# Patient Record
Sex: FEMALE | Race: BLACK OR AFRICAN AMERICAN | NOT HISPANIC OR LATINO | Employment: OTHER | URBAN - METROPOLITAN AREA
[De-identification: names, ages, dates, MRNs, and addresses within clinical notes are randomized per-mention and may not be internally consistent; named-entity substitution may affect disease eponyms.]

---

## 2019-05-01 ENCOUNTER — HOSPITAL ENCOUNTER (OUTPATIENT)
Dept: URGENT CARE | Facility: CLINIC | Age: 39
Discharge: HOME | End: 2019-05-01

## 2020-05-30 ENCOUNTER — HOSPITAL ENCOUNTER (OUTPATIENT)
Dept: URGENT CARE | Facility: CLINIC | Age: 40
Discharge: HOME | End: 2020-05-30
Attending: FAMILY MEDICINE

## 2020-08-26 ENCOUNTER — HOSPITAL ENCOUNTER (OUTPATIENT)
Dept: URGENT CARE | Facility: CLINIC | Age: 40
Discharge: HOME | End: 2020-08-26

## 2020-08-29 LAB — SARS-COV-2 RNA SPEC QL NAA+PROBE: NOT DETECTED

## 2020-09-24 ENCOUNTER — HOSPITAL ENCOUNTER (OUTPATIENT)
Dept: URGENT CARE | Facility: CLINIC | Age: 40
Discharge: HOME | End: 2020-09-24

## 2021-01-08 ENCOUNTER — HOSPITAL ENCOUNTER (OUTPATIENT)
Dept: OTHER | Facility: HOSPITAL | Age: 41
Discharge: HOME | End: 2021-01-08
Attending: INTERNAL MEDICINE

## 2021-02-02 ENCOUNTER — HOSPITAL ENCOUNTER (OUTPATIENT)
Dept: VACCINE CLINIC | Facility: HOSPITAL | Age: 41
Discharge: HOME | End: 2021-02-02
Attending: INTERNAL MEDICINE

## 2021-09-10 ENCOUNTER — OFFICE VISIT (OUTPATIENT)
Dept: INTERNAL MEDICINE | Facility: CLINIC | Age: 41
End: 2021-09-10

## 2021-09-10 ENCOUNTER — TELEPHONE (OUTPATIENT)
Dept: INTERNAL MEDICINE | Facility: CLINIC | Age: 41
End: 2021-09-10

## 2021-09-10 VITALS
HEART RATE: 101 BPM | TEMPERATURE: 97.6 F | DIASTOLIC BLOOD PRESSURE: 84 MMHG | SYSTOLIC BLOOD PRESSURE: 124 MMHG | OXYGEN SATURATION: 98 % | WEIGHT: 253.6 LBS | BODY MASS INDEX: 43.29 KG/M2 | HEIGHT: 64 IN

## 2021-09-10 DIAGNOSIS — L73.2 HIDRADENITIS SUPPURATIVA: ICD-10-CM

## 2021-09-10 DIAGNOSIS — Z13.220 SCREENING FOR LIPID DISORDERS: ICD-10-CM

## 2021-09-10 DIAGNOSIS — M47.817 LUMBOSACRAL SPONDYLOSIS WITHOUT MYELOPATHY: ICD-10-CM

## 2021-09-10 DIAGNOSIS — D25.2 INTRAMURAL AND SUBSEROUS LEIOMYOMA OF UTERUS: ICD-10-CM

## 2021-09-10 DIAGNOSIS — R05.9 COUGH: ICD-10-CM

## 2021-09-10 DIAGNOSIS — F41.1 GAD (GENERALIZED ANXIETY DISORDER): ICD-10-CM

## 2021-09-10 DIAGNOSIS — D25.1 INTRAMURAL AND SUBSEROUS LEIOMYOMA OF UTERUS: ICD-10-CM

## 2021-09-10 DIAGNOSIS — F33.42 RECURRENT MAJOR DEPRESSIVE DISORDER, IN FULL REMISSION (HCC): ICD-10-CM

## 2021-09-10 DIAGNOSIS — G43.829 MENSTRUAL MIGRAINE WITHOUT STATUS MIGRAINOSUS, NOT INTRACTABLE: Primary | ICD-10-CM

## 2021-09-10 DIAGNOSIS — L40.9 PSORIASIS: ICD-10-CM

## 2021-09-10 DIAGNOSIS — J30.2 SEASONAL ALLERGIES: ICD-10-CM

## 2021-09-10 PROBLEM — F43.10 PTSD (POST-TRAUMATIC STRESS DISORDER): Status: ACTIVE | Noted: 2021-09-10

## 2021-09-10 LAB
EXPIRATION DATE: NORMAL
FLUAV AG UPPER RESP QL IA.RAPID: NOT DETECTED
FLUBV AG UPPER RESP QL IA.RAPID: NOT DETECTED
INTERNAL CONTROL: NORMAL
Lab: NORMAL
SARS-COV-2 AG UPPER RESP QL IA.RAPID: NOT DETECTED

## 2021-09-10 PROCEDURE — U0004 COV-19 TEST NON-CDC HGH THRU: HCPCS | Performed by: INTERNAL MEDICINE

## 2021-09-10 PROCEDURE — 87428 SARSCOV & INF VIR A&B AG IA: CPT | Performed by: INTERNAL MEDICINE

## 2021-09-10 PROCEDURE — 99204 OFFICE O/P NEW MOD 45 MIN: CPT | Performed by: INTERNAL MEDICINE

## 2021-09-10 RX ORDER — ADALIMUMAB 80MG/0.8ML
KIT SUBCUTANEOUS
COMMUNITY
End: 2021-10-13

## 2021-09-10 RX ORDER — ATOMOXETINE 60 MG/1
CAPSULE ORAL
COMMUNITY
Start: 2021-06-28 | End: 2021-09-29 | Stop reason: SDUPTHER

## 2021-09-10 RX ORDER — IBUPROFEN 800 MG/1
TABLET ORAL
COMMUNITY
Start: 2021-06-29 | End: 2022-07-22 | Stop reason: SDUPTHER

## 2021-09-10 RX ORDER — BUSPIRONE HYDROCHLORIDE 10 MG/1
TABLET ORAL
COMMUNITY
Start: 2021-06-28 | End: 2021-12-14 | Stop reason: SDUPTHER

## 2021-09-10 RX ORDER — ATOMOXETINE 40 MG/1
CAPSULE ORAL
COMMUNITY
Start: 2021-06-28 | End: 2021-09-29

## 2021-09-10 RX ORDER — TOPIRAMATE 100 MG/1
TABLET, FILM COATED ORAL
COMMUNITY
Start: 2021-08-26 | End: 2021-09-11 | Stop reason: SDUPTHER

## 2021-09-10 RX ORDER — CETIRIZINE HYDROCHLORIDE 10 MG/1
TABLET ORAL
COMMUNITY
Start: 2021-07-23 | End: 2021-09-11 | Stop reason: SDUPTHER

## 2021-09-10 RX ORDER — DULOXETIN HYDROCHLORIDE 60 MG/1
CAPSULE, DELAYED RELEASE ORAL
COMMUNITY
Start: 2021-06-28 | End: 2022-01-14 | Stop reason: SDUPTHER

## 2021-09-10 RX ORDER — GABAPENTIN 600 MG/1
TABLET ORAL
COMMUNITY
Start: 2021-08-15

## 2021-09-10 RX ORDER — DULOXETIN HYDROCHLORIDE 60 MG/1
CAPSULE, DELAYED RELEASE ORAL
COMMUNITY
End: 2021-09-29 | Stop reason: SDUPTHER

## 2021-09-10 RX ORDER — ESZOPICLONE 3 MG/1
TABLET, FILM COATED ORAL
COMMUNITY
Start: 2021-07-23 | End: 2021-12-28

## 2021-09-10 RX ORDER — BENZONATATE 100 MG/1
CAPSULE ORAL
COMMUNITY
Start: 2021-08-13 | End: 2021-09-13 | Stop reason: ALTCHOICE

## 2021-09-10 RX ORDER — CYCLOBENZAPRINE HCL 10 MG
TABLET ORAL
COMMUNITY
Start: 2021-07-28 | End: 2022-02-15

## 2021-09-10 NOTE — TELEPHONE ENCOUNTER
PT VERIFIED      CONTACTED PT PER PROVIDER'S INSTRUCTIONS       PATIENT NOTIFIED OF NEGATIVE POCT COVID RESULTS.

## 2021-09-10 NOTE — PROGRESS NOTES
"Chief Complaint  Establish Care (was being seen on [facility] office)    Subjective          Malinda Saucedo presents to [facility]  History of Present Illness  Recent skin Bx that was inconclusive. Thought to be panniculitis  Pt also with labs consistent with sjogren's  Psoriasis and arthritis- doing well at thi time.   hidranitis suppuritiva- on Humira with good results.   Pt also f/u with OB for leiomyoma.   DDD - pt receives lumbar spin injections at [facility]. Pt also gets gabapentin, flexeril, motrin to use for pain.   Pt tried using topamax for weight loss. Pt reports having topamax to help with migraines. Pt does reports some sleepiness on topamax when taking it at night.   Insomnia- pt is on lunesta to help with sleep.   Pt also developed a cough over the last 24 hours. Very mild symptoms without fevers or SOB.     Objective   Vital Signs:   /84 (BP Location: Left arm, Patient Position: Sitting, Cuff Size: Large Adult)   Pulse 101   Temp 97.6 °F (36.4 °C) (Temporal)   Ht 162.6 cm (64\")   Wt 115 kg (253 lb 9.6 oz)   SpO2 98%   BMI 43.53 kg/m²     Physical Exam   Appearance: No acute distress, well-nourished  Head: normocephalic, atraumatic  Eyes: extraocular movements intact, no scleral icterus, no conjunctival injection  Ears, Nose, and Throat: external ears normal, nares patent, moist mucous membranes  Cardiovascular: regular rate and rhythm. no murmurs, rales, or rhonchi. no edema  Respiratory: breathing comfortably, symmetric chest rise, clear to auscultation bilaterally. No wheezes, rales, or rhonchi.  Neuro: alert and oriented to time, place, and person. Normal gait  Psych: normal mood and affect     Result Review :   The following data was reviewed by: Ant Carlos Jr, MD on 09/10/2021:  Common labs    Common Labsle 9/22/20 9/22/20    0845 0845   Glucose  95   BUN  10   Creatinine  1.68 (A)   Sodium  136 "   Potassium  3.8   Chloride  100   Calcium  9.0   Albumin  4.2   Total Bilirubin  0.37   Alkaline Phosphatase  116 (A)   AST (SGOT)  29   ALT (SGPT)  32   WBC 6.62    Hemoglobin 14.3    Hematocrit 43.7    Platelets 221    (A) Abnormal value            Assessment and Plan    Diagnoses and all orders for this visit:    1. Menstrual migraine without status migrainosus, not intractable (Primary)  Comments:  also used to aid in weigh tloss efforts.   Orders:  -     topiramate (TOPAMAX) 100 MG tablet; Take 1 tablet by mouth Daily.  Dispense: 90 tablet; Refill: 3    2. Screening for lipid disorders  -     Lipid panel; Future    3. Cough  -     POCT SARS-CoV-2 Antigen BIANCA  -     COVID-19 PCR, [facility], NP SWAB IN [facility]EX[facility] VIRAL TRANSPORT MEDIA/ORAL SWISH 24-30 HR TAT - Swab, Nasopharynx    4. YENNY (generalized anxiety disorder)  -     Ambulatory Referral to Psychiatry    5. Recurrent major depressive disorder, in full remission (CMS/HCC)  -     Ambulatory Referral to Psychiatry    6. Intramural and subserous leiomyoma of uterus  Comments:  cont f/u with OB for management.     7. Hidradenitis suppurativa  Comments:  cont f/u with derm. seems to be oing well on humira.     8. Psoriasis  Comments:  likely receieving benefit from humira. cont monitoring.     9. Lumbosacral spondylosis without myelopathy  Comments:  f/u with pain management for pain control regimen and spinal injections.    10. Seasonal allergies  -     cetirizine (zyrTEC) 10 MG tablet; Take 1 tablet by mouth Daily.  Dispense: 90 tablet; Refill: 3        Follow Up   No follow-ups on file.  Patient was given instructions and counseling regarding her condition or for health maintenance advice. Please see specific information pulled into the AVS if appropriate.

## 2021-09-11 PROBLEM — O00.90 ECTOPIC PREGNANCY WITHOUT INTRAUTERINE PREGNANCY: Status: ACTIVE | Noted: 2019-01-25

## 2021-09-11 PROBLEM — D25.2 INTRAMURAL AND SUBSEROUS LEIOMYOMA OF UTERUS: Status: ACTIVE | Noted: 2019-02-12

## 2021-09-11 PROBLEM — D68.59 THROMBOPHILIA: Status: ACTIVE | Noted: 2019-02-12

## 2021-09-11 PROBLEM — F41.9 ANXIETY: Status: RESOLVED | Noted: 2021-02-03 | Resolved: 2021-09-11

## 2021-09-11 PROBLEM — J30.2 SEASONAL ALLERGIES: Status: ACTIVE | Noted: 2021-09-11

## 2021-09-11 PROBLEM — N80.9 ENDOMETRIOSIS DETERMINED BY LAPAROSCOPY: Status: ACTIVE | Noted: 2020-06-11

## 2021-09-11 PROBLEM — D25.1 INTRAMURAL AND SUBSEROUS LEIOMYOMA OF UTERUS: Status: ACTIVE | Noted: 2019-02-12

## 2021-09-11 PROBLEM — L40.9 PSORIASIS: Status: ACTIVE | Noted: 2021-02-03

## 2021-09-11 PROBLEM — F41.9 ANXIETY: Status: ACTIVE | Noted: 2021-02-03

## 2021-09-11 PROBLEM — L73.2 HIDRADENITIS SUPPURATIVA: Status: ACTIVE | Noted: 2021-02-03

## 2021-09-11 PROBLEM — F32.81 PMDD (PREMENSTRUAL DYSPHORIC DISORDER): Status: ACTIVE | Noted: 2018-11-20

## 2021-09-11 PROBLEM — F90.9 ATTENTION DEFICIT HYPERACTIVITY DISORDER: Status: ACTIVE | Noted: 2021-02-03

## 2021-09-11 PROBLEM — M47.817 LUMBOSACRAL SPONDYLOSIS WITHOUT MYELOPATHY: Status: ACTIVE | Noted: 2021-09-11

## 2021-09-11 LAB — SARS-COV-2 RNA NOSE QL NAA+PROBE: NOT DETECTED

## 2021-09-11 RX ORDER — TOPIRAMATE 100 MG/1
100 TABLET, FILM COATED ORAL DAILY
Qty: 90 TABLET | Refills: 3 | Status: SHIPPED | OUTPATIENT
Start: 2021-09-11 | End: 2021-10-13

## 2021-09-11 RX ORDER — CETIRIZINE HYDROCHLORIDE 10 MG/1
10 TABLET ORAL DAILY
Qty: 90 TABLET | Refills: 3 | Status: SHIPPED | OUTPATIENT
Start: 2021-09-11 | End: 2022-02-18 | Stop reason: SDUPTHER

## 2021-09-13 ENCOUNTER — TELEMEDICINE (OUTPATIENT)
Dept: FAMILY MEDICINE CLINIC | Facility: TELEHEALTH | Age: 41
End: 2021-09-13

## 2021-09-13 VITALS — TEMPERATURE: 99.7 F

## 2021-09-13 DIAGNOSIS — J32.9 SINUSITIS, UNSPECIFIED CHRONICITY, UNSPECIFIED LOCATION: Primary | ICD-10-CM

## 2021-09-13 PROBLEM — F33.42 RECURRENT MAJOR DEPRESSIVE DISORDER, IN FULL REMISSION: Status: RESOLVED | Noted: 2021-09-10 | Resolved: 2021-09-13

## 2021-09-13 PROBLEM — F32.4 MAJOR DEPRESSIVE DISORDER IN PARTIAL REMISSION: Status: ACTIVE | Noted: 2021-09-13

## 2021-09-13 PROCEDURE — BHEMPVIDEOVISIT: Performed by: NURSE PRACTITIONER

## 2021-09-13 RX ORDER — BENZONATATE 200 MG/1
200 CAPSULE ORAL 3 TIMES DAILY PRN
Qty: 30 CAPSULE | Refills: 0 | Status: SHIPPED | OUTPATIENT
Start: 2021-09-13 | End: 2021-09-23

## 2021-09-13 RX ORDER — METHYLPREDNISOLONE 4 MG/1
TABLET ORAL
Qty: 1 EACH | Refills: 0 | Status: SHIPPED | OUTPATIENT
Start: 2021-09-13 | End: 2021-10-13

## 2021-09-13 RX ORDER — AMOXICILLIN AND CLAVULANATE POTASSIUM 875; 125 MG/1; MG/1
1 TABLET, FILM COATED ORAL 2 TIMES DAILY
Qty: 14 TABLET | Refills: 0 | Status: SHIPPED | OUTPATIENT
Start: 2021-09-13 | End: 2021-09-20

## 2021-09-13 NOTE — PROGRESS NOTES
Chief Complaint  URI (Covid tested negative a few days ago)    Subjective          Malinda Saucedo presents to [facility] for   History of seasonal allergies with runny nose, nasal congestion, sinus congestions and pressure that has worsened over the last 7-10 days. She was tested twice in the last week or so for Covid and negative. She has been fully vaccinated. She is on Humira.    URI   This is a new problem. Episode onset: over a week ago. The problem has been gradually worsening. There has been no fever. Associated symptoms include congestion (nasal congestion, sinus congestion), coughing, headaches, rhinorrhea and sinus pain. Pertinent negatives include no diarrhea, nausea, rash, sore throat, swollen glands, vomiting or wheezing. She has tried antihistamine and decongestant for the symptoms. The treatment provided no relief.       Review of Systems   Constitutional: Negative for chills and fever.   HENT: Positive for congestion (nasal congestion, sinus congestion), postnasal drip, rhinorrhea, sinus pressure and sinus pain. Negative for facial swelling and sore throat.    Eyes: Negative.    Respiratory: Positive for cough. Negative for shortness of breath and wheezing.    Gastrointestinal: Negative for diarrhea, nausea and vomiting.   Skin: Negative for rash.   Allergic/Immunologic: Positive for environmental allergies.   Neurological: Positive for dizziness and headaches.   Hematological: Negative for adenopathy.       Objective   Vital Signs:   Temp 99.7 °F (37.6 °C) (Oral)     Physical Exam  HENT:      Nose: Congestion present.      Right Sinus: Maxillary sinus tenderness present.   Eyes:      Conjunctiva/sclera: Conjunctivae normal.   Pulmonary:      Effort: Pulmonary effort is normal.   Neurological:      Mental Status: She is alert.        Result Review :                 Assessment and Plan    Problem List Items Addressed This Visit     None      Visit Diagnoses      "Sinusitis, unspecified chronicity, unspecified location    -  Primary    Relevant Medications    amoxicillin-clavulanate (AUGMENTIN) 875-125 MG per tablet    methylPREDNISolone (MEDROL) 4 MG dose pack    benzonatate (TESSALON) 200 MG capsule          This visit was performed via Telehealth.    cool mist humidifier to sooth sinus congestion and inflammation, warm compresses to face for sinus pain and pressure relief, Nasal rinses to clear mucous. Over the Counter medications can help with symptoms as follows:    NSAIDs (Non-Steroidal Anti-Inflammatory Drugs) such as Ibuprofen are more beneficial for pain and inflammation. (May not be appropriate if you have High blood Pressure, Ulcers or Hx of GI bleeding).  Mucinex can thin the mucous and secretions to help clear the \"snot\" so that you can breath easier. Blow your nose often, use nasal rinse to clear as needed.  Decongestants- oral such as Sudafed (if no heart disease or Hypertension) or nasal for 2-3 days only to help breath through your nose easier and relieve nasal congestion.  Antihistamine-use if seasonal allergies are a problem and you feel like you have drainage that could be traveling down the back of the throat and causing cough and/or sore throat.  Steroid nasal spray such as Flonase-help to decrease the inflammation in your nasal and sinus passages and decrease the mucous and nasal congestion.       Follow up with PCP, Urgent Care or Video Visit if symptoms have not resolved in 7-10 days. If symptoms worsen, go to Urgent Care or follow up with PCP. If you develop high fever, chest pain, shortness of breath or any life-threatening symptoms, go to nearest Emergency Department.      I spent 20 minutes caring for Malinda on this date of service. This time includes time spent by me in the following activities:preparing for the visit, obtaining and/or reviewing a separately obtained history, performing a medically appropriate examination and/or evaluation , " counseling and educating the patient/family/caregiver, ordering medications, tests, or procedures and documenting information in the medical record  Follow Up   No follow-ups on file.  Patient was given instructions and counseling regarding her condition or for health maintenance advice. Please see specific information pulled into the AVS if appropriate.

## 2021-09-13 NOTE — PATIENT INSTRUCTIONS
"  cool mist humidifier to sooth sinus congestion and inflammation, warm compresses to face for sinus pain and pressure relief, Nasal rinses to clear mucous. Over the Counter medications can help with symptoms as follows:    NSAIDs (Non-Steroidal Anti-Inflammatory Drugs) such as Ibuprofen are more beneficial for pain and inflammation. (May not be appropriate if you have High blood Pressure, Ulcers or Hx of GI bleeding).  Mucinex can thin the mucous and secretions to help clear the \"snot\" so that you can breath easier. Blow your nose often, use nasal rinse to clear as needed.  Decongestants- oral such as Sudafed (if no heart disease or Hypertension) or nasal for 2-3 days only to help breath through your nose easier and relieve nasal congestion.  Antihistamine-use if seasonal allergies are a problem and you feel like you have drainage that could be traveling down the back of the throat and causing cough and/or sore throat.  Steroid nasal spray such as Flonase-help to decrease the inflammation in your nasal and sinus passages and decrease the mucous and nasal congestion.       Sinusitis, Adult  Sinusitis is soreness and swelling (inflammation) of your sinuses. Sinuses are hollow spaces in the bones around your face. They are located:  · Around your eyes.  · In the middle of your forehead.  · Behind your nose.  · In your cheekbones.  Your sinuses and nasal passages are lined with a fluid called mucus. Mucus drains out of your sinuses. Swelling can trap mucus in your sinuses. This lets germs (bacteria, virus, or fungus) grow, which leads to infection. Most of the time, this condition is caused by a virus.  What are the causes?  This condition is caused by:  · Allergies.  · Asthma.  · Germs.  · Things that block your nose or sinuses.  · Growths in the nose (nasal polyps).  · Chemicals or irritants in the air.  · Fungus (rare).  What increases the risk?  You are more likely to develop this condition if:  · You have a weak " body defense system (immune system).  · You do a lot of swimming or diving.  · You use nasal sprays too much.  · You smoke.  What are the signs or symptoms?  The main symptoms of this condition are pain and a feeling of pressure around the sinuses. Other symptoms include:  · Stuffy nose (congestion).  · Runny nose (drainage).  · Swelling and warmth in the sinuses.  · Headache.  · Toothache.  · A cough that may get worse at night.  · Mucus that collects in the throat or the back of the nose (postnasal drip).  · Being unable to smell and taste.  · Being very tired (fatigue).  · A fever.  · Sore throat.  · Bad breath.  How is this diagnosed?  This condition is diagnosed based on:  · Your symptoms.  · Your medical history.  · A physical exam.  · Tests to find out if your condition is short-term (acute) or long-term (chronic). Your doctor may:  ? Check your nose for growths (polyps).  ? Check your sinuses using a tool that has a light (endoscope).  ? Check for allergies or germs.  ? Do imaging tests, such as an MRI or CT scan.  How is this treated?  Treatment for this condition depends on the cause and whether it is short-term or long-term.  · If caused by a virus, your symptoms should go away on their own within 10 days. You may be given medicines to relieve symptoms. They include:  ? Medicines that shrink swollen tissue in the nose.  ? Medicines that treat allergies (antihistamines).  ? A spray that treats swelling of the nostrils.   ? Rinses that help get rid of thick mucus in your nose (nasal saline washes).  · If caused by bacteria, your doctor may wait to see if you will get better without treatment. You may be given antibiotic medicine if you have:  ? A very bad infection.  ? A weak body defense system.  · If caused by growths in the nose, you may need to have surgery.  Follow these instructions at home:  Medicines  · Take, use, or apply over-the-counter and prescription medicines only as told by your doctor.  These may include nasal sprays.  · If you were prescribed an antibiotic medicine, take it as told by your doctor. Do not stop taking the antibiotic even if you start to feel better.  Hydrate and humidify    · Drink enough water to keep your pee (urine) pale yellow.  · Use a cool mist humidifier to keep the humidity level in your home above 50%.  · Breathe in steam for 10-15 minutes, 3-4 times a day, or as told by your doctor. You can do this in the bathroom while a hot shower is running.  · Try not to spend time in cool or dry air.    Rest  · Rest as much as you can.  · Sleep with your head raised (elevated).  · Make sure you get enough sleep each night.  General instructions    · Put a warm, moist washcloth on your face 3-4 times a day, or as often as told by your doctor. This will help with discomfort.  · Wash your hands often with soap and water. If there is no soap and water, use hand .  · Do not smoke. Avoid being around people who are smoking (secondhand smoke).  · Keep all follow-up visits as told by your doctor. This is important.    Contact a doctor if:  · You have a fever.  · Your symptoms get worse.  · Your symptoms do not get better within 10 days.  Get help right away if:  · You have a very bad headache.  · You cannot stop throwing up (vomiting).  · You have very bad pain or swelling around your face or eyes.  · You have trouble seeing.  · You feel confused.  · Your neck is stiff.  · You have trouble breathing.  Summary  · Sinusitis is swelling of your sinuses. Sinuses are hollow spaces in the bones around your face.  · This condition is caused by tissues in your nose that become inflamed or swollen. This traps germs. These can lead to infection.  · If you were prescribed an antibiotic medicine, take it as told by your doctor. Do not stop taking it even if you start to feel better.  · Keep all follow-up visits as told by your doctor. This is important.  This information is not intended to  replace advice given to you by your health care provider. Make sure you discuss any questions you have with your health care provider.  Document Revised: 05/20/2019 Document Reviewed: 05/20/2019  Elsevier Patient Education © 2021 [employer]

## 2021-09-29 ENCOUNTER — TELEMEDICINE (OUTPATIENT)
Dept: PSYCHIATRY | Facility: CLINIC | Age: 41
End: 2021-09-29

## 2021-09-29 DIAGNOSIS — F33.1 MAJOR DEPRESSIVE DISORDER, RECURRENT EPISODE, MODERATE (HCC): Primary | ICD-10-CM

## 2021-09-29 DIAGNOSIS — F41.1 GENERALIZED ANXIETY DISORDER: ICD-10-CM

## 2021-09-29 DIAGNOSIS — F51.05 INSOMNIA DUE TO MENTAL CONDITION: ICD-10-CM

## 2021-09-29 PROCEDURE — 90792 PSYCH DIAG EVAL W/MED SRVCS: CPT | Performed by: STUDENT IN AN ORGANIZED HEALTH CARE EDUCATION/TRAINING PROGRAM

## 2021-09-29 RX ORDER — BUPROPION HYDROCHLORIDE 300 MG/1
TABLET ORAL
COMMUNITY
Start: 2021-09-23 | End: 2022-04-18 | Stop reason: SDUPTHER

## 2021-09-29 RX ORDER — TRIAMCINOLONE ACETONIDE 1 MG/G
CREAM TOPICAL
COMMUNITY
Start: 2021-09-10 | End: 2021-10-13

## 2021-09-29 RX ORDER — ATOMOXETINE 60 MG/1
60 CAPSULE ORAL DAILY
Qty: 30 CAPSULE | Refills: 2 | Status: SHIPPED | OUTPATIENT
Start: 2021-09-29 | End: 2021-10-27 | Stop reason: SDUPTHER

## 2021-09-29 RX ORDER — DULOXETIN HYDROCHLORIDE 30 MG/1
30 CAPSULE, DELAYED RELEASE ORAL DAILY
Qty: 30 CAPSULE | Refills: 2 | Status: SHIPPED | OUTPATIENT
Start: 2021-09-29 | End: 2021-11-10 | Stop reason: SDUPTHER

## 2021-09-29 NOTE — PATIENT INSTRUCTIONS
1.  Please return to clinic at your next scheduled visit.  Contact the clinic ([phone]) at least 24 hours prior in the event you need to cancel.  2.  Do no harm to yourself or others.    3.  Avoid alcohol and drugs.    4.  Take all medications as prescribed.  Please contact the clinic with any concerns. If you are in need of medication refills, please call the clinic at [phone].    5. Should you want to get in touch with your provider, Dr. Brooks Lynn, please utilize [employer] or contact the office ([phone]), and staff will be able to page Dr. Lynn directly.  6.  In the event you have personal crisis, contact the following crisis numbers: Suicide Prevention Hotline 1-658.578.8313; [facility] Helpline 3-317-347-NKCL; [facility] Emergency Room [phone]; text HELLO to 015110; or 712.     SPECIFIC RECOMMENDATIONS:     1.      Medications discussed at this encounter:                   -      2.      Psychotherapy recommendations:      3.     Return to clinic: 6 weeks

## 2021-09-29 NOTE — PROGRESS NOTES
"Subjective   Malinda Sue Saucedo is a 41 y.o. female who presents today for initial evaluation     Referring Provider:  Ant Carlos Jr., MD  [address] [facility]  [address]  [place] [address]    Chief Complaint:  mdd yenny    History of Present Illness:     Chart review 9/29: Seen September 10 to establish care.  Previously was at [facility] office.  Labs are consistent with Sjogren's.  Psoriasis and arthritis are under control.  On Topamax for migraines.  On Lunesta for insomnia.  History of anxiety and depression.  On gabapentin 600 mg 3 times daily, Strattera 100 mg daily, BuSpar 10 mg, duloxetine 60 mg, bupropion 300 mg.  Denied anxiety in January 2019.  Has been on Prozac in the past.  BMP demonstrates creatinine of 1.68, alk phos of 116, otherwise unremarkable.  hCG was followed in 2019.  CBC unremarkable, no head imaging or EKG.    \"Malinda\"    9/29: Virtual visit via Zoom audio and video due to the COVID-19 pandemic.  Patient is accepting of and agreeable to visit.  The visit consisted of the patient and I.  Interview:  1. Her story: \"Well, it started in [employer].\"  a. Originally PMDD (2009) for years  b. Got out 2011  c. Then dx'd with depression and YENNY  d. Has been on medications since  i. Was on prozac from 2009 until 2015, stopped due to pregnancy  ii. 2018, started wellbutrin only. Which was horrible by itself. They added buspar 10 mg bid, with it. Then duloxetine 60 mg daily added.   iii. Now on the above, and also on strattera 100 mg daily for ADHD. Also on gabapentin 600 tid.  e. Stimulants put her to sleep: adderall, vyvanse.  Did not try extended release formulations.  2. Mood: much better than it was in the past, but still could use some help.  3. Stressors:  a. Niece started having seizures at 21 and lost her memory, doesn't remember anyone or anybody.  b. In the middle of buying a house  c. Finances  d. Starting my own practice.  e. Son having school problems; hanging with \"bad " "kids.\"  4. Anxiety: manageable.  a. Worrying a lot  5. Coping: goes to [employer], trusts in God  6. Sleeping: yes  7. Eating: stable  8. Medication compliant: yes  9. Psych ROS: D, A.  Negative for psychosis.  Unclear gorge.  10. No SI HI AVH    Depression:  11. Anhedonia: denies  12. Guilt or hopelessness:   a. Feelings of guilt about how she could have done more for her nieces and nephews  13. Energy: horrible  14. Concentration: \"I have to force myself to focus.\"  a. If stops to eat, it ends her day  15. Weight loss or weight gain: stable over last few months, on weight loss pills, however  16. Psychomotor retardation or agitation: frequently  17. Insomnia: yes, on the lunesta  18. Duration: for years      Access to Firearms: yes, locked away    PHQ-9 Depression Screening  PHQ-9 Total Score:      Little interest or pleasure in doing things?     Feeling down, depressed, or hopeless?     Trouble falling or staying asleep, or sleeping too much?     Feeling tired or having little energy?     Poor appetite or overeating?     Feeling bad about yourself - or that you are a failure or have let yourself or your family down?     Trouble concentrating on things, such as reading the newspaper or watching television?     Moving or speaking so slowly that other people could have noticed? Or the opposite - being so fidgety or restless that you have been moving around a lot more than usual?     Thoughts that you would be better off dead, or of hurting yourself in some way?     PHQ-9 Total Score       YENNY-7       Past Surgical History:  Past Surgical History:   Procedure Laterality Date   •  SECTION     • CYSTECTOMY     • MYOMECTOMY         Problem List:  Patient Active Problem List   Diagnosis   • YENNY (generalized anxiety disorder)   • PTSD (post-traumatic stress disorder)   • Attention deficit hyperactivity disorder   • Ectopic pregnancy without intrauterine pregnancy   • Hidradenitis suppurativa   • Intramural and " subserous leiomyoma of uterus   • Lumbosacral spondylosis without myelopathy   • Endometriosis determined by laparoscopy   • PMDD (premenstrual dysphoric disorder)   • Psoriasis   • Thrombophilia (CMS/HCC)   • Seasonal allergies   • Major depressive disorder in partial remission (CMS/HCC)       Allergy:   Allergies   Allergen Reactions   • Methotrexate Rash        Discontinued Medications:  Medications Discontinued During This Encounter   Medication Reason   • buPROPion HCl (WELLBUTRIN PO)    • DULoxetine (CYMBALTA) 60 MG capsule Duplicate order   • atomoxetine (STRATTERA) 40 MG capsule Not Efficacious   • atomoxetine (STRATTERA) 60 MG capsule Reorder       Current Medications:   Current Outpatient Medications   Medication Sig Dispense Refill   • Adalimumab (Humira Pen) 80 MG/0.8ML injection      • atomoxetine (STRATTERA) 60 MG capsule Take 1 capsule by mouth Daily. 30 capsule 2   • buPROPion XL (WELLBUTRIN XL) 300 MG 24 hr tablet      • busPIRone (BUSPAR) 10 MG tablet      • cetirizine (zyrTEC) 10 MG tablet Take 1 tablet by mouth Daily. 90 tablet 3   • cyclobenzaprine (FLEXERIL) 10 MG tablet      • eszopiclone (LUNESTA) 3 MG tablet      • gabapentin (NEURONTIN) 600 MG tablet TAKE 1 TABLET BY MOUTH EVERY 8 HOURS AS DIRECTED     • ibuprofen (ADVIL,MOTRIN) 800 MG tablet      • topiramate (TOPAMAX) 100 MG tablet Take 1 tablet by mouth Daily. 90 tablet 3   • DULoxetine (CYMBALTA) 30 MG capsule Take 1 capsule by mouth Daily. 30 capsule 2   • DULoxetine (CYMBALTA) 60 MG capsule      • methylPREDNISolone (MEDROL) 4 MG dose pack Take as directed on package instructions. 1 each 0   • triamcinolone (KENALOG) 0.1 % cream        No current facility-administered medications for this visit.       Past Medical History:  Past Medical History:   Diagnosis Date   • Allergic    • Anxiety    • Arthritis    • Chronic pain disorder    • Deep vein thrombosis (CMS/HCC)    • Depression    • Endometriosis    • Fibroids    • Hidradenitis         Past Psychiatric History:  Began Treatment:   Diagnoses: D, A, insomnia  Psychiatrist: Last was months ago for a couple times; previously NP for 2 years  Therapist: yes, therapy has not helped, two bad experiences  Admission History: denies  Medication Trials: see hpi  Self Harm: Denies  Suicide Attempts:Denies   Psychosis, Anxiety, Depression: denies    Substance Abuse History:   Types:Denies all, including illicit  Withdrawal Symptoms:Denies  Longest Period Sober:Not Applicable   AA: Not applicable     Social History:  Martial Status:  Employed: therapist, 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  Kids: one  House: apartment   History: army, honorable discharge, see hpi    Social History     Socioeconomic History   • Marital status:      Spouse name: Not on file   • Number of children: Not on file   • Years of education: Not on file   • Highest education level: Not on file   Tobacco Use   • Smoking status: Never Smoker   • Smokeless tobacco: Never Used   Vaping Use   • Vaping Use: Never used   Substance and Sexual Activity   • Alcohol use: Not Currently   • Drug use: Never   • Sexual activity: Defer       Family History:   Suicide Attempts:  1st cousin, maternal; another 1st cousin maternal  Suicide Completions: 1st cousin, maternal  Dx'd her sister herself that she has bipolar.    Family History   Problem Relation Age of Onset   • ADD / ADHD Mother    • OCD Mother    • ADD / ADHD Sister    • Anxiety disorder Sister    • Bipolar disorder Sister    • Drug abuse Maternal Aunt    • Alcohol abuse Maternal Uncle    • Dementia Maternal Uncle    • Drug abuse Maternal Uncle    • Schizophrenia Maternal Uncle    • Depression Maternal Grandmother    • ADD / ADHD Cousin    • OCD Cousin    • Suicide Attempts Cousin    • Seizures Niece        Developmental History:   Born: [place]  Siblings: 1 sister, older cousin who lived with them  Childhood: no abuse  High School:Completed  College: 4 yrs at [employer]  "[employer] 3 years degree in counseling    · Mental Status Exam  · Appearance  · : groomed, good eye contact, normal street clothes  · Behavior  · : pleasant and cooperative  · Motor  · : No abnormal  · Speech  · :normal rhythm, rate, volume, tone, not hyperverbal, not pressured, normal prosidy  · Mood  · : \"still depressed\"  · Affect  · : euthymic, mood incongruent, good variability  · Thought Content  · : negative suicidal ideations, negative homicidal ideations, negative obsessions  · Perceptions  · : negative auditory hallucinations, negative visual hallucinations  · Thought Process  · : sometimes circumstantial  · Insight/Judgement  · : Fair/fair  · Cognition  · : grossly intact  · Attention   : intact    Review of Systems:  Review of Systems   Constitutional: Positive for diaphoresis and fatigue.   HENT: Negative for drooling.    Eyes: Negative for visual disturbance.   Respiratory: Negative for cough and shortness of breath.    Cardiovascular: Positive for palpitations and leg swelling. Negative for chest pain.   Gastrointestinal: Positive for nausea. Negative for vomiting.   Endocrine: Positive for cold intolerance and heat intolerance.   Genitourinary: Positive for difficulty urinating.   Musculoskeletal: Positive for joint swelling.   Allergic/Immunologic: Positive for immunocompromised state.   Neurological: Positive for dizziness. Negative for seizures, speech difficulty and numbness.         Physical Exam:  Physical Exam    Vital Signs:   There were no vitals taken for this visit.     Lab Results:   Office Visit on 09/10/2021   Component Date Value Ref Range Status   • SARS Antigen 09/10/2021 Not Detected  Not Detected Final   • Influenza A Antigen BIANCA 09/10/2021 Not Detected   Final   • Influenza B Antigen BIANCA 09/10/2021 Not Detected   Final   • Internal Control 09/10/2021 Passed  Passed Final   • Lot Number 09/10/2021 [id number]   Final   • Expiration Date 09/10/2021 12/22/22   Final   • " SARS-CoV-2 AMRIK 09/10/2021 Not Detected  Not Detected Final       EKG Results:  No orders to display       Imaging Results:  No Images in the past 120 days found..      Assessment/Plan   Diagnoses and all orders for this visit:    1. Major depressive disorder, recurrent episode, moderate (HCC) (Primary)  -     DULoxetine (CYMBALTA) 30 MG capsule; Take 1 capsule by mouth Daily.  Dispense: 30 capsule; Refill: 2  -     atomoxetine (STRATTERA) 60 MG capsule; Take 1 capsule by mouth Daily.  Dispense: 30 capsule; Refill: 2  -     Ambulatory Referral to Behavioral Health    2. Generalized anxiety disorder  -     DULoxetine (CYMBALTA) 30 MG capsule; Take 1 capsule by mouth Daily.  Dispense: 30 capsule; Refill: 2  -     atomoxetine (STRATTERA) 60 MG capsule; Take 1 capsule by mouth Daily.  Dispense: 30 capsule; Refill: 2  -     Ambulatory Referral to Behavioral Health    3. Insomnia due to mental condition        Visit Diagnoses:    ICD-10-CM ICD-9-CM   1. Major depressive disorder, recurrent episode, moderate (HCC)  F33.1 296.32   2. Generalized anxiety disorder  F41.1 300.02   3. Insomnia due to mental condition  F51.05 300.9     327.02     9/29: Records release will be signed by patient.  Patient is unsure if Strattera helped.  Reduce the dose.  Residual depressive symptoms.  Increase duloxetine.  Continue gabapentin and BuSpar.  Therapy referral made.  See back in 2 weeks to try to titrate off of Strattera entirely.  It does not sound like she is ever tried extended release formulations of stimulants for ADHD.  Does not sound like she got neuropsychological testing for ADHD.  Rule out gorge.    PLAN:  19. Safety: No acute safety concerns  20. Therapy:  Referral made.  21. Risk Assessment: Risk of self-harm acutely is moderate.  Risk factors include anxiety disorder, mood disorder, and recent psychosocial stressors (pandemic). Protective factors include no family history, denies access to guns/weapons, no present SI, no  history of suicide attempts or self-harm in the past, minimal AODA, healthcare seeking, future orientation, willingness to engage in care.  Risk of self-harm chronically is also moderate, but could be further elevated in the event of treatment noncompliance and/or AODA.  22. Meds:  a. REDUCE Strattera from 100 mg to 60 mg daily. Risks, benefits, alternatives discussed with patient including nausea, GI upset, sedation, exacerbation of irritability, dry mouth, constipation, urinary hesitancy. After discussion of these risks and benefits, the patient voiced understanding and agreed to proceed.  b. INCREASE duloxetine from 60 to 90 mg a day. Risks, benefits, alternatives discussed with patient including GI upset, nausea vomiting diarrhea, theoretical decrease of seizure threshold predisposing the patient to a slightly higher seizure risk, headaches, sexual dysfunction, serotonin syndrome, bleeding risk, increased suicidality in patients 24 years and younger.  Also constipation and urinary retention.  After discussion of these risks and benefits, the patient voiced understanding and agreed to proceed.  c. CONTINUE BuSpar 10 mg p.o. twice daily. Risks, benefits, alternatives discussed with patient including nausea, GI upset, mild sedation, falls risk.  After discussion of these risks and benefits, the patient voiced understanding and agreed to proceed.  d. CONTINUE bupropion  mg p.o. daily. Risks, benefits, alternatives discussed with patient including nausea, GI upset, increased energy, exacerbation of irritability, insomnia, lowering of seizure threshold.  After discussion of these risks and benefits, the patient voiced understanding and agreed to proceed.  23. Labs: no recs  24. Follow up: 2 wks    Patient screened positive for depression based on a PHQ-9 score of 0 on 9/10/2021. Follow-up recommendations include: Prescribed antidepressant medication treatment.           TREATMENT PLAN/GOALS: Continue supportive  psychotherapy efforts and medications as indicated. Treatment and medication options discussed during today's visit. Patient acknowledged and verbally consented to continue with current treatment plan and was educated on the importance of compliance with treatment and follow-up appointments.    MEDICATION ISSUES:  LILIAN reviewed as expected.  Discussed medication options and treatment plan of prescribed medication as well as the risks, benefits, and side effects including potential falls, possible impaired driving and metabolic adversities among others. Patient is agreeable to call the office with any worsening of symptoms or onset of side effects. Patient is agreeable to call 911 or go to the nearest ER should he/she begin having SI/HI. No medication side effects or related complaints today.     MEDS ORDERED DURING VISIT:  New Medications Ordered This Visit   Medications   • DULoxetine (CYMBALTA) 30 MG capsule     Sig: Take 1 capsule by mouth Daily.     Dispense:  30 capsule     Refill:  2     To be added to the 60 mg cap for a total of 90 mg daily   • atomoxetine (STRATTERA) 60 MG capsule     Sig: Take 1 capsule by mouth Daily.     Dispense:  30 capsule     Refill:  2       Return in about 6 weeks (around 11/10/2021).         This document has been electronically signed by Brooks Lynn MD  September 29, 2021 09:54 EDT      Part of this note may be an electronic transcription/translation of spoken language to printed text using the Dragon Dictation System.

## 2021-10-13 ENCOUNTER — TELEMEDICINE (OUTPATIENT)
Dept: PSYCHIATRY | Facility: CLINIC | Age: 41
End: 2021-10-13

## 2021-10-13 DIAGNOSIS — F41.1 GENERALIZED ANXIETY DISORDER: ICD-10-CM

## 2021-10-13 DIAGNOSIS — F33.1 MAJOR DEPRESSIVE DISORDER, RECURRENT EPISODE, MODERATE (HCC): Primary | ICD-10-CM

## 2021-10-13 DIAGNOSIS — F51.05 INSOMNIA DUE TO MENTAL CONDITION: ICD-10-CM

## 2021-10-13 PROCEDURE — 99214 OFFICE O/P EST MOD 30 MIN: CPT | Performed by: STUDENT IN AN ORGANIZED HEALTH CARE EDUCATION/TRAINING PROGRAM

## 2021-10-13 RX ORDER — PHENDIMETRAZINE TARTRATE 105 MG/1
105 CAPSULE, EXTENDED RELEASE ORAL DAILY
COMMUNITY
End: 2021-12-28

## 2021-10-13 RX ORDER — MEFENAMIC ACID 250 MG/1
CAPSULE ORAL
COMMUNITY
Start: 2021-10-05 | End: 2021-12-28

## 2021-10-13 RX ORDER — CLOBETASOL PROPIONATE 0.46 MG/ML
SOLUTION TOPICAL
COMMUNITY
End: 2023-01-14

## 2021-10-13 RX ORDER — MINOCYCLINE HYDROCHLORIDE 100 MG/1
CAPSULE ORAL
COMMUNITY
Start: 2021-10-12 | End: 2021-11-10

## 2021-10-13 RX ORDER — EPINEPHRINE 0.3 MG/.3ML
INJECTION SUBCUTANEOUS
COMMUNITY
End: 2022-06-21 | Stop reason: SDUPTHER

## 2021-10-13 RX ORDER — BENZONATATE 200 MG/1
CAPSULE ORAL
COMMUNITY
End: 2021-10-13

## 2021-10-13 RX ORDER — AMOXICILLIN AND CLAVULANATE POTASSIUM 875; 125 MG/1; MG/1
TABLET, FILM COATED ORAL
COMMUNITY
End: 2021-11-10

## 2021-10-13 NOTE — PATIENT INSTRUCTIONS
1.  Please return to clinic at your next scheduled visit.  Contact the clinic ([phone]) at least 24 hours prior in the event you need to cancel.  2.  Do no harm to yourself or others.    3.  Avoid alcohol and drugs.    4.  Take all medications as prescribed.  Please contact the clinic with any concerns. If you are in need of medication refills, please call the clinic at [phone].    5. Should you want to get in touch with your provider, Dr. Brooks Lynn, please utilize [employer] or contact the office ([phone]), and staff will be able to page Dr. Lynn directly.  6.  In the event you have personal crisis, contact the following crisis numbers: Suicide Prevention Hotline 1-953.623.4659; KELLY Helpline 3-413-974-AXWD; [facility] Emergency Room [phone]; text HELLO to 555921; or 810.     SPECIFIC RECOMMENDATIONS:     1.      Medications discussed at this encounter:                   - reduce strattera to 40 mg daily     2.      Psychotherapy recommendations:      3.     Return to clinic: 4 weeks

## 2021-10-13 NOTE — PROGRESS NOTES
"Subjective   Malinda Sue Saucedo is a 41 y.o. female who presents today for initial evaluation     Referring Provider:  No referring provider defined for this encounter.    Chief Complaint:  mdd vivi    History of Present Illness:     Chart review 9/29: Seen September 10 to establish care.  Previously was at [facility] office.  Labs are consistent with Sjogren's.  Psoriasis and arthritis are under control.  On Topamax for migraines.  On Lunesta for insomnia.  History of anxiety and depression.  On gabapentin 600 mg 3 times daily, Strattera 100 mg daily, BuSpar 10 mg, duloxetine 60 mg, bupropion 300 mg.  Denied anxiety in January 2019.  Has been on Prozac in the past.  BMP demonstrates creatinine of 1.68, alk phos of 116, otherwise unremarkable.  hCG was followed in 2019.  CBC unremarkable, no head imaging or EKG.    \"Malinda\"    10/13: Virtual visit via Zoom audio and video due to the COVID-19 pandemic.  Patient is accepting of and agreeable to visit.  The visit consisted of the patient and I.  Interview:  1. Chart review: No new developments.  2. \"I've seen some small changes, but not, like, horrible.\"  a. Attention drifts a little more, in the mornings.  3. Depression/Mood: \"increasing the duloxetine has helped.\"  a. Usually feels really sad before her cycle, but doesn't this time  4. Anxiety:  a. Not as irritable  5. Sleeping: yes  6. Eating: stable  7. Substances: denies  8. Therapy: denies  9. Medication compliant: y  10. No SI HI AV      9/29: Virtual visit via Zoom audio and video due to the COVID-19 pandemic.  Patient is accepting of and agreeable to visit.  The visit consisted of the patient and I.  Interview:  11. Her story: \"Well, it started in [employer].\"  a. Originally PMDD (2009) for years  b. Got out 2011  c. Then dx'd with depression and VIVI  d. Has been on medications since  i. Was on prozac from 2009 until 2015, stopped due to pregnancy  ii. 2018, started wellbutrin only. Which was horrible by " "itself. They added buspar 10 mg bid, with it. Then duloxetine 60 mg daily added. Better combination.  iii. Now on the above, and also on strattera 100 mg daily for ADHD. Also on gabapentin 600 tid.  e. Stimulants put her to sleep: adderall, vyvanse.  Did not try extended release formulations.  12. Mood: much better than it was in the past, but still could use some help.  13. Stressors:  a. Niece started having seizures at 21 and lost her memory, doesn't remember anyone or anybody.  b. In the middle of buying a house  c. Finances  d. Starting my own practice.  e. Son having school problems; hanging with \"bad kids.\"  14. Anxiety: manageable.  a. Worrying a lot  15. Coping: goes to [employer], trusts in God  16. Sleeping: yes  17. Eating: stable  18. Medication compliant: yes  19. Psych ROS: D, A.  Negative for psychosis.  Unclear gorge.  20. No SI HI AVH    Depression:  21. Anhedonia: denies  22. Guilt or hopelessness:   a. Feelings of guilt about how she could have done more for her nieces and nephews  23. Energy: horrible  24. Concentration: \"I have to force myself to focus.\"  a. If stops to eat, it ends her day  25. Weight loss or weight gain: stable over last few months, on weight loss pills, however  26. Psychomotor retardation or agitation: frequently  27. Insomnia: yes, on the lunesta  28. Duration: for years      Access to Firearms: yes, locked away    PHQ-9 Depression Screening  PHQ-9 Total Score:      Little interest or pleasure in doing things?     Feeling down, depressed, or hopeless?     Trouble falling or staying asleep, or sleeping too much?     Feeling tired or having little energy?     Poor appetite or overeating?     Feeling bad about yourself - or that you are a failure or have let yourself or your family down?     Trouble concentrating on things, such as reading the newspaper or watching television?     Moving or speaking so slowly that other people could have noticed? Or the opposite - being so " fidgety or restless that you have been moving around a lot more than usual?     Thoughts that you would be better off dead, or of hurting yourself in some way?     PHQ-9 Total Score       YENNY-7       Past Surgical History:  Past Surgical History:   Procedure Laterality Date   •  SECTION     • CYSTECTOMY     • EYE SURGERY     • MYOMECTOMY         Problem List:  Patient Active Problem List   Diagnosis   • YENNY (generalized anxiety disorder)   • PTSD (post-traumatic stress disorder)   • Attention deficit hyperactivity disorder   • Ectopic pregnancy without intrauterine pregnancy   • Hidradenitis suppurativa   • Intramural and subserous leiomyoma of uterus   • Lumbosacral spondylosis without myelopathy   • Endometriosis determined by laparoscopy   • PMDD (premenstrual dysphoric disorder)   • Psoriasis   • Thrombophilia ([facility])   • Seasonal allergies   • Major depressive disorder in partial remission ([facility])   • Body mass index (BMI) 40.0-44.9, adult ([facility])       Allergy:   Allergies   Allergen Reactions   • Methotrexate Rash        Discontinued Medications:  Medications Discontinued During This Encounter   Medication Reason   • triamcinolone (KENALOG) 0.1 % cream    • methylPREDNISolone (MEDROL) 4 MG dose pack    • Adalimumab (Humira Pen) 80 MG/0.8ML injection    • topiramate (TOPAMAX) 100 MG tablet    • benzonatate (TESSALON) 200 MG capsule        Current Medications:   Current Outpatient Medications   Medication Sig Dispense Refill   • atomoxetine (STRATTERA) 60 MG capsule Take 1 capsule by mouth Daily. 30 capsule 2   • buPROPion XL (WELLBUTRIN XL) 300 MG 24 hr tablet      • busPIRone (BUSPAR) 10 MG tablet      • cetirizine (zyrTEC) 10 MG tablet Take 1 tablet by mouth Daily. 90 tablet 3   • clobetasol (TEMOVATE) 0.05 % external solution clobetasol 0.05 % scalp solution     • cyclobenzaprine (FLEXERIL) 10 MG tablet      • DULoxetine (CYMBALTA) 30 MG capsule Take 1 capsule by mouth Daily. 30 capsule 2   • DULoxetine  (CYMBALTA) 60 MG capsule      • EPINEPHrine (EPIPEN) 0.3 MG/0.3ML solution auto-injector injection epinephrine 0.3 mg/0.3 mL injection, auto-injector     • eszopiclone (LUNESTA) 3 MG tablet      • gabapentin (NEURONTIN) 600 MG tablet TAKE 1 TABLET BY MOUTH EVERY 8 HOURS AS DIRECTED     • ibuprofen (ADVIL,MOTRIN) 800 MG tablet      • Mefenamic Acid 250 MG capsule      • minocycline (MINOCIN,DYNACIN) 100 MG capsule      • norethindrone (AYGESTIN) 5 MG tablet      • topiramate (TOPAMAX) 200 MG tablet topiramate 200 mg tablet     • amoxicillin-clavulanate (AUGMENTIN) 875-125 MG per tablet amoxicillin 875 mg-potassium clavulanate 125 mg tablet   TAKE 1 TABLET BY MOUTH TWICE DAILY FOR 7 DAYS     • Elagolix Sodium 150 MG tablet Take 150 mg by mouth Daily.     • mupirocin (BACTROBAN) 2 % ointment      • Phendimetrazine Tartrate  MG capsule sustained-release 24 hr Take 105 mg by mouth Daily.       No current facility-administered medications for this visit.       Past Medical History:  Past Medical History:   Diagnosis Date   • Allergic    • Anxiety    • Arthritis    • Chronic pain disorder    • Deep vein thrombosis (HCC)    • Depression    • Endometriosis    • Fibroids    • Hidradenitis    • Panic disorder    • PTSD (post-traumatic stress disorder)        Past Psychiatric History:  Began Treatment:   Diagnoses: D, A, insomnia  Psychiatrist: Last was months ago for a couple times; previously NP for 2 years  Therapist: yes, therapy has not helped, two bad experiences  Admission History: denies  Medication Trials: see hpi  Self Harm: Denies  Suicide Attempts:Denies   Psychosis, Anxiety, Depression: denies    Substance Abuse History:   Types:Denies all, including illicit  Withdrawal Symptoms:Denies  Longest Period Sober:Not Applicable   AA: Not applicable     Social History:  Martial Status:  Employed: therapist, 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    Family History   Problem Relation Age of Onset   • ADD / ADHD Mother    • OCD Mother    • ADD / ADHD Sister    • Anxiety disorder Sister    • Bipolar disorder Sister    • Drug abuse Maternal Aunt    • Alcohol abuse Maternal Uncle    • Dementia Maternal Uncle    • Drug abuse Maternal Uncle    • Schizophrenia Maternal Uncle    • Depression Maternal Grandmother    • ADD / ADHD Cousin    • OCD Cousin    • Suicide Attempts Cousin    • Seizures Niece        Developmental History:   Born: [place]  Siblings: 1 sister, older cousin who lived with them  Childhood: no abuse  High School:Completed  College: 4 yrs at [employer], [employer] 3 years degree in counseling    · Mental Status Exam  · Appearance  · : groomed, good eye contact, normal street clothes  · Behavior  · : pleasant and cooperative  · Motor  · : No abnormal  · Speech  · :normal rhythm, rate, volume, tone, not hyperverbal, not pressured, normal prosidy  · Mood  · : \"better\"  · Affect  · : euthymic, mood congruent, good variability  · Thought Content  · : negative suicidal ideations, negative homicidal ideations, negative obsessions  · Perceptions  · : negative auditory hallucinations, negative visual hallucinations  · Thought Process  · : linear  · Insight/Judgement  · : Fair/fair  · Cognition  · : grossly intact  · Attention   : intact    Review of Systems:  Review of Systems   Constitutional: Positive for diaphoresis and fatigue.   HENT: Negative for drooling.  "   Eyes: Negative for visual disturbance.   Respiratory: Positive for cough and shortness of breath.    Cardiovascular: Positive for leg swelling. Negative for chest pain and palpitations.   Gastrointestinal: Positive for nausea. Negative for vomiting.   Endocrine: Positive for heat intolerance. Negative for cold intolerance.   Genitourinary: Positive for difficulty urinating.   Musculoskeletal: Positive for joint swelling.   Allergic/Immunologic: Positive for immunocompromised state.   Neurological: Positive for dizziness. Negative for seizures, speech difficulty and numbness.         Physical Exam:  Physical Exam    Vital Signs:   There were no vitals taken for this visit.     Lab Results:   Office Visit on 09/10/2021   Component Date Value Ref Range Status   • SARS Antigen 09/10/2021 Not Detected  Not Detected Final   • Influenza A Antigen BIANCA 09/10/2021 Not Detected   Final   • Influenza B Antigen BIANCA 09/10/2021 Not Detected   Final   • Internal Control 09/10/2021 Passed  Passed Final   • Lot Number 09/10/2021 [id number]   Final   • Expiration Date 09/10/2021 12/22/22   Final   • SARS-CoV-2 AMRIK 09/10/2021 Not Detected  Not Detected Final       EKG Results:  No orders to display       Imaging Results:  No Images in the past 120 days found..      Assessment/Plan   Diagnoses and all orders for this visit:    1. Major depressive disorder, recurrent episode, moderate (HCC) (Primary)    2. Generalized anxiety disorder    3. Insomnia due to mental condition        Visit Diagnoses:    ICD-10-CM ICD-9-CM   1. Major depressive disorder, recurrent episode, moderate (HCC)  F33.1 296.32   2. Generalized anxiety disorder  F41.1 300.02   3. Insomnia due to mental condition  F51.05 300.9     327.02     10/13: Better mood. Reduce strattera to 40 mg nightly (not daily, it is sedating). 4 wks.    9/29: Records release will be signed by patient.  Patient is unsure if Strattera helped.  Reduce the dose.  Residual depressive symptoms.   Increase duloxetine.  Continue gabapentin and BuSpar.  Therapy referral made.  See back in 2 weeks to try to titrate off of Strattera entirely.  It does not sound like she has ever tried extended release formulations of stimulants for ADHD.  Does not sound like she got neuropsychological testing for ADHD.  Rule out gorge.    PLAN:  29. Safety: No acute safety concerns  30. Therapy:  Referral made.  31. Risk Assessment: Risk of self-harm acutely is moderate.  Risk factors include anxiety disorder, mood disorder, and recent psychosocial stressors (pandemic). Protective factors include no family history, denies access to guns/weapons, no present SI, no history of suicide attempts or self-harm in the past, minimal AODA, healthcare seeking, future orientation, willingness to engage in care.  Risk of self-harm chronically is also moderate, but could be further elevated in the event of treatment noncompliance and/or AODA.  32. Meds:  a. REDUCE Strattera from 60 to 40 mg daily. Risks, benefits, alternatives discussed with patient including nausea, GI upset, sedation, exacerbation of irritability, dry mouth, constipation, urinary hesitancy. After discussion of these risks and benefits, the patient voiced understanding and agreed to proceed.  b. CONTINUE duloxetine 90 mg a day. Risks, benefits, alternatives discussed with patient including GI upset, nausea vomiting diarrhea, theoretical decrease of seizure threshold predisposing the patient to a slightly higher seizure risk, headaches, sexual dysfunction, serotonin syndrome, bleeding risk, increased suicidality in patients 24 years and younger.  Also constipation and urinary retention.  After discussion of these risks and benefits, the patient voiced understanding and agreed to proceed.  c. CONTINUE BuSpar 10 mg p.o. twice daily. Risks, benefits, alternatives discussed with patient including nausea, GI upset, mild sedation, falls risk.  After discussion of these risks and  benefits, the patient voiced understanding and agreed to proceed.  d. CONTINUE bupropion  mg p.o. daily. Risks, benefits, alternatives discussed with patient including nausea, GI upset, increased energy, exacerbation of irritability, insomnia, lowering of seizure threshold.  After discussion of these risks and benefits, the patient voiced understanding and agreed to proceed.  33. Labs: no recs  34. Follow up: 4 wks    Patient screened positive for depression based on a PHQ-9 score of 0 on 9/10/2021. Follow-up recommendations include: Prescribed antidepressant medication treatment.           TREATMENT PLAN/GOALS: Continue supportive psychotherapy efforts and medications as indicated. Treatment and medication options discussed during today's visit. Patient acknowledged and verbally consented to continue with current treatment plan and was educated on the importance of compliance with treatment and follow-up appointments.    MEDICATION ISSUES:  LILIAN reviewed as expected.  Discussed medication options and treatment plan of prescribed medication as well as the risks, benefits, and side effects including potential falls, possible impaired driving and metabolic adversities among others. Patient is agreeable to call the office with any worsening of symptoms or onset of side effects. Patient is agreeable to call 911 or go to the nearest ER should he/she begin having SI/HI. No medication side effects or related complaints today.     MEDS ORDERED DURING VISIT:  No orders of the defined types were placed in this encounter.      Return in about 4 weeks (around 11/10/2021).         This document has been electronically signed by Brooks Lynn MD  October 13, 2021 10:24 EDT      Part of this note may be an electronic transcription/translation of spoken language to printed text using the Dragon Dictation System.

## 2021-10-27 ENCOUNTER — TELEPHONE (OUTPATIENT)
Dept: PSYCHIATRY | Facility: CLINIC | Age: 41
End: 2021-10-27

## 2021-10-27 DIAGNOSIS — F41.1 GENERALIZED ANXIETY DISORDER: ICD-10-CM

## 2021-10-27 DIAGNOSIS — F33.1 MAJOR DEPRESSIVE DISORDER, RECURRENT EPISODE, MODERATE (HCC): ICD-10-CM

## 2021-10-27 RX ORDER — ATOMOXETINE 40 MG/1
40 CAPSULE ORAL DAILY
Qty: 30 CAPSULE | Refills: 2 | Status: SHIPPED | OUTPATIENT
Start: 2021-10-27 | End: 2021-12-14

## 2021-10-27 NOTE — TELEPHONE ENCOUNTER
Med refill, pt said that she is supposed to be on the 40mg of Strattera as well as the 60mg, she needs a refill of 40mg

## 2021-11-10 ENCOUNTER — TELEMEDICINE (OUTPATIENT)
Dept: PSYCHIATRY | Facility: CLINIC | Age: 41
End: 2021-11-10

## 2021-11-10 DIAGNOSIS — F41.1 GENERALIZED ANXIETY DISORDER: ICD-10-CM

## 2021-11-10 DIAGNOSIS — F51.05 INSOMNIA DUE TO MENTAL CONDITION: ICD-10-CM

## 2021-11-10 DIAGNOSIS — F33.1 MAJOR DEPRESSIVE DISORDER, RECURRENT EPISODE, MODERATE (HCC): Primary | ICD-10-CM

## 2021-11-10 PROCEDURE — 99214 OFFICE O/P EST MOD 30 MIN: CPT | Performed by: STUDENT IN AN ORGANIZED HEALTH CARE EDUCATION/TRAINING PROGRAM

## 2021-11-10 PROCEDURE — 90833 PSYTX W PT W E/M 30 MIN: CPT | Performed by: STUDENT IN AN ORGANIZED HEALTH CARE EDUCATION/TRAINING PROGRAM

## 2021-11-10 RX ORDER — DULOXETIN HYDROCHLORIDE 30 MG/1
30 CAPSULE, DELAYED RELEASE ORAL DAILY
Qty: 30 CAPSULE | Refills: 2 | Status: SHIPPED | OUTPATIENT
Start: 2021-11-10 | End: 2022-02-09

## 2021-11-10 RX ORDER — ADALIMUMAB 40MG/0.4ML
KIT SUBCUTANEOUS
COMMUNITY
Start: 2021-10-31 | End: 2022-09-16

## 2021-11-10 NOTE — PATIENT INSTRUCTIONS
1.  Please return to clinic at your next scheduled visit.  Contact the clinic ([phone]) at least 24 hours prior in the event you need to cancel.  2.  Do no harm to yourself or others.    3.  Avoid alcohol and drugs.    4.  Take all medications as prescribed.  Please contact the clinic with any concerns. If you are in need of medication refills, please call the clinic at [phone].    5. Should you want to get in touch with your provider, Dr. Brooks Lynn, please utilize [employer] or contact the office ([phone]), and staff will be able to page Dr. Lynn directly.  6.  In the event you have personal crisis, contact the following crisis numbers: Suicide Prevention Hotline 1-783.719.7531; [facility] Helpline 9-364-944-KNOD; [facility] Emergency Room [phone]; text HELLO to 494260; or 573.     SPECIFIC RECOMMENDATIONS:     1.      Medications discussed at this encounter:                   - restart duloxetine; take melatonin 10-20 mg nightly and lunesta     2.      Psychotherapy recommendations:      3.     Return to clinic: 4 weeks

## 2021-11-10 NOTE — PROGRESS NOTES
"Subjective   Malinda Sue Saucedo is a 41 y.o. female who presents today for initial evaluation     Referring Provider:  No referring provider defined for this encounter.    Chief Complaint:  mdd vivi    History of Present Illness:     Chart review 9/29: Seen September 10 to establish care.  Previously was at [facility] office.  Labs are consistent with Sjogren's.  Psoriasis and arthritis are under control.  On Topamax for migraines.  On Lunesta for insomnia.  History of anxiety and depression.  On gabapentin 600 mg 3 times daily, Strattera 100 mg daily, BuSpar 10 mg, duloxetine 60 mg, bupropion 300 mg.  Denied anxiety in January 2019.  Has been on Prozac in the past.  BMP demonstrates creatinine of 1.68, alk phos of 116, otherwise unremarkable.  hCG was followed in 2019.  CBC unremarkable, no head imaging or EKG.    \"Malinda\"    11/10: Virtual visit via Zoom audio and video due to the COVID-19 pandemic.  Patient is accepting of and agreeable to visit.  The visit consisted of the patient and I.  Interview:  1. Chart review: No new developments.  2. \"I'm ok.\"  a. More emotional; horribly.  b. Mostly down.  c. Pharmacy has not filled her 30 mg cap of duloxetine in weeks; unsure why.  d. Also feels sick too; congested today  3. Depression/Mood: depressed mood  1. Guilt or hopelessness: denies  2. Energy: poor  3. Concentration: poor  4. Anxiety: not bad  5. Sleeping:  a. Initial insomnia  b. Maintenance insomnia  6. Eating: stable  7. Substances:  8. Therapy: denies  9. Medication compliant: no, inadvertently  10. No SI HI AVH.      10/13: Virtual visit via Zoom audio and video due to the COVID-19 pandemic.  Patient is accepting of and agreeable to visit.  The visit consisted of the patient and I.  Interview:  11. Chart review: No new developments.  12. \"I've seen some small changes, but not, like, horrible.\"  a. Attention drifts a little more, in the mornings.  13. Depression/Mood: \"increasing the duloxetine " "has helped.\"  a. Usually feels really sad before her cycle, but doesn't this time  14. Anxiety:  a. Not as irritable  15. Sleeping: yes  16. Eating: stable  17. Substances: denies  18. Therapy: denies  19. Medication compliant: y  20. No SI HI AVH      9/29: Virtual visit via Zoom audio and video due to the COVID-19 pandemic.  Patient is accepting of and agreeable to visit.  The visit consisted of the patient and I.  Interview:  21. Her story: \"Well, it started in [employer].\"  a. Originally PMDD (2009) for years  b. Got out 2011  c. Then dx'd with depression and YENNY  d. Has been on medications since  i. Was on prozac from 2009 until 2015, stopped due to pregnancy  ii. 2018, started wellbutrin only. Which was horrible by itself. They added buspar 10 mg bid, with it. Then duloxetine 60 mg daily added. Better combination.  iii. Now on the above, and also on strattera 100 mg daily for ADHD. Also on gabapentin 600 tid.  e. Stimulants put her to sleep: adderall, vyvanse.  Did not try extended release formulations.  22. Mood: much better than it was in the past, but still could use some help.  23. Stressors:  a. Niece started having seizures at 21 and lost her memory, doesn't remember anyone or anybody.  b. In the middle of buying a house  c. Finances  d. Starting my own practice.  e. Son having school problems; hanging with \"bad kids.\"  24. Anxiety: manageable.  a. Worrying a lot  25. Coping: goes to Christianity, trusts in God  26. Sleeping: yes  27. Eating: stable  28. Medication compliant: yes  29. Psych ROS: D, A.  Negative for psychosis.  Unclear gorge.  30. No SI HI AVH    Depression:  31. Anhedonia: denies  32. Guilt or hopelessness:   a. Feelings of guilt about how she could have done more for her nieces and nephews  33. Energy: horrible  34. Concentration: \"I have to force myself to focus.\"  a. If stops to eat, it ends her day  35. Weight loss or weight gain: stable over last few months, on weight loss pills, " however  36. Psychomotor retardation or agitation: frequently  37. Insomnia: yes, on the lunesta  38. Duration: for years      Access to Firearms: yes, locked away    PHQ-9 Depression Screening  PHQ-9 Total Score:      Little interest or pleasure in doing things?     Feeling down, depressed, or hopeless?     Trouble falling or staying asleep, or sleeping too much?     Feeling tired or having little energy?     Poor appetite or overeating?     Feeling bad about yourself - or that you are a failure or have let yourself or your family down?     Trouble concentrating on things, such as reading the newspaper or watching television?     Moving or speaking so slowly that other people could have noticed? Or the opposite - being so fidgety or restless that you have been moving around a lot more than usual?     Thoughts that you would be better off dead, or of hurting yourself in some way?     PHQ-9 Total Score       YENNY-7       Past Surgical History:  Past Surgical History:   Procedure Laterality Date   •  SECTION     • CYSTECTOMY     • EYE SURGERY     • MYOMECTOMY         Problem List:  Patient Active Problem List   Diagnosis   • YENNY (generalized anxiety disorder)   • PTSD (post-traumatic stress disorder)   • Attention deficit hyperactivity disorder   • Ectopic pregnancy without intrauterine pregnancy   • Hidradenitis suppurativa   • Intramural and subserous leiomyoma of uterus   • Lumbosacral spondylosis without myelopathy   • Endometriosis determined by laparoscopy   • PMDD (premenstrual dysphoric disorder)   • Psoriasis   • Thrombophilia ([facility])   • Seasonal allergies   • Major depressive disorder in partial remission (HCC)   • Body mass index (BMI) 40.0-44.9, adult ([facility])       Allergy:   Allergies   Allergen Reactions   • Methotrexate Rash        Discontinued Medications:  Medications Discontinued During This Encounter   Medication Reason   • amoxicillin-clavulanate (AUGMENTIN) 875-125 MG per tablet *Therapy  completed   • Elagolix Sodium 150 MG tablet *Therapy completed   • minocycline (MINOCIN,DYNACIN) 100 MG capsule *Therapy completed   • mupirocin (BACTROBAN) 2 % ointment *Therapy completed   • DULoxetine (CYMBALTA) 30 MG capsule Reorder       Current Medications:   Current Outpatient Medications   Medication Sig Dispense Refill   • atomoxetine (STRATTERA) 40 MG capsule Take 1 capsule by mouth Daily. 30 capsule 2   • buPROPion XL (WELLBUTRIN XL) 300 MG 24 hr tablet      • busPIRone (BUSPAR) 10 MG tablet      • cetirizine (zyrTEC) 10 MG tablet Take 1 tablet by mouth Daily. 90 tablet 3   • clobetasol (TEMOVATE) 0.05 % external solution clobetasol 0.05 % scalp solution     • cyclobenzaprine (FLEXERIL) 10 MG tablet      • EPINEPHrine (EPIPEN) 0.3 MG/0.3ML solution auto-injector injection epinephrine 0.3 mg/0.3 mL injection, auto-injector     • eszopiclone (LUNESTA) 3 MG tablet      • gabapentin (NEURONTIN) 600 MG tablet TAKE 1 TABLET BY MOUTH EVERY 8 HOURS AS DIRECTED     • Humira Pen 40 MG/0.4ML Pen-injector Kit      • ibuprofen (ADVIL,MOTRIN) 800 MG tablet      • norethindrone (AYGESTIN) 5 MG tablet      • Phendimetrazine Tartrate  MG capsule sustained-release 24 hr Take 105 mg by mouth Daily.     • topiramate (TOPAMAX) 200 MG tablet topiramate 200 mg tablet     • DULoxetine (CYMBALTA) 30 MG capsule Take 1 capsule by mouth Daily. 30 capsule 2   • DULoxetine (CYMBALTA) 60 MG capsule      • Mefenamic Acid 250 MG capsule        No current facility-administered medications for this visit.       Past Medical History:  Past Medical History:   Diagnosis Date   • Allergic    • Anxiety    • Arthritis    • Chronic pain disorder    • Deep vein thrombosis (HCC)    • Depression    • Endometriosis    • Fibroids    • Hidradenitis    • Panic disorder    • PTSD (post-traumatic stress disorder)        Past Psychiatric History:  Began Treatment: 2009  Diagnoses: D, A, insomnia  Psychiatrist: Last was months ago for a couple times;  previously NP for 2 years  Therapist: yes, therapy has not helped, two bad experiences  Admission History: denies  Medication Trials: see hpi  Self Harm: Denies  Suicide Attempts:Denies   Psychosis, Anxiety, Depression: denies    Substance Abuse History:   Types:Denies all, including illicit  Withdrawal Symptoms:Denies  Longest Period Sober:Not Applicable   AA: Not applicable     Social History:  Martial Status:  Employed: therapist, 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    Family History   Problem Relation Age of Onset   • ADD / ADHD Mother    • OCD Mother    • ADD / ADHD Sister    • Anxiety disorder Sister    • Bipolar disorder Sister    • Drug abuse Maternal Aunt    • Alcohol abuse Maternal Uncle    • Dementia Maternal Uncle    • Drug abuse Maternal Uncle    • Schizophrenia Maternal Uncle    • Depression Maternal Grandmother    • ADD / ADHD Cousin    • OCD Cousin    • Suicide Attempts Cousin    • Seizures Niece        Developmental History:   Born[place]  Siblings: 1 sister, older cousin who lived with them  Childhood: no abuse  High School:Completed  College: 4 yrs at [employer] 3 years degree in counseling    · Mental Status Exam  · Appearance  · : groomed, good eye contact, normal street clothes, at home  · Behavior  · : pleasant and cooperative  · Motor  · : No abnormal  · Speech  · :normal rhythm, rate, volume, tone, not hyperverbal,  "not pressured, normal prosidy  · Mood  · : \"more downs than ups\"  · Affect  · : slightly depressed, mood congruent, good variability  · Thought Content  · : negative suicidal ideations, negative homicidal ideations, negative obsessions  · Perceptions  · : negative auditory hallucinations, negative visual hallucinations  · Thought Process  · : linear  · Insight/Judgement  · : Fair/fair  · Cognition  · : grossly intact  · Attention   : intact    Review of Systems:  Review of Systems   Constitutional: Positive for fatigue. Negative for diaphoresis.   HENT: Negative for drooling.    Eyes: Negative for visual disturbance.   Respiratory: Positive for cough and shortness of breath.    Cardiovascular: Negative for chest pain, palpitations and leg swelling.   Gastrointestinal: Negative for nausea and vomiting.   Endocrine: Positive for cold intolerance and heat intolerance.   Genitourinary: Positive for difficulty urinating.   Musculoskeletal: Positive for joint swelling.   Allergic/Immunologic: Positive for immunocompromised state.   Neurological: Positive for dizziness, light-headedness and numbness. Negative for seizures and speech difficulty.         Physical Exam:  Physical Exam    Vital Signs:   There were no vitals taken for this visit.     Lab Results:   Office Visit on 09/10/2021   Component Date Value Ref Range Status   • SARS Antigen 09/10/2021 Not Detected  Not Detected Final   • Influenza A Antigen BIANCA 09/10/2021 Not Detected   Final   • Influenza B Antigen BIANCA 09/10/2021 Not Detected   Final   • Internal Control 09/10/2021 Passed  Passed Final   • Lot Number 09/10/2021 [id number]   Final   • Expiration Date 09/10/2021 12/22/22   Final   • SARS-CoV-2 AMRIK 09/10/2021 Not Detected  Not Detected Final       EKG Results:  No orders to display       Imaging Results:  No Images in the past 120 days found..      Assessment/Plan   Diagnoses and all orders for this visit:    1. Major depressive disorder, recurrent episode, " moderate (HCC) (Primary)  -     DULoxetine (CYMBALTA) 30 MG capsule; Take 1 capsule by mouth Daily.  Dispense: 30 capsule; Refill: 2    2. Generalized anxiety disorder  -     DULoxetine (CYMBALTA) 30 MG capsule; Take 1 capsule by mouth Daily.  Dispense: 30 capsule; Refill: 2    3. Insomnia due to mental condition        Visit Diagnoses:    ICD-10-CM ICD-9-CM   1. Major depressive disorder, recurrent episode, moderate (HCC)  F33.1 296.32   2. Generalized anxiety disorder  F41.1 300.02   3. Insomnia due to mental condition  F51.05 300.9     327.02     11/10: Start melatonin, restart duloxetine 17 minutes of supportive psychotherapy with goal to strengthen defenses, promote problems solving, restore adaptive functioning and provide symptom relief. The therapeutic alliance was strengthened to encourage the patient to express their thoughts and feelings. Esteem building was enhanced through praise, reassurance, normalizing and encouragement. Coping skills were enhanced to build distress tolerance skills and emotional regulation. Patient given education on medication side effects, diagnosis/illness and relapse symptoms. Plan to continue supportive psychotherapy in next appointment to provide symptom relief.  4 wks.    10/13: Better mood. Reduce strattera to 40 mg nightly (not daily, it is sedating). 4 wks.    9/29: Records release will be signed by patient.  Patient is unsure if Strattera helped.  Reduce the dose.  Residual depressive symptoms.  Increase duloxetine.  Continue gabapentin and BuSpar.  Therapy referral made.  See back in 2 weeks to try to titrate off of Strattera entirely.  It does not sound like she has ever tried extended release formulations of stimulants for ADHD.  Does not sound like she got neuropsychological testing for ADHD.  Rule out gorge.    PLAN:  39. Safety: No acute safety concerns  40. Therapy:  Referral made.  41. Risk Assessment: Risk of self-harm acutely is moderate.  Risk factors include  anxiety disorder, mood disorder, and recent psychosocial stressors (pandemic). Protective factors include no family history, denies access to guns/weapons, no present SI, no history of suicide attempts or self-harm in the past, minimal AODA, healthcare seeking, future orientation, willingness to engage in care.  Risk of self-harm chronically is also moderate, but could be further elevated in the event of treatment noncompliance and/or AODA.  42. Meds:  a. CONTINUE Strattera 40 mg daily. Risks, benefits, alternatives discussed with patient including nausea, GI upset, sedation, exacerbation of irritability, dry mouth, constipation, urinary hesitancy. After discussion of these risks and benefits, the patient voiced understanding and agreed to proceed.  b. CONTINUE duloxetine 90 mg a day. Risks, benefits, alternatives discussed with patient including GI upset, nausea vomiting diarrhea, theoretical decrease of seizure threshold predisposing the patient to a slightly higher seizure risk, headaches, sexual dysfunction, serotonin syndrome, bleeding risk, increased suicidality in patients 24 years and younger.  Also constipation and urinary retention.  After discussion of these risks and benefits, the patient voiced understanding and agreed to proceed.  c. CONTINUE BuSpar 10 mg p.o. twice daily. Risks, benefits, alternatives discussed with patient including nausea, GI upset, mild sedation, falls risk.  After discussion of these risks and benefits, the patient voiced understanding and agreed to proceed.  d. CONTINUE bupropion  mg p.o. daily. Risks, benefits, alternatives discussed with patient including nausea, GI upset, increased energy, exacerbation of irritability, insomnia, lowering of seizure threshold.  After discussion of these risks and benefits, the patient voiced understanding and agreed to proceed.  e. START melatonin 10-20 mg PO QHS. Risks, benefits, side effects discussed with patient including sedation,  dizziness/falls risk, GI upset.  After discussion of these risks and benefits, the patient voiced understanding and agreed to proceed.   43. Labs: no recs  44. Follow up: 4 wks    Patient screened positive for depression based on a PHQ-9 score of 0 on 9/10/2021. Follow-up recommendations include: Prescribed antidepressant medication treatment.           TREATMENT PLAN/GOALS: Continue supportive psychotherapy efforts and medications as indicated. Treatment and medication options discussed during today's visit. Patient acknowledged and verbally consented to continue with current treatment plan and was educated on the importance of compliance with treatment and follow-up appointments.    MEDICATION ISSUES:  LILIAN reviewed as expected.  Discussed medication options and treatment plan of prescribed medication as well as the risks, benefits, and side effects including potential falls, possible impaired driving and metabolic adversities among others. Patient is agreeable to call the office with any worsening of symptoms or onset of side effects. Patient is agreeable to call 911 or go to the nearest ER should he/she begin having SI/HI. No medication side effects or related complaints today.     MEDS ORDERED DURING VISIT:  New Medications Ordered This Visit   Medications   • DULoxetine (CYMBALTA) 30 MG capsule     Sig: Take 1 capsule by mouth Daily.     Dispense:  30 capsule     Refill:  2     To be added to the 60 mg cap for a total of 90 mg daily       Return in about 4 weeks (around 12/8/2021).         This document has been electronically signed by Brooks Lynn MD  November 10, 2021 10:57 EST      Part of this note may be an electronic transcription/translation of spoken language to printed text using the Dragon Dictation System.

## 2021-11-11 ENCOUNTER — OFFICE VISIT (OUTPATIENT)
Dept: INTERNAL MEDICINE | Facility: CLINIC | Age: 41
End: 2021-11-11

## 2021-11-11 VITALS
HEIGHT: 64 IN | SYSTOLIC BLOOD PRESSURE: 123 MMHG | OXYGEN SATURATION: 98 % | TEMPERATURE: 97.9 F | DIASTOLIC BLOOD PRESSURE: 86 MMHG | BODY MASS INDEX: 41.76 KG/M2 | WEIGHT: 244.6 LBS | HEART RATE: 102 BPM

## 2021-11-11 DIAGNOSIS — B34.9 ACUTE VIRAL SYNDROME: Primary | ICD-10-CM

## 2021-11-11 DIAGNOSIS — R05.9 COUGH: ICD-10-CM

## 2021-11-11 PROCEDURE — U0004 COV-19 TEST NON-CDC HGH THRU: HCPCS | Performed by: INTERNAL MEDICINE

## 2021-11-11 PROCEDURE — 87428 SARSCOV & INF VIR A&B AG IA: CPT | Performed by: INTERNAL MEDICINE

## 2021-11-11 PROCEDURE — 99213 OFFICE O/P EST LOW 20 MIN: CPT | Performed by: INTERNAL MEDICINE

## 2021-11-11 NOTE — PROGRESS NOTES
"Chief Complaint  Cough, Nasal Congestion, Fatigue, Headache, and Fever (LOW GRADE)    Subjective          Malinda Saucedo presents to [facility]  History of Present Illness  Pt with cough, congestion, fatigue, HEADACHE, subjective fevers, diffuse mylagias and arthralgias. Sick contacts include child with similar symptoms. Pt denies CHEST PAIN, SOB.     Objective   Vital Signs:   /86 (BP Location: Left arm, Patient Position: Sitting, Cuff Size: Adult)   Pulse 102   Temp 97.9 °F (36.6 °C) (Temporal)   Ht 162.6 cm (64\")   Wt 111 kg (244 lb 9.6 oz)   SpO2 98%   BMI 41.99 kg/m²     Wt Readings from Last 3 Encounters:   11/11/21 111 kg (244 lb 9.6 oz)   09/10/21 115 kg (253 lb 9.6 oz)     BP Readings from Last 3 Encounters:   11/11/21 123/86   09/10/21 124/84     Physical Exam   Appearance: No acute distress, well-nourished  Head: normocephalic, atraumatic  Eyes: extraocular movements intact, no scleral icterus, no conjunctival injection  Ears, Nose, and Throat: external ears normal, nares patent, moist mucous membranes  Cardiovascular: regular rate and rhythm. no murmurs, rales, or rhonchi. no edema  Respiratory: breathing comfortably, symmetric chest rise, clear to auscultation bilaterally. No wheezes, rales, or rhonchi.  Neuro: alert and oriented to time, place, and person. Normal gait  Psych: normal mood and affect     Result Review :     Lab Results   Component Value Date    SARSANTIGEN Not Detected 11/12/2021    COVID19 Not Detected 11/12/2021    FLUAAG Not Detected 11/12/2021    INR 1.1 02/05/2019        Assessment and Plan    Diagnoses and all orders for this visit:    1. Acute viral syndrome (Primary)  Comments:  concern for flu/covid based on symtpoms. rapid tests neg today, but may be too soon in disease course. pt ot return in 1-2 day for repeat. If symptoms persists and test neg, will Prescribe augmentin for sinus infection.     2. Cough  -     " POCT SARS-CoV-2 Antigen BIANCA + Flu  -     COVID-19 PCR, [facility], NP SWAB IN LEXAR VIRAL TRANSPORT MEDIA/ORAL SWISH 24-30 HR TAT - Swab, Nasopharynx        Follow Up   Return if symptoms worsen or fail to improve.  Patient was given instructions and counseling regarding her condition or for health maintenance advice. Please see specific information pulled into the AVS if appropriate.

## 2021-11-12 ENCOUNTER — CLINICAL SUPPORT (OUTPATIENT)
Dept: INTERNAL MEDICINE | Facility: CLINIC | Age: 41
End: 2021-11-12

## 2021-11-12 DIAGNOSIS — R05.9 COUGH: ICD-10-CM

## 2021-11-12 DIAGNOSIS — Z20.822 EXPOSURE TO COVID-19 VIRUS: Primary | ICD-10-CM

## 2021-11-12 DIAGNOSIS — J06.9 UPPER RESPIRATORY TRACT INFECTION, UNSPECIFIED TYPE: Primary | ICD-10-CM

## 2021-11-12 PROBLEM — O00.90 ECTOPIC PREGNANCY WITHOUT INTRAUTERINE PREGNANCY: Status: RESOLVED | Noted: 2019-01-25 | Resolved: 2021-11-12

## 2021-11-12 LAB
EXPIRATION DATE: NORMAL
FLUAV AG UPPER RESP QL IA.RAPID: NOT DETECTED
FLUBV AG UPPER RESP QL IA.RAPID: NOT DETECTED
INTERNAL CONTROL: NORMAL
Lab: NORMAL
SARS-COV-2 AG UPPER RESP QL IA.RAPID: NOT DETECTED
SARS-COV-2 N GENE RESP QL NAA+PROBE: NOT DETECTED
SARS-COV-2 RNA NOSE QL NAA+PROBE: NOT DETECTED

## 2021-11-12 PROCEDURE — 87428 SARSCOV & INF VIR A&B AG IA: CPT | Performed by: INTERNAL MEDICINE

## 2021-11-12 PROCEDURE — 87635 SARS-COV-2 COVID-19 AMP PRB: CPT | Performed by: INTERNAL MEDICINE

## 2021-11-12 RX ORDER — BROMPHENIRAMINE MALEATE, PSEUDOEPHEDRINE HYDROCHLORIDE, AND DEXTROMETHORPHAN HYDROBROMIDE 2; 30; 10 MG/5ML; MG/5ML; MG/5ML
10 SYRUP ORAL 4 TIMES DAILY PRN
Qty: 400 ML | Refills: 0 | Status: SHIPPED | OUTPATIENT
Start: 2021-11-12 | End: 2021-11-22

## 2021-11-12 RX ORDER — FLUTICASONE PROPIONATE 50 MCG
2 SPRAY, SUSPENSION (ML) NASAL DAILY
Qty: 16 G | Refills: 0 | Status: SHIPPED | OUTPATIENT
Start: 2021-11-12 | End: 2021-11-15

## 2021-11-13 DIAGNOSIS — J06.9 UPPER RESPIRATORY TRACT INFECTION, UNSPECIFIED TYPE: ICD-10-CM

## 2021-11-15 ENCOUNTER — TELEPHONE (OUTPATIENT)
Dept: INTERNAL MEDICINE | Facility: CLINIC | Age: 41
End: 2021-11-15

## 2021-11-15 RX ORDER — AMOXICILLIN AND CLAVULANATE POTASSIUM 875; 125 MG/1; MG/1
1 TABLET, FILM COATED ORAL 2 TIMES DAILY
Qty: 14 TABLET | Refills: 0 | Status: SHIPPED | OUTPATIENT
Start: 2021-11-15 | End: 2021-11-22

## 2021-11-15 RX ORDER — FLUTICASONE PROPIONATE 50 MCG
SPRAY, SUSPENSION (ML) NASAL
Qty: 48 G | Refills: 3 | Status: SHIPPED | OUTPATIENT
Start: 2021-11-15 | End: 2021-12-13

## 2021-11-15 NOTE — TELEPHONE ENCOUNTER
Caller: Malinda Saucedo    Relationship: Self    Best call back number: [phone]     What medication are you requesting: ANTIBIOTIC    What are your current symptoms: COUGH, CONGESTION, SORE THROAT, COUGHING UP MUCUS, HEADACHE    How long have you been experiencing symptoms: ONE WEEK     Have you had these symptoms before:    [x] Yes  [] No    Have you been treated for these symptoms before:   [x] Yes  [] No    If a prescription is needed, what is your preferred pharmacy and phone number:      [id number] - [place], [place] - [address] AT [facility] [phone] [facility] [phone]     Additional notes: PATIENT IS STILL NOT FEELING WELL AND WOULD LIKE TO KNOW IF AN ANTIBIOTIC CAN BE CALLED IN.

## 2021-12-03 ENCOUNTER — TELEPHONE (OUTPATIENT)
Dept: INTERNAL MEDICINE | Facility: CLINIC | Age: 41
End: 2021-12-03

## 2021-12-03 DIAGNOSIS — G47.00 INSOMNIA, UNSPECIFIED TYPE: Primary | ICD-10-CM

## 2021-12-03 NOTE — TELEPHONE ENCOUNTER
Caller: Malinda Saucedo    Relationship: Self    Best call back number: [phone]    What is the medical concern/diagnosis: LUPUS    What specialty or service is being requested: SLEEP STUDY    What is the provider, practice or medical service name: PROVIDER PREFERENCE

## 2021-12-08 ENCOUNTER — OFFICE VISIT (OUTPATIENT)
Dept: INTERNAL MEDICINE | Facility: CLINIC | Age: 41
End: 2021-12-08

## 2021-12-08 VITALS
HEART RATE: 102 BPM | BODY MASS INDEX: 43.28 KG/M2 | HEIGHT: 64 IN | OXYGEN SATURATION: 98 % | WEIGHT: 253.5 LBS | TEMPERATURE: 97.4 F | SYSTOLIC BLOOD PRESSURE: 118 MMHG | DIASTOLIC BLOOD PRESSURE: 82 MMHG

## 2021-12-08 DIAGNOSIS — B37.0 CANDIDA INFECTION OF MOUTH: ICD-10-CM

## 2021-12-08 DIAGNOSIS — B37.0 ORAL THRUSH: Primary | ICD-10-CM

## 2021-12-08 PROCEDURE — 99213 OFFICE O/P EST LOW 20 MIN: CPT | Performed by: NURSE PRACTITIONER

## 2021-12-08 RX ORDER — FLUCONAZOLE 150 MG/1
150 TABLET ORAL DAILY
Qty: 5 TABLET | Refills: 0 | Status: SHIPPED | OUTPATIENT
Start: 2021-12-08 | End: 2021-12-13

## 2021-12-08 NOTE — PROGRESS NOTES
Chief Complaint  Other (thrush in mouth, sx have lasted greater than 2 weeks, currently on Humira, was recently on Augmentin)    Subjective          Malinda Saucedo presents to [facility]  Oral thrush on tongue and in back of her throat after taking Augmentin.   Discussed the need to change toothbrush and clean any product that she uses for her mouth extensively after she has been on treatment for a few days.     Sore Throat   This is a new problem. The current episode started 1 to 4 weeks ago (2 weeks ). The problem has been gradually worsening. There has been no fever. Pertinent negatives include no abdominal pain, congestion, coughing, diarrhea, drooling, ear discharge, ear pain, headaches, hoarse voice, plugged ear sensation, neck pain, shortness of breath, stridor, swollen glands, trouble swallowing or vomiting.       Answers for HPI/ROS submitted by the patient on 12/7/2021  Please describe your symptoms.: Thrush. White tongue, starting to make swallowing difficult and causing coughing when it goes into the back of my throat. I take Humira and it weakens my immune system, so I get them often, but it's never been this bad.  Have you had these symptoms before?: Yes  How long have you been having these symptoms?: Greater than 2 weeks  Please list any medications you are currently taking for this condition.: N/A  Please describe any probable cause for these symptoms. : Weakened immune response due to Humira. Just took antibiotics recently and it's worsened since then.  What is the primary reason for your visit?: Other        Current Outpatient Medications   Medication Instructions   • atomoxetine (STRATTERA) 40 mg, Oral, Daily   • buPROPion XL (WELLBUTRIN XL) 300 MG 24 hr tablet No dose, route, or frequency recorded.   • busPIRone (BUSPAR) 10 MG tablet No dose, route, or frequency recorded.   • cetirizine (ZYRTEC) 10 mg, Oral, Daily   • clobetasol (TEMOVATE)  "0.05 % external solution clobetasol 0.05 % scalp solution   • cyclobenzaprine (FLEXERIL) 10 MG tablet No dose, route, or frequency recorded.   • DULoxetine (CYMBALTA) 60 MG capsule No dose, route, or frequency recorded.   • DULoxetine (CYMBALTA) 30 mg, Oral, Daily   • EPINEPHrine (EPIPEN) 0.3 MG/0.3ML solution auto-injector injection epinephrine 0.3 mg/0.3 mL injection, auto-injector   • eszopiclone (LUNESTA) 3 MG tablet No dose, route, or frequency recorded.   • fluconazole (DIFLUCAN) 150 mg, Oral, Daily   • fluticasone (FLONASE) 50 MCG/ACT nasal spray USE 2 SPRAYS INTO THE NOSTRIL(S) EVERY DAY AS DIRECTED   • gabapentin (NEURONTIN) 600 MG tablet TAKE 1 TABLET BY MOUTH EVERY 8 HOURS AS DIRECTED   • Humira Pen 40 MG/0.4ML Pen-injector Kit No dose, route, or frequency recorded.   • ibuprofen (ADVIL,MOTRIN) 800 MG tablet No dose, route, or frequency recorded.   • Mefenamic Acid 250 MG capsule No dose, route, or frequency recorded.   • norethindrone (AYGESTIN) 5 MG tablet No dose, route, or frequency recorded.   • nystatin (MYCOSTATIN) 500,000 Units, Swish & Swallow, 4 Times Daily   • Phendimetrazine Tartrate  mg, Oral, Daily   • topiramate (TOPAMAX) 200 MG tablet topiramate 200 mg tablet         Objective   Vital Signs:   /82   Pulse 102   Temp 97.4 °F (36.3 °C) (Temporal)   Ht 162.6 cm (64\")   Wt 115 kg (253 lb 8 oz)   SpO2 98%   BMI 43.51 kg/m²     Wt Readings from Last 3 Encounters:   12/08/21 115 kg (253 lb 8 oz)   11/11/21 111 kg (244 lb 9.6 oz)   09/10/21 115 kg (253 lb 9.6 oz)     BP Readings from Last 3 Encounters:   12/08/21 118/82   11/11/21 123/86   09/10/21 124/84     Physical Exam  Vitals and nursing note reviewed.   Constitutional:       General: She is not in acute distress.     Appearance: Normal appearance. She is not ill-appearing.   HENT:      Mouth/Throat:      Lips: Pink.      Comments: White patches to tongue and posterior pharynx   Eyes:      Extraocular Movements: Extraocular " movements intact.      Conjunctiva/sclera: Conjunctivae normal.   Pulmonary:      Effort: Pulmonary effort is normal.      Breath sounds: Normal breath sounds.   Skin:     General: Skin is warm and dry.      Capillary Refill: Capillary refill takes less than 2 seconds.   Neurological:      General: No focal deficit present.      Mental Status: She is alert and oriented to person, place, and time. Mental status is at baseline.   Psychiatric:         Mood and Affect: Mood normal.         Behavior: Behavior normal.         Thought Content: Thought content normal.         Judgment: Judgment normal.            Result Review :   The following data was reviewed by: CARLA Hart on 12/08/2021:         Lab Results   Component Value Date    SARSANTIGEN Not Detected 11/12/2021    COVID19 Not Detected 11/12/2021    FLUAAG Not Detected 11/12/2021    INR 1.1 02/05/2019       Procedures        Assessment and Plan    Diagnoses and all orders for this visit:    1. Oral thrush (Primary)  -     nystatin (MYCOSTATIN) 100,000 unit/mL suspension; Swish and swallow 5 mL 4 (Four) Times a Day.  Dispense: 473 mL; Refill: 0    2. Candida infection of mouth  -     fluconazole (Diflucan) 150 MG tablet; Take 1 tablet by mouth Daily for 5 doses.  Dispense: 5 tablet; Refill: 0        There are no discontinued medications.       Follow Up   Return if symptoms worsen or fail to improve.  Patient was given instructions and counseling regarding her condition or for health maintenance advice. Please see specific information pulled into the AVS if appropriate.

## 2021-12-13 DIAGNOSIS — J06.9 UPPER RESPIRATORY TRACT INFECTION, UNSPECIFIED TYPE: ICD-10-CM

## 2021-12-13 RX ORDER — FLUTICASONE PROPIONATE 50 MCG
SPRAY, SUSPENSION (ML) NASAL
Qty: 48 G | Refills: 3 | Status: SHIPPED | OUTPATIENT
Start: 2021-12-13 | End: 2021-12-28

## 2021-12-14 ENCOUNTER — TELEMEDICINE (OUTPATIENT)
Dept: PSYCHIATRY | Facility: CLINIC | Age: 41
End: 2021-12-14

## 2021-12-14 DIAGNOSIS — F51.05 INSOMNIA DUE TO MENTAL CONDITION: ICD-10-CM

## 2021-12-14 DIAGNOSIS — F33.1 MAJOR DEPRESSIVE DISORDER, RECURRENT EPISODE, MODERATE (HCC): Primary | ICD-10-CM

## 2021-12-14 DIAGNOSIS — F41.1 GENERALIZED ANXIETY DISORDER: ICD-10-CM

## 2021-12-14 PROCEDURE — 99214 OFFICE O/P EST MOD 30 MIN: CPT | Performed by: STUDENT IN AN ORGANIZED HEALTH CARE EDUCATION/TRAINING PROGRAM

## 2021-12-14 PROCEDURE — 90833 PSYTX W PT W E/M 30 MIN: CPT | Performed by: STUDENT IN AN ORGANIZED HEALTH CARE EDUCATION/TRAINING PROGRAM

## 2021-12-14 RX ORDER — BUSPIRONE HYDROCHLORIDE 10 MG/1
10 TABLET ORAL 2 TIMES DAILY
Qty: 60 TABLET | Refills: 2 | Status: SHIPPED | OUTPATIENT
Start: 2021-12-14 | End: 2022-03-16

## 2021-12-14 RX ORDER — ARIPIPRAZOLE 2 MG/1
2 TABLET ORAL DAILY
Qty: 30 TABLET | Refills: 2 | Status: SHIPPED | OUTPATIENT
Start: 2021-12-14 | End: 2022-01-11 | Stop reason: SDUPTHER

## 2021-12-14 NOTE — PATIENT INSTRUCTIONS
1.  Please return to clinic at your next scheduled visit.  Contact the clinic ([phone]) at least 24 hours prior in the event you need to cancel.  2.  Do no harm to yourself or others.    3.  Avoid alcohol and drugs.    4.  Take all medications as prescribed.  Please contact the clinic with any concerns. If you are in need of medication refills, please call the clinic at [phone].    5. Should you want to get in touch with your provider, Dr. Brooks Lynn, please utilize [employer] or contact the office ([phone]), and staff will be able to page Dr. Lynn directly.  6.  In the event you have personal crisis, contact the following crisis numbers: Suicide Prevention Hotline 1-140.213.2913; [facility] Helpline 2-175-016-DXZJ; [facility] Emergency Room [phone]; text HELLO to 876618; or 106.     SPECIFIC RECOMMENDATIONS:     1.      Medications discussed at this encounter:                   - Take strattera every other day for 3 doses then stop. Start abilify 5 mg daily.     2.      Psychotherapy recommendations:      3.     Return to clinic: 4 weeks

## 2021-12-14 NOTE — PROGRESS NOTES
"Subjective   Malinda Sue Saucedo is a 41 y.o. female who presents today for initial evaluation     Referring Provider:  No referring provider defined for this encounter.    Chief Complaint:  mdd vivi    History of Present Illness:     Chart review 9/29: Seen September 10 to establish care.  Previously was at [facility] office.  Labs are consistent with Sjogren's.  Psoriasis and arthritis are under control.  On Topamax for migraines.  On Lunesta for insomnia.  History of anxiety and depression.  On gabapentin 600 mg 3 times daily, Strattera 100 mg daily, BuSpar 10 mg, duloxetine 60 mg, bupropion 300 mg.  Denied anxiety in January 2019.  Has been on Prozac in the past.  BMP demonstrates creatinine of 1.68, alk phos of 116, otherwise unremarkable.  hCG was followed in 2019.  CBC unremarkable, no head imaging or EKG.    \"Malinda\"    12/14: Virtual visit via Zoom audio and video due to the COVID-19 pandemic.  Patient is accepting of and agreeable to visit.  The visit consisted of the patient and I.  Interview:  1. Chart review: Seen for thrush by PCP 12/8, seen 11/11 for cough, sore throat (COVID neg). Referred to sleep medicine.  2. \"I'm ok.\"  a. Things have \"been weird.\"  b. I was having auditory and visual hallucinations when \"I first started seeing you.\"  i. Her son or  saying her name.  ii. Sees \"scary shadows\" out of the corner of her eyes, especially at night.  iii. \"Afraid I have bipolar disorder.\"  1. \"Started a practice out of nowhere.\"  2. \"I have spending sprees.\" $2,000 at a time. In the middle of one right now. Bought a bedset, tables, talked  into getting a new TV. Now in a new house.  c. Uncle has dx of schizophrenia, sister dx'd with bipolar d/o.  d. Also found out recently on the Sjogren's/Lupus spectrum.  e. Also has sleep study scheduled.  f. Will be seeing a neurologist because \"I'm losing time, like 5 minutes.\"  g. Willing to reduce the number of medications she is on and start " "a mood stabilizer.  h. \"Scared of these. Scared it will get worse.\"  i. Stopped buspar 2 weeks ago and is more irritable.  3. Depression/Mood:  1. Depressed mood: y  2. Seasonal pattern: denies  3. Severity: moderate  4. Anhedonia: mild, but enjoys spending time with son, shopping  5. Guilt or hopelessness:  6. Energy: \"horrible\"  7. Concentration: poor  8. Weight loss or weight gain: gain, 2 lbs since 2 weeks  9. Psychomotor retardation or agitation: def  10. Insomnia:  a. Topamax makes her sleepy, bed at 11, no initial insomnia, but wakes at 2 am, eats, sleeps in an hour, wakes at 6:30 am.  11. Duration: months  4. Anxiety:  1. Uncontrolled worrying: y  2. Severity: moderate  3. Muscle tension: y  4. Fatigue: y  5. Concentration: poor  6. Restlessness/feeling on edge: y  7. Irritability: y  8. Insomnia: maintenance insomnia  9. Duration: months  10. Panic attacks: def  5. Refills: none  6. Sleeping: above, sleep study set up  7. Eating: above  8. Substances: CBD gummy to sleep  9. Therapy: declines  10. Medication compliant: y  11. No SI HI AVH.      11/10: Virtual visit via Zoom audio and video due to the COVID-19 pandemic.  Patient is accepting of and agreeable to visit.  The visit consisted of the patient and I.  Interview:  12. Chart review: No new developments.  13. \"I'm ok.\"  a. More emotional; horribly.  b. Mostly down.  c. Pharmacy has not filled her 30 mg cap of duloxetine in weeks; unsure why.  d. Also feels sick too; congested today  14. Depression/Mood: depressed mood  12. Guilt or hopelessness: denies  13. Energy: poor  14. Concentration: poor  15. Anxiety: not bad  16. Sleeping:  a. Initial insomnia  b. Maintenance insomnia  17. Eating: stable  18. Substances:  19. Therapy: denies  20. Medication compliant: no, inadvertently  21. No SI HI AVH.      10/13: Virtual visit via Zoom audio and video due to the COVID-19 pandemic.  Patient is accepting of and agreeable to visit.  The visit consisted of the " "patient and I.  Interview:  22. Chart review: No new developments.  23. \"I've seen some small changes, but not, like, horrible.\"  a. Attention drifts a little more, in the mornings.  24. Depression/Mood: \"increasing the duloxetine has helped.\"  a. Usually feels really sad before her cycle, but doesn't this time  25. Anxiety:  a. Not as irritable  26. Sleeping: yes  27. Eating: stable  28. Substances: denies  29. Therapy: denies  30. Medication compliant: y  31. No SI HI AVH      9/29: Virtual visit via Zoom audio and video due to the COVID-19 pandemic.  Patient is accepting of and agreeable to visit.  The visit consisted of the patient and I.  Interview:  32. Her story: \"Well, it started in [employer].\"  a. Originally PMDD (2009) for years  b. Got out 2011  c. Then dx'd with depression and YENNY  d. Has been on medications since  i. Was on prozac from 2009 until 2015, stopped due to pregnancy  ii. 2018, started wellbutrin only. Which was horrible by itself. They added buspar 10 mg bid, with it. Then duloxetine 60 mg daily added. Better combination.  iii. Now on the above, and also on strattera 100 mg daily for ADHD. Also on gabapentin 600 tid.  e. Stimulants put her to sleep: adderall, vyvanse.  Did not try extended release formulations.  33. Mood: much better than it was in the past, but still could use some help.  34. Stressors:  a. Niece started having seizures at 21 and lost her memory, doesn't remember anyone or anybody.  b. In the middle of buying a house  c. Finances  d. Starting my own practice.  e. Son having school problems; hanging with \"bad kids.\"  35. Anxiety: manageable.  a. Worrying a lot  36. Coping: goes to [employer], trusts in God  37. Sleeping: yes  38. Eating: stable  39. Medication compliant: yes  40. Psych ROS: D, A.  Negative for psychosis.  Unclear gorge.  41. No SI HI AVH    Depression:  42. Anhedonia: denies  43. Guilt or hopelessness:   a. Feelings of guilt about how she could have done more for her " "nieces and nephews  44. Energy: horrible  45. Concentration: \"I have to force myself to focus.\"  a. If stops to eat, it ends her day  46. Weight loss or weight gain: stable over last few months, on weight loss pills, however  47. Psychomotor retardation or agitation: frequently  48. Insomnia: yes, on the lunesta  49. Duration: for years      Access to Firearms: yes, locked away    PHQ-9 Depression Screening  PHQ-9 Total Score:      Little interest or pleasure in doing things?     Feeling down, depressed, or hopeless?     Trouble falling or staying asleep, or sleeping too much?     Feeling tired or having little energy?     Poor appetite or overeating?     Feeling bad about yourself - or that you are a failure or have let yourself or your family down?     Trouble concentrating on things, such as reading the newspaper or watching television?     Moving or speaking so slowly that other people could have noticed? Or the opposite - being so fidgety or restless that you have been moving around a lot more than usual?     Thoughts that you would be better off dead, or of hurting yourself in some way?     PHQ-9 Total Score       YENNY-7       Past Surgical History:  Past Surgical History:   Procedure Laterality Date   •  SECTION     • CYSTECTOMY     • ENDOMETRIAL ABLATION  , ,    • EYE SURGERY     • MYOMECTOMY         Problem List:  Patient Active Problem List   Diagnosis   • YENNY (generalized anxiety disorder)   • PTSD (post-traumatic stress disorder)   • Attention deficit hyperactivity disorder   • Hidradenitis suppurativa   • Intramural and subserous leiomyoma of uterus   • Lumbosacral spondylosis without myelopathy   • Endometriosis determined by laparoscopy   • PMDD (premenstrual dysphoric disorder)   • Psoriasis   • Thrombophilia (formerly [facility])   • Seasonal allergies   • Major depressive disorder in partial remission (formerly [facility])   • Body mass index (BMI) 40.0-44.9, adult (formerly [facility])       Allergy:   Allergies   Allergen " Reactions   • Methotrexate Rash        Discontinued Medications:  Medications Discontinued During This Encounter   Medication Reason   • atomoxetine (STRATTERA) 40 MG capsule Not Efficacious   • busPIRone (BUSPAR) 10 MG tablet Reorder       Current Medications:   Current Outpatient Medications   Medication Sig Dispense Refill   • buPROPion XL (WELLBUTRIN XL) 300 MG 24 hr tablet      • cetirizine (zyrTEC) 10 MG tablet Take 1 tablet by mouth Daily. 90 tablet 3   • clobetasol (TEMOVATE) 0.05 % external solution clobetasol 0.05 % scalp solution     • cyclobenzaprine (FLEXERIL) 10 MG tablet      • DULoxetine (CYMBALTA) 30 MG capsule Take 1 capsule by mouth Daily. 30 capsule 2   • DULoxetine (CYMBALTA) 60 MG capsule      • EPINEPHrine (EPIPEN) 0.3 MG/0.3ML solution auto-injector injection epinephrine 0.3 mg/0.3 mL injection, auto-injector     • eszopiclone (LUNESTA) 3 MG tablet      • gabapentin (NEURONTIN) 600 MG tablet TAKE 1 TABLET BY MOUTH EVERY 8 HOURS AS DIRECTED     • Humira Pen 40 MG/0.4ML Pen-injector Kit      • ibuprofen (ADVIL,MOTRIN) 800 MG tablet      • norethindrone (AYGESTIN) 5 MG tablet      • topiramate (TOPAMAX) 200 MG tablet topiramate 200 mg tablet     • ARIPiprazole (Abilify) 2 MG tablet Take 1 tablet by mouth Daily. 30 tablet 2   • busPIRone (BUSPAR) 10 MG tablet Take 1 tablet by mouth 2 (Two) Times a Day. 60 tablet 2   • fluticasone (FLONASE) 50 MCG/ACT nasal spray USE 2 SPRAYS INTO THE NOSTRIL(S) EVERY DAY AS DIRECTED 48 g 3   • Mefenamic Acid 250 MG capsule      • nystatin (MYCOSTATIN) 100,000 unit/mL suspension Swish and swallow 5 mL 4 (Four) Times a Day. 473 mL 0   • Phendimetrazine Tartrate  MG capsule sustained-release 24 hr Take 105 mg by mouth Daily.       No current facility-administered medications for this visit.       Past Medical History:  Past Medical History:   Diagnosis Date   • Allergic    • Anxiety    • Arthritis    • Chronic pain disorder    • Deep vein thrombosis (HCC)    •  Depression    • Ectopic pregnancy without intrauterine pregnancy 2019    Formatting of this note might be different from the original. - Day 1 = 19 -> beta 4,927 MTX #1 given (110mg) - Day 4 = 19 -> beta 11,077 - Day 7 = 19 -> beta 11,923 MTX #2 given (110mg) - Day 11 = 19 -> beta 11,406 - Day 14 = 19 -> scheduled visit and beta - Day 19 =  19 -> beta 6,584 - Day 26 = 19 -> beta 3,111 - Day 34 = 3/1/19 -> beta 553 [facility]   • Endometriosis    • Fibroids    • Headache    • Hidradenitis    • Low back pain ?    DDD   • Obesity    • Panic disorder    • PTSD (post-traumatic stress disorder)        Past Psychiatric History:  Began Treatment:   Diagnoses: D, A, insomnia  Psychiatrist: Last was months ago for a couple times; previously NP for 2 years  Therapist: yes, therapy has not helped, two bad experiences  Admission History: denies  Medication Trials: see hpi  Self Harm: Denies  Suicide Attempts:Denies   Psychosis, Anxiety, Depression: denies    Substance Abuse History:   Types:Denies all, including illicit  Withdrawal Symptoms:Denies  Longest Period Sober:Not Applicable   AA: Not applicable     Social History:  Martial Status:  Employed: therapist, 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  Kids: one  House: apartment   History: army, honorable discharge, see hpi    Social History     Socioeconomic History   • Marital status:    Tobacco Use   • Smoking status: Never Smoker   • Smokeless tobacco: Never Used   Vaping Use   • Vaping Use: Never used   Substance and Sexual Activity   • Alcohol use: Not Currently   • Drug use: Never   • Sexual activity: Not Currently     Partners: Male     Birth control/protection: None       Family History:   Suicide Attempts:  1st cousin, maternal; another 1st cousin maternal  Suicide Completions: 1st cousin, maternal  Dx'd her sister herself that she has bipolar.    Family History   Problem Relation Age of Onset  "  • ADD / ADHD Mother    • OCD Mother    • Arthritis Mother    • Hyperlipidemia Mother    • Hypertension Mother    • Thyroid disease Mother    • Anxiety disorder Mother    • ADD / ADHD Sister    • Anxiety disorder Sister    • Bipolar disorder Sister    • Drug abuse Maternal Aunt    • Depression Maternal Aunt    • Alcohol abuse Maternal Uncle    • Drug abuse Maternal Uncle    • Schizophrenia Maternal Uncle    • Depression Maternal Grandmother    • Other Maternal Grandmother         Colon cancer   • Anxiety disorder Maternal Grandmother    • ADD / ADHD Cousin    • OCD Cousin    • Suicide Attempts Cousin    • Alcohol abuse Cousin    • Depression Cousin    • Seizures Niece    • Arthritis Sister    • Depression Sister    • Hyperlipidemia Sister    • Asthma Sister    • Hypertension Sister    • Miscarriages / Stillbirths Sister    • Mental illness Maternal Uncle    • Alcohol abuse Maternal Uncle        Developmental History:   Born: [place]  Siblings: 1 sister, older cousin who lived with them  Childhood: no abuse  High School:Completed  College: 4 yrs at [employer], [employer] 3 years degree in counseling    · Mental Status Exam  · Appearance  · : groomed, good eye contact, normal street clothes, at home  · Behavior  · : pleasant and cooperative  · Motor  · : No abnormal  · Speech  · :normal rhythm, rate, volume, tone, not hyperverbal, not pressured, normal prosidy  · Mood  · : \"it's been weird\"  · Affect  · : slightly depressed, mood congruent, good variability  · Thought Content  · : negative suicidal ideations, negative homicidal ideations, negative obsessions  · Perceptions  · : negative auditory hallucinations, negative visual hallucinations  · Thought Process  · : a little tangential  · Insight/Judgement  · : Fair/fair  · Cognition  · : grossly intact  · Attention   : intact    Review of Systems:  Review of Systems   Constitutional: Positive for fatigue. Negative for diaphoresis.   HENT: Negative for drooling.  "   Eyes: Negative for visual disturbance.   Respiratory: Positive for shortness of breath. Negative for cough.    Cardiovascular: Negative for chest pain, palpitations and leg swelling.   Gastrointestinal: Negative for nausea and vomiting.   Endocrine: Positive for cold intolerance and heat intolerance.   Genitourinary: Positive for difficulty urinating.   Musculoskeletal: Positive for joint swelling.   Allergic/Immunologic: Positive for immunocompromised state.   Neurological: Positive for dizziness, speech difficulty, light-headedness and numbness. Negative for seizures.         Physical Exam:  Physical Exam    Vital Signs:   There were no vitals taken for this visit.     Lab Results:   Clinical Support on 11/12/2021   Component Date Value Ref Range Status   • SARS Antigen 11/12/2021 Not Detected  Not Detected Final   • Influenza A Antigen BIANCA 11/12/2021 Not Detected   Final   • Influenza B Antigen BIANCA 11/12/2021 Not Detected   Final   • Internal Control 11/12/2021 Passed  Passed Final   • Lot Number 11/12/2021 [id number]   Final   • Expiration Date 11/12/2021 12/22/2021   Final   • COVID19 11/12/2021 Not Detected  Not Detected - Ref. Range Final   Office Visit on 11/11/2021   Component Date Value Ref Range Status   • SARS Antigen 11/11/2021 Not Detected  Not Detected Final   • Influenza A Antigen BIANCA 11/11/2021 Not Detected   Final   • Influenza B Antigen BIANCA 11/11/2021 Not Detected   Final   • Internal Control 11/11/2021 Passed  Passed Final   • Lot Number 11/11/2021 [id number]   Final   • Expiration Date 11/11/2021 12/11/2022   Final   • SARS-CoV-2 AMRIK 11/11/2021 Not Detected  Not Detected Final   Office Visit on 09/10/2021   Component Date Value Ref Range Status   • SARS Antigen 09/10/2021 Not Detected  Not Detected Final   • Influenza A Antigen BIANCA 09/10/2021 Not Detected   Final   • Influenza B Antigen BIANCA 09/10/2021 Not Detected   Final   • Internal Control 09/10/2021 Passed  Passed Final   • Lot Number 09/10/2021  [id number]   Final   • Expiration Date 09/10/2021 12/22/22   Final   • SARS-CoV-2 AMRIK 09/10/2021 Not Detected  Not Detected Final       EKG Results:  No orders to display       Imaging Results:  No Images in the past 120 days found..      Assessment/Plan   Diagnoses and all orders for this visit:    1. Major depressive disorder, recurrent episode, moderate (HCC) (Primary)  -     busPIRone (BUSPAR) 10 MG tablet; Take 1 tablet by mouth 2 (Two) Times a Day.  Dispense: 60 tablet; Refill: 2  -     ARIPiprazole (Abilify) 2 MG tablet; Take 1 tablet by mouth Daily.  Dispense: 30 tablet; Refill: 2    2. Generalized anxiety disorder  -     busPIRone (BUSPAR) 10 MG tablet; Take 1 tablet by mouth 2 (Two) Times a Day.  Dispense: 60 tablet; Refill: 2    3. Insomnia due to mental condition        Visit Diagnoses:    ICD-10-CM ICD-9-CM   1. Major depressive disorder, recurrent episode, moderate (HCC)  F33.1 296.32   2. Generalized anxiety disorder  F41.1 300.02   3. Insomnia due to mental condition  F51.05 300.9     327.02     12/14: Mood reactivity versus actual bipolar II disorder. Take strattera every other day for 3 doses then stop. Start abilify 5 mg day. 20 minutes of supportive psychotherapy with goal to strengthen defenses, promote problems solving, restore adaptive functioning and provide symptom relief. The therapeutic alliance was strengthened to encourage the patient to express their thoughts and feelings. Esteem building was enhanced through praise, reassurance, normalizing and encouragement. Coping skills were enhanced to build distress tolerance skills and emotional regulation. Patient given education on medication side effects, diagnosis/illness and relapse symptoms. Plan to continue supportive psychotherapy in next appointment to provide symptom relief.  4 wks.    11/10: Start melatonin, restart duloxetine 17 minutes of supportive psychotherapy with goal to strengthen defenses, promote problems solving, restore  adaptive functioning and provide symptom relief. The therapeutic alliance was strengthened to encourage the patient to express their thoughts and feelings. Esteem building was enhanced through praise, reassurance, normalizing and encouragement. Coping skills were enhanced to build distress tolerance skills and emotional regulation. Patient given education on medication side effects, diagnosis/illness and relapse symptoms. Plan to continue supportive psychotherapy in next appointment to provide symptom relief.  4 wks.    10/13: Better mood. Reduce strattera to 40 mg nightly (not daily, it is sedating). 4 wks.    9/29: Records release will be signed by patient.  Patient is unsure if Strattera helped.  Reduce the dose.  Residual depressive symptoms.  Increase duloxetine.  Continue gabapentin and BuSpar.  Therapy referral made.  See back in 2 weeks to try to titrate off of Strattera entirely.  It does not sound like she has ever tried extended release formulations of stimulants for ADHD.  Does not sound like she got neuropsychological testing for ADHD.  Rule out gorge.    PLAN:  50. Safety: No acute safety concerns  51. Therapy:  Referral made.  52. Risk Assessment: Risk of self-harm acutely is moderate.  Risk factors include anxiety disorder, mood disorder, and recent psychosocial stressors (pandemic). Protective factors include no family history, denies access to guns/weapons, no present SI, no history of suicide attempts or self-harm in the past, minimal AODA, healthcare seeking, future orientation, willingness to engage in care.  Risk of self-harm chronically is also moderate, but could be further elevated in the event of treatment noncompliance and/or AODA.  53. Meds:  a. START abilify 2 mg PO QDAY. Risks, benefits, alternatives discussed with patient including increased energy, exacerbation of irritability, akathisia, GI upset, orthostatic hypotension, increased appetite.  After discussion of these risks and  benefits, the patient voiced understanding and agreed to proceed.  b. DISCONTINUE Strattera 40 mg daily (every other day for 3 doses, then stop). Risks, benefits, alternatives discussed with patient including nausea, GI upset, sedation, exacerbation of irritability, dry mouth, constipation, urinary hesitancy. After discussion of these risks and benefits, the patient voiced understanding and agreed to proceed.  c. CONTINUE duloxetine 90 mg a day. Risks, benefits, alternatives discussed with patient including GI upset, nausea vomiting diarrhea, theoretical decrease of seizure threshold predisposing the patient to a slightly higher seizure risk, headaches, sexual dysfunction, serotonin syndrome, bleeding risk, increased suicidality in patients 24 years and younger.  Also constipation and urinary retention.  After discussion of these risks and benefits, the patient voiced understanding and agreed to proceed.  d. CONTINUE BuSpar 10 mg p.o. twice daily (refilled today). Risks, benefits, alternatives discussed with patient including nausea, GI upset, mild sedation, falls risk.  After discussion of these risks and benefits, the patient voiced understanding and agreed to proceed.  e. CONTINUE bupropion  mg p.o. daily. Risks, benefits, alternatives discussed with patient including nausea, GI upset, increased energy, exacerbation of irritability, insomnia, lowering of seizure threshold.  After discussion of these risks and benefits, the patient voiced understanding and agreed to proceed.  f. CONTINUE gabapentin 600 mg PO TID. Risks, benefits, alternatives discussed with patient including sedation, dizziness/falls risk, GI upset, weight gain.  After discussion of these risks and benefits, the patient voiced understanding and agreed to proceed. LULY, Ramin ordered. Verbally signed controlled substances agreement.  g. START melatonin 10-20 mg PO QHS. Risks, benefits, side effects discussed with patient including sedation,  dizziness/falls risk, GI upset.  After discussion of these risks and benefits, the patient voiced understanding and agreed to proceed.   54. Labs: no recs  55. Follow up: 4 wks    Patient screened positive for depression based on a PHQ-9 score of 0 on 9/10/2021. Follow-up recommendations include: Prescribed antidepressant medication treatment.           TREATMENT PLAN/GOALS: Continue supportive psychotherapy efforts and medications as indicated. Treatment and medication options discussed during today's visit. Patient acknowledged and verbally consented to continue with current treatment plan and was educated on the importance of compliance with treatment and follow-up appointments.    MEDICATION ISSUES:  LILIAN reviewed as expected.  Discussed medication options and treatment plan of prescribed medication as well as the risks, benefits, and side effects including potential falls, possible impaired driving and metabolic adversities among others. Patient is agreeable to call the office with any worsening of symptoms or onset of side effects. Patient is agreeable to call 911 or go to the nearest ER should he/she begin having SI/HI. No medication side effects or related complaints today.     MEDS ORDERED DURING VISIT:  New Medications Ordered This Visit   Medications   • busPIRone (BUSPAR) 10 MG tablet     Sig: Take 1 tablet by mouth 2 (Two) Times a Day.     Dispense:  60 tablet     Refill:  2   • ARIPiprazole (Abilify) 2 MG tablet     Sig: Take 1 tablet by mouth Daily.     Dispense:  30 tablet     Refill:  2       Return in about 4 weeks (around 1/11/2022).         This document has been electronically signed by Brooks Lynn MD  December 14, 2021 09:14 EST      Part of this note may be an electronic transcription/translation of spoken language to printed text using the Dragon Dictation System.

## 2021-12-17 ENCOUNTER — OFFICE VISIT (OUTPATIENT)
Dept: SLEEP MEDICINE | Facility: HOSPITAL | Age: 41
End: 2021-12-17

## 2021-12-17 VITALS
WEIGHT: 280 LBS | HEIGHT: 64 IN | DIASTOLIC BLOOD PRESSURE: 93 MMHG | HEART RATE: 99 BPM | TEMPERATURE: 96.6 F | SYSTOLIC BLOOD PRESSURE: 140 MMHG | BODY MASS INDEX: 47.8 KG/M2 | OXYGEN SATURATION: 100 %

## 2021-12-17 DIAGNOSIS — G47.10 HYPERSOMNIA: Primary | ICD-10-CM

## 2021-12-17 PROCEDURE — G0463 HOSPITAL OUTPT CLINIC VISIT: HCPCS

## 2021-12-17 RX ORDER — ZOLPIDEM TARTRATE 5 MG/1
5 TABLET ORAL
Qty: 2 TABLET | Refills: 0 | Status: SHIPPED | OUTPATIENT
Start: 2021-12-17 | End: 2021-12-17
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
## 2021-12-21 ENCOUNTER — HOSPITAL ENCOUNTER (OUTPATIENT)
Dept: SLEEP MEDICINE | Facility: HOSPITAL | Age: 41
Discharge: HOME | End: 2021-12-21
Admitting: INTERNAL MEDICINE

## 2021-12-21 DIAGNOSIS — G47.10 HYPERSOMNIA: ICD-10-CM

## 2021-12-21 PROCEDURE — 95810 POLYSOM 6/> YRS 4/> PARAM: CPT

## 2021-12-22 ENCOUNTER — CLINICAL SUPPORT (OUTPATIENT)
Dept: INTERNAL MEDICINE | Facility: CLINIC | Age: 41
End: 2021-12-22

## 2021-12-22 DIAGNOSIS — R05.9 COUGH: Primary | ICD-10-CM

## 2021-12-22 PROCEDURE — 87428 SARSCOV & INF VIR A&B AG IA: CPT | Performed by: NURSE PRACTITIONER

## 2021-12-22 PROCEDURE — U0004 COV-19 TEST NON-CDC HGH THRU: HCPCS | Performed by: INTERNAL MEDICINE

## 2021-12-22 NOTE — PROGRESS NOTES
Lab Results   Component Value Date    SARSANTIGEN Not Detected 12/22/2021    COVID19 Not Detected 11/12/2021    FLUAAG Not Detected 12/22/2021    INR 1.1 02/05/2019

## 2021-12-23 LAB — SARS-COV-2 RNA PNL SPEC NAA+PROBE: NOT DETECTED

## 2021-12-28 ENCOUNTER — OFFICE VISIT (OUTPATIENT)
Dept: INTERNAL MEDICINE | Facility: CLINIC | Age: 41
End: 2021-12-28

## 2021-12-28 VITALS
OXYGEN SATURATION: 97 % | HEIGHT: 64 IN | SYSTOLIC BLOOD PRESSURE: 127 MMHG | HEART RATE: 110 BPM | BODY MASS INDEX: 43.5 KG/M2 | WEIGHT: 254.8 LBS | DIASTOLIC BLOOD PRESSURE: 85 MMHG | TEMPERATURE: 96.5 F

## 2021-12-28 DIAGNOSIS — R73.02 IMPAIRED GLUCOSE TOLERANCE: ICD-10-CM

## 2021-12-28 DIAGNOSIS — L73.2 HIDRADENITIS SUPPURATIVA: ICD-10-CM

## 2021-12-28 DIAGNOSIS — R56.9 SEIZURE-LIKE ACTIVITY (HCC): Primary | ICD-10-CM

## 2021-12-28 DIAGNOSIS — L83 ACANTHOSIS: ICD-10-CM

## 2021-12-28 DIAGNOSIS — L40.9 PSORIASIS: ICD-10-CM

## 2021-12-28 DIAGNOSIS — Z13.220 SCREENING FOR LIPID DISORDERS: ICD-10-CM

## 2021-12-28 PROBLEM — G47.00 INSOMNIA, UNSPECIFIED: Status: ACTIVE | Noted: 2021-12-20

## 2021-12-28 PROBLEM — T63.441A ANAPHYLACTIC REACTION TO BEE STING: Status: ACTIVE | Noted: 2021-12-20

## 2021-12-28 PROBLEM — F33.1 MAJOR DEPRESSIVE DISORDER, RECURRENT, MODERATE (HCC): Status: ACTIVE | Noted: 2019-08-05

## 2021-12-28 PROBLEM — M54.50 LOW BACK PAIN: Status: ACTIVE | Noted: 2017-01-27

## 2021-12-28 PROBLEM — B37.31 CANDIDIASIS OF VAGINA: Status: ACTIVE | Noted: 2021-12-20

## 2021-12-28 PROBLEM — T78.2XXA ANAPHYLACTIC REACTION TO BEE STING: Status: ACTIVE | Noted: 2021-12-20

## 2021-12-28 LAB
ALBUMIN SERPL-MCNC: 4.5 G/DL (ref 3.5–5.2)
ALBUMIN/GLOB SERPL: 1.9 G/DL
ALP SERPL-CCNC: 98 U/L (ref 39–117)
ALT SERPL W P-5'-P-CCNC: 15 U/L (ref 1–33)
ANION GAP SERPL CALCULATED.3IONS-SCNC: 8.6 MMOL/L (ref 5–15)
AST SERPL-CCNC: 15 U/L (ref 1–32)
BASOPHILS # BLD AUTO: 0.06 10*3/MM3 (ref 0–0.2)
BASOPHILS NFR BLD AUTO: 0.8 % (ref 0–1.5)
BILIRUB SERPL-MCNC: 0.3 MG/DL (ref 0–1.2)
BUN SERPL-MCNC: 10 MG/DL (ref 6–20)
BUN/CREAT SERPL: 8.9 (ref 7–25)
CALCIUM SPEC-SCNC: 9.5 MG/DL (ref 8.6–10.5)
CHLORIDE SERPL-SCNC: 111 MMOL/L (ref 98–107)
CHOLEST SERPL-MCNC: 150 MG/DL (ref 0–200)
CO2 SERPL-SCNC: 21.4 MMOL/L (ref 22–29)
CREAT SERPL-MCNC: 1.12 MG/DL (ref 0.57–1)
DEPRECATED RDW RBC AUTO: 41.7 FL (ref 37–54)
EOSINOPHIL # BLD AUTO: 0.15 10*3/MM3 (ref 0–0.4)
EOSINOPHIL NFR BLD AUTO: 2 % (ref 0.3–6.2)
ERYTHROCYTE [DISTWIDTH] IN BLOOD BY AUTOMATED COUNT: 12.4 % (ref 12.3–15.4)
ERYTHROCYTE [SEDIMENTATION RATE] IN BLOOD: 20 MM/HR (ref 0–20)
GFR SERPL CREATININE-BSD FRML MDRD: 65 ML/MIN/1.73
GLOBULIN UR ELPH-MCNC: 2.4 GM/DL
GLUCOSE SERPL-MCNC: 95 MG/DL (ref 65–99)
HBA1C MFR BLD: 4.94 % (ref 4.8–5.6)
HCT VFR BLD AUTO: 45.2 % (ref 34–46.6)
HDLC SERPL-MCNC: 64 MG/DL (ref 40–60)
HGB BLD-MCNC: 15 G/DL (ref 12–15.9)
IMM GRANULOCYTES # BLD AUTO: 0.03 10*3/MM3 (ref 0–0.05)
IMM GRANULOCYTES NFR BLD AUTO: 0.4 % (ref 0–0.5)
LDLC SERPL CALC-MCNC: 72 MG/DL (ref 0–100)
LDLC/HDLC SERPL: 1.13 {RATIO}
LYMPHOCYTES # BLD AUTO: 1.98 10*3/MM3 (ref 0.7–3.1)
LYMPHOCYTES NFR BLD AUTO: 26.9 % (ref 19.6–45.3)
MCH RBC QN AUTO: 30.5 PG (ref 26.6–33)
MCHC RBC AUTO-ENTMCNC: 33.2 G/DL (ref 31.5–35.7)
MCV RBC AUTO: 92.1 FL (ref 79–97)
MONOCYTES # BLD AUTO: 0.55 10*3/MM3 (ref 0.1–0.9)
MONOCYTES NFR BLD AUTO: 7.5 % (ref 5–12)
NEUTROPHILS NFR BLD AUTO: 4.59 10*3/MM3 (ref 1.7–7)
NEUTROPHILS NFR BLD AUTO: 62.4 % (ref 42.7–76)
NRBC BLD AUTO-RTO: 0 /100 WBC (ref 0–0.2)
PLATELET # BLD AUTO: 265 10*3/MM3 (ref 140–450)
PMV BLD AUTO: 10.5 FL (ref 6–12)
POTASSIUM SERPL-SCNC: 4.4 MMOL/L (ref 3.5–5.2)
PROT SERPL-MCNC: 6.9 G/DL (ref 6–8.5)
RBC # BLD AUTO: 4.91 10*6/MM3 (ref 3.77–5.28)
SODIUM SERPL-SCNC: 141 MMOL/L (ref 136–145)
TRIGL SERPL-MCNC: 69 MG/DL (ref 0–150)
TSH SERPL DL<=0.05 MIU/L-ACNC: 1.1 UIU/ML (ref 0.27–4.2)
VLDLC SERPL-MCNC: 14 MG/DL (ref 5–40)
WBC NRBC COR # BLD: 7.36 10*3/MM3 (ref 3.4–10.8)

## 2021-12-28 PROCEDURE — 99214 OFFICE O/P EST MOD 30 MIN: CPT | Performed by: INTERNAL MEDICINE

## 2021-12-28 PROCEDURE — 80061 LIPID PANEL: CPT | Performed by: INTERNAL MEDICINE

## 2021-12-28 PROCEDURE — 85025 COMPLETE CBC W/AUTO DIFF WBC: CPT | Performed by: INTERNAL MEDICINE

## 2021-12-28 PROCEDURE — 84443 ASSAY THYROID STIM HORMONE: CPT | Performed by: INTERNAL MEDICINE

## 2021-12-28 PROCEDURE — 85652 RBC SED RATE AUTOMATED: CPT | Performed by: INTERNAL MEDICINE

## 2021-12-28 PROCEDURE — 80053 COMPREHEN METABOLIC PANEL: CPT | Performed by: INTERNAL MEDICINE

## 2021-12-28 PROCEDURE — 83036 HEMOGLOBIN GLYCOSYLATED A1C: CPT | Performed by: INTERNAL MEDICINE

## 2021-12-28 NOTE — PROGRESS NOTES
Chief Complaint  referral needed (neruo ) and Diabetes (dark spot- referred by Dr Fofana )    Subjective          Malinda Saucedo presents to [facility]  History of Present Illness  Due for annual labs  Patient reports easy bruising from humira injections recently. Bruising seems to take a while to resolve.    Patient would like screened for Diabetes Mellitus. Dermatology reported having some skin darkening in different areas. .   Patient would like to see neurology with concerns for epilepsy. Patient reports having abnormal brain waves on recent sleep study. Patient still with intermittent somnolence during the day.     Current Outpatient Medications   Medication Instructions   • ARIPiprazole (ABILIFY) 2 mg, Oral, Daily   • buPROPion XL (WELLBUTRIN XL) 300 MG 24 hr tablet No dose, route, or frequency recorded.   • busPIRone (BUSPAR) 10 mg, Oral, 2 Times Daily   • cetirizine (ZYRTEC) 10 mg, Oral, Daily   • clobetasol (TEMOVATE) 0.05 % external solution clobetasol 0.05 % scalp solution   • cyclobenzaprine (FLEXERIL) 10 MG tablet No dose, route, or frequency recorded.   • DULoxetine (CYMBALTA) 60 MG capsule No dose, route, or frequency recorded.   • DULoxetine (CYMBALTA) 30 mg, Oral, Daily   • EPINEPHrine (EPIPEN) 0.3 MG/0.3ML solution auto-injector injection epinephrine 0.3 mg/0.3 mL injection, auto-injector   • gabapentin (NEURONTIN) 600 MG tablet TAKE 1 TABLET BY MOUTH EVERY 8 HOURS AS DIRECTED   • Humira Pen 40 MG/0.4ML Pen-injector Kit No dose, route, or frequency recorded.   • ibuprofen (ADVIL,MOTRIN) 800 MG tablet No dose, route, or frequency recorded.   • norethindrone (AYGESTIN) 5 MG tablet No dose, route, or frequency recorded.   • topiramate (TOPAMAX) 200 MG tablet topiramate 200 mg tablet       The following portions of the patient's history were reviewed and updated as appropriate: allergies, current medications, past family history, past medical  "history, past social history, past surgical history, and problem list.    Objective   Vital Signs:   /85 (BP Location: Left arm, Patient Position: Sitting, Cuff Size: Large Adult)   Pulse 110   Temp 96.5 °F (35.8 °C) (Temporal)   Ht 162.6 cm (64\")   Wt 116 kg (254 lb 12.8 oz)   SpO2 97%   BMI 43.74 kg/m²     Wt Readings from Last 3 Encounters:   12/28/21 116 kg (254 lb 12.8 oz)   12/17/21 127 kg (280 lb)   12/08/21 115 kg (253 lb 8 oz)     BP Readings from Last 3 Encounters:   12/28/21 127/85   12/17/21 140/93   12/08/21 118/82     Physical Exam   Appearance: No acute distress, well-nourished  Head: normocephalic, atraumatic  Eyes: extraocular movements intact, no scleral icterus, no conjunctival injection  Ears, Nose, and Throat: external ears normal, nares patent, moist mucous membranes  Cardiovascular: regular rate and rhythm. no murmurs, rales, or rhonchi. no edema  Respiratory: breathing comfortably, symmetric chest rise, clear to auscultation bilaterally. No wheezes, rales, or rhonchi.  Neuro: alert and oriented to time, place, and person. Normal gait  Psych: normal mood and affect     Lab Results   Component Value Date    SARSANTIGEN Not Detected 12/22/2021    COVID19 Not Detected 12/22/2021    FLUAAG Not Detected 12/22/2021    INR 1.1 02/05/2019          Assessment and Plan    Diagnoses and all orders for this visit:    1. Seizure-like activity (HCC) (Primary)  Comments:  based on sleep study results. refer to neuro for further eval.   Orders:  -     Ambulatory Referral to Neurology  -     Hemoglobin A1c  -     TSH    2. Psoriasis  Comments:  check ESR with blood draw. cont f/u with derm.   Orders:  -     Sedimentation Rate    3. Hidradenitis suppurativa  -     Sedimentation Rate    4. Impaired glucose tolerance  Comments:  noted previously. check HgbA1c.   Orders:  -     Comprehensive Metabolic Panel  -     CBC & Differential  -     TSH    5. Screening for lipid disorders  -     Lipid " Panel    6. Acanthosis  Comments:  from dermatology exam. will check HgbA1c.         Medications Discontinued During This Encounter   Medication Reason   • fluticasone (FLONASE) 50 MCG/ACT nasal spray *Therapy completed   • nystatin (MYCOSTATIN) 100,000 unit/mL suspension *Therapy completed   • eszopiclone (LUNESTA) 3 MG tablet *Therapy completed   • Mefenamic Acid 250 MG capsule *Therapy completed   • Phendimetrazine Tartrate  MG capsule sustained-release 24 hr *Therapy completed        Follow Up   Return in about 4 months (around 4/28/2022) for Recheck.  Patient was given instructions and counseling regarding her condition or for health maintenance advice. Please see specific information pulled into the AVS if appropriate.

## 2021-12-31 ENCOUNTER — HOSPITAL ENCOUNTER (EMERGENCY)
Facility: HOSPITAL | Age: 41
Discharge: HOME | End: 2022-01-01
Attending: EMERGENCY MEDICINE | Admitting: EMERGENCY MEDICINE

## 2021-12-31 ENCOUNTER — APPOINTMENT (OUTPATIENT)
Dept: GENERAL RADIOLOGY | Facility: HOSPITAL | Age: 41
End: 2021-12-31

## 2021-12-31 ENCOUNTER — APPOINTMENT (OUTPATIENT)
Dept: CARDIOLOGY | Facility: HOSPITAL | Age: 41
End: 2021-12-31

## 2021-12-31 VITALS
SYSTOLIC BLOOD PRESSURE: 130 MMHG | DIASTOLIC BLOOD PRESSURE: 86 MMHG | HEIGHT: 64 IN | RESPIRATION RATE: 18 BRPM | TEMPERATURE: 98.2 F | HEART RATE: 103 BPM | WEIGHT: 257.94 LBS | OXYGEN SATURATION: 99 % | BODY MASS INDEX: 44.04 KG/M2

## 2021-12-31 DIAGNOSIS — S93.601A SPRAIN OF RIGHT FOOT, INITIAL ENCOUNTER: Primary | ICD-10-CM

## 2021-12-31 PROCEDURE — 93971 EXTREMITY STUDY: CPT | Performed by: SURGERY

## 2021-12-31 PROCEDURE — 93971 EXTREMITY STUDY: CPT

## 2021-12-31 PROCEDURE — 73630 X-RAY EXAM OF FOOT: CPT

## 2021-12-31 PROCEDURE — 99283 EMERGENCY DEPT VISIT LOW MDM: CPT

## 2021-12-31 RX ORDER — IBUPROFEN 400 MG/1
800 TABLET ORAL ONCE
Status: COMPLETED | OUTPATIENT
Start: 2021-12-31 | End: 2021-12-31

## 2021-12-31 RX ADMIN — IBUPROFEN 800 MG: 400 TABLET, FILM COATED ORAL at 22:14

## 2022-01-01 NOTE — ED TRIAGE NOTES
Patient presents to ED with complaints of left arm pain that she believes may be a blood clot caused by a lab draw on Wednesday. States that she has a history of blood clots but has not been on lovenox since 2019. Patient also complaining of right foot and toe pain caused by a fall this morning while walking up stairs.

## 2022-01-01 NOTE — ED PROVIDER NOTES
Time: 9:11 PM EST  Arrived by: private car  Chief Complaint: Right foot pain, concern for DVT of left arm  History provided by: Patient  History is limited by: N/A     History of Present Illness:  Patient is a 41 y.o. year old female that presents to the emergency department with left arm numbness pain/swelling since yesterday.  Patient states she had an IV placed to her left AC 2 days ago and is concerned about DVT.  Sent by urgent care for ultrasound.  Patient also states that 06 15 this morning she fell while walking and has pain to her right distal/medial foot.  Pain is throbbing and worse with walking.    HPI    Similar Symptoms Previously: Has a history of DVT  Recently seen: Yes      Patient Care Team  Primary Care Provider: Ant Carlos Jr., MD    Past Medical History:     Allergies   Allergen Reactions   • Methotrexate Rash     Past Medical History:   Diagnosis Date   • Allergic    • Anxiety    • Arthritis    • Chronic pain disorder    • Deep vein thrombosis (HCC)    • Depression    • Ectopic pregnancy without intrauterine pregnancy 2019    Formatting of this note might be different from the original. - Day 1 = 19 -> beta 4,927 MTX #1 given (110mg) - Day 4 = 19 -> beta 11,077 - Day 7 = 19 -> beta 11,923 MTX #2 given (110mg) - Day 11 = 19 -> beta 11,406 - Day 14 = 19 -> scheduled visit and beta - Day 19 =  19 -> beta 6,584 - Day 26 = 19 -> beta 3,111 - Day 34 = 3/1/19 -> beta 553 (seen at [facility]   • Endometriosis    • Fibroids    • Headache    • Hidradenitis    • Low back pain ?    DDD   • Obesity    • Panic disorder    • PTSD (post-traumatic stress disorder)      Past Surgical History:   Procedure Laterality Date   •  SECTION     • CYSTECTOMY     • ENDOMETRIAL ABLATION  , ,    • EYE SURGERY     • MYOMECTOMY       Family History   Problem Relation Age of Onset   • ADD / ADHD Mother    • OCD Mother    • Arthritis Mother    •  Hyperlipidemia Mother    • Hypertension Mother    • Thyroid disease Mother    • Anxiety disorder Mother    • ADD / ADHD Sister    • Anxiety disorder Sister    • Bipolar disorder Sister    • Drug abuse Maternal Aunt    • Depression Maternal Aunt    • Alcohol abuse Maternal Uncle    • Drug abuse Maternal Uncle    • Schizophrenia Maternal Uncle    • Depression Maternal Grandmother    • Other Maternal Grandmother         Colon cancer   • Anxiety disorder Maternal Grandmother    • ADD / ADHD Cousin    • OCD Cousin    • Suicide Attempts Cousin    • Alcohol abuse Cousin    • Depression Cousin    • Seizures Niece    • Arthritis Sister    • Depression Sister    • Hyperlipidemia Sister    • Asthma Sister    • Hypertension Sister    • Miscarriages / Stillbirths Sister    • Mental illness Maternal Uncle    • Alcohol abuse Maternal Uncle        Home Medications:  Prior to Admission medications    Medication Sig Start Date End Date Taking? Authorizing Provider   ARIPiprazole (Abilify) 2 MG tablet Take 1 tablet by mouth Daily. 12/14/21   Brooks Lynn MD   buPROPion XL (WELLBUTRIN XL) 300 MG 24 hr tablet  9/23/21   Merari Fischer MD   busPIRone (BUSPAR) 10 MG tablet Take 1 tablet by mouth 2 (Two) Times a Day. 12/14/21   Brooks Lynn MD   cetirizine (zyrTEC) 10 MG tablet Take 1 tablet by mouth Daily. 9/11/21   Ant Carlos Jr., MD   clobetasol (TEMOVATE) 0.05 % external solution clobetasol 0.05 % scalp solution    Merari Fischer MD   cyclobenzaprine (FLEXERIL) 10 MG tablet  7/28/21   Merari Fischer MD   DULoxetine (CYMBALTA) 30 MG capsule Take 1 capsule by mouth Daily. 11/10/21   Brooks Lynn MD   DULoxetine (CYMBALTA) 60 MG capsule  6/28/21   Merari Fischer MD   EPINEPHrine (EPIPEN) 0.3 MG/0.3ML solution auto-injector injection epinephrine 0.3 mg/0.3 mL injection, auto-injector    Merari Fischer MD   gabapentin (NEURONTIN) 600 MG tablet TAKE 1 TABLET BY MOUTH EVERY 8 HOURS AS  "DIRECTED 8/15/21   Merari Fischer MD   Humira Pen 40 MG/0.4ML Pen-injector Kit  10/31/21   Merari Fischer MD   ibuprofen (ADVIL,MOTRIN) 800 MG tablet  6/29/21   Merari Fischer MD   norethindrone (AYGESTIN) 5 MG tablet  10/4/21   Merari Fischer MD   topiramate (TOPAMAX) 200 MG tablet topiramate 200 mg tablet    Merari Fischer MD        Social History:   Social History     Tobacco Use   • Smoking status: Never Smoker   • Smokeless tobacco: Never Used   Vaping Use   • Vaping Use: Never used   Substance Use Topics   • Alcohol use: Not Currently   • Drug use: Never     Recent travel: no     Review of Systems:  Review of Systems   Constitutional: Negative for chills and fever.   HENT: Negative for congestion, rhinorrhea and sore throat.    Eyes: Negative for pain and visual disturbance.   Respiratory: Negative for apnea, cough, chest tightness and shortness of breath.    Cardiovascular: Negative for chest pain and palpitations.   Gastrointestinal: Negative for abdominal pain, diarrhea, nausea and vomiting.   Genitourinary: Negative for difficulty urinating and dysuria.   Musculoskeletal: Negative for joint swelling and myalgias.   Skin: Negative for color change.   Neurological: Negative for seizures and headaches.   Psychiatric/Behavioral: Negative.    All other systems reviewed and are negative.       Physical Exam:  /86   Pulse 103   Temp 98.2 °F (36.8 °C) (Oral)   Resp 18   Ht 162.6 cm (64\")   Wt 117 kg (257 lb 15 oz)   LMP  (LMP Unknown)   SpO2 99%   BMI 44.27 kg/m²     Physical Exam  Vitals and nursing note reviewed.   Constitutional:       Appearance: Normal appearance.   HENT:      Head: Normocephalic and atraumatic.      Nose: Nose normal.      Mouth/Throat:      Mouth: Mucous membranes are dry.   Eyes:      Extraocular Movements: Extraocular movements intact.      Pupils: Pupils are equal, round, and reactive to light.   Cardiovascular:      Rate and Rhythm: " Normal rate and regular rhythm.      Heart sounds: Normal heart sounds.   Pulmonary:      Effort: Pulmonary effort is normal.      Breath sounds: Normal breath sounds.   Abdominal:      General: Bowel sounds are normal.      Palpations: Abdomen is soft.      Tenderness: There is no abdominal tenderness.   Musculoskeletal:         General: Tenderness (Right first metacarpal) present. No swelling.      Cervical back: Normal range of motion and neck supple.   Skin:     General: Skin is warm and dry.      Coloration: Skin is not jaundiced.   Neurological:      General: No focal deficit present.      Mental Status: She is alert and oriented to person, place, and time. Mental status is at baseline.   Psychiatric:         Mood and Affect: Mood normal.         Behavior: Behavior normal.         Judgment: Judgment normal.                Medications in the Emergency Department:  Medications   ibuprofen (ADVIL,MOTRIN) tablet 800 mg (800 mg Oral Given 12/31/21 2214)        Labs  Lab Results (last 24 hours)     ** No results found for the last 24 hours. **           Imaging:  XR Foot 3+ View Right    Result Date: 12/31/2021  PROCEDURE: XR FOOT 3+ VW RIGHT  COMPARISON: None  INDICATIONS: fall, right foot pain  FINDINGS:  BONES: Normal.  No significant arthropathy or acute abnormality.  SOFT TISSUES: Negative.  No visible soft tissue swelling.  EFFUSION: None visible.  OTHER: Negative.   CONCLUSION: No acute fracture or traumatic malalignment identified.      AMRY CONNELL MD       Electronically Signed and Approved By: MARY CONNELL MD on 12/31/2021 at 21:08               Procedures:  Procedures    Progress                            Medical Decision Making:  MDM  Number of Diagnoses or Management Options     Amount and/or Complexity of Data Reviewed  Tests in the radiology section of CPT®: reviewed  Independent visualization of images, tracings, or specimens: yes    Risk of Complications, Morbidity, and/or  Mortality  Presenting problems: moderate  Management options: low    Patient Progress  Patient progress: stable       Final diagnoses:   Sprain of right foot, initial encounter        Disposition:  ED Disposition     ED Disposition Condition Comment    Discharge Stable Pt d/c'ed stable and ambulatory. IV cath removed and intact.           This medical record created using voice recognition software and may contain unintended errors.         Juan R Barcenas MD  12/31/21 [id number]

## 2022-01-02 LAB
BH CV UPPER VENOUS LEFT AXILLARY AUGMENT: NORMAL
BH CV UPPER VENOUS LEFT AXILLARY COMPRESS: NORMAL
BH CV UPPER VENOUS LEFT AXILLARY PHASIC: NORMAL
BH CV UPPER VENOUS LEFT AXILLARY SPONT: NORMAL
BH CV UPPER VENOUS LEFT BASILIC FOREARM COMPRESS: NORMAL
BH CV UPPER VENOUS LEFT BASILIC UPPER COMPRESS: NORMAL
BH CV UPPER VENOUS LEFT BRACHIAL AUGMENT: NORMAL
BH CV UPPER VENOUS LEFT BRACHIAL COMPRESS: NORMAL
BH CV UPPER VENOUS LEFT CEPHALIC FOREARM COMPRESS: NORMAL
BH CV UPPER VENOUS LEFT CEPHALIC UPPER COMPRESS: NORMAL
BH CV UPPER VENOUS LEFT INTERNAL JUGULAR COMPRESS: NORMAL
BH CV UPPER VENOUS LEFT INTERNAL JUGULAR PHASIC: NORMAL
BH CV UPPER VENOUS LEFT INTERNAL JUGULAR SPONT: NORMAL
BH CV UPPER VENOUS LEFT RADIAL AUGMENT: NORMAL
BH CV UPPER VENOUS LEFT RADIAL COMPRESS: NORMAL
BH CV UPPER VENOUS LEFT SUBCLAVIAN AUGMENT: NORMAL
BH CV UPPER VENOUS LEFT SUBCLAVIAN COMPRESS: NORMAL
BH CV UPPER VENOUS LEFT SUBCLAVIAN PHASIC: NORMAL
BH CV UPPER VENOUS LEFT SUBCLAVIAN SPONT: NORMAL
BH CV UPPER VENOUS LEFT ULNAR AUGMENT: NORMAL
BH CV UPPER VENOUS LEFT ULNAR COMPRESS: NORMAL
BH CV UPPER VENOUS RIGHT INTERNAL JUGULAR COMPRESS: NORMAL
BH CV UPPER VENOUS RIGHT INTERNAL JUGULAR PHASIC: NORMAL
BH CV UPPER VENOUS RIGHT INTERNAL JUGULAR SPONT: NORMAL
BH CV UPPER VENOUS RIGHT SUBCLAVIAN COMPRESS: NORMAL
BH CV UPPER VENOUS RIGHT SUBCLAVIAN PHASIC: NORMAL
BH CV UPPER VENOUS RIGHT SUBCLAVIAN SPONT: NORMAL
MAXIMAL PREDICTED HEART RATE: 180 BPM
STRESS TARGET HR: 153 BPM

## 2022-01-09 DIAGNOSIS — G47.33 OSA (OBSTRUCTIVE SLEEP APNEA): Primary | ICD-10-CM

## 2022-01-10 ENCOUNTER — TELEPHONE (OUTPATIENT)
Dept: SLEEP MEDICINE | Facility: HOSPITAL | Age: 42
End: 2022-01-10

## 2022-01-11 ENCOUNTER — TELEMEDICINE (OUTPATIENT)
Dept: PSYCHIATRY | Facility: CLINIC | Age: 42
End: 2022-01-11

## 2022-01-11 DIAGNOSIS — F51.05 INSOMNIA DUE TO MENTAL CONDITION: ICD-10-CM

## 2022-01-11 DIAGNOSIS — F41.1 GENERALIZED ANXIETY DISORDER: ICD-10-CM

## 2022-01-11 DIAGNOSIS — F33.1 MAJOR DEPRESSIVE DISORDER, RECURRENT EPISODE, MODERATE: Primary | ICD-10-CM

## 2022-01-11 PROCEDURE — 99214 OFFICE O/P EST MOD 30 MIN: CPT | Performed by: STUDENT IN AN ORGANIZED HEALTH CARE EDUCATION/TRAINING PROGRAM

## 2022-01-11 RX ORDER — FLUTICASONE PROPIONATE 50 MCG
SPRAY, SUSPENSION (ML) NASAL
COMMUNITY
End: 2022-01-11

## 2022-01-11 RX ORDER — FLUCONAZOLE 150 MG/1
TABLET ORAL
COMMUNITY
End: 2022-01-11

## 2022-01-11 RX ORDER — ARIPIPRAZOLE 2 MG/1
4 TABLET ORAL DAILY
Qty: 60 TABLET | Refills: 2 | Status: SHIPPED | OUTPATIENT
Start: 2022-01-11 | End: 2022-03-11 | Stop reason: ALTCHOICE

## 2022-01-11 RX ORDER — ZOLPIDEM TARTRATE 5 MG/1
TABLET ORAL
COMMUNITY
End: 2022-02-15

## 2022-01-11 NOTE — PATIENT INSTRUCTIONS
1.  Please return to clinic at your next scheduled visit.  Contact the clinic ([phone]) at least 24 hours prior in the event you need to cancel.  2.  Do no harm to yourself or others.    3.  Avoid alcohol and drugs.    4.  Take all medications as prescribed.  Please contact the clinic with any concerns. If you are in need of medication refills, please call the clinic at [phone].    5. Should you want to get in touch with your provider, Dr. Brooks Lynn, please utilize [employer] or contact the office ([phone]), and staff will be able to page Dr. Lynn directly.  6.  In the event you have personal crisis, contact the following crisis numbers: Suicide Prevention Hotline 1-645.528.5375; [facility] Helpline 1-379-168-DOFV; [facility] Emergency Room [phone]; text HELLO to 327841; or 206.     SPECIFIC RECOMMENDATIONS:     1.      Medications discussed at this encounter:                   - increase abilify     2.      Psychotherapy recommendations:      3.     Return to clinic: 4 weeks

## 2022-01-11 NOTE — PROGRESS NOTES
"Subjective   Malinda Saucedo is a 41 y.o. female who presents today for initial evaluation     Referring Provider:  No referring provider defined for this encounter.    Chief Complaint:  mdd vivi    History of Present Illness:     Chart review 9/29: Seen September 10 to establish care.  Previously was at hospitals office.  Labs are consistent with Sjogren's.  Psoriasis and arthritis are under control.  On Topamax for migraines.  On Lunesta for insomnia.  History of anxiety and depression.  On gabapentin 600 mg 3 times daily, Strattera 100 mg daily, BuSpar 10 mg, duloxetine 60 mg, bupropion 300 mg.  Denied anxiety in January 2019.  Has been on Prozac in the past.  BMP demonstrates creatinine of 1.68, alk phos of 116, otherwise unremarkable.  hCG was followed in 2019.  CBC unremarkable, no head imaging or EKG.    \"Malinda\"    1/11: Virtual visit via Zoom audio and video due to the COVID-19 pandemic.  Patient is accepting of and agreeable to visit.  The visit consisted of the patient and I.  Interview:  1. Chart review: Seen in the emergency room December 31 for left arm numbness pain and swelling after having an IV placed to her ACE 2 days ago.  Patient saw primary care December 28 and wanted to be referred to neurology after her sleep study showed abnormal brain waves.  Labs from December 28 show low CO2 21.4, elevated chloride 111, elevated creatinine 1.12, A1c.  Thyroid studies, lipid profile, CBC are unremarkable.  No DVT found.  2. \"Good actually. The abilify helped a lot.\"  a. Sees things out of the corner of her eye, never directly.  b. Still hears voices stating her name.  3. Depression/Mood: stable  4. Anxiety: stable  5. Refills: n  6. Sleeping: initial insomnia. GISELA confirmed by sleep study.  7. Eating: stable  8. Substances: n  9. Therapy: n  10. Medication compliant: y  11. No SI HI AVH.      12/14: Virtual visit via Zoom audio and video due to the COVID-19 pandemic.  Patient is accepting of " "and agreeable to visit.  The visit consisted of the patient and I.  Interview:  12. Chart review: Seen for thrush by PCP 12/8, seen 11/11 for cough, sore throat (COVID neg). Referred to sleep medicine.  13. \"I'm ok.\"  a. Things have \"been weird.\"  b. I was having auditory and visual hallucinations when \"I first started seeing you.\"  i. Her son or  saying her name.  ii. Sees \"scary shadows\" out of the corner of her eyes, especially at night.  iii. \"Afraid I have bipolar disorder.\"  1. \"Started a practice out of nowhere.\"  2. \"I have spending sprees.\" $2,000 at a time. In the middle of one right now. Bought a bedset, tables, talked  into getting a new TV. Now in a new house.  c. Uncle has dx of schizophrenia, sister dx'd with bipolar d/o.  d. Also found out recently on the Sjogren's/Lupus spectrum.  e. Also has sleep study scheduled.  f. Will be seeing a neurologist because \"I'm losing time, like 5 minutes.\"  g. Willing to reduce the number of medications she is on and start a mood stabilizer.  h. \"Scared of these. Scared it will get worse.\"  i. Stopped buspar 2 weeks ago and is more irritable.  14. Depression/Mood:  1. Depressed mood: y  2. Seasonal pattern: denies  3. Severity: moderate  4. Anhedonia: mild, but enjoys spending time with son, shopping  5. Guilt or hopelessness:  6. Energy: \"horrible\"  7. Concentration: poor  8. Weight loss or weight gain: gain, 2 lbs since 2 weeks  9. Psychomotor retardation or agitation: def  10. Insomnia:  a. Topamax makes her sleepy, bed at 11, no initial insomnia, but wakes at 2 am, eats, sleeps in an hour, wakes at 6:30 am.  11. Duration: months  15. Anxiety:  1. Uncontrolled worrying: y  2. Severity: moderate  3. Muscle tension: y  4. Fatigue: y  5. Concentration: poor  6. Restlessness/feeling on edge: y  7. Irritability: y  8. Insomnia: maintenance insomnia  9. Duration: months  10. Panic attacks: def  16. Refills: none  17. Sleeping: above, sleep study set " "up  18. Eating: above  19. Substances: CBD gummy to sleep  20. Therapy: declines  21. Medication compliant: y  22. No SI HI AVH.      11/10: Virtual visit via Zoom audio and video due to the COVID-19 pandemic.  Patient is accepting of and agreeable to visit.  The visit consisted of the patient and I.  Interview:  23. Chart review: No new developments.  24. \"I'm ok.\"  a. More emotional; horribly.  b. Mostly down.  c. Pharmacy has not filled her 30 mg cap of duloxetine in weeks; unsure why.  d. Also feels sick too; congested today  25. Depression/Mood: depressed mood  12. Guilt or hopelessness: denies  13. Energy: poor  14. Concentration: poor  26. Anxiety: not bad  27. Sleeping:  a. Initial insomnia  b. Maintenance insomnia  28. Eating: stable  29. Substances:  30. Therapy: denies  31. Medication compliant: no, inadvertently  32. No SI HI AVH.      10/13: Virtual visit via Zoom audio and video due to the COVID-19 pandemic.  Patient is accepting of and agreeable to visit.  The visit consisted of the patient and I.  Interview:  33. Chart review: No new developments.  34. \"I've seen some small changes, but not, like, horrible.\"  a. Attention drifts a little more, in the mornings.  35. Depression/Mood: \"increasing the duloxetine has helped.\"  a. Usually feels really sad before her cycle, but doesn't this time  36. Anxiety:  a. Not as irritable  37. Sleeping: yes  38. Eating: stable  39. Substances: denies  40. Therapy: denies  41. Medication compliant: y  42. No SI HI AVH      9/29: Virtual visit via Zoom audio and video due to the COVID-19 pandemic.  Patient is accepting of and agreeable to visit.  The visit consisted of the patient and I.  Interview:  43. Her story: \"Well, it started in [employer].\"  a. Originally PMDD (2009) for years  b. Got out 2011  c. Then dx'd with depression and YENNY  d. Has been on medications since  i. Was on prozac from 2009 until 2015, stopped due to pregnancy  ii. 2018, started wellbutrin only. " "Which was horrible by itself. They added buspar 10 mg bid, with it. Then duloxetine 60 mg daily added. Better combination.  iii. Now on the above, and also on strattera 100 mg daily for ADHD. Also on gabapentin 600 tid.  e. Stimulants put her to sleep: adderalllachelleyvanse.  Did not try extended release formulations.  44. Mood: much better than it was in the past, but still could use some help.  45. Stressors:  a. Niece started having seizures at 21 and lost her memory, doesn't remember anyone or anybody.  b. In the middle of buying a house  c. Finances  d. Starting my own practice.  e. Son having school problems; hanging with \"bad kids.\"  46. Anxiety: manageable.  a. Worrying a lot  47. Coping: goes to [employer], trusts in God  48. Sleeping: yes  49. Eating: stable  50. Medication compliant: yes  51. Psych ROS: D, A.  Negative for psychosis.  Unclear gorge.  52. No SI HI AVH    Depression:  53. Anhedonia: denies  54. Guilt or hopelessness:   a. Feelings of guilt about how she could have done more for her nieces and nephews  55. Energy: horrible  56. Concentration: \"I have to force myself to focus.\"  a. If stops to eat, it ends her day  57. Weight loss or weight gain: stable over last few months, on weight loss pills, however  58. Psychomotor retardation or agitation: frequently  59. Insomnia: yes, on the lunesta  60. Duration: for years      Access to Firearms: yes, locked away    PHQ-9 Depression Screening  PHQ-9 Total Score:      Little interest or pleasure in doing things?     Feeling down, depressed, or hopeless?     Trouble falling or staying asleep, or sleeping too much?     Feeling tired or having little energy?     Poor appetite or overeating?     Feeling bad about yourself - or that you are a failure or have let yourself or your family down?     Trouble concentrating on things, such as reading the newspaper or watching television?     Moving or speaking so slowly that other people could have noticed? Or the " opposite - being so fidgety or restless that you have been moving around a lot more than usual?     Thoughts that you would be better off dead, or of hurting yourself in some way?     PHQ-9 Total Score       YENNY-7       Past Surgical History:  Past Surgical History:   Procedure Laterality Date   •  SECTION     • CYSTECTOMY     • ENDOMETRIAL ABLATION  , ,    • EYE SURGERY     • MYOMECTOMY         Problem List:  Patient Active Problem List   Diagnosis   • YENNY (generalized anxiety disorder)   • PTSD (post-traumatic stress disorder)   • Attention deficit hyperactivity disorder   • Hidradenitis suppurativa   • Intramural and subserous leiomyoma of uterus   • Lumbosacral spondylosis without myelopathy   • Endometriosis determined by laparoscopy   • PMDD (premenstrual dysphoric disorder)   • Psoriasis   • Thrombophilia ([facility])   • Seasonal allergies   • Major depressive disorder in partial remission ([facility])   • Body mass index (BMI) 40.0-44.9, adult ([facility])   • Candidiasis of vagina   • Anaphylactic reaction to bee sting   • Insomnia, unspecified   • Low back pain   • Major depressive disorder, recurrent, moderate ([facility])       Allergy:   Allergies   Allergen Reactions   • Methotrexate Rash        Discontinued Medications:  Medications Discontinued During This Encounter   Medication Reason   • fluconazole (DIFLUCAN) 150 MG tablet *Therapy completed   • fluticasone (FLONASE) 50 MCG/ACT nasal spray *Therapy completed   • ARIPiprazole (Abilify) 2 MG tablet Reorder       Current Medications:   Current Outpatient Medications   Medication Sig Dispense Refill   • ARIPiprazole (Abilify) 2 MG tablet Take 2 tablets by mouth Daily. 60 tablet 2   • buPROPion XL (WELLBUTRIN XL) 300 MG 24 hr tablet      • busPIRone (BUSPAR) 10 MG tablet Take 1 tablet by mouth 2 (Two) Times a Day. 60 tablet 2   • cetirizine (zyrTEC) 10 MG tablet Take 1 tablet by mouth Daily. 90 tablet 3   • clobetasol (TEMOVATE) 0.05 % external solution  clobetasol 0.05 % scalp solution     • cyclobenzaprine (FLEXERIL) 10 MG tablet      • DULoxetine (CYMBALTA) 30 MG capsule Take 1 capsule by mouth Daily. 30 capsule 2   • DULoxetine (CYMBALTA) 60 MG capsule      • EPINEPHrine (EPIPEN) 0.3 MG/0.3ML solution auto-injector injection epinephrine 0.3 mg/0.3 mL injection, auto-injector     • gabapentin (NEURONTIN) 600 MG tablet TAKE 1 TABLET BY MOUTH EVERY 8 HOURS AS DIRECTED     • Humira Pen 40 MG/0.4ML Pen-injector Kit      • ibuprofen (ADVIL,MOTRIN) 800 MG tablet      • norethindrone (AYGESTIN) 5 MG tablet      • nystatin (MYCOSTATIN) 100,000 unit/mL suspension nystatin 100,000 unit/mL oral suspension   SWISH AND SWALLOW 5 ML BY MOUTH FOUR TIMES DAILY     • topiramate (TOPAMAX) 200 MG tablet topiramate 200 mg tablet     • zolpidem (AMBIEN) 5 MG tablet zolpidem 5 mg tablet   TAKE 1 TABLET BY MOUTH. TO BE GIVEN IN CLINIC OR HOSPITAL DEPARTMENT FOR 1 DOSE. DO NOT TAKE AT HOME. BRING WITH YOU TO SLEEP LAB.       No current facility-administered medications for this visit.       Past Medical History:  Past Medical History:   Diagnosis Date   • Allergic    • Anxiety    • Arthritis    • Chronic pain disorder    • Deep vein thrombosis (HCC)    • Depression    • Ectopic pregnancy without intrauterine pregnancy 1/25/2019    Formatting of this note might be different from the original. - Day 1 = 1/25/19 -> beta 4,927 MTX #1 given (110mg) - Day 4 = 1/28/19 -> beta 11,077 - Day 7 = 1/31/19 -> beta 11,923 MTX #2 given (110mg) - Day 11 = 2/5/19 -> beta 11,406 - Day 14 = 2/8/19 -> scheduled visit and beta - Day 19 =  2/13/19 -> beta 6,584 - Day 26 = 2/20/19 -> beta 3,111 - Day 34 = 3/1/19 -> beta 553 (seen at [facility]   • Endometriosis    • Fibroids    • Headache    • Hidradenitis    • Low back pain 2013?    DDD   • Obesity    • Panic disorder    • PTSD (post-traumatic stress disorder)        Past Psychiatric History:  Began Treatment: 2009  Diagnoses: D, A,  insomnia  Psychiatrist: Last was months ago for a couple times; previously NP for 2 years  Therapist: yes, therapy has not helped, two bad experiences  Admission History: denies  Medication Trials: see hpi  Self Harm: Denies  Suicide Attempts:Denies   Psychosis, Anxiety, Depression: denies    Substance Abuse History:   Types:Denies all, including illicit  Withdrawal Symptoms:Denies  Longest Period Sober:Not Applicable   AA: Not applicable     Social History:  Martial Status:  Employed: therapist, 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  Kids: one  House: apartment   History: army, honorable discharge, see hpi    Social History     Socioeconomic History   • Marital status:    Tobacco Use   • Smoking status: Never Smoker   • Smokeless tobacco: Never Used   Vaping Use   • Vaping Use: Never used   Substance and Sexual Activity   • Alcohol use: Not Currently   • Drug use: Never   • Sexual activity: Not Currently     Partners: Male     Birth control/protection: None       Family History:   Suicide Attempts:  1st cousin, maternal; another 1st cousin maternal  Suicide Completions: 1st cousin, maternal  Dx'd her sister herself that she has bipolar.    Family History   Problem Relation Age of Onset   • ADD / ADHD Mother    • OCD Mother    • Arthritis Mother    • Hyperlipidemia Mother    • Hypertension Mother    • Thyroid disease Mother    • Anxiety disorder Mother    • ADD / ADHD Sister    • Anxiety disorder Sister    • Bipolar disorder Sister    • Drug abuse Maternal Aunt    • Depression Maternal Aunt    • Alcohol abuse Maternal Uncle    • Drug abuse Maternal Uncle    • Schizophrenia Maternal Uncle    • Depression Maternal Grandmother    • Other Maternal Grandmother         Colon cancer   • Anxiety disorder Maternal Grandmother    • ADD / ADHD Cousin    • OCD Cousin    • Suicide Attempts Cousin    • Alcohol abuse Cousin    • Depression Cousin    • Seizures Niece    • Arthritis Sister    • Depression Sister   "  • Hyperlipidemia Sister    • Asthma Sister    • Hypertension Sister    • Miscarriages / Stillbirths Sister    • Mental illness Maternal Uncle    • Alcohol abuse Maternal Uncle        Developmental History:   Born: [place]  Siblings: 1 sister, older cousin who lived with them  Childhood: no abuse  High School:Completed  College: 4 yrs at [employer] 3 years degree in counseling    · Mental Status Exam  · Appearance  · : groomed, good eye contact, normal street clothes, in her car  · Behavior  · : pleasant and cooperative  · Motor  · : No abnormal  · Speech  · :normal rhythm, rate, volume, tone, not hyperverbal, not pressured, normal prosidy  · Mood  · : \"much better\"  · Affect  · : euthymic, mood congruent, good variability  · Thought Content  · : negative suicidal ideations, negative homicidal ideations, negative obsessions  · Perceptions  · : negative auditory hallucinations, negative visual hallucinations  · Thought Process  · : a little tangential  · Insight/Judgement  · : Fair/fair  · Cognition  · : grossly intact  · Attention   : intact    Review of Systems:  Review of Systems   Constitutional: Positive for fatigue. Negative for diaphoresis.   HENT: Negative for drooling.    Eyes: Negative for visual disturbance.   Respiratory: Negative for cough and shortness of breath.    Cardiovascular: Negative for chest pain, palpitations and leg swelling.   Gastrointestinal: Negative for nausea and vomiting.   Endocrine: Positive for cold intolerance and heat intolerance.   Genitourinary: Negative for difficulty urinating.   Musculoskeletal: Positive for joint swelling.   Allergic/Immunologic: Positive for immunocompromised state.   Neurological: Positive for dizziness, speech difficulty, light-headedness and numbness. Negative for seizures.         Physical Exam:  Physical Exam    Vital Signs:   There were no vitals taken for this visit.     Lab Results:   Admission on 12/31/2021, Discharged on 01/01/2022 "   Component Date Value Ref Range Status   • Target HR (85%) 12/31/2021 153  bpm Final   • Max. Pred. HR (100%) 12/31/2021 180  bpm Final   • Right Internal Jugular Compress 12/31/2021 C   Final   • Right Internal Jugular Phasic 12/31/2021 N   Final   • Right Internal Jugular Spont 12/31/2021 Y   Final   • Left Internal Jugular Compress 12/31/2021 C   Final   • Left Internal Jugular Phasic 12/31/2021 Y   Final   • Left Internal Jugular Spont 12/31/2021 Y   Final   • Right Subclavian Compress 12/31/2021 C   Final   • Right Subclavian Phasic 12/31/2021 N   Final   • Right Subclavian Spont 12/31/2021 Y   Final   • Left Subclavian Augment 12/31/2021 Y   Final   • Left Subclavian Compress 12/31/2021 C   Final   • Left Subclavian Phasic 12/31/2021 Y   Final   • Left Subclavian Spont 12/31/2021 Y   Final   • Left Axillary Augment 12/31/2021 Y   Final   • Left Axillary Compress 12/31/2021 C   Final   • Left Axillary Phasic 12/31/2021 Y   Final   • Left Axillary Spont 12/31/2021 Y   Final   • Left Brachial Augment 12/31/2021 Y   Final   • Left Brachial Compress 12/31/2021 C   Final   • Left Radial Augment 12/31/2021 Y   Final   • Left Radial Compress 12/31/2021 C   Final   • Left Ulnar Augment 12/31/2021 Y   Final   • Left Ulnar Compress 12/31/2021 C   Final   • Left Basilic Upper Compress 12/31/2021 C   Final   • Left Basilic Forearm Compress 12/31/2021 C   Final   • Left Cephalic Upper Compress 12/31/2021 C   Final   • Left Cephalic Forearm Compress 12/31/2021 C   Final   Office Visit on 12/28/2021   Component Date Value Ref Range Status   • Total Cholesterol 12/28/2021 150  0 - 200 mg/dL Final   • Triglycerides 12/28/2021 69  0 - 150 mg/dL Final   • HDL Cholesterol 12/28/2021 64* 40 - 60 mg/dL Final   • LDL Cholesterol  12/28/2021 72  0 - 100 mg/dL Final   • VLDL Cholesterol 12/28/2021 14  5 - 40 mg/dL Final   • LDL/HDL Ratio 12/28/2021 1.13   Final   • Glucose 12/28/2021 95  65 - 99 mg/dL Final   • BUN 12/28/2021 10  6 -  20 mg/dL Final   • Creatinine 12/28/2021 1.12* 0.57 - 1.00 mg/dL Final   • Sodium 12/28/2021 141  136 - 145 mmol/L Final   • Potassium 12/28/2021 4.4  3.5 - 5.2 mmol/L Final   • Chloride 12/28/2021 111* 98 - 107 mmol/L Final   • CO2 12/28/2021 21.4* 22.0 - 29.0 mmol/L Final   • Calcium 12/28/2021 9.5  8.6 - 10.5 mg/dL Final   • Total Protein 12/28/2021 6.9  6.0 - 8.5 g/dL Final   • Albumin 12/28/2021 4.50  3.50 - 5.20 g/dL Final   • ALT (SGPT) 12/28/2021 15  1 - 33 U/L Final   • AST (SGOT) 12/28/2021 15  1 - 32 U/L Final   • Alkaline Phosphatase 12/28/2021 98  39 - 117 U/L Final   • Total Bilirubin 12/28/2021 0.3  0.0 - 1.2 mg/dL Final   • eGFR   Amer 12/28/2021 65  >60 mL/min/1.73 Final   • Globulin 12/28/2021 2.4  gm/dL Final   • A/G Ratio 12/28/2021 1.9  g/dL Final   • BUN/Creatinine Ratio 12/28/2021 8.9  7.0 - 25.0 Final   • Anion Gap 12/28/2021 8.6  5.0 - 15.0 mmol/L Final   • Hemoglobin A1C 12/28/2021 4.94  4.80 - 5.60 % Final   • Sed Rate 12/28/2021 20  0 - 20 mm/hr Final   • TSH 12/28/2021 1.100  0.270 - 4.200 uIU/mL Final   • WBC 12/28/2021 7.36  3.40 - 10.80 10*3/mm3 Final   • RBC 12/28/2021 4.91  3.77 - 5.28 10*6/mm3 Final   • Hemoglobin 12/28/2021 15.0  12.0 - 15.9 g/dL Final   • Hematocrit 12/28/2021 45.2  34.0 - 46.6 % Final   • MCV 12/28/2021 92.1  79.0 - 97.0 fL Final   • MCH 12/28/2021 30.5  26.6 - 33.0 pg Final   • MCHC 12/28/2021 33.2  31.5 - 35.7 g/dL Final   • RDW 12/28/2021 12.4  12.3 - 15.4 % Final   • RDW-SD 12/28/2021 41.7  37.0 - 54.0 fl Final   • MPV 12/28/2021 10.5  6.0 - 12.0 fL Final   • Platelets 12/28/2021 265  140 - 450 10*3/mm3 Final   • Neutrophil % 12/28/2021 62.4  42.7 - 76.0 % Final   • Lymphocyte % 12/28/2021 26.9  19.6 - 45.3 % Final   • Monocyte % 12/28/2021 7.5  5.0 - 12.0 % Final   • Eosinophil % 12/28/2021 2.0  0.3 - 6.2 % Final   • Basophil % 12/28/2021 0.8  0.0 - 1.5 % Final   • Immature Grans % 12/28/2021 0.4  0.0 - 0.5 % Final   • Neutrophils, Absolute  12/28/2021 4.59  1.70 - 7.00 10*3/mm3 Final   • Lymphocytes, Absolute 12/28/2021 1.98  0.70 - 3.10 10*3/mm3 Final   • Monocytes, Absolute 12/28/2021 0.55  0.10 - 0.90 10*3/mm3 Final   • Eosinophils, Absolute 12/28/2021 0.15  0.00 - 0.40 10*3/mm3 Final   • Basophils, Absolute 12/28/2021 0.06  0.00 - 0.20 10*3/mm3 Final   • Immature Grans, Absolute 12/28/2021 0.03  0.00 - 0.05 10*3/mm3 Final   • nRBC 12/28/2021 0.0  0.0 - 0.2 /100 WBC Final   Clinical Support on 12/22/2021   Component Date Value Ref Range Status   • SARS Antigen 12/22/2021 Not Detected  Not Detected Final   • Influenza A Antigen BIANCA 12/22/2021 Not Detected   Final   • Influenza B Antigen BIANCA 12/22/2021 Not Detected   Final   • Internal Control 12/22/2021 Passed  Passed Final   • Lot Number 12/22/2021 [id number]   Final   • Expiration Date 12/22/2021 12,112,022   Final   • COVID19 12/22/2021 Not Detected  Not Detected - Ref. Range Final   Clinical Support on 11/12/2021   Component Date Value Ref Range Status   • SARS Antigen 11/12/2021 Not Detected  Not Detected Final   • Influenza A Antigen BIANCA 11/12/2021 Not Detected   Final   • Influenza B Antigen BIANCA 11/12/2021 Not Detected   Final   • Internal Control 11/12/2021 Passed  Passed Final   • Lot Number 11/12/2021 [id number]   Final   • Expiration Date 11/12/2021 12/22/2021   Final   • COVID19 11/12/2021 Not Detected  Not Detected - Ref. Range Final   Office Visit on 11/11/2021   Component Date Value Ref Range Status   • SARS Antigen 11/11/2021 Not Detected  Not Detected Final   • Influenza A Antigen BIANCA 11/11/2021 Not Detected   Final   • Influenza B Antigen BIANCA 11/11/2021 Not Detected   Final   • Internal Control 11/11/2021 Passed  Passed Final   • Lot Number 11/11/2021 [id number]   Final   • Expiration Date 11/11/2021 12/11/2022   Final   • SARS-CoV-2 AMRIK 11/11/2021 Not Detected  Not Detected Final   Office Visit on 09/10/2021   Component Date Value Ref Range Status   • SARS Antigen 09/10/2021 Not Detected   Not Detected Final   • Influenza A Antigen BIANCA 09/10/2021 Not Detected   Final   • Influenza B Antigen BIANCA 09/10/2021 Not Detected   Final   • Internal Control 09/10/2021 Passed  Passed Final   • Lot Number 09/10/2021 [id number]   Final   • Expiration Date 09/10/2021 12/22/22   Final   • SARS-CoV-2 AMRIK 09/10/2021 Not Detected  Not Detected Final       EKG Results:  No orders to display       Imaging Results:  No Images in the past 120 days found..      Assessment/Plan   Diagnoses and all orders for this visit:    1. Major depressive disorder, recurrent episode, moderate (HCC) (Primary)  -     ARIPiprazole (Abilify) 2 MG tablet; Take 2 tablets by mouth Daily.  Dispense: 60 tablet; Refill: 2    2. Generalized anxiety disorder    3. Insomnia due to mental condition        Visit Diagnoses:    ICD-10-CM ICD-9-CM   1. Major depressive disorder, recurrent episode, moderate (HCC)  F33.1 296.32   2. Generalized anxiety disorder  F41.1 300.02   3. Insomnia due to mental condition  F51.05 300.9     327.02     1/11: Significant improvement in symptoms, however residual auditory hallucinations.  Increase Abilify. 5 minutes of supportive psychotherapy with goal to strengthen defenses, promote problems solving, restore adaptive functioning and provide symptom relief. The therapeutic alliance was strengthened to encourage the patient to express their thoughts and feelings. Esteem building was enhanced through praise, reassurance, normalizing and encouragement. Coping skills were enhanced to build distress tolerance skills and emotional regulation. Patient given education on medication side effects, diagnosis/illness and relapse symptoms. Plan to continue supportive psychotherapy in next appointment to provide symptom relief.  4 weeks    12/14: Mood reactivity versus actual bipolar II disorder. Take strattera every other day for 3 doses then stop. Start abilify 5 mg day. 20 minutes of supportive psychotherapy with goal to strengthen  defenses, promote problems solving, restore adaptive functioning and provide symptom relief. The therapeutic alliance was strengthened to encourage the patient to express their thoughts and feelings. Esteem building was enhanced through praise, reassurance, normalizing and encouragement. Coping skills were enhanced to build distress tolerance skills and emotional regulation. Patient given education on medication side effects, diagnosis/illness and relapse symptoms. Plan to continue supportive psychotherapy in next appointment to provide symptom relief.  4 wks.    11/10: Start melatonin, restart duloxetine 17 minutes of supportive psychotherapy with goal to strengthen defenses, promote problems solving, restore adaptive functioning and provide symptom relief. The therapeutic alliance was strengthened to encourage the patient to express their thoughts and feelings. Esteem building was enhanced through praise, reassurance, normalizing and encouragement. Coping skills were enhanced to build distress tolerance skills and emotional regulation. Patient given education on medication side effects, diagnosis/illness and relapse symptoms. Plan to continue supportive psychotherapy in next appointment to provide symptom relief.  4 wks.    10/13: Better mood. Reduce strattera to 40 mg nightly (not daily, it is sedating). 4 wks.    9/29: Records release will be signed by patient.  Patient is unsure if Strattera helped.  Reduce the dose.  Residual depressive symptoms.  Increase duloxetine.  Continue gabapentin and BuSpar.  Therapy referral made.  See back in 2 weeks to try to titrate off of Strattera entirely.  It does not sound like she has ever tried extended release formulations of stimulants for ADHD.  Does not sound like she got neuropsychological testing for ADHD.  Rule out gorge.    PLAN:  61. Safety: No acute safety concerns  62. Therapy:  Referral made.  63. Risk Assessment: Risk of self-harm acutely is moderate.  Risk  factors include anxiety disorder, mood disorder, and recent psychosocial stressors (pandemic). Protective factors include no family history, denies access to guns/weapons, no present SI, no history of suicide attempts or self-harm in the past, minimal AODA, healthcare seeking, future orientation, willingness to engage in care.  Risk of self-harm chronically is also moderate, but could be further elevated in the event of treatment noncompliance and/or AODA.  64. Meds:  a. INCREASE abilify 2 to 4 mg PO QDAY.  Effective dose is 8 mg.  Risks, benefits, alternatives discussed with patient including increased energy, exacerbation of irritability, akathisia, GI upset, orthostatic hypotension, increased appetite.  After discussion of these risks and benefits, the patient voiced understanding and agreed to proceed.  b. CONTINUE duloxetine 90 mg a day. Risks, benefits, alternatives discussed with patient including GI upset, nausea vomiting diarrhea, theoretical decrease of seizure threshold predisposing the patient to a slightly higher seizure risk, headaches, sexual dysfunction, serotonin syndrome, bleeding risk, increased suicidality in patients 24 years and younger.  Also constipation and urinary retention.  After discussion of these risks and benefits, the patient voiced understanding and agreed to proceed.  c. CONTINUE BuSpar 10 mg p.o. twice daily (refilled today). Risks, benefits, alternatives discussed with patient including nausea, GI upset, mild sedation, falls risk.  After discussion of these risks and benefits, the patient voiced understanding and agreed to proceed.  d. CONTINUE bupropion  mg p.o. daily. Risks, benefits, alternatives discussed with patient including nausea, GI upset, increased energy, exacerbation of irritability, insomnia, lowering of seizure threshold.  After discussion of these risks and benefits, the patient voiced understanding and agreed to proceed.  e. CONTINUE gabapentin 600 mg PO  TID. Risks, benefits, alternatives discussed with patient including sedation, dizziness/falls risk, GI upset, weight gain.  After discussion of these risks and benefits, the patient voiced understanding and agreed to proceed. Lilian MAURICIO ordered. Verbally signed controlled substances agreement.  f. S/P:  i. Strattera 40 mg: stopped 1/11/21 to reduce medication regimen.  ii. Never started melatonin  65. Labs: no recs  66. Follow up: 4 wks    Patient screened positive for depression based on a PHQ-9 score of 0 on 9/10/2021. Follow-up recommendations include: Prescribed antidepressant medication treatment.           TREATMENT PLAN/GOALS: Continue supportive psychotherapy efforts and medications as indicated. Treatment and medication options discussed during today's visit. Patient acknowledged and verbally consented to continue with current treatment plan and was educated on the importance of compliance with treatment and follow-up appointments.    MEDICATION ISSUES:  LILIAN reviewed as expected.  Discussed medication options and treatment plan of prescribed medication as well as the risks, benefits, and side effects including potential falls, possible impaired driving and metabolic adversities among others. Patient is agreeable to call the office with any worsening of symptoms or onset of side effects. Patient is agreeable to call 911 or go to the nearest ER should he/she begin having SI/HI. No medication side effects or related complaints today.     MEDS ORDERED DURING VISIT:  New Medications Ordered This Visit   Medications   • ARIPiprazole (Abilify) 2 MG tablet     Sig: Take 2 tablets by mouth Daily.     Dispense:  60 tablet     Refill:  2       Return in about 4 weeks (around 2/8/2022).         This document has been electronically signed by Brooks Lynn MD  January 11, 2022 15:03 EST      Part of this note may be an electronic transcription/translation of spoken language to printed text using the Dragon  Dictation System.

## 2022-01-12 ENCOUNTER — TELEPHONE (OUTPATIENT)
Dept: PSYCHIATRY | Facility: CLINIC | Age: 42
End: 2022-01-12

## 2022-01-14 DIAGNOSIS — F33.1 MAJOR DEPRESSIVE DISORDER, RECURRENT EPISODE, MODERATE: Primary | ICD-10-CM

## 2022-01-14 DIAGNOSIS — F51.05 INSOMNIA DUE TO MENTAL CONDITION: ICD-10-CM

## 2022-01-14 DIAGNOSIS — F41.1 GENERALIZED ANXIETY DISORDER: ICD-10-CM

## 2022-01-14 RX ORDER — DULOXETIN HYDROCHLORIDE 60 MG/1
60 CAPSULE, DELAYED RELEASE ORAL DAILY
Qty: 30 CAPSULE | Refills: 2 | Status: SHIPPED | OUTPATIENT
Start: 2022-01-14 | End: 2022-06-07 | Stop reason: SDUPTHER

## 2022-01-14 NOTE — TELEPHONE ENCOUNTER
Patient called and is asking you to send in a new RX for her Duloxetine 60mg.  You gave her the Duloxetine 30mg to add to her 60mg dose to total 90mg daily, however you have never Rx'd her Duloxetine 60mg and patient says pharmacy won't let her have it.  I have reordered and pended medication.

## 2022-01-20 ENCOUNTER — TELEMEDICINE (OUTPATIENT)
Dept: FAMILY MEDICINE CLINIC | Facility: TELEHEALTH | Age: 42
End: 2022-01-20

## 2022-01-20 ENCOUNTER — E-VISIT (OUTPATIENT)
Dept: FAMILY MEDICINE CLINIC | Facility: TELEHEALTH | Age: 42
End: 2022-01-20

## 2022-01-20 DIAGNOSIS — J01.40 ACUTE NON-RECURRENT PANSINUSITIS: Primary | ICD-10-CM

## 2022-01-20 PROCEDURE — 99213 OFFICE O/P EST LOW 20 MIN: CPT | Performed by: NURSE PRACTITIONER

## 2022-01-20 RX ORDER — BROMPHENIRAMINE MALEATE, PSEUDOEPHEDRINE HYDROCHLORIDE, AND DEXTROMETHORPHAN HYDROBROMIDE 2; 30; 10 MG/5ML; MG/5ML; MG/5ML
5-10 SYRUP ORAL 4 TIMES DAILY PRN
Qty: 200 ML | Refills: 0 | Status: SHIPPED | OUTPATIENT
Start: 2022-01-20 | End: 2022-02-08

## 2022-01-20 RX ORDER — FLUCONAZOLE 150 MG/1
150 TABLET ORAL
Qty: 3 TABLET | Refills: 0 | Status: SHIPPED | OUTPATIENT
Start: 2022-01-20 | End: 2022-02-15

## 2022-01-20 RX ORDER — AMOXICILLIN AND CLAVULANATE POTASSIUM 875; 125 MG/1; MG/1
1 TABLET, FILM COATED ORAL 2 TIMES DAILY
Qty: 20 TABLET | Refills: 0 | Status: SHIPPED | OUTPATIENT
Start: 2022-01-20 | End: 2022-01-30

## 2022-01-20 NOTE — E-VISIT ESCALATED
Patient escalated   Provider Theresa Saldivar chose to escalate patient to another level of care because: Insufficient information to diagnose   Patient was sent the following message:   Based on the information you've provided us, you need to take another step to get care. Your severe dizziness needs to be evaluated further.   What to do now:    Please set up a video visit  .   You won't be charged for your eVisit. If you paid with a credit card, the charge will be reversed.   Chief Complaint: Coronavirus (COVID-19), cold, sinus pain, allergy, or flu   Patient introduction   Patient is 41-year-old female who reports cough, fever (which may have resolved; see below), congestion, rhinitis, itchy or watery eyes, voice hoarseness, headache, myalgia, and fatigue that started 3-5 days ago.   Patient has not requested COVID testing.   Coronavirus Disease 2019 (COVID-19) exposure, testing history, vaccination status, and vaccine injection site symptoms:    No known exposure to a confirmed or suspected case of COVID-19.    No recent travel outside of their local community.    Patient had a self-test 2-4 days ago. Test result was negative.    Reports receiving 3 doses of the COVID-19 vaccine.    Received the Moderna vaccine for the first dose.    Received the Moderna vaccine for the second dose.    Received the Pfizer vaccine for the third dose.    Received their most recent dose of the vaccine > 14 days ago.   Warning. The following may warrant further investigation:    BMI >= 40    Moderate to severe plaque psoriasis    An unspecified autoimmune disorder    Dizziness that makes it hard to stand, walk, or do daily activities   When asked why they're seeking care online today, patient reports they want a specific treatment or medication. Patient writes: Upper respiratory infecti   Patient-submitted comments:   Patient writes: I am taking Humira as well. Also, my son had the same symptoms that I do (he's 5yo)..    Patient did not request an excuse note.   General presentation   Symptoms came on gradually.   Fever:    Reports 1-3 days of measured fever.    Reports current measured fever, but none at initial symptom onset.    Highest temperature of 100.4F-101.5F.   Sinus and nasal symptoms:    Reports rhinitis.    Reports yellow nasal drainage.    Nasal drainage is thick.    Reports postnasal drip.    Reports 1-3 episodes of antibiotic treatment for sinus infection in the last year.    Current diagnosis of deviated septum.    Reports congestion with sinus pain or pressure on or around their eyes and nose.    Patient first noticed sinus pain < 5 days ago.    Sinus pain is not worse with Valsalva.    Denies itchy nose or sneezing.    Denies history of unhealed nasal septal ulcer/nasal wound.   Sore throat:    Reports mild hoarseness. Patient doesn't believe hoarseness is due to voice strain.    Denies sore throat.   Head and body aches:    Reports headache, described as mild (1-3 on a scale of 1-10).    Reports myalgia.    Reports fatigue.    Denies sweats.    Denies chills.   Dizziness:    Reports dizziness that interferes with daily activities.   Cough:    Reports cough.    Cough is worse in the morning.    Cough is productive of sputum.    Describes color of mucus as yellow.   Wheezing and SOB:    Reports previous albuterol inhaler use during URIs, bronchitis, or pneumonia.    Reports previous steroid inhaler use during URIs, bronchitis, or pneumonia.    Not using an albuterol inhaler for current symptoms because they don't have any available.    Denies asthma diagnosis.    Denies wheezing.    Denies shortness of breath.   Chest pain:    Denies chest pain.   Allergies:    Reports history of allergies.    Patient does not think symptoms are allergy-related.    Patient has known seasonal allergies.   Flu exposure:    Denies recent exposure to confirmed flu diagnosis.    Reports a flu vaccine in the last 3-6 months.   Patient  denies the following red flags:    Changes in alertness or awareness    Symptoms suggesting intracranial hemorrhage    Decreased urination   Risk factors for antibiotic resistance:    Close contact with a child in    Pregnancy/menstrual status/breastfeeding:    Denies being pregnant    Denies breastfeeding    Regarding last menstrual period, patient writes: Sept 2021 due to hormones to manage endometriosis   Self-exam:    No difficulty moving their chin toward their chest    Neck lymph nodes feel normal    Mild periorbital edema    Denies antibiotic treatment for similar symptoms within the past month   Current medications   Reports taking over-the-counter medication for current symptoms. Patient has taken acetaminophen, cetirizine, dextromethorphan, ibuprofen, and oxymetazoline.   Reports taking Buspirone, elagolix / estradiol / norethindrone, Duloxetine, phentermine / topiramate, Abilify, cetirizine Oral Capsule [Zyrtec], and Gabapentin.   Medication allergies   None reported.   Medication contraindication review   Reports history positive for depression. Therefore, the following medication(s) will not be prescribed:    Metoclopramide   Denies history of metoclopramide-associated dystonic reaction and tardive dyskinesia.   No known history of amoxicillin-clavulanate-associated cholestatic jaundice or hepatic impairment.   No known history of azithromycin-associated cholestatic jaundice or hepatic impairment.   Past medical history   Immune conditions: Reports moderate to severe plaque psoriasis and an unspecified autoimmune disorder. Patient takes medications regularly for their condition(s). Denies history of cancer.   Social history   Reports being a healthcare worker.   Non-smoker.   Assessment:   Patient determined to need a level of care not appropriate to be delivered through eVisit.   Plan:   Patient informed of need to seek in-person care      ----------   Electronically signed by Theresa  Saldivar, APRPATTI on 2022-01-20 at 10:37AM   ----------   Patient Interview Transcript:   Why are you getting care through eVisit today? We can't guarantee a specific treatment or test. Your provider will decide what's best for you. Select all that apply.    I want a specific treatment or medication   Not selected:    I want to know if I have a cold or something more serious    I want to know if I need to be seen by a provider    I need a doctor's note    I want to be tested for COVID-19    I want to get the COVID-19 vaccine    I think I'm having side effects from the COVID-19 vaccine    I just want to feel better!    None of the above   Tell us which specific treatment or medication you'd like. Your provider will make the final decision on which treatment is best for your condition.    Upper respiratory infecti   Which of these symptoms are bothering you? Select all that apply.    Cough    Fever    Stuffed-up nose or sinuses    Runny nose    Itchy or watery eyes    Hoarse voice or loss of voice    Headache    Muscle or body aches    Fatigue or tiredness   Not selected:    Shortness of breath    Itchy nose or sneezing    Loss of smell or taste    Sore throat    Sweats    Chills    Nausea or vomiting    Diarrhea    I don't have any of these symptoms   Before we learn more about why you're here, we'll get some information related to COVID-19. We'll ask about risk factors, testing, vaccination status, vaccine injection site symptoms, and exposure. Do you have any of these conditions? If so, you may be at increased risk for complications from COVID-19. Select all that apply.    None of the above   Not selected:    Chronic lung disease, such as cystic fibrosis or interstitial fibrosis    Heart disease, such as congenital heart disease, congestive heart failure, or coronary artery disease    Disorder of the brain, spinal cord, or nerves and muscles, such as dementia, cerebral palsy, epilepsy, muscular dystrophy, or  developmental delay    Metabolic disorder or mitochondrial disease    Cerebrovascular disease, such as stroke or another condition affecting the blood vessels or blood supply to the brain   Do you live in a group care setting? Examples include: - Nursing home - Residential care - Psychiatric treatment facility - Group home - Dormitory - Board and care home - Homeless shelter - Foster care setting Select one.    No   Not selected:    Yes   Have you ever been tested for COVID-19? Select one.    Yes   Not selected:    No   When was your most recent COVID-19 test? Select one.    2 to 4 days ago   Not selected:    Today    Yesterday    5 to 7 days ago    7 to 14 days ago    15 to 30 days ago    1 to 3 months ago    More than 3 months ago   What type of COVID-19 test did you most recently have? There are two types of COVID-19 tests: - Viral tests check if you're currently infected with COVID-19. For these tests, a nose swab or saliva sample is taken. Viral tests include self-tests and tests done at a doctor's office, lab, or testing site. - Antibody tests check if you've been infected in the past. For these tests, your blood is drawn. Antibody tests can only be done at a doctor's office, lab, or testing site. Select one.    Viral self-test   Not selected:    Viral test at a doctor's office, lab, or testing site    Antibody test   What was the result of your most recent COVID-19 test? Select one.    Negative   Not selected:    Positive    I'm not sure   Have you gotten the COVID-19 vaccine? Select one.    Yes   Not selected:    No   How many doses of the COVID-19 vaccine have you gotten? This includes boosters. Select one.    3 doses   Not selected:    1 dose    2 doses   Which COVID-19 vaccine did you get for your first dose? Check your Vaccination Record Card under Product Name/. Select one.    Moderna   Not selected:    Rufus & Rufus's Carissa Vaccine (J&J/Carissa)    Pfizer-BioNTech (Pfizer)   Which  "COVID-19 vaccine did you get for your second dose? Check your Vaccination Record Card under Product Name/. Select one.    Moderna   Not selected:    Rufus [employer] Rufus's Carissa Vaccine (J&J/Carissa)    Pfizer-BioNT[employer] (Pfizer)   Which COVID-19 vaccine did you get for your third dose? Check your Vaccination Record Card under Product Name/. Select one.    Pfizer-BioNTech (Pfizer)   Not selected:    Rufus & Rufus's Carissa Vaccine (J&J/Carissa)    Moderna   When did you get your most recent dose of the COVID-19 vaccine?    More than 14 days ago   Not selected:    Less than 48 hours (2 days) ago    48 to 72 hours (3 days) ago    3 to 5 days ago    5 to 7 days ago    7 to 14 days ago   In the last 14 days, have you traveled outside of your local community? This includes travel by car, RV, bus, train, or plane. Travel increases your chances of getting and spreading COVID-19. Select one.    No   Not selected:    Yes   In the last 14 days, have you had close contact with someone who has coronavirus (COVID-19)? \"Close contact\" means any of these: - Living in the same household as someone with COVID-19. - Caring for someone with COVID-19. - Being within 6 feet of someone with COVID-19 for a total of at least 15 minutes over a 24-hour period. For example, three 5-minute exposures for a total of 15 minutes. - Being in direct contact with respiratory droplets from someone with COVID-19 (being coughed on, kissing, sharing utensils). Select one.    No, not that I know of   Not selected:    Yes, a confirmed case    Yes, a suspected case   Thanks for completing our COVID-19 questions. Now we'll return to your symptoms. When did your symptoms start? If you know the exact date your symptoms started, choose Other and enter the month and day. Select one.    3 to 5 days ago   Not selected:    Less than 48 hours ago    6 to 9 days ago    10 to 14 days ago    2 to 4 weeks ago    More than a month ago    Other " (specify)   Did your symptoms come on suddenly or gradually? Select one.    Gradually   Not selected:    Suddenly    I'm not sure   You mentioned having a fever. Do you have a fever now? Select one.    Yes, but I didn't have one when my symptoms started   Not selected:    Yes, and I've had one since my symptoms started    No, it's gone now    I'm not sure   Did you take your temperature with a thermometer? Select one.    Yes   Not selected:    No, but it felt mild    No, but it felt high   What was the highest reading on the thermometer? Select one.    100.4 to 101.5F   Not selected:    Below 100.4F    101.6 to 101.9F    102.0 to 103.0F    Above 103.0F   How long have you had a fever? Select one.    1 to 3 days   Not selected:    Less than 24 hours    4 or more days   You mentioned having a headache. On a scale of 1 to 10, how severe is your headache pain? Select one.    Mild (1 to 3)   Not selected:    Moderate (4 to 6)    Severe (7 to 9)    Unbearable (10)    The worst headache of my life (10+)   Do you cough so hard that it's made you gag or vomit? By gag, we mean has your coughing made you choke or dry heave? Select all that apply.    No   Not selected:    Yes, my coughing has made me gag    Yes, my coughing has made me vomit   When is your cough the worst? Select all that apply.    In the morning, or when I wake up   Not selected:    During the day    At nighttime, or while I'm sleeping    I'm not sure   Are you coughing up mucus or phlegm? Select one.    Yes, a lot   Not selected:    No, my cough is dry    Yes, a little   What color is most of the mucus or phlegm that you're coughing up? Select one.    Yellow   Not selected:    Clear    White/frothy    Green    Red or pink    I'm not sure   You mentioned having a stuffy nose or sinus congestion. Do you feel pain or pressure in your sinuses?    Yes   Not selected:    No    I'm not sure   Where do you feel sinus pain or pressure?    Around my eyes    Behind my  "nose   Not selected:    In my forehead    In my cheeks    In my upper teeth or jaw    I'm not sure   When did you first notice your sinus pain or pressure? Select one.    Less than 5 days ago   Not selected:    5 to 9 days ago    10 to 14 days ago    2 to 4 weeks ago    1 month ago or longer   Does coughing, sneezing, or leaning forward make your sinuses feel worse? Select one.    No   Not selected:    Yes    I'm not sure   What color is your nasal drainage? Select one.    Yellow   Not selected:    Clear    White    Green    My nose is stuffed but not draining or running    I'm not sure   Is your nasal drainage thick or thin? Select one.    Thick   Not selected:    Thin    I'm not sure   Is there any drainage (mucus) going down the back of your throat? This kind of drainage is also called \"postnasal drip.\" Select one.    Yes   Not selected:    No    I'm not sure   Since your symptoms started, have you felt dizzy? Select one.    Yes, and it makes it hard to stand, walk, or do daily activities   Not selected:    Yes, but I can continue with my regular daily activities    No   Do you have chest pain? You might also feel it as discomfort, aching, tightness, or squeezing in the chest. Select one.    No   Not selected:    Yes   Have you urinated at least 3 times in the last 24 hours? Select one.    Yes   Not selected:    No    I'm not sure   Changes in alertness or awareness may mean you need emergency care. Since your symptoms started, have you had any of these? Select all that apply.    None of the above   Not selected:    Confusion    Slurred speech    Not knowing where you are or what day it is    Difficulty staying conscious    Fainting or passing out   Do your symptoms include a whistling sound, or wheezing, when you breathe? Select one.    No   Not selected:    Yes    I'm not sure   Early in this interview, you told us you were hoarse or you'd lost your voice. How would you describe the changes to your voice? " Select one.    It just sounds a little raspy   Not selected:    It's harder than usual to talk    I can barely talk at all   Is it possible that you strained your voice? Singing, yelling, or talking more or louder than usual can cause voice strain. Select one.    No   Not selected:    Yes    I'm not sure   Are your eyelids or the areas around your eyes puffy? Select one.    Yes, but I can easily open my eye(s)   Not selected:    Yes, and it's hard to open my eye(s)    Yes, and my eye(s) are completely swollen shut    No   Do you have any of these symptoms in your ear(s)? Select all that apply.    Pressure    Crackling or popping    Plugged or blocked sensation   Not selected:    Pain    Fullness    None of the above   Can you move your chin toward your chest?    Yes   Not selected:    No, my neck is too stiff   Are your glands/lymph nodes swollen, or does it hurt when you touch them?    No   Not selected:    Yes    I'm not sure   People with a very high body mass index (BMI) are at higher risk for developing complications from the flu and severe illness from COVID-19. To determine your BMI, we need to know your weight and height. Please enter your weight (in pounds).    Weight   Please enter your height.    Height   In the past week, has anyone around you (such as at school, work, or home) had a confirmed diagnosis of the flu? A confirmed diagnosis means that a nose swab was done to verify a flu infection. Select all that apply.    No   Not selected:    I live with someone who has the flu    I've been within touching distance of someone who has the flu    I've walked by, or sat about 3 feet away from, someone who has the flu    I've been in the same building as someone who has the flu    I'm not sure   Have you ever been diagnosed with asthma? Select one.    No   Not selected:    Yes   Have you ever been prescribed albuterol to use for wheezing, cough, or shortness of breath caused by a cold, bronchitis, or  pneumonia? Albuterol (ProAir, Proventil, Ventolin) is prescribed as an inhaler or a solution to be used with a nebulizer machine. Select one.    Yes   Not selected:    No    I'm not sure   Have you ever been prescribed a steroid inhaler to use for wheezing, cough, or shortness of breath caused by a cold, bronchitis, or pneumonia? Some examples of steroid inhalers include Pulmicort, Flovent, Qvar, and Alvesco. Select one.    Yes   Not selected:    No    I'm not sure   Have you used albuterol for your current symptoms? This includes both albuterol inhalers and albuterol solution in a nebulizer machine. Select one.    No, I don't have any, or it's    Not selected:    Yes    No, I don't feel like I need it   Would you like a prescription for albuterol? Select one.    No   Not selected:    Yes   Have you ever been diagnosed with chronic obstructive pulmonary disease (COPD)? Select one.    I'm not sure   Not selected:    Yes    No   In the last year, how many times were you treated with antibiotics for a sinus infection? Select one.    1 to 3 times   Not selected:    None    4 or more times   Have you been diagnosed with a deviated septum or nasal polyps? The nose is divided into two nostrils by the septum. A crooked septum is called a deviated septum. Nasal polyps are growths inside the nose or sinuses. Select one.    Yes, I have a deviated septum   Not selected:    Yes, but I had surgery to treat them    Yes, I have nasal polyps    Yes, I have a deviated septum and nasal polyps    No    I'm not sure   Do you have a sore inside your nose that won't heal? Select one.    No   Not selected:    Yes    I'm not sure   Do you have allergies (pollen, dust mites, mold, animal dander)? Select one.    Yes   Not selected:    No    I'm not sure   What kind of allergies do you have? Select all that apply.    Seasonal allergies (hay fever)   Not selected:    Pet allergies    Dust allergies    None of the above    I'm not sure    Do you think your symptoms could be allergy-related? Select one.    No   Not selected:    Yes    I'm not sure   Have you had a flu shot this season? Select one.    Yes, 3 to 6 months ago   Not selected:    Yes, less than 2 weeks ago    Yes, 2 to 4 weeks ago    Yes, 1 to 3 months ago    Yes, more than 6 months ago    I'm not sure    No   The flu and COVID-19 can be more serious for people with certain conditions or characteristics. These questions help us figure out if you or anyone you live with is at higher risk for complications from these infections. Do either of these statements apply to you? Select all that apply.    I'm a healthcare worker   Not selected:    I'm  or Native [place]    None of the above   Do you smoke tobacco? Select one.    No, never   Not selected:    Yes, every day    Yes, some days    No, I quit   Some conditions can put you at risk for more serious infections. Do any of these apply to you? Select all that apply.    I'm in close contact with a child in    Not selected:    I've been hospitalized within the last 5 days    I have diabetes    None of the above   Are you currently being treated for any of these conditions? Scroll to see all options. Select all that apply.    Depression   Not selected:    Aspirin triad (also known as Samter's triad or ASA triad)    Asthma or hives from taking aspirin or other NSAIDs, such as ibuprofen or naproxen    Blockage or narrowing of the blood vessels of the heart    Blood dyscrasia, such anemia, leukemia, lymphoma, or myeloma    Bone marrow depression    Catecholamine-releasing paraganglioma    Blood clotting disorder    Congenital long QT syndrome    Difficulty urinating or completely emptying your bladder    Uncorrected electrolyte abnormalities    Fungal infection    Gastrointestinal (GI) bleeding    Gastrointestinal (GI) obstruction    G6PD deficiency    Recent heart attack    High blood pressure    Irregular heartbeat or heart  rhythm    Kidney disease or hemodialysis    Mononucleosis (mono)    Myasthenia gravis    Parkinson's disease    Pheochromocytoma    Reye syndrome    Seizure disorder    Ulcerative colitis    None of the above   Do you have any of these conditions that can affect the immune system? Scroll to see all options. Select all that apply.    Moderate to severe plaque psoriasis    An autoimmune disorder not listed here   Not selected:    History of bone marrow transplant    Chronic kidney disease    Chronic liver disease (including cirrhosis)    HIV/AIDS    Inflammatory bowel disease (Crohn's disease or ulcerative colitis)    Lupus    Multiple sclerosis    Rheumatoid arthritis    Sickle cell anemia    Alpha or beta thalassemia    History of solid organ transplant (kidney, liver, or heart)    History of spleen removal    A condition requiring treatment with long-term use of oral steroids (such as prednisone, prednisolone, or dexamethasone)    None of these   Do you take medications for your condition? This includes oral and injectable medications that are taken daily, weekly, or monthly. Select one.    Yes, regularly   Not selected:    Yes, for flare-ups only    No   Have you ever been diagnosed with cancer? Select one.    No   Not selected:    Yes, I have cancer now    Yes, but I'm in remission   Have you ever had either of these conditions? Select all that apply.    No   Not selected:    Metoclopramide-associated dystonic reaction    Tardive dyskinesia   Do any of these apply to the people who live with you? Select all that apply.    None of the above   Not selected:    A child under the age of 5    An adult 65 or older    A person who is pregnant    A person who has given birth, had a miscarriage, had a pregnancy loss, or had an  in the last 2 weeks    An  or Native [place]   Does any member of your household have any of these medical conditions? Select all that apply.    None of the above   Not  selected:    Asthma    Disorders of the brain, spinal cord, or nerves and muscles, such as dementia, cerebral palsy, epilepsy, muscular dystrophy, or developmental delay    Chronic lung disease, such as COPD or cystic fibrosis    Heart disease, such as congenital heart disease, congestive heart failure, or coronary artery disease    Cerebrovascular disease, such as stroke or another condition affecting the blood vessels or blood supply to the brain    Blood disorders, such as sickle cell disease    Diabetes    Metabolic disorders such as inherited metabolic disorders or mitochondrial disease    Kidney disorders    Liver disorders    Weakened immune system due to illness or medications such as chemotherapy or steroids    Children under the age of 19 who are on long-term aspirin therapy    Extreme obesity (BMI > 40)   Are you pregnant? Select one.    No   Not selected:    Yes   When was your last menstrual period? If you don't currently have periods or no longer have periods, please briefly explain.    2021 due to hormones to manage endometriosis   Within the last 2 weeks, have you: - Given birth - Had a miscarriage - Had a pregnancy loss - Had an  Being postpartum (live birth or loss) within the last 2 weeks increases your risk of flu complications. Select one.    No   Not selected:    Yes   Are you breastfeeding? Select one.    No   Not selected:    Yes   Just a few more questions about medications, and then you're finished. Have you used any non-prescription medications or nasal sprays for your current symptoms? Examples include saline sprays, decongestants, NyQuil, and Tylenol. Select one.    Yes   Not selected:    No   Which of these non-prescription medications have you tried? Scroll to see all options. Select all that apply.    Acetaminophen (Tylenol)    Cetirizine (Zyrtec)    Dextromethorphan (Delsym, Robitussin, Vicks DayQuil Cough)    Ibuprofen (Advil, Motrin, Midol)    Oxymetazoline (Afrin)    Not selected:    Budesonide (Rhinocort)    Chlorpheniramine (Aller-chlor, Chlor-Trimeton)    Cromolyn (NasalCrom)    Diphenhydramine (Benadryl)    Fexofenadine (Allegra)    Fluticasone (Flonase)    Guaifenesin (Mucinex)    Guaifenesin/dextromethorphan (Delsym DM, Mucinex DM, Robitussin DM)    Ketotifen (Alaway, Zaditor)    Loratadine (Alavert, Claritin)    Naphazoline-pheniramine (Naphcon-A, Opcon-A, Visine-A)    Omeprazole (Prilosec)    Phenylephrine (Sudafed)    Triamcinolone (Nasacort)    None of the above   In the past month, have you taken antibiotics for similar symptoms? Examples of antibiotics include amoxicillin, amoxicillin-clavulanate (Augmentin), penicillin, cefdinir (Omnicef), doxycycline, and clindamycin (Cleocin). Select one.    No   Not selected:    Yes    I'm not sure   Have you taken any monoamine oxidase inhibitor (MAOI) medications in the last 14 days? Examples include rasagiline (Azilect), selegiline (Eldepryl, Zelapar), isocarboxazid (Marplan), phenelzine (Nardil), and tranylcypromine (Parnate). Select one.    No   Not selected:    Yes    I'm not sure   Do you take Kynmobi or Apokyn (apomorphine)? Select one.    No   Not selected:    Yes    I'm not sure   Are you taking any other medications or supplements? On the next screen, you need to list all vitamins, supplements, non-prescription medications (such as aspirin or Aleve), and prescription medications that you're taking. Select one.    Yes   Not selected:    Yes, but I'm not sure what they are    No   Have you ever had an allergic or bad reaction to any medication? Select one.    Yes   Not selected:    No   Have you had an allergic or bad reaction to any of these medications? Select all that apply.    None of the above   Not selected:    Baloxavir (Xofluza)    Benzonatate (Tessalon Perles)    Fluconazole, itraconazole, or terconazole (brands include Diflucan, Sporanox, Terazol)    Oseltamivir (Tamiflu) or zanamivir (Relenza)    I'm not  "sure   Have you had an allergic or bad reaction to any of these antibiotic medications? Select all that apply.    None of the above   Not selected:    Penicillin or any \"-cillin\" antibiotic, such as amoxicillin, ampicillin, dicloxacillin, nafcillin, or piperacillin (Brands include Augmentin, Unasyn, and Zosyn)    Tetracycline or any \"-cycline\" antibiotic, such as doxycycline, demeclocycline, minocycline (Brands include Declomycin, Doryx, Dynacin, Oracea, Monodox, Panmycin, and Vibramycin)    Ciprofloxacin or any \"-floxacin\" antibiotic, such as gemifloxacin, levofloxacin, moxifloxacin, or ofloxacin (Brands include Factive, Cipro, Floxin, and Levaquin)    Cephalexin or any \"cef-\" antibiotic, such as cefazolin, cefdinir, cefuroxime, ceftriaxone, ceftazidime, or cefepime (Brands include Ancef, Ceftin, Fortaz, Keflex, Maxipime, Rocephin, and Simplicef)    Azithromycin or any \"-thromycin\" antibiotic, such as erythromycin or clarithromycin (Brands include Biaxin, Erythrocin, Z-killian, and Zithromax)    Clindamycin or lincomycin (Brands include Cleocin and Lincocin)    I'm not sure   Have you had an allergic or bad reaction to any of these medications? Select all that apply.    None of the above   Not selected:    Albuterol or a similar medication    Corticosteroid (steroid) medication, including topical steroids, inhaled steroids, nasal steroids, or oral steroids (budesonide, ciclesonide, dexamethasone, flunisolide, fluticasone, methylprednisolone, triamcinolone, prednisone (or brand names Alvesco, Deltasone, Flovent, Medrol, Nasacort, Rhinocort, or Veramyst)    Metoclopramide (Reglan)    Ondansetron (Zuplenz, Zofran ODT, Zofran)    Prochlorperazine (Compazine)    I'm not sure   Have you had an allergic or bad reaction to any of these eye drops or nasal sprays? Scroll to see all options. Select all that apply.    None of the above   Not selected:    Azelastine (Astelin, Astepro, Optivar)    Cromolyn (Crolom, NasalCrom)   "  Ipratropium (Atrovent)    Ketotifen (Alaway, Zaditor)    Pheniramine/naphazoline (Naphcon-A, Opcon-A, Visine-A)    Olopatadine (Pataday, Patanol, Pazeo)    I'm not sure   Have you had an allergic or bad reaction to any of these non-prescription medications? Scroll to see all options. Select all that apply.    None of the above   Not selected:    Acetaminophen (Tylenol)    Aspirin    Cetirizine (Zyrtec)    Chlorpheniramine (Aller-chlor, Chlor-Trimeton)    Dextromethorphan (Delsym, Robitussin, Vicks DayQuil Cough)    Diphenhydramine (Benadryl)    Fexofenadine (Allegra)    Guaifenesin (Mucinex)    Guaifenesin/dextromethorphan (Delsym DM, Mucinex DM, Robitussin DM)    Ibuprofen (Advil, Motrin, Midol)    Loratadine (Alavert, Claritin)    Oxymetazoline (Afrin)    Phenylephrine (Sudafed)    I'm not sure   Are you allergic to milk or to the proteins found in milk (for example, whey or casein)? A milk allergy is different from lactose intolerance. Select one.    No   Not selected:    Yes    I'm not sure   Have you ever had jaundice or liver problems as a result of taking amoxicillin-clavulanate (Augmentin)? Jaundice is a condition in which the skin and the whites of the eyes turn yellow. Select all that apply.    No, not that I know of   Not selected:    Yes, jaundice    Yes, liver problems   Have you ever had jaundice or liver problems as a result of taking azithromycin (Zithromax, Zmax)? Jaundice is a condition in which the skin and the whites of the eyes turn yellow. Select all that apply.    No, not that I know of   Not selected:    Yes, jaundice    Yes, liver problems   Do you need a doctor's note? A doctor's note confirms that you received care today and states when you can return to school or work. It does not contain information about your diagnosis or treatment plan. Your provider will make the final decision on whether to give you a doctor's note and for how long. Doctor's notes CANNOT be backdated. We can't  provide medical leave paperwork through this type of visit. If more paperwork is needed to request time off, contact your primary care provider. Select one.    No   Not selected:    Today only (1 day)    Today and tomorrow (2 days)    3 days    7 days    10 days    14 days   Is there anything else you'd like to tell us about your symptoms?    I am taking Humira as well. Also, my son had the same symptoms that I do (he's 5yo).   ----------   Medical history   Medical history data does not currently exist for this patient.

## 2022-01-20 NOTE — PROGRESS NOTES
HPI  Malinda Saucedo is a 41 y.o. female  presents with complaint of 3-4 day history of congestion, runny nose, watery eyes, sinus pressure, cough, sore throat due to cough, bilateral ears feel full. Temp up to 101.    Denies SOA, CP, N/V/D.    History of frequent sinus infections.   Home covid test negative.        Review of Systems    Past Medical History:   Diagnosis Date   • Allergic    • Anxiety    • Arthritis    • Chronic pain disorder    • Deep vein thrombosis (HCC)    • Depression    • Ectopic pregnancy without intrauterine pregnancy 1/25/2019    Formatting of this note might be different from the original. - Day 1 = 1/25/19 -> beta 4,927 MTX #1 given (110mg) - Day 4 = 1/28/19 -> beta 11,077 - Day 7 = 1/31/19 -> beta 11,923 MTX #2 given (110mg) - Day 11 = 2/5/19 -> beta 11,406 - Day 14 = 2/8/19 -> scheduled visit and beta - Day 19 =  2/13/19 -> beta 6,584 - Day 26 = 2/20/19 -> beta 3,111 - Day 34 = 3/1/19 -> [facility]   • Endometriosis    • Fibroids    • Headache    • Hidradenitis    • Low back pain 2013?    DDD   • Obesity    • Panic disorder    • PTSD (post-traumatic stress disorder)        Family History   Problem Relation Age of Onset   • ADD / ADHD Mother    • OCD Mother    • Arthritis Mother    • Hyperlipidemia Mother    • Hypertension Mother    • Thyroid disease Mother    • Anxiety disorder Mother    • ADD / ADHD Sister    • Anxiety disorder Sister    • Bipolar disorder Sister    • Drug abuse Maternal Aunt    • Depression Maternal Aunt    • Alcohol abuse Maternal Uncle    • Drug abuse Maternal Uncle    • Schizophrenia Maternal Uncle    • Depression Maternal Grandmother    • Other Maternal Grandmother         Colon cancer   • Anxiety disorder Maternal Grandmother    • ADD / ADHD Cousin    • OCD Cousin    • Suicide Attempts Cousin    • Alcohol abuse Cousin    • Depression Cousin    • Seizures Niece    • Arthritis Sister    • Depression Sister    • Hyperlipidemia Sister    •  Asthma Sister    • Hypertension Sister    • Miscarriages / Stillbirths Sister    • Mental illness Maternal Uncle    • Alcohol abuse Maternal Uncle        Social History     Socioeconomic History   • Marital status:    Tobacco Use   • Smoking status: Never Smoker   • Smokeless tobacco: Never Used   Vaping Use   • Vaping Use: Never used   Substance and Sexual Activity   • Alcohol use: Not Currently   • Drug use: Never   • Sexual activity: Not Currently     Partners: Male     Birth control/protection: None         LMP  (LMP Unknown)     PHYSICAL EXAM  Physical Exam   Constitutional: She appears well-developed and well-nourished.   HENT:   Head: Normocephalic.   Nose: Rhinorrhea present. Right sinus exhibits maxillary sinus tenderness and frontal sinus tenderness. Left sinus exhibits maxillary sinus tenderness and frontal sinus tenderness.   Neck: Neck normal appearance.  Pulmonary/Chest: Effort normal.   Neurological: She is alert.   Psychiatric: She has a normal mood and affect. Her speech is normal.       Diagnoses and all orders for this visit:    1. Acute non-recurrent pansinusitis (Primary)  -     COVID-19,[facility] ROUTINE, NP/OP SWAB IN TRANSPORT MEDIA OR ESWAB 72 HR TAT - Swab, Nasopharynx; Future  -     amoxicillin-clavulanate (Augmentin) 875-125 MG per tablet; Take 1 tablet by mouth 2 (Two) Times a Day for 10 days.  Dispense: 20 tablet; Refill: 0  -     fluconazole (Diflucan) 150 MG tablet; Take 1 tablet by mouth Every 72 (Seventy-Two) Hours As Needed (yeast).  Dispense: 3 tablet; Refill: 0  -     brompheniramine-pseudoephedrine-DM 30-2-10 MG/5ML syrup; Take 5-10 mL by mouth 4 (Four) Times a Day As Needed for Cough or Allergies.  Dispense: 200 mL; Refill: 0      *Discussed possibility of covid. She agrees to try to test at  if wait is not too long. Will treat sinuses with abx due to frequent hx and decreased immune system. Pt verbalized understanding.     FOLLOW-UP  As discussed during visit with  Virtual Care, if symptoms worsen or fail to improve, follow-up with PCP/Urgent Care/Emergency Department.    Patient verbalizes understanding of medications, instructions for treatment and follow-up.    CARLA Bunch  01/20/2022  14:52 EST    This visit was performed via Telehealth.  This patient has been instructed to follow-up with their primary care provider if their symptoms worsen or the treatment provided does not resolve their illness.    aMlinda Saucedo verbally consented to a telehealth visit. Malinda Saucedo was seen via telehealth using real-time video conferencing technology by CARLA Bunch. Malinda Suejesus Saucedo was located at [place], [place], and CARLA Bunch was located in [place], [place].

## 2022-01-20 NOTE — PATIENT INSTRUCTIONS
Sinusitis, Adult  Sinusitis is inflammation of your sinuses. Sinuses are hollow spaces in the bones around your face. Your sinuses are located:  · Around your eyes.  · In the middle of your forehead.  · Behind your nose.  · In your cheekbones.  Mucus normally drains out of your sinuses. When your nasal tissues become inflamed or swollen, mucus can become trapped or blocked. This allows bacteria, viruses, and fungi to grow, which leads to infection. Most infections of the sinuses are caused by a virus.  Sinusitis can develop quickly. It can last for up to 4 weeks (acute) or for more than 12 weeks (chronic). Sinusitis often develops after a cold.  What are the causes?  This condition is caused by anything that creates swelling in the sinuses or stops mucus from draining. This includes:  · Allergies.  · Asthma.  · Infection from bacteria or viruses.  · Deformities or blockages in your nose or sinuses.  · Abnormal growths in the nose (nasal polyps).  · Pollutants, such as chemicals or irritants in the air.  · Infection from fungi (rare).  What increases the risk?  You are more likely to develop this condition if you:  · Have a weak body defense system (immune system).  · Do a lot of swimming or diving.  · Overuse nasal sprays.  · Smoke.  What are the signs or symptoms?  The main symptoms of this condition are pain and a feeling of pressure around the affected sinuses. Other symptoms include:  · Stuffy nose or congestion.  · Thick drainage from your nose.  · Swelling and warmth over the affected sinuses.  · Headache.  · Upper toothache.  · A cough that may get worse at night.  · Extra mucus that collects in the throat or the back of the nose (postnasal drip).  · Decreased sense of smell and taste.  · Fatigue.  · A fever.  · Sore throat.  · Bad breath.  How is this diagnosed?  This condition is diagnosed based on:  · Your symptoms.  · Your medical history.  · A physical exam.  · Tests to find out if your condition is  acute or chronic. This may include:  ? Checking your nose for nasal polyps.  ? Viewing your sinuses using a device that has a light (endoscope).  ? Testing for allergies or bacteria.  ? Imaging tests, such as an MRI or CT scan.  In rare cases, a bone biopsy may be done to rule out more serious types of fungal sinus disease.  How is this treated?  Treatment for sinusitis depends on the cause and whether your condition is chronic or acute.  · If caused by a virus, your symptoms should go away on their own within 10 days. You may be given medicines to relieve symptoms. They include:  ? Medicines that shrink swollen nasal passages (topical intranasal decongestants).  ? Medicines that treat allergies (antihistamines).  ? A spray that eases inflammation of the nostrils (topical intranasal corticosteroids).  ? Rinses that help get rid of thick mucus in your nose (nasal saline washes).  · If caused by bacteria, your health care provider may recommend waiting to see if your symptoms improve. Most bacterial infections will get better without antibiotic medicine. You may be given antibiotics if you have:  ? A severe infection.  ? A weak immune system.  · If caused by narrow nasal passages or nasal polyps, you may need to have surgery.  Follow these instructions at home:  Medicines  · Take, use, or apply over-the-counter and prescription medicines only as told by your health care provider. These may include nasal sprays.  · If you were prescribed an antibiotic medicine, take it as told by your health care provider. Do not stop taking the antibiotic even if you start to feel better.  Hydrate and humidify    · Drink enough fluid to keep your urine pale yellow. Staying hydrated will help to thin your mucus.  · Use a cool mist humidifier to keep the humidity level in your home above 50%.  · Inhale steam for 10-15 minutes, 3-4 times a day, or as told by your health care provider. You can do this in the bathroom while a hot shower is  running.  · Limit your exposure to cool or dry air.    Rest  · Rest as much as possible.  · Sleep with your head raised (elevated).  · Make sure you get enough sleep each night.  General instructions    · Apply a warm, moist washcloth to your face 3-4 times a day or as told by your health care provider. This will help with discomfort.  · Wash your hands often with soap and water to reduce your exposure to germs. If soap and water are not available, use hand .  · Do not smoke. Avoid being around people who are smoking (secondhand smoke).  · Keep all follow-up visits as told by your health care provider. This is important.    Contact a health care provider if:  · You have a fever.  · Your symptoms get worse.  · Your symptoms do not improve within 10 days.  Get help right away if:  · You have a severe headache.  · You have persistent vomiting.  · You have severe pain or swelling around your face or eyes.  · You have vision problems.  · You develop confusion.  · Your neck is stiff.  · You have trouble breathing.  Summary  · Sinusitis is soreness and inflammation of your sinuses. Sinuses are hollow spaces in the bones around your face.  · This condition is caused by nasal tissues that become inflamed or swollen. The swelling traps or blocks the flow of mucus. This allows bacteria, viruses, and fungi to grow, which leads to infection.  · If you were prescribed an antibiotic medicine, take it as told by your health care provider. Do not stop taking the antibiotic even if you start to feel better.  · Keep all follow-up visits as told by your health care provider. This is important.  This information is not intended to replace advice given to you by your health care provider. Make sure you discuss any questions you have with your health care provider.  Document Revised: 05/20/2019 Document Reviewed: 05/20/2019  [employer] Patient Education © 2021 [employer]

## 2022-01-21 PROCEDURE — U0004 COV-19 TEST NON-CDC HGH THRU: HCPCS | Performed by: NURSE PRACTITIONER

## 2022-01-25 ENCOUNTER — TELEPHONE (OUTPATIENT)
Dept: URGENT CARE | Facility: CLINIC | Age: 42
End: 2022-01-25

## 2022-01-25 NOTE — TELEPHONE ENCOUNTER
Patient is contacted with her positive Covid result.  She is informed to quarantine for 5 more days based on her date of symptom onset which is 5 days ago.  Seek care if symptoms worsen.

## 2022-02-08 ENCOUNTER — OFFICE VISIT (OUTPATIENT)
Dept: INTERNAL MEDICINE | Facility: CLINIC | Age: 42
End: 2022-02-08

## 2022-02-08 ENCOUNTER — HOSPITAL ENCOUNTER (OUTPATIENT)
Dept: GENERAL RADIOLOGY | Facility: HOSPITAL | Age: 42
Discharge: HOME | End: 2022-02-08

## 2022-02-08 ENCOUNTER — LAB (OUTPATIENT)
Dept: LAB | Facility: HOSPITAL | Age: 42
End: 2022-02-08

## 2022-02-08 DIAGNOSIS — U07.1 COVID-19: ICD-10-CM

## 2022-02-08 DIAGNOSIS — R06.02 SHORTNESS OF BREATH: ICD-10-CM

## 2022-02-08 DIAGNOSIS — R50.9 FEBRILE ILLNESS: ICD-10-CM

## 2022-02-08 DIAGNOSIS — U07.1 COVID-19: Primary | ICD-10-CM

## 2022-02-08 LAB
ALBUMIN SERPL-MCNC: 4 G/DL (ref 3.5–5.2)
ALBUMIN/GLOB SERPL: 1.5 G/DL
ALP SERPL-CCNC: 90 U/L (ref 39–117)
ALT SERPL W P-5'-P-CCNC: 14 U/L (ref 1–33)
ANION GAP SERPL CALCULATED.3IONS-SCNC: 10.7 MMOL/L (ref 5–15)
AST SERPL-CCNC: 13 U/L (ref 1–32)
BASOPHILS # BLD AUTO: 0.05 10*3/MM3 (ref 0–0.2)
BASOPHILS NFR BLD AUTO: 0.6 % (ref 0–1.5)
BILIRUB SERPL-MCNC: 0.2 MG/DL (ref 0–1.2)
BUN SERPL-MCNC: 8 MG/DL (ref 6–20)
BUN/CREAT SERPL: 7.7 (ref 7–25)
CALCIUM SPEC-SCNC: 8.9 MG/DL (ref 8.6–10.5)
CHLORIDE SERPL-SCNC: 108 MMOL/L (ref 98–107)
CO2 SERPL-SCNC: 20.3 MMOL/L (ref 22–29)
CREAT SERPL-MCNC: 1.04 MG/DL (ref 0.57–1)
DEPRECATED RDW RBC AUTO: 42.4 FL (ref 37–54)
EOSINOPHIL # BLD AUTO: 0.19 10*3/MM3 (ref 0–0.4)
EOSINOPHIL NFR BLD AUTO: 2.3 % (ref 0.3–6.2)
ERYTHROCYTE [DISTWIDTH] IN BLOOD BY AUTOMATED COUNT: 12.4 % (ref 12.3–15.4)
GFR SERPL CREATININE-BSD FRML MDRD: 71 ML/MIN/1.73
GLOBULIN UR ELPH-MCNC: 2.7 GM/DL
GLUCOSE SERPL-MCNC: 75 MG/DL (ref 65–99)
HCT VFR BLD AUTO: 44.1 % (ref 34–46.6)
HGB BLD-MCNC: 14.7 G/DL (ref 12–15.9)
IMM GRANULOCYTES # BLD AUTO: 0.02 10*3/MM3 (ref 0–0.05)
IMM GRANULOCYTES NFR BLD AUTO: 0.2 % (ref 0–0.5)
LYMPHOCYTES # BLD AUTO: 2.49 10*3/MM3 (ref 0.7–3.1)
LYMPHOCYTES NFR BLD AUTO: 30.6 % (ref 19.6–45.3)
MCH RBC QN AUTO: 30.8 PG (ref 26.6–33)
MCHC RBC AUTO-ENTMCNC: 33.3 G/DL (ref 31.5–35.7)
MCV RBC AUTO: 92.5 FL (ref 79–97)
MONOCYTES # BLD AUTO: 0.42 10*3/MM3 (ref 0.1–0.9)
MONOCYTES NFR BLD AUTO: 5.2 % (ref 5–12)
NEUTROPHILS NFR BLD AUTO: 4.96 10*3/MM3 (ref 1.7–7)
NEUTROPHILS NFR BLD AUTO: 61.1 % (ref 42.7–76)
NRBC BLD AUTO-RTO: 0 /100 WBC (ref 0–0.2)
PLATELET # BLD AUTO: 299 10*3/MM3 (ref 140–450)
PMV BLD AUTO: 10.1 FL (ref 6–12)
POTASSIUM SERPL-SCNC: 4.1 MMOL/L (ref 3.5–5.2)
PROT SERPL-MCNC: 6.7 G/DL (ref 6–8.5)
RBC # BLD AUTO: 4.77 10*6/MM3 (ref 3.77–5.28)
SODIUM SERPL-SCNC: 139 MMOL/L (ref 136–145)
WBC NRBC COR # BLD: 8.13 10*3/MM3 (ref 3.4–10.8)

## 2022-02-08 PROCEDURE — 99214 OFFICE O/P EST MOD 30 MIN: CPT | Performed by: NURSE PRACTITIONER

## 2022-02-08 PROCEDURE — 36415 COLL VENOUS BLD VENIPUNCTURE: CPT

## 2022-02-08 PROCEDURE — 80053 COMPREHEN METABOLIC PANEL: CPT

## 2022-02-08 PROCEDURE — 85025 COMPLETE CBC W/AUTO DIFF WBC: CPT

## 2022-02-08 PROCEDURE — 71046 X-RAY EXAM CHEST 2 VIEWS: CPT

## 2022-02-08 RX ORDER — BROMPHENIRAMINE MALEATE, PSEUDOEPHEDRINE HYDROCHLORIDE, AND DEXTROMETHORPHAN HYDROBROMIDE 2; 30; 10 MG/5ML; MG/5ML; MG/5ML
10 SYRUP ORAL 4 TIMES DAILY PRN
Qty: 400 ML | Refills: 0 | Status: SHIPPED | OUTPATIENT
Start: 2022-02-08 | End: 2022-02-15

## 2022-02-09 DIAGNOSIS — F33.1 MAJOR DEPRESSIVE DISORDER, RECURRENT EPISODE, MODERATE: ICD-10-CM

## 2022-02-09 DIAGNOSIS — F41.1 GENERALIZED ANXIETY DISORDER: ICD-10-CM

## 2022-02-09 RX ORDER — PHENDIMETRAZINE TARTRATE 105 MG/1
CAPSULE, EXTENDED RELEASE ORAL
COMMUNITY
End: 2022-03-07

## 2022-02-09 RX ORDER — BACLOFEN 5 MG/1
1 TABLET ORAL 3 TIMES DAILY
COMMUNITY
Start: 2022-01-11 | End: 2022-04-18

## 2022-02-09 RX ORDER — DULOXETIN HYDROCHLORIDE 30 MG/1
CAPSULE, DELAYED RELEASE ORAL
Qty: 30 CAPSULE | Refills: 2 | Status: SHIPPED | OUTPATIENT
Start: 2022-02-09 | End: 2022-04-18 | Stop reason: SDUPTHER

## 2022-02-09 RX ORDER — AMOXICILLIN AND CLAVULANATE POTASSIUM 875; 125 MG/1; MG/1
TABLET, FILM COATED ORAL
COMMUNITY
End: 2022-02-15

## 2022-02-10 ENCOUNTER — PATIENT ROUNDING (BHMG ONLY) (OUTPATIENT)
Dept: PSYCHIATRY | Facility: CLINIC | Age: 42
End: 2022-02-10

## 2022-02-10 ENCOUNTER — TELEPHONE (OUTPATIENT)
Dept: PSYCHIATRY | Facility: CLINIC | Age: 42
End: 2022-02-10

## 2022-02-10 NOTE — PROGRESS NOTES
Chief Complaint  Cough/SOA  Subjective          Malinda Sue Saucedo presents to [facility]  S/P COVID     URI   This is a recurrent problem. The current episode started 1 to 4 weeks ago. The problem has been gradually improving. There has been no fever. Associated symptoms include coughing. Pertinent negatives include no abdominal pain, chest pain, congestion, diarrhea, dysuria, ear pain, headaches, joint pain, joint swelling, nausea, neck pain, plugged ear sensation, rash, rhinorrhea, sinus pain, sneezing, sore throat, swollen glands, vomiting or wheezing. She has tried decongestant for the symptoms.       Current Outpatient Medications   Medication Instructions   • amoxicillin-clavulanate (AUGMENTIN) 875-125 MG per tablet amoxicillin 875 mg-potassium clavulanate 125 mg tablet   TAKE 1 TABLET BY MOUTH TWICE DAILY FOR 10 DAYS   • ARIPiprazole (ABILIFY) 4 mg, Oral, Daily   • Baclofen 5 MG tablet 1 tablet, Oral, 3 Times Daily   • brompheniramine-pseudoephedrine-DM (Bromfed DM) 30-2-10 MG/5ML syrup 10 mL, Oral, 4 Times Daily PRN   • buPROPion XL (WELLBUTRIN XL) 300 MG 24 hr tablet No dose, route, or frequency recorded.   • busPIRone (BUSPAR) 10 mg, Oral, 2 Times Daily   • cetirizine (ZYRTEC) 10 mg, Oral, Daily   • clobetasol (TEMOVATE) 0.05 % external solution clobetasol 0.05 % scalp solution   • cyclobenzaprine (FLEXERIL) 10 MG tablet No dose, route, or frequency recorded.   • DULoxetine (CYMBALTA) 30 MG capsule TAKE 1 CAPSULE BY MOUTH DAILY(WITH 60MG= 90MG/DAY)   • DULoxetine (CYMBALTA) 60 mg, Oral, Daily, In addition to the Duloxetine 30mg   • EPINEPHrine (EPIPEN) 0.3 MG/0.3ML solution auto-injector injection epinephrine 0.3 mg/0.3 mL injection, auto-injector   • fluconazole (DIFLUCAN) 150 mg, Oral, Every 72 Hours PRN   • gabapentin (NEURONTIN) 600 MG tablet TAKE 1 TABLET BY MOUTH EVERY 8 HOURS AS DIRECTED   • Humira Pen 40 MG/0.4ML Pen-injector Kit No dose, route, or  frequency recorded.   • ibuprofen (ADVIL,MOTRIN) 800 MG tablet No dose, route, or frequency recorded.   • norethindrone (AYGESTIN) 5 MG tablet No dose, route, or frequency recorded.   • nystatin (MYCOSTATIN) 100,000 unit/mL suspension nystatin 100,000 unit/mL oral suspension   SWISH AND SWALLOW 5 ML BY MOUTH FOUR TIMES DAILY   • Phendimetrazine Tartrate  MG capsule sustained-release 24 hr phendimetrazine tartrate  mg capsule,extended release   Take 1 capsule every day by oral route in the morning for 30 days.   • topiramate (TOPAMAX) 200 MG tablet topiramate 200 mg tablet   • zolpidem (AMBIEN) 5 MG tablet zolpidem 5 mg tablet   TAKE 1 TABLET BY MOUTH. TO BE GIVEN IN CLINIC OR HOSPITAL DEPARTMENT FOR 1 DOSE. DO NOT TAKE AT HOME. BRING WITH YOU TO SLEEP LAB.       The following portions of the patient's history were reviewed and updated as appropriate: allergies, current medications, past family history, past medical history, past social history, past surgical history, and problem list.    Objective   Vital Signs:   There were no vitals taken for this visit.    Wt Readings from Last 3 Encounters:   12/31/21 117 kg (257 lb 15 oz)   12/28/21 116 kg (254 lb 12.8 oz)   12/17/21 127 kg (280 lb)     BP Readings from Last 3 Encounters:   12/31/21 130/86   12/28/21 127/85   12/17/21 140/93     Physical Exam  Vitals and nursing note reviewed.   Constitutional:       General: She is not in acute distress.     Appearance: Normal appearance. She is not ill-appearing.   HENT:      Head: Normocephalic.      Nose: Nose normal.      Mouth/Throat:      Mouth: Mucous membranes are moist.   Eyes:      Extraocular Movements: Extraocular movements intact.      Conjunctiva/sclera: Conjunctivae normal.   Cardiovascular:      Rate and Rhythm: Normal rate.   Pulmonary:      Effort: Pulmonary effort is normal.      Comments: Diminished breath sounds   Skin:     General: Skin is warm and dry.      Capillary Refill: Capillary refill  takes less than 2 seconds.   Neurological:      General: No focal deficit present.      Mental Status: She is alert and oriented to person, place, and time. Mental status is at baseline.   Psychiatric:         Mood and Affect: Mood normal.         Behavior: Behavior normal.         Thought Content: Thought content normal.         Judgment: Judgment normal.            Result Review :   The following data was reviewed by: CARLA Hart on 02/08/2022:  Common labs    Common Labsle 12/28/21 12/28/21 12/28/21 12/28/21 2/8/22 2/8/22    1147 1147 1147 1147 1645 1645   Glucose    95  75   BUN    10  8   Creatinine    1.12 (A)  1.04 (A)   eGFR African Am    65  71   Sodium    141  139   Potassium    4.4  4.1   Chloride    111 (A)  108 (A)   Calcium    9.5  8.9   Albumin    4.50  4.00   Total Bilirubin    0.3  0.2   Alkaline Phosphatase    98  90   AST (SGOT)    15  13   ALT (SGPT)    15  14   WBC 7.36    8.13    Hemoglobin 15.0    14.7    Hematocrit 45.2    44.1    Platelets 265    299    Total Cholesterol   150      Triglycerides   69      HDL Cholesterol   64 (A)      LDL Cholesterol    72      Hemoglobin A1C  4.94       (A) Abnormal value                   Lab Results   Component Value Date    SARSANTIGEN Not Detected 12/22/2021    COVID19 Detected (C) 01/21/2022    FLUAAG Not Detected 12/22/2021    FLUBAG Not Detected 12/22/2021    INR 1.1 02/05/2019       Procedures        Assessment and Plan    Diagnoses and all orders for this visit:    1. COVID-19 (Primary)  -     XR Chest PA & Lateral; Future  -     CBC w AUTO Differential; Future  -     Comprehensive metabolic panel; Future  -     brompheniramine-pseudoephedrine-DM (Bromfed DM) 30-2-10 MG/5ML syrup; Take 10 mL by mouth 4 (Four) Times a Day As Needed for Congestion, Cough or Allergies for up to 10 days.  Dispense: 400 mL; Refill: 0    2. Shortness of breath  -     XR Chest PA & Lateral; Future  -     CBC w AUTO Differential; Future  -     Comprehensive  metabolic panel; Future    3. Febrile illness  -     XR Chest PA & Lateral; Future  -     CBC w AUTO Differential; Future  -     Comprehensive metabolic panel; Future          Medications Discontinued During This Encounter   Medication Reason   • brompheniramine-pseudoephedrine-DM 30-2-10 MG/5ML syrup           Follow Up   Return if symptoms worsen or fail to improve.  Patient was given instructions and counseling regarding her condition or for health maintenance advice. Please see specific information pulled into the AVS if appropriate.

## 2022-02-15 ENCOUNTER — TELEPHONE (OUTPATIENT)
Dept: INTERNAL MEDICINE | Facility: CLINIC | Age: 42
End: 2022-02-15

## 2022-02-15 ENCOUNTER — OFFICE VISIT (OUTPATIENT)
Dept: INTERNAL MEDICINE | Facility: CLINIC | Age: 42
End: 2022-02-15

## 2022-02-15 VITALS
SYSTOLIC BLOOD PRESSURE: 140 MMHG | DIASTOLIC BLOOD PRESSURE: 88 MMHG | BODY MASS INDEX: 44.46 KG/M2 | TEMPERATURE: 98.8 F | WEIGHT: 260.4 LBS | HEART RATE: 99 BPM | OXYGEN SATURATION: 97 % | HEIGHT: 64 IN

## 2022-02-15 DIAGNOSIS — R05.9 COUGH: ICD-10-CM

## 2022-02-15 DIAGNOSIS — U07.1 COVID-19: Primary | ICD-10-CM

## 2022-02-15 LAB
EXPIRATION DATE: NORMAL
EXPIRATION DATE: NORMAL
FLUAV AG NPH QL: NEGATIVE
FLUBV AG NPH QL: NEGATIVE
INTERNAL CONTROL: NORMAL
INTERNAL CONTROL: NORMAL
Lab: NORMAL
Lab: NORMAL
SARS-COV-2 AG UPPER RESP QL IA.RAPID: NOT DETECTED

## 2022-02-15 PROCEDURE — 87426 SARSCOV CORONAVIRUS AG IA: CPT | Performed by: INTERNAL MEDICINE

## 2022-02-15 PROCEDURE — 87804 INFLUENZA ASSAY W/OPTIC: CPT | Performed by: INTERNAL MEDICINE

## 2022-02-15 PROCEDURE — 99213 OFFICE O/P EST LOW 20 MIN: CPT | Performed by: INTERNAL MEDICINE

## 2022-02-15 RX ORDER — AZITHROMYCIN 250 MG/1
TABLET, FILM COATED ORAL
Qty: 6 TABLET | Refills: 0 | Status: SHIPPED | OUTPATIENT
Start: 2022-02-15 | End: 2022-03-07

## 2022-02-15 RX ORDER — FAMOTIDINE 40 MG/1
80 TABLET, FILM COATED ORAL 3 TIMES DAILY
Qty: 84 TABLET | Refills: 0 | Status: SHIPPED | OUTPATIENT
Start: 2022-02-15 | End: 2022-02-28

## 2022-02-15 RX ORDER — ASPIRIN 81 MG/1
81 TABLET ORAL DAILY
Qty: 90 TABLET | Refills: 3 | Status: SHIPPED | OUTPATIENT
Start: 2022-02-15 | End: 2022-08-26 | Stop reason: SDUPTHER

## 2022-02-15 NOTE — PROGRESS NOTES
Chief Complaint  Cough (COVID POSITIVE)    Subjective          Malinda Sue Saucedo presents to [facility]  History of Present Illness  Patient reports persistent cough, VERDIN, and shortness of breath. Patient denies wheezing. Patient reports difficulty sleeping despite taking nyquil. Patient unsure what is keep her awake. Patient reports chest pain, but mostly when she is coughing.     Current Outpatient Medications   Medication Instructions   • ARIPiprazole (ABILIFY) 4 mg, Oral, Daily   • aspirin 81 mg, Oral, Daily   • azithromycin (Zithromax Z-Antonio) 250 MG tablet Take 2 tablets by mouth on day 1, then 1 tablet daily on days 2-5   • Baclofen 5 MG tablet 1 tablet, Oral, 3 Times Daily   • budesonide (PULMICORT) 180 MCG/ACT inhaler 1 puff, Inhalation, 2 Times Daily - RT   • buPROPion XL (WELLBUTRIN XL) 300 MG 24 hr tablet No dose, route, or frequency recorded.   • busPIRone (BUSPAR) 10 mg, Oral, 2 Times Daily   • cetirizine (ZYRTEC) 10 mg, Oral, Daily   • clobetasol (TEMOVATE) 0.05 % external solution clobetasol 0.05 % scalp solution   • DULoxetine (CYMBALTA) 30 MG capsule TAKE 1 CAPSULE BY MOUTH DAILY(WITH 60MG= 90MG/DAY)   • DULoxetine (CYMBALTA) 60 mg, Oral, Daily, In addition to the Duloxetine 30mg   • EPINEPHrine (EPIPEN) 0.3 MG/0.3ML solution auto-injector injection epinephrine 0.3 mg/0.3 mL injection, auto-injector   • famotidine (PEPCID) 80 mg, Oral, 3 Times Daily   • gabapentin (NEURONTIN) 600 MG tablet TAKE 1 TABLET BY MOUTH EVERY 8 HOURS AS DIRECTED   • Humira Pen 40 MG/0.4ML Pen-injector Kit No dose, route, or frequency recorded.   • ibuprofen (ADVIL,MOTRIN) 800 MG tablet No dose, route, or frequency recorded.   • norethindrone (AYGESTIN) 5 MG tablet No dose, route, or frequency recorded.   • Phendimetrazine Tartrate  MG capsule sustained-release 24 hr phendimetrazine tartrate  mg capsule,extended release   Take 1 capsule every day by oral route in  "the morning for 30 days.   • topiramate (TOPAMAX) 200 MG tablet topiramate 200 mg tablet       The following portions of the patient's history were reviewed and updated as appropriate: allergies, current medications, past family history, past medical history, past social history, past surgical history, and problem list.    Objective   Vital Signs:   /88   Pulse 99   Temp 98.8 °F (37.1 °C) (Oral)   Ht 162.6 cm (64\")   Wt 118 kg (260 lb 6.4 oz)   SpO2 97%   BMI 44.70 kg/m²     Wt Readings from Last 3 Encounters:   02/15/22 118 kg (260 lb 6.4 oz)   12/31/21 117 kg (257 lb 15 oz)   12/28/21 116 kg (254 lb 12.8 oz)     BP Readings from Last 3 Encounters:   02/15/22 140/88   12/31/21 130/86   12/28/21 127/85     Physical Exam   Appearance: No acute distress, well-nourished  Head: normocephalic, atraumatic  Eyes: extraocular movements intact, no scleral icterus, no conjunctival injection  Ears, Nose, and Throat: external ears normal, nares patent, moist mucous membranes  Cardiovascular: regular rate and rhythm. no murmurs, rales, or rhonchi. no edema  Respiratory: breathing comfortably, symmetric chest rise, clear to auscultation bilaterally. No wheezes, rales, or rhonchi.  Neuro: alert and oriented to time, place, and person. Normal gait  Psych: normal mood and affect     Result Review :   The following data was reviewed by: Ant Carlos Jr, MD on 02/15/2022:  Common labs    Common Labsle 12/28/21 12/28/21 12/28/21 12/28/21 2/8/22 2/8/22    1147 1147 1147 1147 1645 1645   Glucose    95  75   BUN    10  8   Creatinine    1.12 (A)  1.04 (A)   eGFR African Am    65  71   Sodium    141  139   Potassium    4.4  4.1   Chloride    111 (A)  108 (A)   Calcium    9.5  8.9   Albumin    4.50  4.00   Total Bilirubin    0.3  0.2   Alkaline Phosphatase    98  90   AST (SGOT)    15  13   ALT (SGPT)    15  14   WBC 7.36    8.13    Hemoglobin 15.0    14.7    Hematocrit 45.2    44.1    Platelets 265    299    Total " Cholesterol   150      Triglycerides   69      HDL Cholesterol   64 (A)      LDL Cholesterol    72      Hemoglobin A1C  4.94       (A) Abnormal value              Lab Results   Component Value Date    SARSANTIGEN Not Detected 12/22/2021    COVID19 Detected (C) 01/21/2022    FLUAAG Not Detected 12/22/2021    FLUBAG Not Detected 12/22/2021    INR 1.1 02/05/2019          Assessment and Plan    Diagnoses and all orders for this visit:    1. COVID-19 (Primary)  Comments:  given duration of symptoms, add pulmicort inhaler, azithromycin, famotidine, and ASA daily. CXR reviewed. exam reassuring.   Orders:  -     aspirin (aspirin) 81 MG EC tablet; Take 1 tablet by mouth Daily.  Dispense: 90 tablet; Refill: 3  -     azithromycin (Zithromax Z-Antonio) 250 MG tablet; Take 2 tablets by mouth on day 1, then 1 tablet daily on days 2-5  Dispense: 6 tablet; Refill: 0  -     budesonide (PULMICORT) 180 MCG/ACT inhaler; Inhale 1 puff 2 (Two) Times a Day.  Dispense: 1 each; Refill: 1  -     famotidine (PEPCID) 40 MG tablet; Take 2 tablets by mouth 3 (Three) Times a Day for 14 days.  Dispense: 84 tablet; Refill: 0    2. Cough  -     POCT Influenza A/B  -     POCT SARS-CoV-2 Antigen BIANCA          Medications Discontinued During This Encounter   Medication Reason   • amoxicillin-clavulanate (AUGMENTIN) 875-125 MG per tablet *Therapy completed   • cyclobenzaprine (FLEXERIL) 10 MG tablet *Therapy completed   • fluconazole (Diflucan) 150 MG tablet *Therapy completed   • nystatin (MYCOSTATIN) 100,000 unit/mL suspension *Therapy completed   • zolpidem (AMBIEN) 5 MG tablet *Therapy completed   • brompheniramine-pseudoephedrine-DM (Bromfed DM) 30-2-10 MG/5ML syrup *Therapy completed        Follow Up   Return if symptoms worsen or fail to improve, for Recheck.  Patient was given instructions and counseling regarding her condition or for health maintenance advice. Please see specific information pulled into the AVS if appropriate.

## 2022-02-17 ENCOUNTER — TELEMEDICINE (OUTPATIENT)
Dept: PSYCHIATRY | Facility: CLINIC | Age: 42
End: 2022-02-17

## 2022-02-17 ENCOUNTER — TELEPHONE (OUTPATIENT)
Dept: PSYCHIATRY | Facility: CLINIC | Age: 42
End: 2022-02-17

## 2022-02-17 DIAGNOSIS — F41.1 GENERALIZED ANXIETY DISORDER: ICD-10-CM

## 2022-02-17 DIAGNOSIS — F33.1 MAJOR DEPRESSIVE DISORDER, RECURRENT EPISODE, MODERATE: Primary | ICD-10-CM

## 2022-02-17 DIAGNOSIS — F51.05 INSOMNIA DUE TO MENTAL CONDITION: ICD-10-CM

## 2022-02-17 DIAGNOSIS — B37.31 CANDIDIASIS OF VAGINA: Primary | ICD-10-CM

## 2022-02-17 PROCEDURE — 90833 PSYTX W PT W E/M 30 MIN: CPT | Performed by: STUDENT IN AN ORGANIZED HEALTH CARE EDUCATION/TRAINING PROGRAM

## 2022-02-17 PROCEDURE — 99214 OFFICE O/P EST MOD 30 MIN: CPT | Performed by: STUDENT IN AN ORGANIZED HEALTH CARE EDUCATION/TRAINING PROGRAM

## 2022-02-17 RX ORDER — FLUCONAZOLE 150 MG/1
150 TABLET ORAL ONCE
Qty: 2 TABLET | Refills: 0 | Status: SHIPPED | OUTPATIENT
Start: 2022-02-17 | End: 2022-02-17

## 2022-02-17 NOTE — PATIENT INSTRUCTIONS
1.  Please return to clinic at your next scheduled visit.  Contact the clinic ([phone]) at least 24 hours prior in the event you need to cancel.  2.  Do no harm to yourself or others.    3.  Avoid alcohol and drugs.    4.  Take all medications as prescribed.  Please contact the clinic with any concerns. If you are in need of medication refills, please call the clinic at [phone].    5. Should you want to get in touch with your provider, Dr. Brooks Lynn, please utilize [employer] or contact the office ([phone]), and staff will be able to page Dr. Lynn directly.  6.  In the event you have personal crisis, contact the following crisis numbers: Suicide Prevention Hotline 1-645.913.8433; [facility] Helpline 8-411-627-ZEUB; [facility] Emergency Room [phone]; text HELLO to 151313; or 432.     SPECIFIC RECOMMENDATIONS:     1.      Medications discussed at this encounter:                   - no changes (forgot to increase abilify)     2.      Psychotherapy recommendations:      3.     Return to clinic: 4 weeks

## 2022-02-17 NOTE — PROGRESS NOTES
"Subjective   Malinda Saucedo is a 41 y.o. female who presents today for initial evaluation     Referring Provider:  No referring provider defined for this encounter.    Chief Complaint:  mdd vivi    History of Present Illness:     Chart review 9/29: Seen September 10 to establish care.  Previously was at [facility].  Labs are consistent with Sjogren's.  Psoriasis and arthritis are under control.  On Topamax for migraines.  On Lunesta for insomnia.  History of anxiety and depression.  On gabapentin 600 mg 3 times daily, Strattera 100 mg daily, BuSpar 10 mg, duloxetine 60 mg, bupropion 300 mg.  Denied anxiety in January 2019.  Has been on Prozac in the past.  BMP demonstrates creatinine of 1.68, alk phos of 116, otherwise unremarkable.  hCG was followed in 2019.  CBC unremarkable, no head imaging or EKG.    \"Malinda\"    2/17:Virtual visit via Zoom audio and video due to the COVID-19 pandemic.  Patient is accepting of and agreeable to visit.  The visit consisted of the patient and I. The patient is at home, and I am at the office.  Interview:  1. Chart review: Seen by primary care February 15.  For cough.  COVID negative.  2. \"I never went up on the two of abilify.\"  a. \"Same symptoms.\" Still hears voices, but not as often. Also,  \"seeing things isn't as bad.\" Hears people calling her name. Random music.  b. Now has a sinus infection.  c. \"There are at least 3 family members who have schizophrenia.\"  d. Fearful that she may have schizophrenia; fearful that she may deteriorate like her uncle and cousin.  3. Sleeping: not well, waiting on equipment for GISELA.  4. Substances: stopped using CBD gummies first week of January 5. Therapy: n  6. Medication compliant: m  7. No SI HI AVH.      1/11: Virtual visit via Zoom audio and video due to the COVID-19 pandemic.  Patient is accepting of and agreeable to visit.  The visit consisted of the patient and I.  Interview:  8. Chart review: Seen in the emergency " "room December 31 for left arm numbness pain and swelling after having an IV placed to her ACE 2 days ago.  Patient saw primary care December 28 and wanted to be referred to neurology after her sleep study showed abnormal brain waves.  Labs from December 28 show low CO2 21.4, elevated chloride 111, elevated creatinine 1.12, A1c.  Thyroid studies, lipid profile, CBC are unremarkable.  No DVT found.  9. \"Good actually. The abilify helped a lot.\"  a. Sees things out of the corner of her eye, never directly.  b. Still hears voices stating her name.  10. Depression/Mood: stable  11. Anxiety: stable  12. Refills: n  13. Sleeping: initial insomnia. GISELA confirmed by sleep study.  14. Eating: stable  15. Substances: n  16. Therapy: n  17. Medication compliant: y  18. No SI HI AVH.      12/14: Virtual visit via Zoom audio and video due to the COVID-19 pandemic.  Patient is accepting of and agreeable to visit.  The visit consisted of the patient and I.  Interview:  19. Chart review: Seen for thrush by PCP 12/8, seen 11/11 for cough, sore throat (COVID neg). Referred to sleep medicine.  20. \"I'm ok.\"  a. Things have \"been weird.\"  b. I was having auditory and visual hallucinations when \"I first started seeing you.\"  i. Her son or  saying her name.  ii. Sees \"scary shadows\" out of the corner of her eyes, especially at night.  iii. \"Afraid I have bipolar disorder.\"  1. \"Started a practice out of nowhere.\"  2. \"I have spending sprees.\" $2,000 at a time. In the middle of one right now. Bought a bedset, tables, talked  into getting a new TV. Now in a new house.  c. Uncle has dx of schizophrenia, sister dx'd with bipolar d/o.  d. Also found out recently on the Sjogren's/Lupus spectrum.  e. Also has sleep study scheduled.  f. Will be seeing a neurologist because \"I'm losing time, like 5 minutes.\"  g. Willing to reduce the number of medications she is on and start a mood stabilizer.  h. \"Scared of these. Scared it will get " "worse.\"  i. Stopped buspar 2 weeks ago and is more irritable.  21. Depression/Mood:  1. Depressed mood: y  2. Seasonal pattern: denies  3. Severity: moderate  4. Anhedonia: mild, but enjoys spending time with son, shopping  5. Guilt or hopelessness:  6. Energy: \"horrible\"  7. Concentration: poor  8. Weight loss or weight gain: gain, 2 lbs since 2 weeks  9. Psychomotor retardation or agitation: def  10. Insomnia:  a. Topamax makes her sleepy, bed at 11, no initial insomnia, but wakes at 2 am, eats, sleeps in an hour, wakes at 6:30 am.  11. Duration: months  22. Anxiety:  1. Uncontrolled worrying: y  2. Severity: moderate  3. Muscle tension: y  4. Fatigue: y  5. Concentration: poor  6. Restlessness/feeling on edge: y  7. Irritability: y  8. Insomnia: maintenance insomnia  9. Duration: months  10. Panic attacks: def  23. Refills: none  24. Sleeping: above, sleep study set up  25. Eating: above  26. Substances: CBD gummy to sleep  27. Therapy: declines  28. Medication compliant: y  29. No SI HI AVH.      11/10: Virtual visit via Zoom audio and video due to the COVID-19 pandemic.  Patient is accepting of and agreeable to visit.  The visit consisted of the patient and I.  Interview:  30. Chart review: No new developments.  31. \"I'm ok.\"  a. More emotional; horribly.  b. Mostly down.  c. Pharmacy has not filled her 30 mg cap of duloxetine in weeks; unsure why.  d. Also feels sick too; congested today  32. Depression/Mood: depressed mood  12. Guilt or hopelessness: denies  13. Energy: poor  14. Concentration: poor  33. Anxiety: not bad  34. Sleeping:  a. Initial insomnia  b. Maintenance insomnia  35. Eating: stable  36. Substances:  37. Therapy: denies  38. Medication compliant: no, inadvertently  39. No SI HI AVH.      10/13: Virtual visit via Zoom audio and video due to the COVID-19 pandemic.  Patient is accepting of and agreeable to visit.  The visit consisted of the patient and I.  Interview:  40. Chart review: No new " "developments.  41. \"I've seen some small changes, but not, like, horrible.\"  a. Attention drifts a little more, in the mornings.  42. Depression/Mood: \"increasing the duloxetine has helped.\"  a. Usually feels really sad before her cycle, but doesn't this time  43. Anxiety:  a. Not as irritable  44. Sleeping: yes  45. Eating: stable  46. Substances: denies  47. Therapy: denies  48. Medication compliant: y  49. No SI HI AVH      9/29: Virtual visit via Zoom audio and video due to the COVID-19 pandemic.  Patient is accepting of and agreeable to visit.  The visit consisted of the patient and I.  Interview:  50. Her story: \"Well, it started in [employer].\"  a. Originally PMDD (2009) for years  b. Got out 2011  c. Then dx'd with depression and YENNY  d. Has been on medications since  i. Was on prozac from 2009 until 2015, stopped due to pregnancy  ii. 2018, started wellbutrin only. Which was horrible by itself. They added buspar 10 mg bid, with it. Then duloxetine 60 mg daily added. Better combination.  iii. Now on the above, and also on strattera 100 mg daily for ADHD. Also on gabapentin 600 tid.  e. Stimulants put her to sleep: adderall vyvanse.  Did not try extended release formulations.  51. Mood: much better than it was in the past, but still could use some help.  52. Stressors:  a. Niece started having seizures at 21 and lost her memory, doesn't remember anyone or anybody.  b. In the middle of buying a house  c. Finances  d. Starting my own practice.  e. Son having school problems; hanging with \"bad kids.\"  53. Anxiety: manageable.  a. Worrying a lot  54. Coping: goes to [employer], trusts in God  55. Sleeping: yes  56. Eating: stable  57. Medication compliant: yes  58. Psych ROS: D, A.  Negative for psychosis.  Unclear gorge.  59. No SI HI AVH    Depression:  60. Anhedonia: denies  61. Guilt or hopelessness:   a. Feelings of guilt about how she could have done more for her nieces and nephews  62. Energy: " "horrible  63. Concentration: \"I have to force myself to focus.\"  a. If stops to eat, it ends her day  64. Weight loss or weight gain: stable over last few months, on weight loss pills, however  65. Psychomotor retardation or agitation: frequently  66. Insomnia: yes, on the lunesta  67. Duration: for years      Access to Firearms: yes, locked away    PHQ-9 Depression Screening  PHQ-9 Total Score:      Little interest or pleasure in doing things?     Feeling down, depressed, or hopeless?     Trouble falling or staying asleep, or sleeping too much?     Feeling tired or having little energy?     Poor appetite or overeating?     Feeling bad about yourself - or that you are a failure or have let yourself or your family down?     Trouble concentrating on things, such as reading the newspaper or watching television?     Moving or speaking so slowly that other people could have noticed? Or the opposite - being so fidgety or restless that you have been moving around a lot more than usual?     Thoughts that you would be better off dead, or of hurting yourself in some way?     PHQ-9 Total Score       YENNY-7       Past Surgical History:  Past Surgical History:   Procedure Laterality Date   •  SECTION     • CYSTECTOMY     • ENDOMETRIAL ABLATION  , ,    • EYE SURGERY     • MYOMECTOMY         Problem List:  Patient Active Problem List   Diagnosis   • YENNY (generalized anxiety disorder)   • PTSD (post-traumatic stress disorder)   • Attention deficit hyperactivity disorder   • Hidradenitis suppurativa   • Intramural and subserous leiomyoma of uterus   • Lumbosacral spondylosis without myelopathy   • Endometriosis determined by laparoscopy   • PMDD (premenstrual dysphoric disorder)   • Psoriasis   • Thrombophilia ([facility])   • Seasonal allergies   • Major depressive disorder in partial remission ([facility])   • Body mass index (BMI) 40.0-44.9, adult ([facility])   • Candidiasis of vagina   • Anaphylactic reaction to bee sting   • " Insomnia, unspecified   • Low back pain   • Major depressive disorder, recurrent, moderate ([facility])       Allergy:   Allergies   Allergen Reactions   • Methotrexate Rash        Discontinued Medications:  There are no discontinued medications.    Current Medications:   Current Outpatient Medications   Medication Sig Dispense Refill   • ARIPiprazole (Abilify) 2 MG tablet Take 2 tablets by mouth Daily. 60 tablet 2   • aspirin (aspirin) 81 MG EC tablet Take 1 tablet by mouth Daily. 90 tablet 3   • azithromycin (Zithromax Z-Antonio) 250 MG tablet Take 2 tablets by mouth on day 1, then 1 tablet daily on days 2-5 6 tablet 0   • Baclofen 5 MG tablet Take 1 tablet by mouth 3 (Three) Times a Day.     • budesonide (PULMICORT) 180 MCG/ACT inhaler Inhale 1 puff 2 (Two) Times a Day. 1 each 1   • buPROPion XL (WELLBUTRIN XL) 300 MG 24 hr tablet      • busPIRone (BUSPAR) 10 MG tablet Take 1 tablet by mouth 2 (Two) Times a Day. 60 tablet 2   • cetirizine (zyrTEC) 10 MG tablet Take 1 tablet by mouth Daily. 90 tablet 3   • clobetasol (TEMOVATE) 0.05 % external solution clobetasol 0.05 % scalp solution     • COVID-19 Test kit COVID-19 test specimen collection   TEST AS DIRECTED TODAY     • DULoxetine (CYMBALTA) 30 MG capsule TAKE 1 CAPSULE BY MOUTH DAILY(WITH 60MG= 90MG/DAY) 30 capsule 2   • DULoxetine (CYMBALTA) 60 MG capsule Take 1 capsule by mouth Daily. In addition to the Duloxetine 30mg 30 capsule 2   • EPINEPHrine (EPIPEN) 0.3 MG/0.3ML solution auto-injector injection epinephrine 0.3 mg/0.3 mL injection, auto-injector     • famotidine (PEPCID) 40 MG tablet Take 2 tablets by mouth 3 (Three) Times a Day for 14 days. 84 tablet 0   • gabapentin (NEURONTIN) 600 MG tablet TAKE 1 TABLET BY MOUTH EVERY 8 HOURS AS DIRECTED     • ibuprofen (ADVIL,MOTRIN) 800 MG tablet      • norethindrone (AYGESTIN) 5 MG tablet      • Phendimetrazine Tartrate  MG capsule sustained-release 24 hr phendimetrazine tartrate  mg capsule,extended release    Take 1 capsule every day by oral route in the morning for 30 days.     • topiramate (TOPAMAX) 200 MG tablet topiramate 200 mg tablet     • Humira Pen 40 MG/0.4ML Pen-injector Kit        No current facility-administered medications for this visit.       Past Medical History:  Past Medical History:   Diagnosis Date   • Allergic    • Anxiety    • Arthritis    • Chronic pain disorder    • Deep vein thrombosis (HCC)    • Depression    • Ectopic pregnancy without intrauterine pregnancy 2019    Formatting of this note might be different from the original. - Day 1 = 19 -> beta 4,927 MTX #1 given (110mg) - Day 4 = 19 -> beta 11,077 - Day 7 = 19 -> beta 11,923 MTX #2 given (110mg) - Day 11 = 19 -> beta 11,406 - Day 14 = 19 -> scheduled visit and beta - Day 19 =  19 -> beta 6,584 - Day 26 = 19 -> beta 3,111 - Day 34 = 3/1/19 -> beta 553 (seen at [facility]   • Endometriosis    • Fibroids    • Headache    • Hidradenitis    • Low back pain ?    DDD   • Obesity    • Panic disorder    • PTSD (post-traumatic stress disorder)        Past Psychiatric History:  Began Treatment:   Diagnoses: D, A, insomnia  Psychiatrist: Last was months ago for a couple times; previously NP for 2 years  Therapist: yes, therapy has not helped, two bad experiences  Admission History: denies  Medication Trials: see hpi  Self Harm: Denies  Suicide Attempts:Denies   Psychosis, Anxiety, Depression: denies    Substance Abuse History:   Types:Denies all, including illicit  Withdrawal Symptoms:Denies  Longest Period Sober:Not Applicable   AA: Not applicable     Social History:  Martial Status:  Employed: therapist, 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  Kids: one  House: apartment   History: army, honorable discharge, see hpi    Social History     Socioeconomic History   • Marital status:    Tobacco Use   • Smoking status: Never Smoker   • Smokeless tobacco: Never Used   Vaping Use   • Vaping  "Use: Never used   Substance and Sexual Activity   • Alcohol use: Not Currently   • Drug use: Never   • Sexual activity: Not Currently     Partners: Male     Birth control/protection: None       Family History:   Suicide Attempts:  1st cousin, maternal; another 1st cousin maternal  Suicide Completions: 1st cousin, maternal  Dx'd her sister herself that she has bipolar.    Family History   Problem Relation Age of Onset   • ADD / ADHD Mother    • OCD Mother    • Arthritis Mother    • Hyperlipidemia Mother    • Hypertension Mother    • Thyroid disease Mother    • Anxiety disorder Mother    • ADD / ADHD Sister    • Anxiety disorder Sister    • Bipolar disorder Sister    • Drug abuse Maternal Aunt    • Depression Maternal Aunt    • Alcohol abuse Maternal Uncle    • Drug abuse Maternal Uncle    • Schizophrenia Maternal Uncle    • Depression Maternal Grandmother    • Other Maternal Grandmother         Colon cancer   • Anxiety disorder Maternal Grandmother    • ADD / ADHD Cousin    • OCD Cousin    • Suicide Attempts Cousin    • Alcohol abuse Cousin    • Depression Cousin    • Seizures Niece    • Arthritis Sister    • Depression Sister    • Hyperlipidemia Sister    • Asthma Sister    • Hypertension Sister    • Miscarriages / Stillbirths Sister    • Mental illness Maternal Uncle    • Alcohol abuse Maternal Uncle        Developmental History:   Born: [place]  Siblings: 1 sister, older cousin who lived with them  Childhood: no abuse  High School:Completed  College: 4 yrs at [employer] 3 years degree in counseling    · Mental Status Exam  · Appearance  · : groomed, good eye contact, normal street clothes, at home  · Behavior  · : pleasant and cooperative  · Motor  · : No abnormal  · Speech  · :normal rhythm, rate, volume, tone, not hyperverbal, not pressured, normal prosidy  · Mood  · : \"I forgot to increase the abilify\"  · Affect  · : euthymic, then tearful, mood congruent, good variability  · Thought Content  · : " negative suicidal ideations, negative homicidal ideations, negative obsessions  · Perceptions  · : negative auditory hallucinations, negative visual hallucinations  · Thought Process  · : linear  · Insight/Judgement  · : Fair/fair  · Cognition  · : grossly intact  · Attention   : intact    Review of Systems:  Review of Systems   Constitutional: Positive for diaphoresis and fatigue.   HENT: Positive for drooling.    Eyes: Positive for visual disturbance.   Respiratory: Positive for cough and shortness of breath.    Cardiovascular: Positive for chest pain. Negative for palpitations and leg swelling.   Gastrointestinal: Positive for nausea. Negative for vomiting.   Endocrine: Positive for cold intolerance and heat intolerance.   Genitourinary: Negative for difficulty urinating.   Musculoskeletal: Negative for joint swelling.   Allergic/Immunologic: Positive for immunocompromised state.   Neurological: Positive for dizziness, speech difficulty and light-headedness. Negative for seizures and numbness.         Physical Exam:  Physical Exam    Vital Signs:   There were no vitals taken for this visit.     Lab Results:   Office Visit on 02/15/2022   Component Date Value Ref Range Status   • Rapid Influenza A Ag 02/15/2022 Negative  Negative Final   • Rapid Influenza B Ag 02/15/2022 Negative  Negative Final   • Internal Control 02/15/2022 Passed  Passed Final   • Lot Number 02/15/2022 [id number]   Final   • Expiration Date 02/15/2022 05/23/2023   Final   • SARS Antigen 02/15/2022 Not Detected  Not Detected Final   • Internal Control 02/15/2022 Passed  Passed Final   • Lot Number 02/15/2022 [id number]   Final   • Expiration Date 02/15/2022 09/20/2023   Final   Lab on 02/08/2022   Component Date Value Ref Range Status   • Glucose 02/08/2022 75  65 - 99 mg/dL Final   • BUN 02/08/2022 8  6 - 20 mg/dL Final   • Creatinine 02/08/2022 1.04* 0.57 - 1.00 mg/dL Final   • Sodium 02/08/2022 139  136 - 145 mmol/L Final   • Potassium 02/08/2022  4.1  3.5 - 5.2 mmol/L Final   • Chloride 02/08/2022 108* 98 - 107 mmol/L Final   • CO2 02/08/2022 20.3* 22.0 - 29.0 mmol/L Final   • Calcium 02/08/2022 8.9  8.6 - 10.5 mg/dL Final   • Total Protein 02/08/2022 6.7  6.0 - 8.5 g/dL Final   • Albumin 02/08/2022 4.00  3.50 - 5.20 g/dL Final   • ALT (SGPT) 02/08/2022 14  1 - 33 U/L Final   • AST (SGOT) 02/08/2022 13  1 - 32 U/L Final   • Alkaline Phosphatase 02/08/2022 90  39 - 117 U/L Final   • Total Bilirubin 02/08/2022 0.2  0.0 - 1.2 mg/dL Final   • eGFR   Amer 02/08/2022 71  >60 mL/min/1.73 Final   • Globulin 02/08/2022 2.7  gm/dL Final   • A/G Ratio 02/08/2022 1.5  g/dL Final   • BUN/Creatinine Ratio 02/08/2022 7.7  7.0 - 25.0 Final   • Anion Gap 02/08/2022 10.7  5.0 - 15.0 mmol/L Final   • WBC 02/08/2022 8.13  3.40 - 10.80 10*3/mm3 Final   • RBC 02/08/2022 4.77  3.77 - 5.28 10*6/mm3 Final   • Hemoglobin 02/08/2022 14.7  12.0 - 15.9 g/dL Final   • Hematocrit 02/08/2022 44.1  34.0 - 46.6 % Final   • MCV 02/08/2022 92.5  79.0 - 97.0 fL Final   • MCH 02/08/2022 30.8  26.6 - 33.0 pg Final   • MCHC 02/08/2022 33.3  31.5 - 35.7 g/dL Final   • RDW 02/08/2022 12.4  12.3 - 15.4 % Final   • RDW-SD 02/08/2022 42.4  37.0 - 54.0 fl Final   • MPV 02/08/2022 10.1  6.0 - 12.0 fL Final   • Platelets 02/08/2022 299  140 - 450 10*3/mm3 Final   • Neutrophil % 02/08/2022 61.1  42.7 - 76.0 % Final   • Lymphocyte % 02/08/2022 30.6  19.6 - 45.3 % Final   • Monocyte % 02/08/2022 5.2  5.0 - 12.0 % Final   • Eosinophil % 02/08/2022 2.3  0.3 - 6.2 % Final   • Basophil % 02/08/2022 0.6  0.0 - 1.5 % Final   • Immature Grans % 02/08/2022 0.2  0.0 - 0.5 % Final   • Neutrophils, Absolute 02/08/2022 4.96  1.70 - 7.00 10*3/mm3 Final   • Lymphocytes, Absolute 02/08/2022 2.49  0.70 - 3.10 10*3/mm3 Final   • Monocytes, Absolute 02/08/2022 0.42  0.10 - 0.90 10*3/mm3 Final   • Eosinophils, Absolute 02/08/2022 0.19  0.00 - 0.40 10*3/mm3 Final   • Basophils, Absolute 02/08/2022 0.05  0.00 - 0.20  10*3/mm3 Final   • Immature Grans, Absolute 02/08/2022 0.02  0.00 - 0.05 10*3/mm3 Final   • nRBC 02/08/2022 0.0  0.0 - 0.2 /100 WBC Final   Admission on 01/21/2022, Discharged on 01/21/2022   Component Date Value Ref Range Status   • COVID19 01/21/2022 Detected* Not Detected - Ref. Range Final   Admission on 12/31/2021, Discharged on 01/01/2022   Component Date Value Ref Range Status   • Target HR (85%) 12/31/2021 153  bpm Final   • Max. Pred. HR (100%) 12/31/2021 180  bpm Final   • Right Internal Jugular Compress 12/31/2021 C   Final   • Right Internal Jugular Phasic 12/31/2021 N   Final   • Right Internal Jugular Spont 12/31/2021 Y   Final   • Left Internal Jugular Compress 12/31/2021 C   Final   • Left Internal Jugular Phasic 12/31/2021 Y   Final   • Left Internal Jugular Spont 12/31/2021 Y   Final   • Right Subclavian Compress 12/31/2021 C   Final   • Right Subclavian Phasic 12/31/2021 N   Final   • Right Subclavian Spont 12/31/2021 Y   Final   • Left Subclavian Augment 12/31/2021 Y   Final   • Left Subclavian Compress 12/31/2021 C   Final   • Left Subclavian Phasic 12/31/2021 Y   Final   • Left Subclavian Spont 12/31/2021 Y   Final   • Left Axillary Augment 12/31/2021 Y   Final   • Left Axillary Compress 12/31/2021 C   Final   • Left Axillary Phasic 12/31/2021 Y   Final   • Left Axillary Spont 12/31/2021 Y   Final   • Left Brachial Augment 12/31/2021 Y   Final   • Left Brachial Compress 12/31/2021 C   Final   • Left Radial Augment 12/31/2021 Y   Final   • Left Radial Compress 12/31/2021 C   Final   • Left Ulnar Augment 12/31/2021 Y   Final   • Left Ulnar Compress 12/31/2021 C   Final   • Left Basilic Upper Compress 12/31/2021 C   Final   • Left Basilic Forearm Compress 12/31/2021 C   Final   • Left Cephalic Upper Compress 12/31/2021 C   Final   • Left Cephalic Forearm Compress 12/31/2021 C   Final   Office Visit on 12/28/2021   Component Date Value Ref Range Status   • Total Cholesterol 12/28/2021 150  0 - 200  mg/dL Final   • Triglycerides 12/28/2021 69  0 - 150 mg/dL Final   • HDL Cholesterol 12/28/2021 64* 40 - 60 mg/dL Final   • LDL Cholesterol  12/28/2021 72  0 - 100 mg/dL Final   • VLDL Cholesterol 12/28/2021 14  5 - 40 mg/dL Final   • LDL/HDL Ratio 12/28/2021 1.13   Final   • Glucose 12/28/2021 95  65 - 99 mg/dL Final   • BUN 12/28/2021 10  6 - 20 mg/dL Final   • Creatinine 12/28/2021 1.12* 0.57 - 1.00 mg/dL Final   • Sodium 12/28/2021 141  136 - 145 mmol/L Final   • Potassium 12/28/2021 4.4  3.5 - 5.2 mmol/L Final   • Chloride 12/28/2021 111* 98 - 107 mmol/L Final   • CO2 12/28/2021 21.4* 22.0 - 29.0 mmol/L Final   • Calcium 12/28/2021 9.5  8.6 - 10.5 mg/dL Final   • Total Protein 12/28/2021 6.9  6.0 - 8.5 g/dL Final   • Albumin 12/28/2021 4.50  3.50 - 5.20 g/dL Final   • ALT (SGPT) 12/28/2021 15  1 - 33 U/L Final   • AST (SGOT) 12/28/2021 15  1 - 32 U/L Final   • Alkaline Phosphatase 12/28/2021 98  39 - 117 U/L Final   • Total Bilirubin 12/28/2021 0.3  0.0 - 1.2 mg/dL Final   • eGFR   Amer 12/28/2021 65  >60 mL/min/1.73 Final   • Globulin 12/28/2021 2.4  gm/dL Final   • A/G Ratio 12/28/2021 1.9  g/dL Final   • BUN/Creatinine Ratio 12/28/2021 8.9  7.0 - 25.0 Final   • Anion Gap 12/28/2021 8.6  5.0 - 15.0 mmol/L Final   • Hemoglobin A1C 12/28/2021 4.94  4.80 - 5.60 % Final   • Sed Rate 12/28/2021 20  0 - 20 mm/hr Final   • TSH 12/28/2021 1.100  0.270 - 4.200 uIU/mL Final   • WBC 12/28/2021 7.36  3.40 - 10.80 10*3/mm3 Final   • RBC 12/28/2021 4.91  3.77 - 5.28 10*6/mm3 Final   • Hemoglobin 12/28/2021 15.0  12.0 - 15.9 g/dL Final   • Hematocrit 12/28/2021 45.2  34.0 - 46.6 % Final   • MCV 12/28/2021 92.1  79.0 - 97.0 fL Final   • MCH 12/28/2021 30.5  26.6 - 33.0 pg Final   • MCHC 12/28/2021 33.2  31.5 - 35.7 g/dL Final   • RDW 12/28/2021 12.4  12.3 - 15.4 % Final   • RDW-SD 12/28/2021 41.7  37.0 - 54.0 fl Final   • MPV 12/28/2021 10.5  6.0 - 12.0 fL Final   • Platelets 12/28/2021 265  140 - 450 10*3/mm3 Final    • Neutrophil % 12/28/2021 62.4  42.7 - 76.0 % Final   • Lymphocyte % 12/28/2021 26.9  19.6 - 45.3 % Final   • Monocyte % 12/28/2021 7.5  5.0 - 12.0 % Final   • Eosinophil % 12/28/2021 2.0  0.3 - 6.2 % Final   • Basophil % 12/28/2021 0.8  0.0 - 1.5 % Final   • Immature Grans % 12/28/2021 0.4  0.0 - 0.5 % Final   • Neutrophils, Absolute 12/28/2021 4.59  1.70 - 7.00 10*3/mm3 Final   • Lymphocytes, Absolute 12/28/2021 1.98  0.70 - 3.10 10*3/mm3 Final   • Monocytes, Absolute 12/28/2021 0.55  0.10 - 0.90 10*3/mm3 Final   • Eosinophils, Absolute 12/28/2021 0.15  0.00 - 0.40 10*3/mm3 Final   • Basophils, Absolute 12/28/2021 0.06  0.00 - 0.20 10*3/mm3 Final   • Immature Grans, Absolute 12/28/2021 0.03  0.00 - 0.05 10*3/mm3 Final   • nRBC 12/28/2021 0.0  0.0 - 0.2 /100 WBC Final   Clinical Support on 12/22/2021   Component Date Value Ref Range Status   • SARS Antigen 12/22/2021 Not Detected  Not Detected Final   • Influenza A Antigen BIANCA 12/22/2021 Not Detected   Final   • Influenza B Antigen BIANCA 12/22/2021 Not Detected   Final   • Internal Control 12/22/2021 Passed  Passed Final   • Lot Number 12/22/2021 [id number]   Final   • Expiration Date 12/22/2021 12,112,022   Final   • COVID19 12/22/2021 Not Detected  Not Detected - Ref. Range Final   Clinical Support on 11/12/2021   Component Date Value Ref Range Status   • SARS Antigen 11/12/2021 Not Detected  Not Detected Final   • Influenza A Antigen BIANCA 11/12/2021 Not Detected   Final   • Influenza B Antigen BIANCA 11/12/2021 Not Detected   Final   • Internal Control 11/12/2021 Passed  Passed Final   • Lot Number 11/12/2021 [id number]   Final   • Expiration Date 11/12/2021 12/22/2021   Final   • COVID19 11/12/2021 Not Detected  Not Detected - Ref. Range Final   Office Visit on 11/11/2021   Component Date Value Ref Range Status   • SARS Antigen 11/11/2021 Not Detected  Not Detected Final   • Influenza A Antigen BIANCA 11/11/2021 Not Detected   Final   • Influenza B Antigen BIANCA 11/11/2021  Not Detected   Final   • Internal Control 11/11/2021 Passed  Passed Final   • Lot Number 11/11/2021 [id number]   Final   • Expiration Date 11/11/2021 12/11/2022   Final   • SARS-CoV-2 AMRIK 11/11/2021 Not Detected  Not Detected Final   Office Visit on 09/10/2021   Component Date Value Ref Range Status   • SARS Antigen 09/10/2021 Not Detected  Not Detected Final   • Influenza A Antigen BIANCA 09/10/2021 Not Detected   Final   • Influenza B Antigen BIANCA 09/10/2021 Not Detected   Final   • Internal Control 09/10/2021 Passed  Passed Final   • Lot Number 09/10/2021 [id number]   Final   • Expiration Date 09/10/2021 12/22/22   Final   • SARS-CoV-2 AMRKI 09/10/2021 Not Detected  Not Detected Final       EKG Results:  No orders to display       Imaging Results:  No Images in the past 120 days found..      Assessment/Plan   Diagnoses and all orders for this visit:    1. Major depressive disorder, recurrent episode, moderate (HCC) (Primary)    2. Generalized anxiety disorder    3. Insomnia due to mental condition        Visit Diagnoses:    ICD-10-CM ICD-9-CM   1. Major depressive disorder, recurrent episode, moderate (HCC)  F33.1 296.32   2. Generalized anxiety disorder  F41.1 300.02   3. Insomnia due to mental condition  F51.05 300.9     327.02     2/17: Increase Abilify.  Patient is fearful that she will develop schizophrenia and deteriorate like her family.  She will start therapy as well.  18 minutes of supportive psychotherapy with goal to strengthen defenses, promote problems solving, restore adaptive functioning and provide symptom relief. The therapeutic alliance was strengthened to encourage the patient to express their thoughts and feelings. Esteem building was enhanced through praise, reassurance, normalizing and encouragement. Coping skills were enhanced to build distress tolerance skills and emotional regulation. Patient given education on medication side effects, diagnosis/illness and relapse symptoms. Plan to continue  supportive psychotherapy in next appointment to provide symptom relief.  4 weeks    1/11: Significant improvement in symptoms, however residual auditory hallucinations.  Increase Abilify. 5 minutes of supportive psychotherapy with goal to strengthen defenses, promote problems solving, restore adaptive functioning and provide symptom relief. The therapeutic alliance was strengthened to encourage the patient to express their thoughts and feelings. Esteem building was enhanced through praise, reassurance, normalizing and encouragement. Coping skills were enhanced to build distress tolerance skills and emotional regulation. Patient given education on medication side effects, diagnosis/illness and relapse symptoms. Plan to continue supportive psychotherapy in next appointment to provide symptom relief.  4 weeks    12/14: Mood reactivity versus actual bipolar II disorder. Take strattera every other day for 3 doses then stop. Start abilify 5 mg day. 20 minutes of supportive psychotherapy with goal to strengthen defenses, promote problems solving, restore adaptive functioning and provide symptom relief. The therapeutic alliance was strengthened to encourage the patient to express their thoughts and feelings. Esteem building was enhanced through praise, reassurance, normalizing and encouragement. Coping skills were enhanced to build distress tolerance skills and emotional regulation. Patient given education on medication side effects, diagnosis/illness and relapse symptoms. Plan to continue supportive psychotherapy in next appointment to provide symptom relief.  4 wks.    11/10: Start melatonin, restart duloxetine 17 minutes of supportive psychotherapy with goal to strengthen defenses, promote problems solving, restore adaptive functioning and provide symptom relief. The therapeutic alliance was strengthened to encourage the patient to express their thoughts and feelings. Esteem building was enhanced through praise,  reassurance, normalizing and encouragement. Coping skills were enhanced to build distress tolerance skills and emotional regulation. Patient given education on medication side effects, diagnosis/illness and relapse symptoms. Plan to continue supportive psychotherapy in next appointment to provide symptom relief.  4 wks.    10/13: Better mood. Reduce strattera to 40 mg nightly (not daily, it is sedating). 4 wks.    9/29: Records release will be signed by patient.  Patient is unsure if Strattera helped.  Reduce the dose.  Residual depressive symptoms.  Increase duloxetine.  Continue gabapentin and BuSpar.  Therapy referral made.  See back in 2 weeks to try to titrate off of Strattera entirely.  It does not sound like she has ever tried extended release formulations of stimulants for ADHD.  Does not sound like she got neuropsychological testing for ADHD.  Rule out gorge.    PLAN:  68. Safety: No acute safety concerns  69. Therapy:  Referral made.  70. Risk Assessment: Risk of self-harm acutely is moderate.  Risk factors include anxiety disorder, mood disorder, and recent psychosocial stressors (pandemic). Protective factors include no family history, denies access to guns/weapons, no present SI, no history of suicide attempts or self-harm in the past, minimal AODA, healthcare seeking, future orientation, willingness to engage in care.  Risk of self-harm chronically is also moderate, but could be further elevated in the event of treatment noncompliance and/or AODA.  71. Meds:  a. INCREASE abilify 2 to 4 mg PO QDAY.  Effective dose is 8 mg.  Risks, benefits, alternatives discussed with patient including increased energy, exacerbation of irritability, akathisia, GI upset, orthostatic hypotension, increased appetite.  After discussion of these risks and benefits, the patient voiced understanding and agreed to proceed.  b. CONTINUE duloxetine 90 mg a day. Risks, benefits, alternatives discussed with patient including GI  upset, nausea vomiting diarrhea, theoretical decrease of seizure threshold predisposing the patient to a slightly higher seizure risk, headaches, sexual dysfunction, serotonin syndrome, bleeding risk, increased suicidality in patients 24 years and younger.  Also constipation and urinary retention.  After discussion of these risks and benefits, the patient voiced understanding and agreed to proceed.  c. CONTINUE BuSpar 10 mg p.o. twice daily (refilled today). Risks, benefits, alternatives discussed with patient including nausea, GI upset, mild sedation, falls risk.  After discussion of these risks and benefits, the patient voiced understanding and agreed to proceed.  d. CONTINUE bupropion  mg p.o. daily. Risks, benefits, alternatives discussed with patient including nausea, GI upset, increased energy, exacerbation of irritability, insomnia, lowering of seizure threshold.  After discussion of these risks and benefits, the patient voiced understanding and agreed to proceed.  e. CONTINUE gabapentin 600 mg PO TID. Risks, benefits, alternatives discussed with patient including sedation, dizziness/falls risk, GI upset, weight gain.  After discussion of these risks and benefits, the patient voiced understanding and agreed to proceed. Ramin MAURICIO ordered. Verbally signed controlled substances agreement.  f. S/P:  i. Strattera 40 mg: stopped 1/11/21 to reduce medication regimen.  ii. Never started melatonin  72. Labs: no recs  73. Follow up: 4 wks    Patient screened positive for depression based on a PHQ-9 score of 0 on 9/10/2021. Follow-up recommendations include: Prescribed antidepressant medication treatment.           TREATMENT PLAN/GOALS: Continue supportive psychotherapy efforts and medications as indicated. Treatment and medication options discussed during today's visit. Patient acknowledged and verbally consented to continue with current treatment plan and was educated on the importance of compliance with  treatment and follow-up appointments.    MEDICATION ISSUES:  LILIAN reviewed as expected.  Discussed medication options and treatment plan of prescribed medication as well as the risks, benefits, and side effects including potential falls, possible impaired driving and metabolic adversities among others. Patient is agreeable to call the office with any worsening of symptoms or onset of side effects. Patient is agreeable to call 911 or go to the nearest ER should he/she begin having SI/HI. No medication side effects or related complaints today.     MEDS ORDERED DURING VISIT:  No orders of the defined types were placed in this encounter.      Return in about 4 weeks (around 3/17/2022).         This document has been electronically signed by Brooks Lynn MD  February 17, 2022 10:39 EST      Part of this note may be an electronic transcription/translation of spoken language to printed text using the Dragon Dictation System.

## 2022-02-18 ENCOUNTER — TELEPHONE (OUTPATIENT)
Dept: INTERNAL MEDICINE | Facility: CLINIC | Age: 42
End: 2022-02-18

## 2022-02-18 DIAGNOSIS — J30.2 SEASONAL ALLERGIES: ICD-10-CM

## 2022-02-18 RX ORDER — DEXAMETHASONE 4 MG/1
1 TABLET ORAL
Qty: 12 G | Refills: 0 | Status: SHIPPED | OUTPATIENT
Start: 2022-02-18 | End: 2022-03-18

## 2022-02-18 RX ORDER — CETIRIZINE HYDROCHLORIDE 10 MG/1
10 TABLET ORAL DAILY
Qty: 90 TABLET | Refills: 3 | Status: SHIPPED | OUTPATIENT
Start: 2022-02-18 | End: 2022-08-26 | Stop reason: SDUPTHER

## 2022-02-18 NOTE — TELEPHONE ENCOUNTER
Caller: Malinda Saucedo    Relationship: Self    Best call back number: [phone] OKAY TO LEAVE A MESSAGE     Requested Prescriptions:   Requested Prescriptions     Pending Prescriptions Disp Refills   • cetirizine (zyrTEC) 10 MG tablet 90 tablet 3     Sig: Take 1 tablet by mouth Daily.    STEROID INHALER (PATIENT SAID THIS WOULD BE HER FIRST TIME RECEIVING THIS MEDICATION)    Pharmacy where request should be sent: [id number] - [place], [place] - [address] AT [facility] [address] [facility] & [place] - [phone] [facility] [phone] FX         Antionette Sun, Estebaned Rep   02/18/22 14:26 EST

## 2022-02-26 DIAGNOSIS — U07.1 COVID-19: ICD-10-CM

## 2022-02-28 ENCOUNTER — TELEPHONE (OUTPATIENT)
Dept: INTERNAL MEDICINE | Facility: CLINIC | Age: 42
End: 2022-02-28

## 2022-02-28 RX ORDER — FAMOTIDINE 40 MG/1
TABLET, FILM COATED ORAL
Qty: 84 TABLET | Refills: 0 | Status: SHIPPED | OUTPATIENT
Start: 2022-02-28 | End: 2022-03-07

## 2022-02-28 NOTE — TELEPHONE ENCOUNTER
Caller: Malinda Saucedo    Relationship to patient: Self    Best call back number: [phone] OKAY TO LEAVE A MESSAGE ON PHONE    Patient is needing: PATIENT WOULD LIKE A CALL BACK WITH THE STATUS OF HER REFERRAL TO A NEUROLOGIST. PATIENT SAID IT IS OKAY TO LEAVE MESSAGE ON PHONE.

## 2022-03-04 ENCOUNTER — TELEPHONE (OUTPATIENT)
Dept: BEHAVIORAL HEALTH | Facility: CLINIC | Age: 42
End: 2022-03-04

## 2022-03-04 ENCOUNTER — OFFICE VISIT (OUTPATIENT)
Dept: SLEEP MEDICINE | Facility: HOSPITAL | Age: 42
End: 2022-03-04

## 2022-03-04 VITALS
HEART RATE: 100 BPM | DIASTOLIC BLOOD PRESSURE: 83 MMHG | HEIGHT: 64 IN | SYSTOLIC BLOOD PRESSURE: 131 MMHG | OXYGEN SATURATION: 97 % | WEIGHT: 260 LBS | TEMPERATURE: 96 F | BODY MASS INDEX: 44.39 KG/M2

## 2022-03-04 DIAGNOSIS — G47.33 OSA (OBSTRUCTIVE SLEEP APNEA): Primary | ICD-10-CM

## 2022-03-04 PROCEDURE — G0463 HOSPITAL OUTPT CLINIC VISIT: HCPCS

## 2022-03-04 NOTE — PROGRESS NOTES
[facility]                          Chief Complaint:   Follow up for obstructive sleep apnea and hypersomnia    History of present illness:   Subjective     Patient is a 41 y.o. female  Patient with hx of anxiety and depression who complains of excessive daytime sleepiness, frequent nocturnal awakenings & snoring. BMI=44.     PSG 21:   AHI= 14.6/h; RDI=30/h; REM-AHI= 54.3/h.     She has not received her CPAP.     ESS: Total score: 21.  She denies sleep paralysis, hallucination or cataplexy    DME: [facility].     REVIEW OF SYSTEMS:   HEENT: Nasal congestion & postnasal drip   CARDIOVASCULAR: No chest pain, chest pressure or chest discomfort. No palpitations or edema.   RESPIRATORY: No shortness of breath, cough or sputum.   GASTROINTESTINAL: No abdominal bloating or reflux   NEUROLOGICAL/PSYCHOATRY: Anxiety and depression.  Morning headache.    Past Medical History:  Past Medical History:   Diagnosis Date   • Allergic    • Anxiety    • Arthritis    • Chronic pain disorder    • Deep vein thrombosis (HCC)    • Depression    • Ectopic pregnancy without intrauterine pregnancy 2019    Formatting of this note might be different from the original. - Day 1 = 19 -> beta 4,927 MTX #1 given (110mg) - Day 4 = 19 -> beta 11,077 - Day 7 = 19 -> beta 11,923 MTX #2 given (110mg) - Day 11 = 19 -> beta 11,406 - Day 14 = 19 -> scheduled visit and beta - Day 19 =  19 -> beta 6,584 - Day 26 = 19 -> beta 3,111 - Day 34 = 3/1/19 -> beta 553 (seen at [facility]   • Endometriosis    • Fibroids    • Headache    • Hidradenitis    • Low back pain ?    DDD   • Obesity    • Panic disorder    • PTSD (post-traumatic stress disorder)    ,   Past Surgical History:   Procedure Laterality Date   •  SECTION     • CYSTECTOMY     • ENDOMETRIAL ABLATION  , ,    • EYE SURGERY     • MYOMECTOMY     ,   Social History     Socioeconomic History   • Marital  status:    Tobacco Use   • Smoking status: Never Smoker   • Smokeless tobacco: Never Used   Vaping Use   • Vaping Use: Never used   Substance and Sexual Activity   • Alcohol use: Not Currently   • Drug use: Never   • Sexual activity: Not Currently     Partners: Male     Birth control/protection: None     E-cigarette/Vaping   • E-cigarette/Vaping Use Never User      E-cigarette/Vaping Substances     E-cigarette/Vaping Devices        and Allergies:  Methotrexate    Medication Review:     Current Outpatient Medications:   •  ARIPiprazole (Abilify) 2 MG tablet, Take 2 tablets by mouth Daily., Disp: 60 tablet, Rfl: 2  •  aspirin (aspirin) 81 MG EC tablet, Take 1 tablet by mouth Daily., Disp: 90 tablet, Rfl: 3  •  azithromycin (Zithromax Z-Antonio) 250 MG tablet, Take 2 tablets by mouth on day 1, then 1 tablet daily on days 2-5, Disp: 6 tablet, Rfl: 0  •  Baclofen 5 MG tablet, Take 1 tablet by mouth 3 (Three) Times a Day., Disp: , Rfl:   •  buPROPion XL (WELLBUTRIN XL) 300 MG 24 hr tablet, , Disp: , Rfl:   •  busPIRone (BUSPAR) 10 MG tablet, Take 1 tablet by mouth 2 (Two) Times a Day., Disp: 60 tablet, Rfl: 2  •  cetirizine (zyrTEC) 10 MG tablet, Take 1 tablet by mouth Daily., Disp: 90 tablet, Rfl: 3  •  clobetasol (TEMOVATE) 0.05 % external solution, clobetasol 0.05 % scalp solution, Disp: , Rfl:   •  COVID-19 Test kit, COVID-19 test specimen collection  TEST AS DIRECTED TODAY, Disp: , Rfl:   •  DULoxetine (CYMBALTA) 30 MG capsule, TAKE 1 CAPSULE BY MOUTH DAILY(WITH 60MG= 90MG/DAY), Disp: 30 capsule, Rfl: 2  •  DULoxetine (CYMBALTA) 60 MG capsule, Take 1 capsule by mouth Daily. In addition to the Duloxetine 30mg, Disp: 30 capsule, Rfl: 2  •  EPINEPHrine (EPIPEN) 0.3 MG/0.3ML solution auto-injector injection, epinephrine 0.3 mg/0.3 mL injection, auto-injector, Disp: , Rfl:   •  famotidine (PEPCID) 40 MG tablet, TAKE 2 TABLETS BY MOUTH THREE TIMES DAILY FOR 14 DAYS, Disp: 84 tablet, Rfl: 0  •  fluticasone (Flovent HFA)  "110 MCG/ACT inhaler, Inhale 1 puff 2 (Two) Times a Day., Disp: 12 g, Rfl: 0  •  gabapentin (NEURONTIN) 600 MG tablet, TAKE 1 TABLET BY MOUTH EVERY 8 HOURS AS DIRECTED, Disp: , Rfl:   •  Humira Pen 40 MG/0.4ML Pen-injector Kit, , Disp: , Rfl:   •  ibuprofen (ADVIL,MOTRIN) 800 MG tablet, , Disp: , Rfl:   •  norethindrone (AYGESTIN) 5 MG tablet, , Disp: , Rfl:   •  Phendimetrazine Tartrate  MG capsule sustained-release 24 hr, phendimetrazine tartrate  mg capsule,extended release  Take 1 capsule every day by oral route in the morning for 30 days., Disp: , Rfl:   •  topiramate (TOPAMAX) 200 MG tablet, topiramate 200 mg tablet, Disp: , Rfl:       Objective   Vital Signs:  Vitals:    03/04/22 1300   BP: 131/83   Pulse: 100   Temp: 96 °F (35.6 °C)   SpO2: 97%   Weight: 118 kg (260 lb)   Height: 162.6 cm (64\")     Body mass index is 44.63 kg/m².          Physical Exam:   General Appearance:    Alert, cooperative, in no acute distress   ENMT:  Freidman score 3, narrow distance in between the posterior pharyngeal pillars   Neck:  Large. Trachea midline. No thyromegaly.   Lungs:     Clear to auscultation,respirations regular, even and                  unlabored    Heart:    Regular rhythm and normal rate, normal S1 and S2, no            Murmur.   Abdomen:     Obese.  Soft.  No tenderness.  No HSM    Neuro:   Conscious, alert, oriented x3. Appropriate mood and affect.    Extremities:   Moves all extremities well, no edema, no cyanosis, no             Redness              Diagnostic data:      Lab Results   Component Value Date    HGBA1C 4.94 12/28/2021     Total Cholesterol   Date Value Ref Range Status   12/28/2021 150 0 - 200 mg/dL Final     Triglycerides   Date Value Ref Range Status   12/28/2021 69 0 - 150 mg/dL Final     HDL Cholesterol   Date Value Ref Range Status   12/28/2021 64 (H) 40 - 60 mg/dL Final     Hemoglobin   Date Value Ref Range Status   02/08/2022 14.7 12.0 - 15.9 g/dL Final   02/05/2019 13.5 12.0 " - 16.0 g/dL Final     CO2   Date Value Ref Range Status   02/08/2022 20.3 (L) 22.0 - 29.0 mmol/L Final     Total CO2   Date Value Ref Range Status   02/05/2019 22 22 - 30 mmol/L Final        Assessment   1. GISELA  2. Hypersomnia, secondary to #1  3. Morbid obesity, BMI 44  4. Depression/PTSD    PLAN:  · Discussed the results of the sleep study  · Initiate auto CPAP 6-16.  · Counseled for weight loss  · Counseled against driving or operating heavy machinery when sleepy        Time 22 minutes    This note was dictated utilizing Dragon dictation

## 2022-03-04 NOTE — TELEPHONE ENCOUNTER
Patient called wanting provider to call her re: her medications and wanting to possibly discontinuing her meds due to her weight.  Patient has appt scheduled for 03/22/2022 was possibly wanting to be seen sooner however provider is really booked up.  Patient was last seen 02/17/2022.  Please advise

## 2022-03-07 ENCOUNTER — TELEMEDICINE (OUTPATIENT)
Dept: PSYCHIATRY | Facility: CLINIC | Age: 42
End: 2022-03-07

## 2022-03-07 DIAGNOSIS — F41.1 GENERALIZED ANXIETY DISORDER: ICD-10-CM

## 2022-03-07 DIAGNOSIS — F33.1 MAJOR DEPRESSIVE DISORDER, RECURRENT EPISODE, MODERATE: Primary | ICD-10-CM

## 2022-03-07 DIAGNOSIS — F51.05 INSOMNIA DUE TO MENTAL CONDITION: ICD-10-CM

## 2022-03-07 PROCEDURE — 99214 OFFICE O/P EST MOD 30 MIN: CPT | Performed by: STUDENT IN AN ORGANIZED HEALTH CARE EDUCATION/TRAINING PROGRAM

## 2022-03-07 PROCEDURE — 90833 PSYTX W PT W E/M 30 MIN: CPT | Performed by: STUDENT IN AN ORGANIZED HEALTH CARE EDUCATION/TRAINING PROGRAM

## 2022-03-07 RX ORDER — FLUCONAZOLE 150 MG/1
TABLET ORAL
COMMUNITY
End: 2022-03-07

## 2022-03-07 RX ORDER — ELAGOLIX 150 MG/1
TABLET, FILM COATED ORAL
COMMUNITY
Start: 2022-03-01 | End: 2022-04-11

## 2022-03-07 NOTE — PATIENT INSTRUCTIONS
1.  Please return to clinic at your next scheduled visit.  Contact the clinic ([phone]) at least 24 hours prior in the event you need to cancel.  2.  Do no harm to yourself or others.    3.  Avoid alcohol and drugs.    4.  Take all medications as prescribed.  Please contact the clinic with any concerns. If you are in need of medication refills, please call the clinic at [phone].    5. Should you want to get in touch with your provider, Dr. Brooks Lynn, please utilize [facility] [employer] or contact the office ([phone]), and staff will be able to page Dr. Lynn directly.  6.  In the event you have personal crisis, contact the following crisis numbers: Suicide Prevention Hotline 1-200.130.5923; [facility] Helpline 6-462-112-LGLX; [facility] Emergency Room [phone]; text HELLO to 567788; or 818.     SPECIFIC RECOMMENDATIONS:     1.      Medications discussed at this encounter:                   -Discontinue Abilify     2.      Psychotherapy recommendations:      3.     Return to clinic: 5 weeks

## 2022-03-07 NOTE — PROGRESS NOTES
"Subjective   Malinda Saucedo is a 41 y.o. female who presents today for initial evaluation     Referring Provider:  No referring provider defined for this encounter.    Chief Complaint:  mdd vivi    History of Present Illness:     Chart review 9/29: Seen September 10 to establish care.  Previously was at [facility] office.  Labs are consistent with Sjogren's.  Psoriasis and arthritis are under control.  On Topamax for migraines.  On Lunesta for insomnia.  History of anxiety and depression.  On gabapentin 600 mg 3 times daily, Strattera 100 mg daily, BuSpar 10 mg, duloxetine 60 mg, bupropion 300 mg.  Denied anxiety in January 2019.  Has been on Prozac in the past.  BMP demonstrates creatinine of 1.68, alk phos of 116, otherwise unremarkable.  hCG was followed in 2019.  CBC unremarkable, no head imaging or EKG.    \"Malinda\"    3/7: Virtual visit via Zoom audio and video due to the COVID-19 pandemic.  Patient is accepting of and agreeable to visit.  The visit consisted of the patient and I. The patient is at home, and I am at the office.  Interview:  1. Chart review: Patient is concerned about her weight.  Labs from February show elevated creatinine 1.04, reassuring LFTs, low CO2 20.3, elevated chloride 108, reassuring CBC.  2. \" I am gaining weight.\"  a. Patient reports she is gained about 5 pounds in the last few weeks.  It is not clear per the system how much weight she has actually gained.  b. Denies hallucinations, anxiety and depression are very well controlled at this time.  c. Having some trouble with her  since around November, but they have had this happen before and \"he is not going anywhere.\"  3. Medication compliant: Yes  4. No SI HI AVH.      2/17: Virtual visit via Zoom audio and video due to the COVID-19 pandemic.  Patient is accepting of and agreeable to visit.  The visit consisted of the patient and I. The patient is at home, and I am at the office.  Interview:  5. Chart review: " "Seen by primary care February 15.  For cough.  COVID negative.  6. \"I never went up on the two of abilify.\"  a. \"Same symptoms.\" Still hears voices, but not as often. Also,  \"seeing things isn't as bad.\" Hears people calling her name. Random music.  b. Now has a sinus infection.  c. \"There are at least 3 family members who have schizophrenia.\"  d. Fearful that she may have schizophrenia; fearful that she may deteriorate like her uncle and cousin.  7. Sleeping: not well, waiting on equipment for GISELA.  8. Substances: stopped using CBD gummies first week of January 9. Therapy: n  10. Medication compliant: m  11. No SI HI AVH.      1/11: Virtual visit via Zoom audio and video due to the COVID-19 pandemic.  Patient is accepting of and agreeable to visit.  The visit consisted of the patient and I.  Interview:  12. Chart review: Seen in the emergency room December 31 for left arm numbness pain and swelling after having an IV placed to her ACE 2 days ago.  Patient saw primary care December 28 and wanted to be referred to neurology after her sleep study showed abnormal brain waves.  Labs from December 28 show low CO2 21.4, elevated chloride 111, elevated creatinine 1.12, A1c.  Thyroid studies, lipid profile, CBC are unremarkable.  No DVT found.  13. \"Good actually. The abilify helped a lot.\"  a. Sees things out of the corner of her eye, never directly.  b. Still hears voices stating her name.  14. Depression/Mood: stable  15. Anxiety: stable  16. Refills: n  17. Sleeping: initial insomnia. GISELA confirmed by sleep study.  18. Eating: stable  19. Substances: n  20. Therapy: n  21. Medication compliant: y  22. No SI HI AVH.      12/14: Virtual visit via Zoom audio and video due to the COVID-19 pandemic.  Patient is accepting of and agreeable to visit.  The visit consisted of the patient and I.  Interview:  23. Chart review: Seen for thrush by PCP 12/8, seen 11/11 for cough, sore throat (COVID neg). Referred to sleep " "medicine.  24. \"I'm ok.\"  a. Things have \"been weird.\"  b. I was having auditory and visual hallucinations when \"I first started seeing you.\"  i. Her son or  saying her name.  ii. Sees \"scary shadows\" out of the corner of her eyes, especially at night.  iii. \"Afraid I have bipolar disorder.\"  1. \"Started a practice out of nowhere.\"  2. \"I have spending sprees.\" $2,000 at a time. In the middle of one right now. Bought a bedset, tables, talked  into getting a new TV. Now in a new house.  c. Uncle has dx of schizophrenia, sister dx'd with bipolar d/o.  d. Also found out recently on the Sjogren's/Lupus spectrum.  e. Also has sleep study scheduled.  f. Will be seeing a neurologist because \"I'm losing time, like 5 minutes.\"  g. Willing to reduce the number of medications she is on and start a mood stabilizer.  h. \"Scared of these. Scared it will get worse.\"  i. Stopped buspar 2 weeks ago and is more irritable.  25. Depression/Mood:  1. Depressed mood: y  2. Seasonal pattern: denies  3. Severity: moderate  4. Anhedonia: mild, but enjoys spending time with son, shopping  5. Guilt or hopelessness:  6. Energy: \"horrible\"  7. Concentration: poor  8. Weight loss or weight gain: gain, 2 lbs since 2 weeks  9. Psychomotor retardation or agitation: def  10. Insomnia:  a. Topamax makes her sleepy, bed at 11, no initial insomnia, but wakes at 2 am, eats, sleeps in an hour, wakes at 6:30 am.  11. Duration: months  26. Anxiety:  1. Uncontrolled worrying: y  2. Severity: moderate  3. Muscle tension: y  4. Fatigue: y  5. Concentration: poor  6. Restlessness/feeling on edge: y  7. Irritability: y  8. Insomnia: maintenance insomnia  9. Duration: months  10. Panic attacks: def  27. Refills: none  28. Sleeping: above, sleep study set up  29. Eating: above  30. Substances: CBD gummy to sleep  31. Therapy: declines  32. Medication compliant: y  33. No SI HI AVH.      11/10: Virtual visit via Zoom audio and video due to the " "COVID-19 pandemic.  Patient is accepting of and agreeable to visit.  The visit consisted of the patient and I.  Interview:  34. Chart review: No new developments.  35. \"I'm ok.\"  a. More emotional; horribly.  b. Mostly down.  c. Pharmacy has not filled her 30 mg cap of duloxetine in weeks; unsure why.  d. Also feels sick too; congested today  36. Depression/Mood: depressed mood  12. Guilt or hopelessness: denies  13. Energy: poor  14. Concentration: poor  37. Anxiety: not bad  38. Sleeping:  a. Initial insomnia  b. Maintenance insomnia  39. Eating: stable  40. Substances:  41. Therapy: denies  42. Medication compliant: no, inadvertently  43. No SI HI AVH.      10/13: Virtual visit via Zoom audio and video due to the COVID-19 pandemic.  Patient is accepting of and agreeable to visit.  The visit consisted of the patient and I.  Interview:  44. Chart review: No new developments.  45. \"I've seen some small changes, but not, like, horrible.\"  a. Attention drifts a little more, in the mornings.  46. Depression/Mood: \"increasing the duloxetine has helped.\"  a. Usually feels really sad before her cycle, but doesn't this time  47. Anxiety:  a. Not as irritable  48. Sleeping: yes  49. Eating: stable  50. Substances: denies  51. Therapy: denies  52. Medication compliant: y  53. No SI HI AVH      9/29: Virtual visit via Zoom audio and video due to the COVID-19 pandemic.  Patient is accepting of and agreeable to visit.  The visit consisted of the patient and I.  Interview:  54. Her story: \"Well, it started in [employer].\"  a. Originally PMDD (2009) for years  b. Got out 2011  c. Then dx'd with depression and YENNY  d. Has been on medications since  i. Was on prozac from 2009 until 2015, stopped due to pregnancy  ii. 2018, started wellbutrin only. Which was horrible by itself. They added buspar 10 mg bid, with it. Then duloxetine 60 mg daily added. Better combination.  iii. Now on the above, and also on strattera 100 mg daily for ADHD. " "Also on gabapentin 600 tid.  e. Stimulants put her to sleep: adderall, vyvanse.  Did not try extended release formulations.  55. Mood: much better than it was in the past, but still could use some help.  56. Stressors:  a. Niece started having seizures at 21 and lost her memory, doesn't remember anyone or anybody.  b. In the middle of buying a house  c. Finances  d. Starting my own practice.  e. Son having school problems; hanging with \"bad kids.\"  57. Anxiety: manageable.  a. Worrying a lot  58. Coping: goes to [employer], trusts in God  59. Sleeping: yes  60. Eating: stable  61. Medication compliant: yes  62. Psych ROS: D, A.  Negative for psychosis.  Unclear gorge.  63. No SI HI AVH    Depression:  64. Anhedonia: denies  65. Guilt or hopelessness:   a. Feelings of guilt about how she could have done more for her nieces and nephews  66. Energy: horrible  67. Concentration: \"I have to force myself to focus.\"  a. If stops to eat, it ends her day  68. Weight loss or weight gain: stable over last few months, on weight loss pills, however  69. Psychomotor retardation or agitation: frequently  70. Insomnia: yes, on the lunesta  71. Duration: for years      Access to Firearms: yes, locked away    PHQ-9 Depression Screening  PHQ-9 Total Score:      Little interest or pleasure in doing things?     Feeling down, depressed, or hopeless?     Trouble falling or staying asleep, or sleeping too much?     Feeling tired or having little energy?     Poor appetite or overeating?     Feeling bad about yourself - or that you are a failure or have let yourself or your family down?     Trouble concentrating on things, such as reading the newspaper or watching television?     Moving or speaking so slowly that other people could have noticed? Or the opposite - being so fidgety or restless that you have been moving around a lot more than usual?     Thoughts that you would be better off dead, or of hurting yourself in some way?     PHQ-9 Total " Score       YENNY-7       Past Surgical History:  Past Surgical History:   Procedure Laterality Date   •  SECTION     • CYSTECTOMY     • ENDOMETRIAL ABLATION  , ,    • EYE SURGERY     • MYOMECTOMY         Problem List:  Patient Active Problem List   Diagnosis   • YENNY (generalized anxiety disorder)   • PTSD (post-traumatic stress disorder)   • Attention deficit hyperactivity disorder   • Hidradenitis suppurativa   • Intramural and subserous leiomyoma of uterus   • Lumbosacral spondylosis without myelopathy   • Endometriosis determined by laparoscopy   • PMDD (premenstrual dysphoric disorder)   • Psoriasis   • Thrombophilia ([facility])   • Seasonal allergies   • Major depressive disorder in partial remission ([facility])   • Body mass index (BMI) 40.0-44.9, adult ([facility])   • Candidiasis of vagina   • Anaphylactic reaction to bee sting   • Insomnia, unspecified   • Low back pain   • Major depressive disorder, recurrent, moderate ([facility])       Allergy:   Allergies   Allergen Reactions   • Methotrexate Rash        Discontinued Medications:  Medications Discontinued During This Encounter   Medication Reason   • azithromycin (Zithromax Z-Antonio) 250 MG tablet *Therapy completed   • famotidine (PEPCID) 40 MG tablet *Therapy completed   • fluconazole (DIFLUCAN) 150 MG tablet *Therapy completed   • Phendimetrazine Tartrate  MG capsule sustained-release 24 hr *Therapy completed       Current Medications:   Current Outpatient Medications   Medication Sig Dispense Refill   • ARIPiprazole (Abilify) 2 MG tablet Take 2 tablets by mouth Daily. 60 tablet 2   • aspirin (aspirin) 81 MG EC tablet Take 1 tablet by mouth Daily. 90 tablet 3   • Baclofen 5 MG tablet Take 1 tablet by mouth 3 (Three) Times a Day.     • buPROPion XL (WELLBUTRIN XL) 300 MG 24 hr tablet      • busPIRone (BUSPAR) 10 MG tablet Take 1 tablet by mouth 2 (Two) Times a Day. 60 tablet 2   • cetirizine (zyrTEC) 10 MG tablet Take 1 tablet by mouth Daily. 90 tablet 3    • clobetasol (TEMOVATE) 0.05 % external solution clobetasol 0.05 % scalp solution     • DULoxetine (CYMBALTA) 30 MG capsule TAKE 1 CAPSULE BY MOUTH DAILY(WITH 60MG= 90MG/DAY) 30 capsule 2   • DULoxetine (CYMBALTA) 60 MG capsule Take 1 capsule by mouth Daily. In addition to the Duloxetine 30mg 30 capsule 2   • EPINEPHrine (EPIPEN) 0.3 MG/0.3ML solution auto-injector injection epinephrine 0.3 mg/0.3 mL injection, auto-injector     • fluticasone (Flovent HFA) 110 MCG/ACT inhaler Inhale 1 puff 2 (Two) Times a Day. 12 g 0   • gabapentin (NEURONTIN) 600 MG tablet TAKE 1 TABLET BY MOUTH EVERY 8 HOURS AS DIRECTED     • Humira Pen 40 MG/0.4ML Pen-injector Kit      • ibuprofen (ADVIL,MOTRIN) 800 MG tablet      • norethindrone (AYGESTIN) 5 MG tablet      • Orilissa 150 MG tablet      • topiramate (TOPAMAX) 200 MG tablet topiramate 200 mg tablet     • COVID-19 Test kit COVID-19 test specimen collection   TEST AS DIRECTED TODAY       No current facility-administered medications for this visit.       Past Medical History:  Past Medical History:   Diagnosis Date   • Allergic    • Anxiety    • Arthritis    • Chronic pain disorder    • Deep vein thrombosis (HCC)    • Depression    • Ectopic pregnancy without intrauterine pregnancy 1/25/2019    Formatting of this note might be different from the original. - Day 1 = 1/25/19 -> beta 4,927 MTX #1 given (110mg) - Day 4 = 1/28/19 -> beta 11,077 - Day 7 = 1/31/19 -> beta 11,923 MTX #2 given (110mg) - Day 11 = 2/5/19 -> beta 11,406 - Day 14 = 2/8/19 -> scheduled visit and beta - Day 19 =  2/13/19 -> beta 6,584 - Day 26 = 2/20/19 -> beta 3,111 - Day 34 = 3/1/19 -> beta 553 (seen at [facility]   • Endometriosis    • Fibroids    • Headache    • Hidradenitis    • Low back pain 2013?    DDD   • Obesity    • Panic disorder    • PTSD (post-traumatic stress disorder)        Past Psychiatric History:  Began Treatment: 2009  Diagnoses: D, A, insomnia  Psychiatrist: Last was months ago for a  couple times; previously NP for 2 years  Therapist: yes, therapy has not helped, two bad experiences  Admission History: denies  Medication Trials: see hpi  Self Harm: Denies  Suicide Attempts:Denies   Psychosis, Anxiety, Depression: denies    Substance Abuse History:   Types:Denies all, including illicit  Withdrawal Symptoms:Denies  Longest Period Sober:Not Applicable   AA: Not applicable     Social History:  Martial Status:  Employed: therapist, 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  Kids: one  House: apartment   History: army, honorable discharge, see hpi    Social History     Socioeconomic History   • Marital status:    Tobacco Use   • Smoking status: Never Smoker   • Smokeless tobacco: Never Used   Vaping Use   • Vaping Use: Never used   Substance and Sexual Activity   • Alcohol use: Not Currently   • Drug use: Never   • Sexual activity: Not Currently     Partners: Male     Birth control/protection: None       Family History:   Suicide Attempts:  1st cousin, maternal; another 1st cousin maternal  Suicide Completions: 1st cousin, maternal  Dx'd her sister herself that she has bipolar.    Family History   Problem Relation Age of Onset   • ADD / ADHD Mother    • OCD Mother    • Arthritis Mother    • Hyperlipidemia Mother    • Hypertension Mother    • Thyroid disease Mother    • Anxiety disorder Mother    • ADD / ADHD Sister    • Anxiety disorder Sister    • Bipolar disorder Sister    • Drug abuse Maternal Aunt    • Depression Maternal Aunt    • Alcohol abuse Maternal Uncle    • Drug abuse Maternal Uncle    • Schizophrenia Maternal Uncle    • Depression Maternal Grandmother    • Other Maternal Grandmother         Colon cancer   • Anxiety disorder Maternal Grandmother    • ADD / ADHD Cousin    • OCD Cousin    • Suicide Attempts Cousin    • Alcohol abuse Cousin    • Depression Cousin    • Seizures Niece    • Arthritis Sister    • Depression Sister    • Hyperlipidemia Sister    • Asthma Sister    •  "Hypertension Sister    • Miscarriages / Stillbirths Sister    • Mental illness Maternal Uncle    • Alcohol abuse Maternal Uncle        Developmental History:   Born: [place]  Siblings: 1 sister, older cousin who lived with them  Childhood: no abuse  High School:Completed  College: 4 yrs at [employer] 3 years degree in counseling    · Mental Status Exam  · Appearance  · : groomed, good eye contact, normal street clothes, at home  · Behavior  · : pleasant and cooperative  · Motor  · : No abnormal  · Speech  · :normal rhythm, rate, volume, tone, not hyperverbal, not pressured, normal prosidy  · Mood  · : \"I am gaining weight\"  · Affect  · : euthymic, mood congruent, good variability  · Thought Content  · : negative suicidal ideations, negative homicidal ideations, negative obsessions  · Perceptions  · : negative auditory hallucinations, negative visual hallucinations  · Thought Process  · : linear  · Insight/Judgement  · : Fair/fair  · Cognition  · : grossly intact  · Attention   : intact    Review of Systems:  Review of Systems   Constitutional: Positive for diaphoresis and fatigue.   Eyes: Negative for visual disturbance.   Respiratory: Positive for shortness of breath. Negative for cough.    Cardiovascular: Positive for chest pain. Negative for palpitations and leg swelling.   Gastrointestinal: Positive for nausea. Negative for vomiting.   Endocrine: Positive for cold intolerance and heat intolerance.   Genitourinary: Negative for difficulty urinating.   Musculoskeletal: Negative for joint swelling.   Allergic/Immunologic: Positive for immunocompromised state.   Neurological: Positive for weakness and light-headedness. Negative for dizziness, seizures, speech difficulty and numbness.         Physical Exam:  Physical Exam    Vital Signs:   There were no vitals taken for this visit.     Lab Results:   Office Visit on 02/15/2022   Component Date Value Ref Range Status   • Rapid Influenza A Ag 02/15/2022 " Negative  Negative Final   • Rapid Influenza B Ag 02/15/2022 Negative  Negative Final   • Internal Control 02/15/2022 Passed  Passed Final   • Lot Number 02/15/2022 [id number]   Final   • Expiration Date 02/15/2022 05/23/2023   Final   • SARS Antigen 02/15/2022 Not Detected  Not Detected Final   • Internal Control 02/15/2022 Passed  Passed Final   • Lot Number 02/15/2022 [id number]   Final   • Expiration Date 02/15/2022 09/20/2023   Final   Lab on 02/08/2022   Component Date Value Ref Range Status   • Glucose 02/08/2022 75  65 - 99 mg/dL Final   • BUN 02/08/2022 8  6 - 20 mg/dL Final   • Creatinine 02/08/2022 1.04 (A) 0.57 - 1.00 mg/dL Final   • Sodium 02/08/2022 139  136 - 145 mmol/L Final   • Potassium 02/08/2022 4.1  3.5 - 5.2 mmol/L Final   • Chloride 02/08/2022 108 (A) 98 - 107 mmol/L Final   • CO2 02/08/2022 20.3 (A) 22.0 - 29.0 mmol/L Final   • Calcium 02/08/2022 8.9  8.6 - 10.5 mg/dL Final   • Total Protein 02/08/2022 6.7  6.0 - 8.5 g/dL Final   • Albumin 02/08/2022 4.00  3.50 - 5.20 g/dL Final   • ALT (SGPT) 02/08/2022 14  1 - 33 U/L Final   • AST (SGOT) 02/08/2022 13  1 - 32 U/L Final   • Alkaline Phosphatase 02/08/2022 90  39 - 117 U/L Final   • Total Bilirubin 02/08/2022 0.2  0.0 - 1.2 mg/dL Final   • eGFR   Amer 02/08/2022 71  >60 mL/min/1.73 Final   • Globulin 02/08/2022 2.7  gm/dL Final   • A/G Ratio 02/08/2022 1.5  g/dL Final   • BUN/Creatinine Ratio 02/08/2022 7.7  7.0 - 25.0 Final   • Anion Gap 02/08/2022 10.7  5.0 - 15.0 mmol/L Final   • WBC 02/08/2022 8.13  3.40 - 10.80 10*3/mm3 Final   • RBC 02/08/2022 4.77  3.77 - 5.28 10*6/mm3 Final   • Hemoglobin 02/08/2022 14.7  12.0 - 15.9 g/dL Final   • Hematocrit 02/08/2022 44.1  34.0 - 46.6 % Final   • MCV 02/08/2022 92.5  79.0 - 97.0 fL Final   • MCH 02/08/2022 30.8  26.6 - 33.0 pg Final   • MCHC 02/08/2022 33.3  31.5 - 35.7 g/dL Final   • RDW 02/08/2022 12.4  12.3 - 15.4 % Final   • RDW-SD 02/08/2022 42.4  37.0 - 54.0 fl Final   • MPV 02/08/2022 10.1   6.0 - 12.0 fL Final   • Platelets 02/08/2022 299  140 - 450 10*3/mm3 Final   • Neutrophil % 02/08/2022 61.1  42.7 - 76.0 % Final   • Lymphocyte % 02/08/2022 30.6  19.6 - 45.3 % Final   • Monocyte % 02/08/2022 5.2  5.0 - 12.0 % Final   • Eosinophil % 02/08/2022 2.3  0.3 - 6.2 % Final   • Basophil % 02/08/2022 0.6  0.0 - 1.5 % Final   • Immature Grans % 02/08/2022 0.2  0.0 - 0.5 % Final   • Neutrophils, Absolute 02/08/2022 4.96  1.70 - 7.00 10*3/mm3 Final   • Lymphocytes, Absolute 02/08/2022 2.49  0.70 - 3.10 10*3/mm3 Final   • Monocytes, Absolute 02/08/2022 0.42  0.10 - 0.90 10*3/mm3 Final   • Eosinophils, Absolute 02/08/2022 0.19  0.00 - 0.40 10*3/mm3 Final   • Basophils, Absolute 02/08/2022 0.05  0.00 - 0.20 10*3/mm3 Final   • Immature Grans, Absolute 02/08/2022 0.02  0.00 - 0.05 10*3/mm3 Final   • nRBC 02/08/2022 0.0  0.0 - 0.2 /100 WBC Final   Admission on 01/21/2022, Discharged on 01/21/2022   Component Date Value Ref Range Status   • COVID19 01/21/2022 Detected (A) Not Detected - Ref. Range Final   Admission on 12/31/2021, Discharged on 01/01/2022   Component Date Value Ref Range Status   • Target HR (85%) 12/31/2021 153  bpm Final   • Max. Pred. HR (100%) 12/31/2021 180  bpm Final   • Right Internal Jugular Compress 12/31/2021 C   Final   • Right Internal Jugular Phasic 12/31/2021 N   Final   • Right Internal Jugular Spont 12/31/2021 Y   Final   • Left Internal Jugular Compress 12/31/2021 C   Final   • Left Internal Jugular Phasic 12/31/2021 Y   Final   • Left Internal Jugular Spont 12/31/2021 Y   Final   • Right Subclavian Compress 12/31/2021 C   Final   • Right Subclavian Phasic 12/31/2021 N   Final   • Right Subclavian Spont 12/31/2021 Y   Final   • Left Subclavian Augment 12/31/2021 Y   Final   • Left Subclavian Compress 12/31/2021 C   Final   • Left Subclavian Phasic 12/31/2021 Y   Final   • Left Subclavian Spont 12/31/2021 Y   Final   • Left Axillary Augment 12/31/2021 Y   Final   • Left Axillary  Compress 12/31/2021 C   Final   • Left Axillary Phasic 12/31/2021 Y   Final   • Left Axillary Spont 12/31/2021 Y   Final   • Left Brachial Augment 12/31/2021 Y   Final   • Left Brachial Compress 12/31/2021 C   Final   • Left Radial Augment 12/31/2021 Y   Final   • Left Radial Compress 12/31/2021 C   Final   • Left Ulnar Augment 12/31/2021 Y   Final   • Left Ulnar Compress 12/31/2021 C   Final   • Left Basilic Upper Compress 12/31/2021 C   Final   • Left Basilic Forearm Compress 12/31/2021 C   Final   • Left Cephalic Upper Compress 12/31/2021 C   Final   • Left Cephalic Forearm Compress 12/31/2021 C   Final   Office Visit on 12/28/2021   Component Date Value Ref Range Status   • Total Cholesterol 12/28/2021 150  0 - 200 mg/dL Final   • Triglycerides 12/28/2021 69  0 - 150 mg/dL Final   • HDL Cholesterol 12/28/2021 64 (A) 40 - 60 mg/dL Final   • LDL Cholesterol  12/28/2021 72  0 - 100 mg/dL Final   • VLDL Cholesterol 12/28/2021 14  5 - 40 mg/dL Final   • LDL/HDL Ratio 12/28/2021 1.13   Final   • Glucose 12/28/2021 95  65 - 99 mg/dL Final   • BUN 12/28/2021 10  6 - 20 mg/dL Final   • Creatinine 12/28/2021 1.12 (A) 0.57 - 1.00 mg/dL Final   • Sodium 12/28/2021 141  136 - 145 mmol/L Final   • Potassium 12/28/2021 4.4  3.5 - 5.2 mmol/L Final   • Chloride 12/28/2021 111 (A) 98 - 107 mmol/L Final   • CO2 12/28/2021 21.4 (A) 22.0 - 29.0 mmol/L Final   • Calcium 12/28/2021 9.5  8.6 - 10.5 mg/dL Final   • Total Protein 12/28/2021 6.9  6.0 - 8.5 g/dL Final   • Albumin 12/28/2021 4.50  3.50 - 5.20 g/dL Final   • ALT (SGPT) 12/28/2021 15  1 - 33 U/L Final   • AST (SGOT) 12/28/2021 15  1 - 32 U/L Final   • Alkaline Phosphatase 12/28/2021 98  39 - 117 U/L Final   • Total Bilirubin 12/28/2021 0.3  0.0 - 1.2 mg/dL Final   • eGFR   Amer 12/28/2021 65  >60 mL/min/1.73 Final   • Globulin 12/28/2021 2.4  gm/dL Final   • A/G Ratio 12/28/2021 1.9  g/dL Final   • BUN/Creatinine Ratio 12/28/2021 8.9  7.0 - 25.0 Final   • Anion Gap  12/28/2021 8.6  5.0 - 15.0 mmol/L Final   • Hemoglobin A1C 12/28/2021 4.94  4.80 - 5.60 % Final   • Sed Rate 12/28/2021 20  0 - 20 mm/hr Final   • TSH 12/28/2021 1.100  0.270 - 4.200 uIU/mL Final   • WBC 12/28/2021 7.36  3.40 - 10.80 10*3/mm3 Final   • RBC 12/28/2021 4.91  3.77 - 5.28 10*6/mm3 Final   • Hemoglobin 12/28/2021 15.0  12.0 - 15.9 g/dL Final   • Hematocrit 12/28/2021 45.2  34.0 - 46.6 % Final   • MCV 12/28/2021 92.1  79.0 - 97.0 fL Final   • MCH 12/28/2021 30.5  26.6 - 33.0 pg Final   • MCHC 12/28/2021 33.2  31.5 - 35.7 g/dL Final   • RDW 12/28/2021 12.4  12.3 - 15.4 % Final   • RDW-SD 12/28/2021 41.7  37.0 - 54.0 fl Final   • MPV 12/28/2021 10.5  6.0 - 12.0 fL Final   • Platelets 12/28/2021 265  140 - 450 10*3/mm3 Final   • Neutrophil % 12/28/2021 62.4  42.7 - 76.0 % Final   • Lymphocyte % 12/28/2021 26.9  19.6 - 45.3 % Final   • Monocyte % 12/28/2021 7.5  5.0 - 12.0 % Final   • Eosinophil % 12/28/2021 2.0  0.3 - 6.2 % Final   • Basophil % 12/28/2021 0.8  0.0 - 1.5 % Final   • Immature Grans % 12/28/2021 0.4  0.0 - 0.5 % Final   • Neutrophils, Absolute 12/28/2021 4.59  1.70 - 7.00 10*3/mm3 Final   • Lymphocytes, Absolute 12/28/2021 1.98  0.70 - 3.10 10*3/mm3 Final   • Monocytes, Absolute 12/28/2021 0.55  0.10 - 0.90 10*3/mm3 Final   • Eosinophils, Absolute 12/28/2021 0.15  0.00 - 0.40 10*3/mm3 Final   • Basophils, Absolute 12/28/2021 0.06  0.00 - 0.20 10*3/mm3 Final   • Immature Grans, Absolute 12/28/2021 0.03  0.00 - 0.05 10*3/mm3 Final   • nRBC 12/28/2021 0.0  0.0 - 0.2 /100 WBC Final   Clinical Support on 12/22/2021   Component Date Value Ref Range Status   • SARS Antigen 12/22/2021 Not Detected  Not Detected Final   • Influenza A Antigen BIANCA 12/22/2021 Not Detected   Final   • Influenza B Antigen BIANCA 12/22/2021 Not Detected   Final   • Internal Control 12/22/2021 Passed  Passed Final   • Lot Number 12/22/2021 [id number]   Final   • Expiration Date 12/22/2021 12,112,022   Final   • COVID19 12/22/2021  Not Detected  Not Detected - Ref. Range Final   Clinical Support on 11/12/2021   Component Date Value Ref Range Status   • SARS Antigen 11/12/2021 Not Detected  Not Detected Final   • Influenza A Antigen BIANCA 11/12/2021 Not Detected   Final   • Influenza B Antigen BIANCA 11/12/2021 Not Detected   Final   • Internal Control 11/12/2021 Passed  Passed Final   • Lot Number 11/12/2021 [id number]   Final   • Expiration Date 11/12/2021 12/22/2021   Final   • COVID19 11/12/2021 Not Detected  Not Detected - Ref. Range Final   Office Visit on 11/11/2021   Component Date Value Ref Range Status   • SARS Antigen 11/11/2021 Not Detected  Not Detected Final   • Influenza A Antigen BIANCA 11/11/2021 Not Detected   Final   • Influenza B Antigen BIANCA 11/11/2021 Not Detected   Final   • Internal Control 11/11/2021 Passed  Passed Final   • Lot Number 11/11/2021 [id number]   Final   • Expiration Date 11/11/2021 12/11/2022   Final   • SARS-CoV-2 AMRIK 11/11/2021 Not Detected  Not Detected Final   Office Visit on 09/10/2021   Component Date Value Ref Range Status   • SARS Antigen 09/10/2021 Not Detected  Not Detected Final   • Influenza A Antigen BIANCA 09/10/2021 Not Detected   Final   • Influenza B Antigen BIANCA 09/10/2021 Not Detected   Final   • Internal Control 09/10/2021 Passed  Passed Final   • Lot Number 09/10/2021 [id number]   Final   • Expiration Date 09/10/2021 12/22/22   Final   • SARS-CoV-2 AMRIK 09/10/2021 Not Detected  Not Detected Final       EKG Results:  No orders to display       Imaging Results:  No Images in the past 120 days found..      Assessment/Plan   Diagnoses and all orders for this visit:    1. Major depressive disorder, recurrent episode, moderate (HCC) (Primary)    2. Generalized anxiety disorder    3. Insomnia due to mental condition        Visit Diagnoses:    ICD-10-CM ICD-9-CM   1. Major depressive disorder, recurrent episode, moderate (HCC)  F33.1 296.32   2. Generalized anxiety disorder  F41.1 300.02   3. Insomnia due to mental  condition  F51.05 300.9     327.02     3/7: Discontinue Abilify due to weight gain.  Now denies having any hallucinations.  Patient to monitor her weight.  Patient will also keep me posted on her relationship with her  which is no data contributor to depression and anxiety.  16 minutes of supportive psychotherapy with goal to strengthen defenses, promote problems solving, restore adaptive functioning and provide symptom relief. The therapeutic alliance was strengthened to encourage the patient to express their thoughts and feelings. Esteem building was enhanced through praise, reassurance, normalizing and encouragement. Coping skills were enhanced to build distress tolerance skills and emotional regulation. Patient given education on medication side effects, diagnosis/illness and relapse symptoms. Plan to continue supportive psychotherapy in next appointment to provide symptom relief.  5 weeks    2/17: Increase Abilify.  Patient is fearful that she will develop schizophrenia and deteriorate like her family.  She will start therapy as well.  18 minutes of supportive psychotherapy with goal to strengthen defenses, promote problems solving, restore adaptive functioning and provide symptom relief. The therapeutic alliance was strengthened to encourage the patient to express their thoughts and feelings. Esteem building was enhanced through praise, reassurance, normalizing and encouragement. Coping skills were enhanced to build distress tolerance skills and emotional regulation. Patient given education on medication side effects, diagnosis/illness and relapse symptoms. Plan to continue supportive psychotherapy in next appointment to provide symptom relief.  4 weeks    1/11: Significant improvement in symptoms, however residual auditory hallucinations.  Increase Abilify. 5 minutes of supportive psychotherapy with goal to strengthen defenses, promote problems solving, restore adaptive functioning and provide symptom  relief. The therapeutic alliance was strengthened to encourage the patient to express their thoughts and feelings. Esteem building was enhanced through praise, reassurance, normalizing and encouragement. Coping skills were enhanced to build distress tolerance skills and emotional regulation. Patient given education on medication side effects, diagnosis/illness and relapse symptoms. Plan to continue supportive psychotherapy in next appointment to provide symptom relief.  4 weeks    12/14: Mood reactivity versus actual bipolar II disorder. Take strattera every other day for 3 doses then stop. Start abilify 5 mg day. 20 minutes of supportive psychotherapy with goal to strengthen defenses, promote problems solving, restore adaptive functioning and provide symptom relief. The therapeutic alliance was strengthened to encourage the patient to express their thoughts and feelings. Esteem building was enhanced through praise, reassurance, normalizing and encouragement. Coping skills were enhanced to build distress tolerance skills and emotional regulation. Patient given education on medication side effects, diagnosis/illness and relapse symptoms. Plan to continue supportive psychotherapy in next appointment to provide symptom relief.  4 wks.    11/10: Start melatonin, restart duloxetine 17 minutes of supportive psychotherapy with goal to strengthen defenses, promote problems solving, restore adaptive functioning and provide symptom relief. The therapeutic alliance was strengthened to encourage the patient to express their thoughts and feelings. Esteem building was enhanced through praise, reassurance, normalizing and encouragement. Coping skills were enhanced to build distress tolerance skills and emotional regulation. Patient given education on medication side effects, diagnosis/illness and relapse symptoms. Plan to continue supportive psychotherapy in next appointment to provide symptom relief.  4 wks.    10/13: Better  mood. Reduce strattera to 40 mg nightly (not daily, it is sedating). 4 wks.    9/29: Records release will be signed by patient.  Patient is unsure if Strattera helped.  Reduce the dose.  Residual depressive symptoms.  Increase duloxetine.  Continue gabapentin and BuSpar.  Therapy referral made.  See back in 2 weeks to try to titrate off of Strattera entirely.  It does not sound like she has ever tried extended release formulations of stimulants for ADHD.  Does not sound like she got neuropsychological testing for ADHD.  Rule out gorge.    PLAN:  72. Safety: No acute safety concerns  73. Therapy:  Referral made.  74. Risk Assessment: Risk of self-harm acutely is moderate.  Risk factors include anxiety disorder, mood disorder, and recent psychosocial stressors (pandemic). Protective factors include no family history, denies access to guns/weapons, no present SI, no history of suicide attempts or self-harm in the past, minimal AODA, healthcare seeking, future orientation, willingness to engage in care.  Risk of self-harm chronically is also moderate, but could be further elevated in the event of treatment noncompliance and/or AODA.  75. Meds:  a. DISCONTINUE abilify 4 mg PO QDAY.  Effective dose is 8 mg.  Risks, benefits, alternatives discussed with patient including increased energy, exacerbation of irritability, akathisia, GI upset, orthostatic hypotension, increased appetite.  After discussion of these risks and benefits, the patient voiced understanding and agreed to proceed.  b. CONTINUE duloxetine 90 mg a day. Risks, benefits, alternatives discussed with patient including GI upset, nausea vomiting diarrhea, theoretical decrease of seizure threshold predisposing the patient to a slightly higher seizure risk, headaches, sexual dysfunction, serotonin syndrome, bleeding risk, increased suicidality in patients 24 years and younger.  Also constipation and urinary retention.  After discussion of these risks and  benefits, the patient voiced understanding and agreed to proceed.  c. CONTINUE BuSpar 10 mg p.o. twice daily (refilled today). Risks, benefits, alternatives discussed with patient including nausea, GI upset, mild sedation, falls risk.  After discussion of these risks and benefits, the patient voiced understanding and agreed to proceed.  d. CONTINUE bupropion  mg p.o. daily. Risks, benefits, alternatives discussed with patient including nausea, GI upset, increased energy, exacerbation of irritability, insomnia, lowering of seizure threshold.  After discussion of these risks and benefits, the patient voiced understanding and agreed to proceed.  e. CONTINUE gabapentin 600 mg PO TID. Risks, benefits, alternatives discussed with patient including sedation, dizziness/falls risk, GI upset, weight gain.  After discussion of these risks and benefits, the patient voiced understanding and agreed to proceed. Lilian MAURICIO ordered. Verbally signed controlled substances agreement.  f. S/P:  i. Strattera 40 mg: stopped 1/11/21 to reduce medication regimen.  ii. Never started melatonin  76. Labs: no recs  77. Follow up: 5 wks    Patient screened positive for depression based on a PHQ-9 score of  on . Follow-up recommendations include: Prescribed antidepressant medication treatment.           TREATMENT PLAN/GOALS: Continue supportive psychotherapy efforts and medications as indicated. Treatment and medication options discussed during today's visit. Patient acknowledged and verbally consented to continue with current treatment plan and was educated on the importance of compliance with treatment and follow-up appointments.    MEDICATION ISSUES:  LILIAN reviewed as expected.  Discussed medication options and treatment plan of prescribed medication as well as the risks, benefits, and side effects including potential falls, possible impaired driving and metabolic adversities among others. Patient is agreeable to call the office with  any worsening of symptoms or onset of side effects. Patient is agreeable to call 911 or go to the nearest ER should he/she begin having SI/HI. No medication side effects or related complaints today.     MEDS ORDERED DURING VISIT:  No orders of the defined types were placed in this encounter.      Return in about 5 weeks (around 4/11/2022).         This document has been electronically signed by Brooks Lynn MD  March 7, 2022 13:20 EST      Part of this note may be an electronic transcription/translation of spoken language to printed text using the Dragon Dictation System.

## 2022-03-10 DIAGNOSIS — F33.1 MAJOR DEPRESSIVE DISORDER, RECURRENT EPISODE, MODERATE: ICD-10-CM

## 2022-03-10 RX ORDER — ARIPIPRAZOLE 2 MG/1
TABLET ORAL
Qty: 30 TABLET | OUTPATIENT
Start: 2022-03-10

## 2022-03-11 ENCOUNTER — OFFICE VISIT (OUTPATIENT)
Dept: INTERNAL MEDICINE | Facility: CLINIC | Age: 42
End: 2022-03-11

## 2022-03-11 ENCOUNTER — TELEPHONE (OUTPATIENT)
Dept: INTERNAL MEDICINE | Facility: CLINIC | Age: 42
End: 2022-03-11

## 2022-03-11 VITALS
HEART RATE: 103 BPM | OXYGEN SATURATION: 98 % | HEIGHT: 64 IN | WEIGHT: 265.4 LBS | BODY MASS INDEX: 45.31 KG/M2 | TEMPERATURE: 98.2 F | SYSTOLIC BLOOD PRESSURE: 122 MMHG | DIASTOLIC BLOOD PRESSURE: 78 MMHG

## 2022-03-11 DIAGNOSIS — F41.1 GAD (GENERALIZED ANXIETY DISORDER): ICD-10-CM

## 2022-03-11 DIAGNOSIS — Z79.899 ENCOUNTER FOR LONG-TERM (CURRENT) USE OF OTHER MEDICATIONS: ICD-10-CM

## 2022-03-11 DIAGNOSIS — J30.2 SEASONAL ALLERGIES: Primary | ICD-10-CM

## 2022-03-11 DIAGNOSIS — R56.9 SEIZURE-LIKE ACTIVITY: ICD-10-CM

## 2022-03-11 DIAGNOSIS — L40.9 PSORIASIS: ICD-10-CM

## 2022-03-11 DIAGNOSIS — F50.81 BINGE EATING DISORDER: ICD-10-CM

## 2022-03-11 DIAGNOSIS — R63.5 WEIGHT GAIN: ICD-10-CM

## 2022-03-11 DIAGNOSIS — B37.0 THRUSH: ICD-10-CM

## 2022-03-11 LAB
AMPHET+METHAMPHET UR QL: NEGATIVE
AMPHETAMINE INTERNAL CONTROL: ABNORMAL
AMPHETAMINES UR QL: NEGATIVE
BARBITURATE INTERNAL CONTROL: ABNORMAL
BARBITURATES UR QL SCN: NEGATIVE
BENZODIAZ UR QL SCN: NEGATIVE
BENZODIAZEPINE INTERNAL CONTROL: ABNORMAL
BUPRENORPHINE INTERNAL CONTROL: ABNORMAL
BUPRENORPHINE SERPL-MCNC: NEGATIVE NG/ML
CANNABINOIDS SERPL QL: POSITIVE
COCAINE INTERNAL CONTROL: ABNORMAL
COCAINE UR QL: NEGATIVE
EXPIRATION DATE: ABNORMAL
Lab: ABNORMAL
MDMA (ECSTASY) INTERNAL CONTROL: ABNORMAL
MDMA UR QL SCN: NEGATIVE
METHADONE INTERNAL CONTROL: ABNORMAL
METHADONE UR QL SCN: NEGATIVE
METHAMPHETAMINE INTERNAL CONTROL: ABNORMAL
OPIATES INTERNAL CONTROL: ABNORMAL
OPIATES UR QL: NEGATIVE
OXYCODONE INTERNAL CONTROL: ABNORMAL
OXYCODONE UR QL SCN: NEGATIVE
PCP UR QL SCN: NEGATIVE
PHENCYCLIDINE INTERNAL CONTROL: ABNORMAL
THC INTERNAL CONTROL: ABNORMAL

## 2022-03-11 PROCEDURE — 99214 OFFICE O/P EST MOD 30 MIN: CPT | Performed by: INTERNAL MEDICINE

## 2022-03-11 PROCEDURE — 80305 DRUG TEST PRSMV DIR OPT OBS: CPT | Performed by: INTERNAL MEDICINE

## 2022-03-11 RX ORDER — MONTELUKAST SODIUM 10 MG/1
10 TABLET ORAL NIGHTLY
Qty: 90 TABLET | Refills: 1 | Status: SHIPPED | OUTPATIENT
Start: 2022-03-11 | End: 2022-08-26 | Stop reason: SDUPTHER

## 2022-03-11 RX ORDER — FLUCONAZOLE 150 MG/1
150 TABLET ORAL DAILY
Qty: 30 TABLET | Refills: 0 | Status: SHIPPED | OUTPATIENT
Start: 2022-03-11 | End: 2022-05-17

## 2022-03-11 RX ORDER — PHENDIMETRAZINE TARTRATE 35 MG/1
TABLET ORAL DAILY
COMMUNITY
End: 2022-04-18

## 2022-03-11 NOTE — PROGRESS NOTES
Chief Complaint  Follow-up and Medication Problem (Tomax, patient went to Neuro, she stated there was seizures from the medication, so patient stopped tomax.)    Subjective          Malinda Saucedo presents to [facility]  History of Present Illness  covid- patient reports feeling much better. Patient was able to get flovent and is still using as needed.   Weight gain - patient noticed from being on abilify. Patient is trying to workout more often. Patient is on phendimetrazine to help with weight control.   Absence episodes- patient is follow-up with neurology. Patient is waiting 4-6 months for topamax to wean out of her system and use CPAP consistently.   Psoriasis- patient restarted humira. psoraisis has flared up.     Current Outpatient Medications   Medication Instructions   • aspirin 81 mg, Oral, Daily   • Baclofen 5 MG tablet 1 tablet, Oral, 3 Times Daily   • buPROPion XL (WELLBUTRIN XL) 300 MG 24 hr tablet No dose, route, or frequency recorded.   • busPIRone (BUSPAR) 10 mg, Oral, 2 Times Daily   • cetirizine (ZYRTEC) 10 mg, Oral, Daily   • clobetasol (TEMOVATE) 0.05 % external solution clobetasol 0.05 % scalp solution   • DULoxetine (CYMBALTA) 30 MG capsule TAKE 1 CAPSULE BY MOUTH DAILY(WITH 60MG= 90MG/DAY)   • DULoxetine (CYMBALTA) 60 mg, Oral, Daily, In addition to the Duloxetine 30mg   • EPINEPHrine (EPIPEN) 0.3 MG/0.3ML solution auto-injector injection epinephrine 0.3 mg/0.3 mL injection, auto-injector   • fluconazole (DIFLUCAN) 150 mg, Oral, Daily   • fluticasone (Flovent HFA) 110 MCG/ACT inhaler 1 puff, Inhalation, 2 Times Daily - RT   • gabapentin (NEURONTIN) 600 MG tablet TAKE 1 TABLET BY MOUTH EVERY 8 HOURS AS DIRECTED   • Humira Pen 40 MG/0.4ML Pen-injector Kit No dose, route, or frequency recorded.   • ibuprofen (ADVIL,MOTRIN) 800 MG tablet No dose, route, or frequency recorded.   • lisdexamfetamine (VYVANSE) 30 mg, Oral, Every Morning   •  "montelukast (SINGULAIR) 10 mg, Oral, Nightly   • Orilissa 150 MG tablet No dose, route, or frequency recorded.   • Phendimetrazine Tartrate 35 MG tablet Oral, Daily       The following portions of the patient's history were reviewed and updated as appropriate: allergies, current medications, past family history, past medical history, past social history, past surgical history, and problem list.    Objective   Vital Signs:   /78 (BP Location: Right arm, Patient Position: Sitting, Cuff Size: Large Adult)   Pulse 103   Temp 98.2 °F (36.8 °C) (Temporal)   Ht 162.6 cm (64\")   Wt 120 kg (265 lb 6.4 oz)   SpO2 98%   BMI 45.56 kg/m²     Wt Readings from Last 3 Encounters:   03/11/22 120 kg (265 lb 6.4 oz)   03/04/22 118 kg (260 lb)   02/15/22 118 kg (260 lb 6.4 oz)     BP Readings from Last 3 Encounters:   03/11/22 122/78   03/04/22 131/83   02/15/22 140/88     Physical Exam   Appearance: No acute distress, well-nourished  Head: normocephalic, atraumatic  Eyes: extraocular movements intact, no scleral icterus, no conjunctival injection  Ears, Nose, and Throat: external ears normal, nares patent, moist mucous membranes  Cardiovascular: regular rate and rhythm. no murmurs, rales, or rhonchi. no edema  Respiratory: breathing comfortably, symmetric chest rise, clear to auscultation bilaterally. No wheezes, rales, or rhonchi.  Neuro: alert and oriented to time, place, and person. Normal gait  Psych: normal mood and affect     Result Review :   The following data was reviewed by: Ant Carlos Jr, MD on 03/11/2022:  Common labs    Common Labsle 12/28/21 12/28/21 12/28/21 12/28/21 2/8/22 2/8/22    1147 1147 1147 1147 1645 1645   Glucose    95  75   BUN    10  8   Creatinine    1.12 (A)  1.04 (A)   eGFR African Am    65  71   Sodium    141  139   Potassium    4.4  4.1   Chloride    111 (A)  108 (A)   Calcium    9.5  8.9   Albumin    4.50  4.00   Total Bilirubin    0.3  0.2   Alkaline Phosphatase    98  90 "   AST (SGOT)    15  13   ALT (SGPT)    15  14   WBC 7.36    8.13    Hemoglobin 15.0    14.7    Hematocrit 45.2    44.1    Platelets 265    299    Total Cholesterol   150      Triglycerides   69      HDL Cholesterol   64 (A)      LDL Cholesterol    72      Hemoglobin A1C  4.94       (A) Abnormal value            Lab Results   Component Value Date    SARSANTIGEN Not Detected 02/15/2022    COVID19 Detected (C) 01/21/2022    RAPFLUA Negative 02/15/2022    RAPFLUB Negative 02/15/2022    FLUAAG Not Detected 12/22/2021    FLUBAG Not Detected 12/22/2021    INR 1.1 02/05/2019         Last Urine Toxicity     LAST URINE TOXICITY RESULTS Latest Ref Rng & Units 3/11/2022    AMPHETAMINES SCREEN, URINE Negative Negative    BARBITURATES SCREEN Negative Negative    BENZODIAZEPINE SCREEN, URINE Negative Negative    BUPRENORPHINEUR Negative Negative    COCAINE SCREEN, URINE Negative Negative    METHADONE SCREEN, URINE Negative Negative    METHAMPHETAMINEUR Negative Negative            Assessment and Plan    Diagnoses and all orders for this visit:    1. Seasonal allergies (Primary)  -     montelukast (Singulair) 10 MG tablet; Take 1 tablet by mouth Every Night.  Dispense: 90 tablet; Refill: 1    2. YENNY (generalized anxiety disorder)  Comments:  cont f/u with psych. off abilify at this time due to weight gain.     3. Thrush  -     fluconazole (Diflucan) 150 MG tablet; Take 1 tablet by mouth Daily.  Dispense: 30 tablet; Refill: 0    4. Binge eating disorder  -     lisdexamfetamine (Vyvanse) 30 MG capsule; Take 1 capsule by mouth Every Morning  Dispense: 30 capsule; Refill: 0    5. Encounter for long-term (current) use of other medications  -     POC Urine Drug Screen [facility]    6. Weight gain  Comments:  currenlty on phendimetrazine to help. may have benefit with vyvanse once off phen product.     7. Psoriasis  Comments:  on humira. monitoring. flare-up during covid when stopped humira.     8. Seizure-like activity  ([facility])  Comments:  f/u with neuro. evaluating for absence seizures.           Medications Discontinued During This Encounter   Medication Reason   • ARIPiprazole (Abilify) 2 MG tablet Alternate therapy   • topiramate (TOPAMAX) 200 MG tablet *Therapy completed   • COVID-19 Test kit *Therapy completed   • norethindrone (AYGESTIN) 5 MG tablet Alternate therapy          Follow Up   Return in about 3 months (around 6/11/2022) for Recheck.  Patient was given instructions and counseling regarding her condition or for health maintenance advice. Please see specific information pulled into the AVS if appropriate.       Ant Carlos Jr, MD  03/11/22  12:40 EST

## 2022-03-16 DIAGNOSIS — F41.1 GENERALIZED ANXIETY DISORDER: ICD-10-CM

## 2022-03-16 DIAGNOSIS — F33.1 MAJOR DEPRESSIVE DISORDER, RECURRENT EPISODE, MODERATE: ICD-10-CM

## 2022-03-16 RX ORDER — BUSPIRONE HYDROCHLORIDE 10 MG/1
TABLET ORAL
Qty: 60 TABLET | Refills: 2 | Status: SHIPPED | OUTPATIENT
Start: 2022-03-16 | End: 2022-06-07 | Stop reason: SDUPTHER

## 2022-03-18 RX ORDER — DEXAMETHASONE 4 MG/1
TABLET ORAL
Qty: 12 G | Refills: 0 | Status: SHIPPED | OUTPATIENT
Start: 2022-03-18 | End: 2022-04-18

## 2022-03-18 NOTE — TELEPHONE ENCOUNTER
Asthma Maintenance Inhalers - Inhaled Steroids Protocol Failed 03/18/2022 09:40 AM    Active short-acting beta agonist inhaler prescription    No active pregnancy on record    No positive pregnancy test in past 12 months    Visit with authorzing provider in past 12 months or upcoming 30 days

## 2022-03-29 ENCOUNTER — TELEPHONE (OUTPATIENT)
Dept: INTERNAL MEDICINE | Facility: CLINIC | Age: 42
End: 2022-03-29

## 2022-03-30 ENCOUNTER — TELEPHONE (OUTPATIENT)
Dept: INTERNAL MEDICINE | Facility: CLINIC | Age: 42
End: 2022-03-30

## 2022-04-01 NOTE — TELEPHONE ENCOUNTER
CARE COORDINATION - SCAN - BARIATRIC SURGERY RELEASE FORMS (03/30/2022)    PRINTED AND PLACED IN MA FOLDER FOR REVIEW

## 2022-04-01 NOTE — TELEPHONE ENCOUNTER
For controlled substances, we have to see the patient at least every 3 months. That is a state regulation no matter where she gets her medication filled.     Please place bariatric surgery paperwork in my sign folder to be completed

## 2022-04-04 NOTE — TELEPHONE ENCOUNTER
GOT THE PAPER.   CALLED PATIENT AND SHE CONFIRMED.   KURT GOT HER SCHEDULED FOR HER FIRST MONTH OF CONSOLATION  4/21/2022    PAPER IS SCANNED INTO CHART

## 2022-04-04 NOTE — TELEPHONE ENCOUNTER
CONTACTED PATIENT THIS MORNING ABOUT THE CONSOLATION FOR WEIGHT LOSS SURGERY. SHE HAS AN APPT SCHEDULED FOR 4/21/2022.   PATIENT COULD DISCUSS AT THE VISIT.

## 2022-04-11 ENCOUNTER — TELEMEDICINE (OUTPATIENT)
Dept: FAMILY MEDICINE CLINIC | Facility: TELEHEALTH | Age: 42
End: 2022-04-11

## 2022-04-11 DIAGNOSIS — B34.9 VIRAL ILLNESS: Primary | ICD-10-CM

## 2022-04-11 PROCEDURE — 99213 OFFICE O/P EST LOW 20 MIN: CPT | Performed by: NURSE PRACTITIONER

## 2022-04-11 RX ORDER — BALOXAVIR MARBOXIL 80 MG/1
1 TABLET, FILM COATED ORAL ONCE
Qty: 1 EACH | Refills: 0 | Status: SHIPPED | OUTPATIENT
Start: 2022-04-11 | End: 2022-04-11

## 2022-04-11 RX ORDER — METHYLPREDNISOLONE 4 MG/1
TABLET ORAL
Qty: 1 EACH | Refills: 0 | Status: SHIPPED | OUTPATIENT
Start: 2022-04-11 | End: 2022-04-18

## 2022-04-11 RX ORDER — ONDANSETRON 4 MG/1
4 TABLET, ORALLY DISINTEGRATING ORAL EVERY 8 HOURS PRN
Qty: 12 TABLET | Refills: 0 | Status: SHIPPED | OUTPATIENT
Start: 2022-04-11 | End: 2022-06-07

## 2022-04-11 NOTE — PATIENT INSTRUCTIONS
Push fluids, rest  Tylenol/ibuprofen for pain or fever>101  Saline nasal spray/irrigation, humidified air for sinus symptoms  Flonase, mucinex with a full glass of water for congestion  Do not suppress your cough unless it prevents you from resting  Follow up if symptoms worsen or do not improve in 1 week.  May return to work once symptoms are improved and no fever for at least 24 hours without taking tylenol/motrin to reduce fever.

## 2022-04-11 NOTE — PROGRESS NOTES
Subjective   Malinda Saucedo is a 41 y.o. female.     She started feeling bad four days ago, with coughing, sinus drainage, fatigue, headaches, chest pain, loss of appetite. She has had a low grade fever (tmax 101). She started taking leftover amoxil yesterday. Her son is sick with similar symptoms. She has used mucinex, nyquil, and dayquil with mild relief. She had covid at the end of January. She is vaccinated against the flu.        The following portions of the patient's history were reviewed and updated as appropriate: allergies, current medications, past family history, past medical history, past social history, past surgical history, and problem list.    Review of Systems   Constitutional: Positive for appetite change and fever.   HENT: Positive for postnasal drip and voice change. Negative for ear pain (feels like there is fluid), sinus pressure and sore throat.    Respiratory: Positive for cough and chest tightness. Negative for shortness of breath.    Gastrointestinal: Positive for nausea. Negative for diarrhea and vomiting.   Musculoskeletal: Positive for myalgias.   Neurological: Positive for headache.       Objective   Physical Exam  Constitutional:       General: She is not in acute distress.     Appearance: She is well-developed. She is obese. She is ill-appearing. She is not diaphoretic.   Pulmonary:      Effort: Pulmonary effort is normal.   Neurological:      Mental Status: She is alert and oriented to person, place, and time.   Psychiatric:         Behavior: Behavior normal.           Assessment/Plan   Diagnoses and all orders for this visit:    1. Viral illness (Primary)  -     ondansetron ODT (Zofran ODT) 4 MG disintegrating tablet; Place 1 tablet on the tongue Every 8 (Eight) Hours As Needed for Nausea or Vomiting.  Dispense: 12 tablet; Refill: 0  -     Baloxavir Marboxil,80 MG Dose, (Xofluza, 80 MG Dose,) 1 x 80 MG tablet therapy pack; Take 1 each by mouth 1 (One) Time for 1 dose.   Dispense: 1 each; Refill: 0  -     methylPREDNISolone (MEDROL) 4 MG dose pack; Take as directed on package instructions.  Dispense: 1 each; Refill: 0                 The use of a video visit has been reviewed with the patient and verbal informed consent has been obtained. Myself and Malinda Saucedo participated in this visit. The patient is located in [place], [place]. I am located in [place], [place]. Mychart and Zoom were utilized. I spent 15 minutes in the patient's chart for this visit.

## 2022-04-14 ENCOUNTER — CLINICAL SUPPORT (OUTPATIENT)
Dept: INTERNAL MEDICINE | Facility: CLINIC | Age: 42
End: 2022-04-14

## 2022-04-14 ENCOUNTER — TELEMEDICINE (OUTPATIENT)
Dept: INTERNAL MEDICINE | Facility: CLINIC | Age: 42
End: 2022-04-14

## 2022-04-14 DIAGNOSIS — J02.9 SORE THROAT: Primary | ICD-10-CM

## 2022-04-14 DIAGNOSIS — R05.9 COUGH: ICD-10-CM

## 2022-04-14 DIAGNOSIS — J02.9 SORE THROAT: ICD-10-CM

## 2022-04-14 DIAGNOSIS — J06.9 VIRAL URI WITH COUGH: Primary | ICD-10-CM

## 2022-04-14 DIAGNOSIS — R50.81 FEVER IN OTHER DISEASES: ICD-10-CM

## 2022-04-14 LAB
EXPIRATION DATE: NORMAL
FLUAV AG NPH QL: NEGATIVE
FLUBV AG NPH QL: NEGATIVE
INTERNAL CONTROL: NORMAL
Lab: NORMAL
S PYO AG THROAT QL: NEGATIVE
SARS-COV-2 AG UPPER RESP QL IA.RAPID: NOT DETECTED
SARS-COV-2 RNA PNL SPEC NAA+PROBE: NOT DETECTED

## 2022-04-14 PROCEDURE — 87804 INFLUENZA ASSAY W/OPTIC: CPT | Performed by: STUDENT IN AN ORGANIZED HEALTH CARE EDUCATION/TRAINING PROGRAM

## 2022-04-14 PROCEDURE — 99213 OFFICE O/P EST LOW 20 MIN: CPT | Performed by: STUDENT IN AN ORGANIZED HEALTH CARE EDUCATION/TRAINING PROGRAM

## 2022-04-14 PROCEDURE — 87081 CULTURE SCREEN ONLY: CPT | Performed by: STUDENT IN AN ORGANIZED HEALTH CARE EDUCATION/TRAINING PROGRAM

## 2022-04-14 PROCEDURE — 87880 STREP A ASSAY W/OPTIC: CPT | Performed by: STUDENT IN AN ORGANIZED HEALTH CARE EDUCATION/TRAINING PROGRAM

## 2022-04-14 PROCEDURE — 87426 SARSCOV CORONAVIRUS AG IA: CPT | Performed by: STUDENT IN AN ORGANIZED HEALTH CARE EDUCATION/TRAINING PROGRAM

## 2022-04-14 PROCEDURE — U0004 COV-19 TEST NON-CDC HGH THRU: HCPCS | Performed by: INTERNAL MEDICINE

## 2022-04-14 RX ORDER — BROMPHENIRAMINE MALEATE, PSEUDOEPHEDRINE HYDROCHLORIDE, AND DEXTROMETHORPHAN HYDROBROMIDE 2; 30; 10 MG/5ML; MG/5ML; MG/5ML
10 SYRUP ORAL 4 TIMES DAILY PRN
Qty: 473 ML | Refills: 0 | Status: SHIPPED | OUTPATIENT
Start: 2022-04-14 | End: 2022-04-21

## 2022-04-14 RX ORDER — AZITHROMYCIN 500 MG/1
500 TABLET, FILM COATED ORAL DAILY
Qty: 3 TABLET | Refills: 0 | Status: SHIPPED | OUTPATIENT
Start: 2022-04-14 | End: 2022-04-18

## 2022-04-14 NOTE — PROGRESS NOTES
Chief Complaint  Fever, cough, sore throat    Subjective          Malinda Saucedo presents to [facility]  History of Present Illness    Patient presents during the COVID-19 pandemic/federally declared state of public health emergency.   This service was conducted via [employer]. Patient's location: home.  Physician's location is at clinic (Internal Medicine/Pediatrics clinic at [address] [place] [address] [address]).    Duration of appointment: 15 minutes  Consent in place: Yes    Here with complaints of fever, body aches/headaches, cough/congestion,. Sore throat, nausea, decreased PO.  Seen by telemedicine urgent care 4/11/22 with day 3-4 of symptoms, and treated empirically with xofluza.  Tmax during this time was 101F, but now low grade to 99F.  Didn't take steroids prescribed, but has been taking flu medicine.  Feels body aches and headaches are improved.  Still with sore throat.  Tolerating PO, though eating less.  Wearing cpap less at night due to night time cough/congestion.  Using chloraseptic spray for throat pain.    Current Outpatient Medications   Medication Instructions   • aspirin 81 mg, Oral, Daily   • Baclofen 5 MG tablet 1 tablet, Oral, 3 Times Daily   • brompheniramine-pseudoephedrine-DM 30-2-10 MG/5ML syrup 10 mL, Oral, 4 Times Daily PRN   • buPROPion XL (WELLBUTRIN XL) 300 MG 24 hr tablet No dose, route, or frequency recorded.   • busPIRone (BUSPAR) 10 MG tablet TAKE 1 TABLET BY MOUTH TWICE DAILY   • cetirizine (ZYRTEC) 10 mg, Oral, Daily   • clobetasol (TEMOVATE) 0.05 % external solution clobetasol 0.05 % scalp solution   • DULoxetine (CYMBALTA) 30 MG capsule TAKE 1 CAPSULE BY MOUTH DAILY(WITH 60MG= 90MG/DAY)   • DULoxetine (CYMBALTA) 60 mg, Oral, Daily, In addition to the Duloxetine 30mg   • EPINEPHrine (EPIPEN) 0.3 MG/0.3ML solution auto-injector injection epinephrine 0.3 mg/0.3 mL injection, auto-injector   • Flovent  MCG/ACT inhaler INHALE 1  PUFF BY MOUTH TWICE DAILY   • fluconazole (DIFLUCAN) 150 mg, Oral, Daily   • gabapentin (NEURONTIN) 600 MG tablet TAKE 1 TABLET BY MOUTH EVERY 8 HOURS AS DIRECTED   • Humira Pen 40 MG/0.4ML Pen-injector Kit No dose, route, or frequency recorded.   • ibuprofen (ADVIL,MOTRIN) 800 MG tablet No dose, route, or frequency recorded.   • lisdexamfetamine (VYVANSE) 30 mg, Oral, Every Morning   • methylPREDNISolone (MEDROL) 4 MG dose pack Take as directed on package instructions.   • montelukast (SINGULAIR) 10 mg, Oral, Nightly   • ondansetron ODT (ZOFRAN ODT) 4 mg, Translingual, Every 8 Hours PRN   • Phendimetrazine Tartrate 35 MG tablet Oral, Daily       The following portions of the patient's history were reviewed and updated as appropriate: allergies, current medications, past family history, past medical history, past social history, past surgical history, and problem list.    Objective   Vital Signs:   There were no vitals taken for this visit.    Wt Readings from Last 3 Encounters:   03/11/22 120 kg (265 lb 6.4 oz)   03/04/22 118 kg (260 lb)   02/15/22 118 kg (260 lb 6.4 oz)     BP Readings from Last 3 Encounters:   03/11/22 122/78   03/04/22 131/83   02/15/22 140/88     Physical Exam   Appearance: No acute distress, well-nourished  Head: normocephalic, atraumatic  Eyes: no scleral icterus, no conjunctival injection  Ears, Nose, and Throat: external ears normal  Respiratory: breathing comfortably  Neuro: alert and oriented  Psych: normal mood and affect     Result Review :   The following data was reviewed by: Clifton Harding MD on 04/14/2022:  Common labs    Common Labsle 12/28/21 12/28/21 12/28/21 12/28/21 2/8/22 2/8/22    1147 1147 1147 1147 1645 1645   Glucose    95  75   BUN    10  8   Creatinine    1.12 (A)  1.04 (A)   eGFR African Am    65  71   Sodium    141  139   Potassium    4.4  4.1   Chloride    111 (A)  108 (A)   Calcium    9.5  8.9   Albumin    4.50  4.00   Total Bilirubin    0.3  0.2   Alkaline  Phosphatase    98  90   AST (SGOT)    15  13   ALT (SGPT)    15  14   WBC 7.36    8.13    Hemoglobin 15.0    14.7    Hematocrit 45.2    44.1    Platelets 265    299    Total Cholesterol   150      Triglycerides   69      HDL Cholesterol   64 (A)      LDL Cholesterol    72      Hemoglobin A1C  4.94       (A) Abnormal value                   Lab Results   Component Value Date    SARSANTIGEN Not Detected 02/15/2022    COVID19 Detected (C) 01/21/2022    RAPFLUA Negative 02/15/2022    RAPFLUB Negative 02/15/2022    FLUAAG Not Detected 12/22/2021    FLUBAG Not Detected 12/22/2021    INR 1.1 02/05/2019       Procedures        Assessment and Plan    Diagnoses and all orders for this visit:    1. Viral URI with cough (Primary)    2. Sore throat    3. Cough  -     brompheniramine-pseudoephedrine-DM 30-2-10 MG/5ML syrup; Take 10 mL by mouth 4 (Four) Times a Day As Needed for Congestion, Cough or Allergies.  Dispense: 473 mL; Refill: 0    4. Fever in other diseases        -will bring in for MA visit later today for rapid strep, throat culture, and flu/covid testing  -I have sent bromfed empirically  -using chloraseptic spray for her throat    There are no discontinued medications.       Follow Up   Return if symptoms worsen or fail to improve.  Patient was given instructions and counseling regarding her condition or for health maintenance advice. Please see specific information pulled into the AVS if appropriate.       Clifton Harding MD  04/14/22  08:52 EDT

## 2022-04-16 LAB — BACTERIA SPEC AEROBE CULT: NORMAL

## 2022-04-18 ENCOUNTER — TELEMEDICINE (OUTPATIENT)
Dept: PSYCHIATRY | Facility: CLINIC | Age: 42
End: 2022-04-18

## 2022-04-18 DIAGNOSIS — F33.1 MAJOR DEPRESSIVE DISORDER, RECURRENT EPISODE, MODERATE: ICD-10-CM

## 2022-04-18 DIAGNOSIS — F41.1 GENERALIZED ANXIETY DISORDER: ICD-10-CM

## 2022-04-18 DIAGNOSIS — F33.1 MAJOR DEPRESSIVE DISORDER, RECURRENT EPISODE, MODERATE: Primary | ICD-10-CM

## 2022-04-18 DIAGNOSIS — F51.05 INSOMNIA DUE TO MENTAL CONDITION: ICD-10-CM

## 2022-04-18 PROBLEM — Z86.718 HISTORY OF THROMBOEMBOLISM OF VEIN: Status: ACTIVE | Noted: 2022-04-18

## 2022-04-18 PROBLEM — F32.9 MAJOR DEPRESSIVE DISORDER, SINGLE EPISODE, UNSPECIFIED: Status: ACTIVE | Noted: 2022-04-18

## 2022-04-18 PROCEDURE — 99214 OFFICE O/P EST MOD 30 MIN: CPT | Performed by: STUDENT IN AN ORGANIZED HEALTH CARE EDUCATION/TRAINING PROGRAM

## 2022-04-18 RX ORDER — PHENTERMINE HYDROCHLORIDE 37.5 MG/1
37.5 TABLET ORAL EVERY MORNING
COMMUNITY
Start: 2022-03-11 | End: 2022-04-18

## 2022-04-18 RX ORDER — ARIPIPRAZOLE 2 MG/1
4 TABLET ORAL DAILY
COMMUNITY
Start: 2022-04-07 | End: 2022-04-18

## 2022-04-18 RX ORDER — BALOXAVIR MARBOXIL 80 MG/1
1 TABLET, FILM COATED ORAL ONCE
COMMUNITY
Start: 2022-04-11 | End: 2022-04-18

## 2022-04-18 RX ORDER — DULOXETIN HYDROCHLORIDE 60 MG/1
CAPSULE, DELAYED RELEASE ORAL
Qty: 30 CAPSULE | Refills: 2 | OUTPATIENT
Start: 2022-04-18

## 2022-04-18 RX ORDER — DULOXETIN HYDROCHLORIDE 30 MG/1
30 CAPSULE, DELAYED RELEASE ORAL DAILY
Qty: 90 CAPSULE | Refills: 0 | Status: SHIPPED | OUTPATIENT
Start: 2022-05-09 | End: 2022-06-07 | Stop reason: SDUPTHER

## 2022-04-18 RX ORDER — BUPROPION HYDROCHLORIDE 300 MG/1
300 TABLET ORAL EVERY MORNING
Qty: 90 TABLET | Refills: 0 | Status: SHIPPED | OUTPATIENT
Start: 2022-04-18 | End: 2022-06-07 | Stop reason: SDUPTHER

## 2022-04-18 NOTE — PATIENT INSTRUCTIONS
1.  Please return to clinic at your next scheduled visit.  Contact the clinic ([phone]) at least 24 hours prior in the event you need to cancel.  2.  Do no harm to yourself or others.    3.  Avoid alcohol and drugs.    4.  Take all medications as prescribed.  Please contact the clinic with any concerns. If you are in need of medication refills, please call the clinic at [phone].    5. Should you want to get in touch with your provider, Dr. Brooks Lynn, please utilize [employer] or contact the office ([phone]), and staff will be able to page Dr. Lynn directly.  6.  In the event you have personal crisis, contact the following crisis numbers: Suicide Prevention Hotline 1-816.415.4786; [facility] Helpline 3-436-834-MOEY; [facility] Emergency Room [phone]; text HELLO to 105087; or 646.     SPECIFIC RECOMMENDATIONS:     1.      Medications discussed at this encounter:                   -No changes     2.      Psychotherapy recommendations:      3.     Return to clinic: 6 weeks

## 2022-04-18 NOTE — PROGRESS NOTES
"Subjective   Malinda Saucedo is a 41 y.o. female who presents today for initial evaluation     Referring Provider:  No referring provider defined for this encounter.    Chief Complaint:  mdd vivi    History of Present Illness:     Chart review 9/29: Seen September 10 to establish care.  Previously was at [facility] office.  Labs are consistent with Sjogren's.  Psoriasis and arthritis are under control.  On Topamax for migraines.  On Lunesta for insomnia.  History of anxiety and depression.  On gabapentin 600 mg 3 times daily, Strattera 100 mg daily, BuSpar 10 mg, duloxetine 60 mg, bupropion 300 mg.  Denied anxiety in January 2019.  Has been on Prozac in the past.  BMP demonstrates creatinine of 1.68, alk phos of 116, otherwise unremarkable.  hCG was followed in 2019.  CBC unremarkable, no head imaging or EKG.    \"Malinda\"    4/18: Virtual visit via Zoom audio and video due to the COVID-19 pandemic.  Patient is accepting of and agreeable to visit.  The visit consisted of the patient and I. The patient is at home, and I am at the office.  Interview:  1. Chart review: Seen by primary care this month for sore throat.  February labs show decreased CO2 20.3, elevated chloride 108, slightly elevated creatinine 1.04, otherwise CMP and CBC are reassuring.  2. \"I'm good.\"  a. \"I've been great!\"  b. Mood is good. Some anxiety, but manageable. No depression.  c. Lost some weight. Achieving her goals and that is definitely helping with mood.  d. Doesn't want to add any meds, change meds. Doesn't want to remove anything either because she is doing well.  e. I notified her that I filled out her form for gastric surgery.  f. Driving during interview  3. Energy: kindof down, \"could be better\"  4. Concentration:  5. Sleeping: \"ok\" still tossing and turning a lot. CPAP helps.  6. Eating: stable  7. Refills:  8. Substances:  9. Therapy:  10. Medication compliant: y  11. No SI HI AVH.      3/7: Virtual visit via Zoom audio " "and video due to the COVID-19 pandemic.  Patient is accepting of and agreeable to visit.  The visit consisted of the patient and I. The patient is at home, and I am at the office.  Interview:  12. Chart review: Patient is concerned about her weight.  Labs from February show elevated creatinine 1.04, reassuring LFTs, low CO2 20.3, elevated chloride 108, reassuring CBC.  13. \" I am gaining weight.\"  a. Patient reports she is gained about 5 pounds in the last few weeks.  It is not clear per the system how much weight she has actually gained.  b. Denies hallucinations, anxiety and depression are very well controlled at this time.  c. Having some trouble with her  since around November, but they have had this happen before and \"he is not going anywhere.\"  14. Medication compliant: Yes  15. No SI HI AVH.      2/17: Virtual visit via Zoom audio and video due to the COVID-19 pandemic.  Patient is accepting of and agreeable to visit.  The visit consisted of the patient and I. The patient is at home, and I am at the office.  Interview:  16. Chart review: Seen by primary care February 15.  For cough.  COVID negative.  17. \"I never went up on the two of abilify.\"  a. \"Same symptoms.\" Still hears voices, but not as often. Also,  \"seeing things isn't as bad.\" Hears people calling her name. Random music.  b. Now has a sinus infection.  c. \"There are at least 3 family members who have schizophrenia.\"  d. Fearful that she may have schizophrenia; fearful that she may deteriorate like her uncle and cousin.  18. Sleeping: not well, waiting on equipment for GISELA.  19. Substances: stopped using CBD gummies first week of January 20. Therapy: n  21. Medication compliant: m  22. No SI HI AVH.      1/11: Virtual visit via Zoom audio and video due to the COVID-19 pandemic.  Patient is accepting of and agreeable to visit.  The visit consisted of the patient and I.  Interview:  23. Chart review: Seen in the emergency room December 31 for " "left arm numbness pain and swelling after having an IV placed to her ACE 2 days ago.  Patient saw primary care December 28 and wanted to be referred to neurology after her sleep study showed abnormal brain waves.  Labs from December 28 show low CO2 21.4, elevated chloride 111, elevated creatinine 1.12, A1c.  Thyroid studies, lipid profile, CBC are unremarkable.  No DVT found.  24. \"Good actually. The abilify helped a lot.\"  a. Sees things out of the corner of her eye, never directly.  b. Still hears voices stating her name.  25. Depression/Mood: stable  26. Anxiety: stable  27. Refills: n  28. Sleeping: initial insomnia. GISELA confirmed by sleep study.  29. Eating: stable  30. Substances: n  31. Therapy: n  32. Medication compliant: y  33. No SI HI AVH.      12/14: Virtual visit via Zoom audio and video due to the COVID-19 pandemic.  Patient is accepting of and agreeable to visit.  The visit consisted of the patient and I.  Interview:  34. Chart review: Seen for thrush by PCP 12/8, seen 11/11 for cough, sore throat (COVID neg). Referred to sleep medicine.  35. \"I'm ok.\"  a. Things have \"been weird.\"  b. I was having auditory and visual hallucinations when \"I first started seeing you.\"  i. Her son or  saying her name.  ii. Sees \"scary shadows\" out of the corner of her eyes, especially at night.  iii. \"Afraid I have bipolar disorder.\"  1. \"Started a practice out of nowhere.\"  2. \"I have spending sprees.\" $2,000 at a time. In the middle of one right now. Bought a bedset, tables, talked  into getting a new TV. Now in a new house.  c. Uncle has dx of schizophrenia, sister dx'd with bipolar d/o.  d. Also found out recently on the Sjogren's/Lupus spectrum.  e. Also has sleep study scheduled.  f. Will be seeing a neurologist because \"I'm losing time, like 5 minutes.\"  g. Willing to reduce the number of medications she is on and start a mood stabilizer.  h. \"Scared of these. Scared it will get worse.\"  i. Stopped " "buspar 2 weeks ago and is more irritable.  36. Depression/Mood:  1. Depressed mood: y  2. Seasonal pattern: denies  3. Severity: moderate  4. Anhedonia: mild, but enjoys spending time with son, shopping  5. Guilt or hopelessness:  6. Energy: \"horrible\"  7. Concentration: poor  8. Weight loss or weight gain: gain, 2 lbs since 2 weeks  9. Psychomotor retardation or agitation: def  10. Insomnia:  a. Topamax makes her sleepy, bed at 11, no initial insomnia, but wakes at 2 am, eats, sleeps in an hour, wakes at 6:30 am.  11. Duration: months  37. Anxiety:  1. Uncontrolled worrying: y  2. Severity: moderate  3. Muscle tension: y  4. Fatigue: y  5. Concentration: poor  6. Restlessness/feeling on edge: y  7. Irritability: y  8. Insomnia: maintenance insomnia  9. Duration: months  10. Panic attacks: def  38. Refills: none  39. Sleeping: above, sleep study set up  40. Eating: above  41. Substances: CBD gummy to sleep  42. Therapy: declines  43. Medication compliant: y  44. No SI HI AVH.      11/10: Virtual visit via Zoom audio and video due to the COVID-19 pandemic.  Patient is accepting of and agreeable to visit.  The visit consisted of the patient and I.  Interview:  45. Chart review: No new developments.  46. \"I'm ok.\"  a. More emotional; horribly.  b. Mostly down.  c. Pharmacy has not filled her 30 mg cap of duloxetine in weeks; unsure why.  d. Also feels sick too; congested today  47. Depression/Mood: depressed mood  12. Guilt or hopelessness: denies  13. Energy: poor  14. Concentration: poor  48. Anxiety: not bad  49. Sleeping:  a. Initial insomnia  b. Maintenance insomnia  50. Eating: stable  51. Substances:  52. Therapy: denies  53. Medication compliant: no, inadvertently  54. No SI HI AVH.      10/13: Virtual visit via Zoom audio and video due to the COVID-19 pandemic.  Patient is accepting of and agreeable to visit.  The visit consisted of the patient and I.  Interview:  55. Chart review: No new " "developments.  56. \"I've seen some small changes, but not, like, horrible.\"  a. Attention drifts a little more, in the mornings.  57. Depression/Mood: \"increasing the duloxetine has helped.\"  a. Usually feels really sad before her cycle, but doesn't this time  58. Anxiety:  a. Not as irritable  59. Sleeping: yes  60. Eating: stable  61. Substances: denies  62. Therapy: denies  63. Medication compliant: y  64. No SI HI AVH      9/29: Virtual visit via Zoom audio and video due to the COVID-19 pandemic.  Patient is accepting of and agreeable to visit.  The visit consisted of the patient and I.  Interview:  65. Her story: \"Well, it started in [employer].\"  a. Originally PMDD (2009) for years  b. Got out 2011  c. Then dx'd with depression and YENNY  d. Has been on medications since  i. Was on prozac from 2009 until 2015, stopped due to pregnancy  ii. 2018, started wellbutrin only. Which was horrible by itself. They added buspar 10 mg bid, with it. Then duloxetine 60 mg daily added. Better combination.  iii. Now on the above, and also on strattera 100 mg daily for ADHD. Also on gabapentin 600 tid.  e. Stimulants put her to sleep: adderalllachelleyvanse.  Did not try extended release formulations.  66. Mood: much better than it was in the past, but still could use some help.  67. Stressors:  a. Niece started having seizures at 21 and lost her memory, doesn't remember anyone or anybody.  b. In the middle of buying a house  c. Finances  d. Starting my own practice.  e. Son having school problems; hanging with \"bad kids.\"  68. Anxiety: manageable.  a. Worrying a lot  69. Coping: goes to [employer], trusts in God  70. Sleeping: yes  71. Eating: stable  72. Medication compliant: yes  73. Psych ROS: D, A.  Negative for psychosis.  Unclear gorge.  74. No SI HI AVH    Depression:  75. Anhedonia: denies  76. Guilt or hopelessness:   a. Feelings of guilt about how she could have done more for her nieces and nephews  77. Energy: " "horrible  78. Concentration: \"I have to force myself to focus.\"  a. If stops to eat, it ends her day  79. Weight loss or weight gain: stable over last few months, on weight loss pills, however  80. Psychomotor retardation or agitation: frequently  81. Insomnia: yes, on the lunesta  82. Duration: for years      Access to Firearms: yes, locked away    PHQ-9 Depression Screening  PHQ-9 Total Score:      Little interest or pleasure in doing things?     Feeling down, depressed, or hopeless?     Trouble falling or staying asleep, or sleeping too much?     Feeling tired or having little energy?     Poor appetite or overeating?     Feeling bad about yourself - or that you are a failure or have let yourself or your family down?     Trouble concentrating on things, such as reading the newspaper or watching television?     Moving or speaking so slowly that other people could have noticed? Or the opposite - being so fidgety or restless that you have been moving around a lot more than usual?     Thoughts that you would be better off dead, or of hurting yourself in some way?     PHQ-9 Total Score       YENNY-7  Feeling nervous, anxious or on edge: Nearly every day  Not being able to stop or control worrying: Nearly every day  Worrying too much about different things: Nearly every day  Trouble Relaxing: Not at all  Being so restless that it is hard to sit still: Not at all  Feeling afraid as if something awful might happen: Nearly every day  Becoming easily annoyed or irritable: Nearly every day  YENNY 7 Total Score: 15  If you checked any problems, how difficult have these problems made it for you to do your work, take care of things at home, or get along with other people: Somewhat difficult    Past Surgical History:  Past Surgical History:   Procedure Laterality Date   •  SECTION     • CYSTECTOMY     • ENDOMETRIAL ABLATION  , ,    • EYE SURGERY     • MYOMECTOMY         Problem List:  Patient Active Problem List "   Diagnosis   • YENNY (generalized anxiety disorder)   • PTSD (post-traumatic stress disorder)   • Attention deficit hyperactivity disorder   • Hidradenitis suppurativa   • Intramural and subserous leiomyoma of uterus   • Lumbosacral spondylosis without myelopathy   • Endometriosis determined by laparoscopy   • PMDD (premenstrual dysphoric disorder)   • Psoriasis   • Thrombophilia ([facility])   • Seasonal allergies   • Major depressive disorder in partial remission ([facility])   • Body mass index (BMI) 40.0-44.9, adult ([facility])   • Anaphylactic reaction to bee sting   • Insomnia, unspecified   • Low back pain   • Major depressive disorder, recurrent, moderate ([facility])   • History of thromboembolism of vein   • Major depressive disorder, single episode, unspecified       Allergy:   Allergies   Allergen Reactions   • Methotrexate Rash        Discontinued Medications:  Medications Discontinued During This Encounter   Medication Reason   • ARIPiprazole (ABILIFY) 2 MG tablet *Therapy completed   • azithromycin (Zithromax) 500 MG tablet *Therapy completed   • Baclofen 5 MG tablet *Therapy completed   • Flovent  MCG/ACT inhaler *Therapy completed   • methylPREDNISolone (MEDROL) 4 MG dose pack *Therapy completed   • Phendimetrazine Tartrate 35 MG tablet *Therapy completed   • phentermine (ADIPEX-P) 37.5 MG tablet *Therapy completed   • Xofluza, 80 MG Dose, 1 x 80 MG tablet therapy pack *Therapy completed   • buPROPion XL (WELLBUTRIN XL) 300 MG 24 hr tablet Reorder   • DULoxetine (CYMBALTA) 30 MG capsule Reorder       Current Medications:   Current Outpatient Medications   Medication Sig Dispense Refill   • aspirin (aspirin) 81 MG EC tablet Take 1 tablet by mouth Daily. 90 tablet 3   • brompheniramine-pseudoephedrine-DM 30-2-10 MG/5ML syrup Take 10 mL by mouth 4 (Four) Times a Day As Needed for Congestion, Cough or Allergies. 473 mL 0   • buPROPion XL (WELLBUTRIN XL) 300 MG 24 hr tablet Take 1 tablet by mouth Every Morning. 90 tablet 0    • busPIRone (BUSPAR) 10 MG tablet TAKE 1 TABLET BY MOUTH TWICE DAILY 60 tablet 2   • cetirizine (zyrTEC) 10 MG tablet Take 1 tablet by mouth Daily. 90 tablet 3   • clobetasol (TEMOVATE) 0.05 % external solution clobetasol 0.05 % scalp solution     • [START ON 5/9/2022] DULoxetine (CYMBALTA) 30 MG capsule Take 1 capsule by mouth Daily. 90 capsule 0   • DULoxetine (CYMBALTA) 60 MG capsule Take 1 capsule by mouth Daily. In addition to the Duloxetine 30mg 30 capsule 2   • EPINEPHrine (EPIPEN) 0.3 MG/0.3ML solution auto-injector injection epinephrine 0.3 mg/0.3 mL injection, auto-injector     • fluconazole (Diflucan) 150 MG tablet Take 1 tablet by mouth Daily. 30 tablet 0   • gabapentin (NEURONTIN) 600 MG tablet TAKE 1 TABLET BY MOUTH EVERY 8 HOURS AS DIRECTED     • Humira Pen 40 MG/0.4ML Pen-injector Kit      • ibuprofen (ADVIL,MOTRIN) 800 MG tablet      • lisdexamfetamine (Vyvanse) 30 MG capsule Take 1 capsule by mouth Every Morning 30 capsule 0   • montelukast (Singulair) 10 MG tablet Take 1 tablet by mouth Every Night. 90 tablet 1   • norethindrone (AYGESTIN) 5 MG tablet      • ondansetron ODT (Zofran ODT) 4 MG disintegrating tablet Place 1 tablet on the tongue Every 8 (Eight) Hours As Needed for Nausea or Vomiting. 12 tablet 0     No current facility-administered medications for this visit.       Past Medical History:  Past Medical History:   Diagnosis Date   • Allergic    • Anxiety    • Arthritis    • Chronic pain disorder    • Deep vein thrombosis ([facility])    • Depression    • Ectopic pregnancy without intrauterine pregnancy 1/25/2019    Formatting of this note might be different from the original. - Day 1 = 1/25/19 -> beta 4,927 MTX #1 given (110mg) - Day 4 = 1/28/19 -> beta 11,077 - Day 7 = 1/31/19 -> beta 11,923 MTX #2 given (110mg) - Day 11 = 2/5/19 -> beta 11,406 - Day 14 = 2/8/19 -> scheduled visit and beta - Day 19 =  2/13/19 -> beta 6,584 - Day 26 = 2/20/19 -> beta 3,111 - Day 34 = 3/1/19 -> beta 553 (seen  [facility]   • Endometriosis    • Fibroids    • Headache    • Hidradenitis    • Low back pain ?    DDD   • Obesity    • Panic disorder    • PTSD (post-traumatic stress disorder)        Past Psychiatric History:  Began Treatment:   Diagnoses: D, A, insomnia  Psychiatrist: Last was months ago for a couple times; previously NP for 2 years  Therapist: yes, therapy has not helped, two bad experiences  Admission History: denies  Medication Trials: see hpi  Self Harm: Denies  Suicide Attempts:Denies   Psychosis, Anxiety, Depression: denies    Substance Abuse History:   Types:Denies all, including illicit  Withdrawal Symptoms:Denies  Longest Period Sober:Not Applicable   AA: Not applicable     Social History:  Martial Status:  Employed: therapist, 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  Kids: one  House: apartment   History: army, honorable discharge, see hpi    Social History     Socioeconomic History   • Marital status:    Tobacco Use   • Smoking status: Never Smoker   • Smokeless tobacco: Never Used   Vaping Use   • Vaping Use: Never used   Substance and Sexual Activity   • Alcohol use: Not Currently   • Drug use: Never   • Sexual activity: Not Currently     Partners: Male     Birth control/protection: None       Family History:   Suicide Attempts:  1st cousin, maternal; another 1st cousin maternal  Suicide Completions: 1st cousin, maternal  Dx'd her sister herself that she has bipolar.    Family History   Problem Relation Age of Onset   • ADD / ADHD Mother    • OCD Mother    • Arthritis Mother    • Hyperlipidemia Mother    • Hypertension Mother    • Thyroid disease Mother    • Anxiety disorder Mother    • ADD / ADHD Sister    • Anxiety disorder Sister    • Bipolar disorder Sister    • Drug abuse Maternal Aunt    • Depression Maternal Aunt    • Alcohol abuse Maternal Uncle    • Drug abuse Maternal Uncle    • Schizophrenia Maternal Uncle    • Depression Maternal Grandmother    • Other  "Maternal Grandmother         Colon cancer   • Anxiety disorder Maternal Grandmother    • ADD / ADHD Cousin    • OCD Cousin    • Suicide Attempts Cousin    • Alcohol abuse Cousin    • Depression Cousin    • Seizures Niece    • Arthritis Sister    • Depression Sister    • Hyperlipidemia Sister    • Asthma Sister    • Hypertension Sister    • Miscarriages / Stillbirths Sister    • Mental illness Maternal Uncle    • Alcohol abuse Maternal Uncle        Developmental History:   Born: [place]  Siblings: 1 sister, older cousin who lived with them  Childhood: no abuse  High School:Completed  College: 4 yrs at [employer] 3 years degree in counseling    · Mental Status Exam  · Appearance  · : groomed, good eye contact, normal street clothes, in her car  · Behavior  · : pleasant and cooperative  · Motor  · : No abnormal  · Speech  · :normal rhythm, rate, volume, tone, not hyperverbal, not pressured, normal prosidy  · Mood  · : \"I am great\"  · Affect  · : euthymic, mood congruent, good variability  · Thought Content  · : negative suicidal ideations, negative homicidal ideations, negative obsessions  · Perceptions  · : negative auditory hallucinations, negative visual hallucinations  · Thought Process  · : linear  · Insight/Judgement  · : Fair/fair  · Cognition  · : grossly intact  · Attention   : intact    Review of Systems:  Review of Systems   Constitutional: Negative for diaphoresis and fatigue.   Eyes: Negative for visual disturbance.   Respiratory: Negative for cough and shortness of breath.    Cardiovascular: Negative for chest pain, palpitations and leg swelling.   Gastrointestinal: Negative for nausea and vomiting.   Endocrine: Negative for cold intolerance and heat intolerance.   Genitourinary: Negative for difficulty urinating.   Musculoskeletal: Positive for joint swelling.   Allergic/Immunologic: Positive for immunocompromised state.   Neurological: Positive for dizziness, weakness and light-headedness. " Negative for seizures, speech difficulty and numbness.         Physical Exam:  Physical Exam    Vital Signs:   There were no vitals taken for this visit.     Lab Results:   Clinical Support on 04/14/2022   Component Date Value Ref Range Status   • SARS Antigen 04/14/2022 Not Detected  Not Detected Final   • Internal Control 04/14/2022 Passed  Passed Final   • Lot Number 04/14/2022 [id number]   Final   • Expiration Date 04/14/2022 09/20/2023   Final   • Rapid Influenza A Ag 04/14/2022 Negative  Negative Final   • Rapid Influenza B Ag 04/14/2022 Negative  Negative Final   • Internal Control 04/14/2022 Passed  Passed Final   • Lot Number 04/14/2022 [id number]   Final   • Expiration Date 04/14/2022 07/01/2023   Final   • Rapid Strep A Screen 04/14/2022 Negative  Negative, VALID, INVALID, Not Performed Final   • Internal Control 04/14/2022 Passed  Passed Final   • Lot Number 04/14/2022 [id number]   Final   • Expiration Date 04/14/2022 08/02/2023   Final   • Throat Culture, Beta Strep 04/14/2022 No Beta Hemolytic Streptococcus Isolated   Final   • COVID19 04/14/2022 Not Detected  Not Detected - Ref. Range Final   Office Visit on 03/11/2022   Component Date Value Ref Range Status   • Amphetamine Screen, Urine 03/11/2022 Negative  Negative Final   • AMP INTERNAL CONTROL 03/11/2022 Passed  Passed Final   • Barbiturates Screen, Urine 03/11/2022 Negative  Negative Final   • BARBITURATE INTERNAL CONTROL 03/11/2022 Passed  Passed Final   • Buprenorphine, Screen, Urine 03/11/2022 Negative  Negative Final   • BUPRENORPHINE INTERNAL CONTROL 03/11/2022 Passed  Passed Final   • Benzodiazepine Screen, Urine 03/11/2022 Negative  Negative Final   • BENZODIAZEPINE INTERNAL CONTROL 03/11/2022 Passed  Passed Final   • Cocaine Screen, Urine 03/11/2022 Negative  Negative Final   • COCAINE INTERNAL CONTROL 03/11/2022 Passed  Passed Final   • MDMA (ECSTASY) 03/11/2022 Negative  Negative Final   • MDMA (ECSTASY) INTERNAL CONTROL 03/11/2022 Passed   Passed Final   • Methamphetamine, Ur 03/11/2022 Negative  Negative Final   • METHAMPHETAMINE INTERNAL CONTROL 03/11/2022 Passed  Passed Final   • Methadone Screen, Urine 03/11/2022 Negative  Negative Final   • METHADONE INTERNAL CONTROL 03/11/2022 Passed  Passed Final   • Opiate Screen 03/11/2022 Negative  Negative Final   • OPIATES INTERNAL CONTROL 03/11/2022 Passed  Passed Final   • Oxycodone Screen, Urine 03/11/2022 Negative  Negative Final   • OXYCODONE INTERNAL CONTROL 03/11/2022 Passed  Passed Final   • Phencyclidine (PCP), Urine 03/11/2022 Negative  Negative Final   • PHENCYCLIDINE INTERNAL CONTROL 03/11/2022 Passed  Passed Final   • THC, Screen, Urine 03/11/2022 Positive (A) Negative Final   • THC INTERNAL CONTROL 03/11/2022 Passed  Passed Final   • Lot Number 03/11/2022 [id number]   Final   • Expiration Date 03/11/2022 04/30/2022   Final   Office Visit on 02/15/2022   Component Date Value Ref Range Status   • Rapid Influenza A Ag 02/15/2022 Negative  Negative Final   • Rapid Influenza B Ag 02/15/2022 Negative  Negative Final   • Internal Control 02/15/2022 Passed  Passed Final   • Lot Number 02/15/2022 [id number]   Final   • Expiration Date 02/15/2022 05/23/2023   Final   • SARS Antigen 02/15/2022 Not Detected  Not Detected Final   • Internal Control 02/15/2022 Passed  Passed Final   • Lot Number 02/15/2022 [id number]   Final   • Expiration Date 02/15/2022 09/20/2023   Final   Lab on 02/08/2022   Component Date Value Ref Range Status   • Glucose 02/08/2022 75  65 - 99 mg/dL Final   • BUN 02/08/2022 8  6 - 20 mg/dL Final   • Creatinine 02/08/2022 1.04 (A) 0.57 - 1.00 mg/dL Final   • Sodium 02/08/2022 139  136 - 145 mmol/L Final   • Potassium 02/08/2022 4.1  3.5 - 5.2 mmol/L Final   • Chloride 02/08/2022 108 (A) 98 - 107 mmol/L Final   • CO2 02/08/2022 20.3 (A) 22.0 - 29.0 mmol/L Final   • Calcium 02/08/2022 8.9  8.6 - 10.5 mg/dL Final   • Total Protein 02/08/2022 6.7  6.0 - 8.5 g/dL Final   • Albumin 02/08/2022 4.00   3.50 - 5.20 g/dL Final   • ALT (SGPT) 02/08/2022 14  1 - 33 U/L Final   • AST (SGOT) 02/08/2022 13  1 - 32 U/L Final   • Alkaline Phosphatase 02/08/2022 90  39 - 117 U/L Final   • Total Bilirubin 02/08/2022 0.2  0.0 - 1.2 mg/dL Final   • eGFR   Amer 02/08/2022 71  >60 mL/min/1.73 Final   • Globulin 02/08/2022 2.7  gm/dL Final   • A/G Ratio 02/08/2022 1.5  g/dL Final   • BUN/Creatinine Ratio 02/08/2022 7.7  7.0 - 25.0 Final   • Anion Gap 02/08/2022 10.7  5.0 - 15.0 mmol/L Final   • WBC 02/08/2022 8.13  3.40 - 10.80 10*3/mm3 Final   • RBC 02/08/2022 4.77  3.77 - 5.28 10*6/mm3 Final   • Hemoglobin 02/08/2022 14.7  12.0 - 15.9 g/dL Final   • Hematocrit 02/08/2022 44.1  34.0 - 46.6 % Final   • MCV 02/08/2022 92.5  79.0 - 97.0 fL Final   • MCH 02/08/2022 30.8  26.6 - 33.0 pg Final   • MCHC 02/08/2022 33.3  31.5 - 35.7 g/dL Final   • RDW 02/08/2022 12.4  12.3 - 15.4 % Final   • RDW-SD 02/08/2022 42.4  37.0 - 54.0 fl Final   • MPV 02/08/2022 10.1  6.0 - 12.0 fL Final   • Platelets 02/08/2022 299  140 - 450 10*3/mm3 Final   • Neutrophil % 02/08/2022 61.1  42.7 - 76.0 % Final   • Lymphocyte % 02/08/2022 30.6  19.6 - 45.3 % Final   • Monocyte % 02/08/2022 5.2  5.0 - 12.0 % Final   • Eosinophil % 02/08/2022 2.3  0.3 - 6.2 % Final   • Basophil % 02/08/2022 0.6  0.0 - 1.5 % Final   • Immature Grans % 02/08/2022 0.2  0.0 - 0.5 % Final   • Neutrophils, Absolute 02/08/2022 4.96  1.70 - 7.00 10*3/mm3 Final   • Lymphocytes, Absolute 02/08/2022 2.49  0.70 - 3.10 10*3/mm3 Final   • Monocytes, Absolute 02/08/2022 0.42  0.10 - 0.90 10*3/mm3 Final   • Eosinophils, Absolute 02/08/2022 0.19  0.00 - 0.40 10*3/mm3 Final   • Basophils, Absolute 02/08/2022 0.05  0.00 - 0.20 10*3/mm3 Final   • Immature Grans, Absolute 02/08/2022 0.02  0.00 - 0.05 10*3/mm3 Final   • nRBC 02/08/2022 0.0  0.0 - 0.2 /100 WBC Final   Admission on 01/21/2022, Discharged on 01/21/2022   Component Date Value Ref Range Status   • COVID19 01/21/2022 Detected (A)  Not Detected - Ref. Range Final   Admission on 12/31/2021, Discharged on 01/01/2022   Component Date Value Ref Range Status   • Target HR (85%) 12/31/2021 153  bpm Final   • Max. Pred. HR (100%) 12/31/2021 180  bpm Final   • Right Internal Jugular Compress 12/31/2021 C   Final   • Right Internal Jugular Phasic 12/31/2021 N   Final   • Right Internal Jugular Spont 12/31/2021 Y   Final   • Left Internal Jugular Compress 12/31/2021 C   Final   • Left Internal Jugular Phasic 12/31/2021 Y   Final   • Left Internal Jugular Spont 12/31/2021 Y   Final   • Right Subclavian Compress 12/31/2021 C   Final   • Right Subclavian Phasic 12/31/2021 N   Final   • Right Subclavian Spont 12/31/2021 Y   Final   • Left Subclavian Augment 12/31/2021 Y   Final   • Left Subclavian Compress 12/31/2021 C   Final   • Left Subclavian Phasic 12/31/2021 Y   Final   • Left Subclavian Spont 12/31/2021 Y   Final   • Left Axillary Augment 12/31/2021 Y   Final   • Left Axillary Compress 12/31/2021 C   Final   • Left Axillary Phasic 12/31/2021 Y   Final   • Left Axillary Spont 12/31/2021 Y   Final   • Left Brachial Augment 12/31/2021 Y   Final   • Left Brachial Compress 12/31/2021 C   Final   • Left Radial Augment 12/31/2021 Y   Final   • Left Radial Compress 12/31/2021 C   Final   • Left Ulnar Augment 12/31/2021 Y   Final   • Left Ulnar Compress 12/31/2021 C   Final   • Left Basilic Upper Compress 12/31/2021 C   Final   • Left Basilic Forearm Compress 12/31/2021 C   Final   • Left Cephalic Upper Compress 12/31/2021 C   Final   • Left Cephalic Forearm Compress 12/31/2021 C   Final   Office Visit on 12/28/2021   Component Date Value Ref Range Status   • Total Cholesterol 12/28/2021 150  0 - 200 mg/dL Final   • Triglycerides 12/28/2021 69  0 - 150 mg/dL Final   • HDL Cholesterol 12/28/2021 64 (A) 40 - 60 mg/dL Final   • LDL Cholesterol  12/28/2021 72  0 - 100 mg/dL Final   • VLDL Cholesterol 12/28/2021 14  5 - 40 mg/dL Final   • LDL/HDL Ratio 12/28/2021  1.13   Final   • Glucose 12/28/2021 95  65 - 99 mg/dL Final   • BUN 12/28/2021 10  6 - 20 mg/dL Final   • Creatinine 12/28/2021 1.12 (A) 0.57 - 1.00 mg/dL Final   • Sodium 12/28/2021 141  136 - 145 mmol/L Final   • Potassium 12/28/2021 4.4  3.5 - 5.2 mmol/L Final   • Chloride 12/28/2021 111 (A) 98 - 107 mmol/L Final   • CO2 12/28/2021 21.4 (A) 22.0 - 29.0 mmol/L Final   • Calcium 12/28/2021 9.5  8.6 - 10.5 mg/dL Final   • Total Protein 12/28/2021 6.9  6.0 - 8.5 g/dL Final   • Albumin 12/28/2021 4.50  3.50 - 5.20 g/dL Final   • ALT (SGPT) 12/28/2021 15  1 - 33 U/L Final   • AST (SGOT) 12/28/2021 15  1 - 32 U/L Final   • Alkaline Phosphatase 12/28/2021 98  39 - 117 U/L Final   • Total Bilirubin 12/28/2021 0.3  0.0 - 1.2 mg/dL Final   • eGFR   Amer 12/28/2021 65  >60 mL/min/1.73 Final   • Globulin 12/28/2021 2.4  gm/dL Final   • A/G Ratio 12/28/2021 1.9  g/dL Final   • BUN/Creatinine Ratio 12/28/2021 8.9  7.0 - 25.0 Final   • Anion Gap 12/28/2021 8.6  5.0 - 15.0 mmol/L Final   • Hemoglobin A1C 12/28/2021 4.94  4.80 - 5.60 % Final   • Sed Rate 12/28/2021 20  0 - 20 mm/hr Final   • TSH 12/28/2021 1.100  0.270 - 4.200 uIU/mL Final   • WBC 12/28/2021 7.36  3.40 - 10.80 10*3/mm3 Final   • RBC 12/28/2021 4.91  3.77 - 5.28 10*6/mm3 Final   • Hemoglobin 12/28/2021 15.0  12.0 - 15.9 g/dL Final   • Hematocrit 12/28/2021 45.2  34.0 - 46.6 % Final   • MCV 12/28/2021 92.1  79.0 - 97.0 fL Final   • MCH 12/28/2021 30.5  26.6 - 33.0 pg Final   • MCHC 12/28/2021 33.2  31.5 - 35.7 g/dL Final   • RDW 12/28/2021 12.4  12.3 - 15.4 % Final   • RDW-SD 12/28/2021 41.7  37.0 - 54.0 fl Final   • MPV 12/28/2021 10.5  6.0 - 12.0 fL Final   • Platelets 12/28/2021 265  140 - 450 10*3/mm3 Final   • Neutrophil % 12/28/2021 62.4  42.7 - 76.0 % Final   • Lymphocyte % 12/28/2021 26.9  19.6 - 45.3 % Final   • Monocyte % 12/28/2021 7.5  5.0 - 12.0 % Final   • Eosinophil % 12/28/2021 2.0  0.3 - 6.2 % Final   • Basophil % 12/28/2021 0.8  0.0 - 1.5 %  Final   • Immature Grans % 12/28/2021 0.4  0.0 - 0.5 % Final   • Neutrophils, Absolute 12/28/2021 4.59  1.70 - 7.00 10*3/mm3 Final   • Lymphocytes, Absolute 12/28/2021 1.98  0.70 - 3.10 10*3/mm3 Final   • Monocytes, Absolute 12/28/2021 0.55  0.10 - 0.90 10*3/mm3 Final   • Eosinophils, Absolute 12/28/2021 0.15  0.00 - 0.40 10*3/mm3 Final   • Basophils, Absolute 12/28/2021 0.06  0.00 - 0.20 10*3/mm3 Final   • Immature Grans, Absolute 12/28/2021 0.03  0.00 - 0.05 10*3/mm3 Final   • nRBC 12/28/2021 0.0  0.0 - 0.2 /100 WBC Final   Clinical Support on 12/22/2021   Component Date Value Ref Range Status   • SARS Antigen 12/22/2021 Not Detected  Not Detected Final   • Influenza A Antigen BIANCA 12/22/2021 Not Detected   Final   • Influenza B Antigen BIANCA 12/22/2021 Not Detected   Final   • Internal Control 12/22/2021 Passed  Passed Final   • Lot Number 12/22/2021 [id number]   Final   • Expiration Date 12/22/2021 12,112,022   Final   • COVID19 12/22/2021 Not Detected  Not Detected - Ref. Range Final   Clinical Support on 11/12/2021   Component Date Value Ref Range Status   • SARS Antigen 11/12/2021 Not Detected  Not Detected Final   • Influenza A Antigen BIANCA 11/12/2021 Not Detected   Final   • Influenza B Antigen BIANCA 11/12/2021 Not Detected   Final   • Internal Control 11/12/2021 Passed  Passed Final   • Lot Number 11/12/2021 [id number]   Final   • Expiration Date 11/12/2021 12/22/2021   Final   • COVID19 11/12/2021 Not Detected  Not Detected - Ref. Range Final   Office Visit on 11/11/2021   Component Date Value Ref Range Status   • SARS Antigen 11/11/2021 Not Detected  Not Detected Final   • Influenza A Antigen BIANCA 11/11/2021 Not Detected   Final   • Influenza B Antigen BIANCA 11/11/2021 Not Detected   Final   • Internal Control 11/11/2021 Passed  Passed Final   • Lot Number 11/11/2021 [id number]   Final   • Expiration Date 11/11/2021 12/11/2022   Final   • SARS-CoV-2 AMRIK 11/11/2021 Not Detected  Not Detected Final   There may be more  visits with results that are not included.       EKG Results:  No orders to display       Imaging Results:  No Images in the past 120 days found..      Assessment/Plan   Diagnoses and all orders for this visit:    1. Major depressive disorder, recurrent episode, moderate (HCC) (Primary)  -     buPROPion XL (WELLBUTRIN XL) 300 MG 24 hr tablet; Take 1 tablet by mouth Every Morning.  Dispense: 90 tablet; Refill: 0  -     DULoxetine (CYMBALTA) 30 MG capsule; Take 1 capsule by mouth Daily.  Dispense: 90 capsule; Refill: 0    2. Generalized anxiety disorder  -     buPROPion XL (WELLBUTRIN XL) 300 MG 24 hr tablet; Take 1 tablet by mouth Every Morning.  Dispense: 90 tablet; Refill: 0  -     DULoxetine (CYMBALTA) 30 MG capsule; Take 1 capsule by mouth Daily.  Dispense: 90 capsule; Refill: 0    3. Insomnia due to mental condition        Visit Diagnoses:    ICD-10-CM ICD-9-CM   1. Major depressive disorder, recurrent episode, moderate (HCC)  F33.1 296.32   2. Generalized anxiety disorder  F41.1 300.02   3. Insomnia due to mental condition  F51.05 300.9     327.02     4/18: Doing well, no hallucinations, depression and anxiety are basically under control.  Patient is accomplishing her goals to lose weight, etc., which is helping.  7 minutes of supportive psychotherapy with goal to strengthen defenses, promote problems solving, restore adaptive functioning and provide symptom relief. The therapeutic alliance was strengthened to encourage the patient to express their thoughts and feelings. Esteem building was enhanced through praise, reassurance, normalizing and encouragement. Coping skills were enhanced to build distress tolerance skills and emotional regulation. Patient given education on medication side effects, diagnosis/illness and relapse symptoms. Plan to continue supportive psychotherapy in next appointment to provide symptom relief.  6 weeks    3/7: Discontinue Abilify due to weight gain.  Now denies having any  hallucinations.  Patient to monitor her weight.  Patient will also keep me posted on her relationship with her  which is no doubt a contributor to depression and anxiety.  16 minutes of supportive psychotherapy with goal to strengthen defenses, promote problems solving, restore adaptive functioning and provide symptom relief. The therapeutic alliance was strengthened to encourage the patient to express their thoughts and feelings. Esteem building was enhanced through praise, reassurance, normalizing and encouragement. Coping skills were enhanced to build distress tolerance skills and emotional regulation. Patient given education on medication side effects, diagnosis/illness and relapse symptoms. Plan to continue supportive psychotherapy in next appointment to provide symptom relief.  5 weeks    2/17: Increase Abilify.  Patient is fearful that she will develop schizophrenia and deteriorate like her family.  She will start therapy as well.  18 minutes of supportive psychotherapy with goal to strengthen defenses, promote problems solving, restore adaptive functioning and provide symptom relief. The therapeutic alliance was strengthened to encourage the patient to express their thoughts and feelings. Esteem building was enhanced through praise, reassurance, normalizing and encouragement. Coping skills were enhanced to build distress tolerance skills and emotional regulation. Patient given education on medication side effects, diagnosis/illness and relapse symptoms. Plan to continue supportive psychotherapy in next appointment to provide symptom relief.  4 weeks    1/11: Significant improvement in symptoms, however residual auditory hallucinations.  Increase Abilify. 5 minutes of supportive psychotherapy with goal to strengthen defenses, promote problems solving, restore adaptive functioning and provide symptom relief. The therapeutic alliance was strengthened to encourage the patient to express their thoughts and  feelings. Esteem building was enhanced through praise, reassurance, normalizing and encouragement. Coping skills were enhanced to build distress tolerance skills and emotional regulation. Patient given education on medication side effects, diagnosis/illness and relapse symptoms. Plan to continue supportive psychotherapy in next appointment to provide symptom relief.  4 weeks    12/14: Mood reactivity versus actual bipolar II disorder. Take strattera every other day for 3 doses then stop. Start abilify 5 mg day. 20 minutes of supportive psychotherapy with goal to strengthen defenses, promote problems solving, restore adaptive functioning and provide symptom relief. The therapeutic alliance was strengthened to encourage the patient to express their thoughts and feelings. Esteem building was enhanced through praise, reassurance, normalizing and encouragement. Coping skills were enhanced to build distress tolerance skills and emotional regulation. Patient given education on medication side effects, diagnosis/illness and relapse symptoms. Plan to continue supportive psychotherapy in next appointment to provide symptom relief.  4 wks.    11/10: Start melatonin, restart duloxetine 17 minutes of supportive psychotherapy with goal to strengthen defenses, promote problems solving, restore adaptive functioning and provide symptom relief. The therapeutic alliance was strengthened to encourage the patient to express their thoughts and feelings. Esteem building was enhanced through praise, reassurance, normalizing and encouragement. Coping skills were enhanced to build distress tolerance skills and emotional regulation. Patient given education on medication side effects, diagnosis/illness and relapse symptoms. Plan to continue supportive psychotherapy in next appointment to provide symptom relief.  4 wks.    10/13: Better mood. Reduce strattera to 40 mg nightly (not daily, it is sedating). 4 wks.    9/29: Records release will be  signed by patient.  Patient is unsure if Strattera helped.  Reduce the dose.  Residual depressive symptoms.  Increase duloxetine.  Continue gabapentin and BuSpar.  Therapy referral made.  See back in 2 weeks to try to titrate off of Strattera entirely.  It does not sound like she has ever tried extended release formulations of stimulants for ADHD.  Does not sound like she got neuropsychological testing for ADHD.  Rule out gorge.    PLAN:  83. Safety: No acute safety concerns  84. Therapy:  Referral made.  85. Risk Assessment: Risk of self-harm acutely is moderate.  Risk factors include anxiety disorder, mood disorder, and recent psychosocial stressors (pandemic). Protective factors include no family history, denies access to guns/weapons, no present SI, no history of suicide attempts or self-harm in the past, minimal AODA, healthcare seeking, future orientation, willingness to engage in care.  Risk of self-harm chronically is also moderate, but could be further elevated in the event of treatment noncompliance and/or AODA.  86. Meds:.  a. CONTINUE duloxetine 90 mg a day. Risks, benefits, alternatives discussed with patient including GI upset, nausea vomiting diarrhea, theoretical decrease of seizure threshold predisposing the patient to a slightly higher seizure risk, headaches, sexual dysfunction, serotonin syndrome, bleeding risk, increased suicidality in patients 24 years and younger.  Also constipation and urinary retention.  After discussion of these risks and benefits, the patient voiced understanding and agreed to proceed.  b. CONTINUE BuSpar 10 mg p.o. twice daily (refilled today). Risks, benefits, alternatives discussed with patient including nausea, GI upset, mild sedation, falls risk.  After discussion of these risks and benefits, the patient voiced understanding and agreed to proceed.  c. CONTINUE bupropion  mg p.o. daily. Risks, benefits, alternatives discussed with patient including nausea, GI  upset, increased energy, exacerbation of irritability, insomnia, lowering of seizure threshold.  After discussion of these risks and benefits, the patient voiced understanding and agreed to proceed.  d. CONTINUE gabapentin 600 mg PO TID. Risks, benefits, alternatives discussed with patient including sedation, dizziness/falls risk, GI upset, weight gain.  After discussion of these risks and benefits, the patient voiced understanding and agreed to proceed. UDS, Lilian ordered. Verbally signed controlled substances agreement.  e. S/P:  i. Strattera 40 mg: stopped 1/11/21 to reduce medication regimen.  ii. Never started melatonin  iii. abilify 4 mg wg.  87. Labs: no recs  88. Follow up: 6 wks    Patient screened positive for depression based on a PHQ-9 score of  on . Follow-up recommendations include: Prescribed antidepressant medication treatment.           TREATMENT PLAN/GOALS: Continue supportive psychotherapy efforts and medications as indicated. Treatment and medication options discussed during today's visit. Patient acknowledged and verbally consented to continue with current treatment plan and was educated on the importance of compliance with treatment and follow-up appointments.    MEDICATION ISSUES:  LILIAN reviewed as expected.  Discussed medication options and treatment plan of prescribed medication as well as the risks, benefits, and side effects including potential falls, possible impaired driving and metabolic adversities among others. Patient is agreeable to call the office with any worsening of symptoms or onset of side effects. Patient is agreeable to call 911 or go to the nearest ER should he/she begin having SI/HI. No medication side effects or related complaints today.     MEDS ORDERED DURING VISIT:  New Medications Ordered This Visit   Medications   • buPROPion XL (WELLBUTRIN XL) 300 MG 24 hr tablet     Sig: Take 1 tablet by mouth Every Morning.     Dispense:  90 tablet     Refill:  0   • DULoxetine  (CYMBALTA) 30 MG capsule     Sig: Take 1 capsule by mouth Daily.     Dispense:  90 capsule     Refill:  0     Total dose is 30 + 60 = 90 mg daily.       Return in about 6 weeks (around 5/30/2022).         This document has been electronically signed by Brooks Lynn MD  April 18, 2022 11:33 EDT      Part of this note may be an electronic transcription/translation of spoken language to printed text using the Dragon Dictation System.

## 2022-04-21 ENCOUNTER — OFFICE VISIT (OUTPATIENT)
Dept: INTERNAL MEDICINE | Facility: CLINIC | Age: 42
End: 2022-04-21

## 2022-04-21 VITALS
HEIGHT: 64 IN | SYSTOLIC BLOOD PRESSURE: 119 MMHG | HEART RATE: 98 BPM | OXYGEN SATURATION: 97 % | WEIGHT: 268.8 LBS | BODY MASS INDEX: 45.89 KG/M2 | DIASTOLIC BLOOD PRESSURE: 78 MMHG

## 2022-04-21 DIAGNOSIS — F50.81 BINGE EATING DISORDER: ICD-10-CM

## 2022-04-21 PROCEDURE — 99213 OFFICE O/P EST LOW 20 MIN: CPT | Performed by: INTERNAL MEDICINE

## 2022-04-21 NOTE — PROGRESS NOTES
Chief Complaint  Weight Loss (Wants to talk about weight loss management )    Subjective          Malinda Saucedo presents to [facility]  History of Present Illness  Patient reports having BLE edema recently, but has since improved. Patient reports having sedentary job. Patient denies pain, but felt uncomfortable and tight.   Obesity -  Patient thinks vyvanse is helping some, but not as effective as she would like. Patient is going to buy a home elliptical machine. Patient reports she does not have time to go to the gym. Patient reports back pain can be limiting with exercise. Patient has not tried any particular dietary adjustments. Patient is on CBD oil. Patient is on CPAP with good results.   Anxiety - patient is off abilify and topamax. Patient is on cymbalta and wellbutrin.      Current Outpatient Medications   Medication Instructions   • aspirin 81 mg, Oral, Daily   • buPROPion XL (WELLBUTRIN XL) 300 mg, Oral, Every Morning   • busPIRone (BUSPAR) 10 MG tablet TAKE 1 TABLET BY MOUTH TWICE DAILY   • cetirizine (ZYRTEC) 10 mg, Oral, Daily   • clobetasol (TEMOVATE) 0.05 % external solution clobetasol 0.05 % scalp solution   • DULoxetine (CYMBALTA) 60 mg, Oral, Daily, In addition to the Duloxetine 30mg   • [START ON 5/9/2022] DULoxetine (CYMBALTA) 30 mg, Oral, Daily   • EPINEPHrine (EPIPEN) 0.3 MG/0.3ML solution auto-injector injection epinephrine 0.3 mg/0.3 mL injection, auto-injector   • fluconazole (DIFLUCAN) 150 mg, Oral, Daily   • gabapentin (NEURONTIN) 600 MG tablet TAKE 1 TABLET BY MOUTH EVERY 8 HOURS AS DIRECTED   • Humira Pen 40 MG/0.4ML Pen-injector Kit No dose, route, or frequency recorded.   • ibuprofen (ADVIL,MOTRIN) 800 MG tablet No dose, route, or frequency recorded.   • lisdexamfetamine (VYVANSE) 50 mg, Oral, Every Morning   • montelukast (SINGULAIR) 10 mg, Oral, Nightly   • norethindrone (AYGESTIN) 5 MG tablet No dose, route, or frequency  "recorded.   • ondansetron ODT (ZOFRAN ODT) 4 mg, Translingual, Every 8 Hours PRN       The following portions of the patient's history were reviewed and updated as appropriate: allergies, current medications, past family history, past medical history, past social history, past surgical history, and problem list.    Objective   Vital Signs:   /78 (BP Location: Left arm, Patient Position: Sitting, Cuff Size: Large Adult)   Pulse 98   Ht 162.6 cm (64\")   Wt 122 kg (268 lb 12.8 oz)   SpO2 97%   BMI 46.14 kg/m²     Wt Readings from Last 3 Encounters:   04/21/22 122 kg (268 lb 12.8 oz)   03/11/22 120 kg (265 lb 6.4 oz)   03/04/22 118 kg (260 lb)     BP Readings from Last 3 Encounters:   04/21/22 119/78   03/11/22 122/78   03/04/22 131/83     Physical Exam   Appearance: No acute distress, well-nourished  Head: normocephalic, atraumatic  Eyes: extraocular movements intact, no scleral icterus, no conjunctival injection  Ears, Nose, and Throat: external ears normal, nares patent, moist mucous membranes  Cardiovascular: regular rate and rhythm. no murmurs, rubs, or gallops. no edema  Respiratory: breathing comfortably, symmetric chest rise, clear to auscultation bilaterally. No wheezes, rales, or rhonchi.  Neuro: alert and oriented to time, place, and person. Normal gait  Psych: normal mood and affect     Result Review :   The following data was reviewed by: Ant Carlos Jr, MD on 04/21/2022:  Common labs    Common Labsle 12/28/21 12/28/21 12/28/21 12/28/21 2/8/22 2/8/22    1147 1147 1147 1147 1645 1645   Glucose    95  75   BUN    10  8   Creatinine    1.12 (A)  1.04 (A)   eGFR African Am    65  71   Sodium    141  139   Potassium    4.4  4.1   Chloride    111 (A)  108 (A)   Calcium    9.5  8.9   Albumin    4.50  4.00   Total Bilirubin    0.3  0.2   Alkaline Phosphatase    98  90   AST (SGOT)    15  13   ALT (SGPT)    15  14   WBC 7.36    8.13    Hemoglobin 15.0    14.7    Hematocrit 45.2    44.1  "   Platelets 265    299    Total Cholesterol   150      Triglycerides   69      HDL Cholesterol   64 (A)      LDL Cholesterol    72      Hemoglobin A1C  4.94       (A) Abnormal value              Lab Results   Component Value Date    SARSANTIGEN Not Detected 04/14/2022    COVID19 Not Detected 04/14/2022    RAPFLUA Negative 04/14/2022    RAPFLUB Negative 04/14/2022    FLUAAG Not Detected 12/22/2021    FLUBAG Not Detected 12/22/2021    RAPSCRN Negative 04/14/2022    INR 1.1 02/05/2019          Assessment and Plan    Diagnoses and all orders for this visit:    1. Binge eating disorder  Comments:  inc vyvanse into higher dosage to help with appetite suppression. monitor. also discussed exercise and food choices.   Orders:  -     lisdexamfetamine (Vyvanse) 50 MG capsule; Take 1 capsule by mouth Every Morning  Dispense: 30 capsule; Refill: 0          Medications Discontinued During This Encounter   Medication Reason   • brompheniramine-pseudoephedrine-DM 30-2-10 MG/5ML syrup *Therapy completed   • lisdexamfetamine (Vyvanse) 30 MG capsule Reorder          Follow Up   Return in about 4 weeks (around 5/19/2022) for Recheck.  Patient was given instructions and counseling regarding her condition or for health maintenance advice. Please see specific information pulled into the AVS if appropriate.       Ant Carlos Jr, MD  04/22/22  16:04 EDT

## 2022-05-13 ENCOUNTER — OFFICE VISIT (OUTPATIENT)
Dept: SLEEP MEDICINE | Facility: HOSPITAL | Age: 42
End: 2022-05-13

## 2022-05-13 VITALS
TEMPERATURE: 96.5 F | DIASTOLIC BLOOD PRESSURE: 82 MMHG | OXYGEN SATURATION: 97 % | SYSTOLIC BLOOD PRESSURE: 114 MMHG | BODY MASS INDEX: 46.1 KG/M2 | WEIGHT: 270 LBS | HEIGHT: 64 IN | HEART RATE: 94 BPM

## 2022-05-13 DIAGNOSIS — G47.33 OSA (OBSTRUCTIVE SLEEP APNEA): Primary | ICD-10-CM

## 2022-05-13 PROCEDURE — G0463 HOSPITAL OUTPT CLINIC VISIT: HCPCS

## 2022-05-13 NOTE — PROGRESS NOTES
[facility]                          Chief Complaint:   Follow up for obstructive sleep apnea and hypersomnia    History of present illness:   Subjective     Patient is a 41 y.o. female  Patient with hx of anxiety and depression who complains of excessive daytime sleepiness, frequent nocturnal awakenings & snoring. BMI=44.     PSG 12/22/21:   AHI= 14.6/h; RDI=30/h; REM-AHI= 54.3/h.     She started CPAP in March 2022. She said it works well when she uses it. It helps her feel more refreshed and sleeps better at night but she had suffered flu and sinus infection and has not been able to use it often.  She also said that the straps with the tube which comes on top of her head, slips down often and she has to readjust it many times at night and finally she gets up and takes the mask off.    ESS: Total score: 21.  She denies sleep paralysis, hallucination or cataplexy    DME: [employer].   Mask: Dreamwear FFM (switched from nasal pillow).        REVIEW OF SYSTEMS:   HEENT: Nasal congestion & postnasal drip.  Dry mouth  CARDIOVASCULAR: No chest pain, chest pressure or chest discomfort. No palpitations or edema.   RESPIRATORY: No shortness of breath, cough or sputum.   GASTROINTESTINAL: No abdominal bloating or reflux   NEUROLOGICAL/PSYCHOATRY: Anxiety and depression.  Morning headache.    Past Medical History:  Past Medical History:   Diagnosis Date   • Allergic    • Anxiety    • Arthritis    • Chronic pain disorder    • Deep vein thrombosis (HCC)    • Depression    • Ectopic pregnancy without intrauterine pregnancy 1/25/2019    Formatting of this note might be different from the original. - Day 1 = 1/25/19 -> beta 4,927 MTX #1 given (110mg) - Day 4 = 1/28/19 -> beta 11,077 - Day 7 = 1/31/19 -> beta 11,923 MTX #2 given (110mg) - Day 11 = 2/5/19 -> beta 11,406 - Day 14 = 2/8/19 -> scheduled visit and beta - Day 19 =  2/13/19 -> beta 6,584 - Day 26 = 2/20/19 -> beta 3,111 - Day 34 = 3/1/19 ->  beta 553 (seen at [facility]   • Endometriosis    • Fibroids    • Headache    • Hidradenitis    • Low back pain 2013?    DDD   • Obesity    • Panic disorder    • PTSD (post-traumatic stress disorder)    ,   Past Surgical History:   Procedure Laterality Date   •  SECTION     • CYSTECTOMY     • ENDOMETRIAL ABLATION  , ,    • EYE SURGERY     • MYOMECTOMY     ,   Social History     Socioeconomic History   • Marital status:    Tobacco Use   • Smoking status: Never Smoker   • Smokeless tobacco: Never Used   Vaping Use   • Vaping Use: Never used   Substance and Sexual Activity   • Alcohol use: Not Currently   • Drug use: Never   • Sexual activity: Not Currently     Partners: Male     Birth control/protection: None     E-cigarette/Vaping   • E-cigarette/Vaping Use Never User         and Allergies:  Methotrexate    Medication Review:     Current Outpatient Medications:   •  aspirin (aspirin) 81 MG EC tablet, Take 1 tablet by mouth Daily., Disp: 90 tablet, Rfl: 3  •  buPROPion XL (WELLBUTRIN XL) 300 MG 24 hr tablet, Take 1 tablet by mouth Every Morning., Disp: 90 tablet, Rfl: 0  •  busPIRone (BUSPAR) 10 MG tablet, TAKE 1 TABLET BY MOUTH TWICE DAILY, Disp: 60 tablet, Rfl: 2  •  cetirizine (zyrTEC) 10 MG tablet, Take 1 tablet by mouth Daily., Disp: 90 tablet, Rfl: 3  •  clobetasol (TEMOVATE) 0.05 % external solution, clobetasol 0.05 % scalp solution, Disp: , Rfl:   •  DULoxetine (CYMBALTA) 30 MG capsule, Take 1 capsule by mouth Daily., Disp: 90 capsule, Rfl: 0  •  DULoxetine (CYMBALTA) 60 MG capsule, Take 1 capsule by mouth Daily. In addition to the Duloxetine 30mg, Disp: 30 capsule, Rfl: 2  •  EPINEPHrine (EPIPEN) 0.3 MG/0.3ML solution auto-injector injection, epinephrine 0.3 mg/0.3 mL injection, auto-injector, Disp: , Rfl:   •  fluconazole (Diflucan) 150 MG tablet, Take 1 tablet by mouth Daily., Disp: 30 tablet, Rfl: 0  •  gabapentin (NEURONTIN) 600 MG tablet, TAKE 1 TABLET BY MOUTH EVERY 8 HOURS  "AS DIRECTED, Disp: , Rfl:   •  Humira Pen 40 MG/0.4ML Pen-injector Kit, , Disp: , Rfl:   •  ibuprofen (ADVIL,MOTRIN) 800 MG tablet, , Disp: , Rfl:   •  lisdexamfetamine (Vyvanse) 50 MG capsule, Take 1 capsule by mouth Every Morning, Disp: 30 capsule, Rfl: 0  •  montelukast (Singulair) 10 MG tablet, Take 1 tablet by mouth Every Night., Disp: 90 tablet, Rfl: 1  •  norethindrone (AYGESTIN) 5 MG tablet, , Disp: , Rfl:   •  ondansetron ODT (Zofran ODT) 4 MG disintegrating tablet, Place 1 tablet on the tongue Every 8 (Eight) Hours As Needed for Nausea or Vomiting., Disp: 12 tablet, Rfl: 0      Objective   Vital Signs:  Vitals:    05/13/22 0900   BP: 114/82   Pulse: 94   Temp: 96.5 °F (35.8 °C)   SpO2: 97%   Weight: 122 kg (270 lb)   Height: 162.6 cm (64\")     Body mass index is 46.35 kg/m².          Physical Exam:   General Appearance:    Alert, cooperative, in no acute distress   ENMT:  Freidman score 3, narrow distance in between the posterior pharyngeal pillars   Neck:  Large. Trachea midline. No thyromegaly.   Lungs:     Clear to auscultation,respirations regular, even and                  unlabored    Heart:    Regular rhythm and normal rate, normal S1 and S2, no            Murmur.   Abdomen:     Obese.  Soft.  No tenderness.  No HSM    Neuro:   Conscious, alert, oriented x3. Appropriate mood and affect.    Extremities:   Moves all extremities well, no edema, no cyanosis, no             Redness              Diagnostic data:    Download over the last 30 days:  Usage = 43%.  Usage >4 H = 3%  Average use 2 hours and 39 minutes  P 95% = 14  Leak 95% = 42.  AHI = 0.7    Lab Results   Component Value Date    HGBA1C 4.94 12/28/2021     Total Cholesterol   Date Value Ref Range Status   12/28/2021 150 0 - 200 mg/dL Final     Triglycerides   Date Value Ref Range Status   12/28/2021 69 0 - 150 mg/dL Final     HDL Cholesterol   Date Value Ref Range Status   12/28/2021 64 (H) 40 - 60 mg/dL Final     Hemoglobin   Date Value Ref " Range Status   02/08/2022 14.7 12.0 - 15.9 g/dL Final   02/05/2019 13.5 12.0 - 16.0 g/dL Final     CO2   Date Value Ref Range Status   02/08/2022 20.3 (L) 22.0 - 29.0 mmol/L Final     Total CO2   Date Value Ref Range Status   02/05/2019 22 22 - 30 mmol/L Final        Assessment   1. GISELA  2. Hypersomnia, secondary to #1  3. Morbid obesity, BMI 44  4. Depression/PTSD  5. Recurrent sinus infection    PLAN:  · Discussed the download. Not compliant due o mask issues. Will send an order to DME to fit with a different mask, maybe FFM with a tube coming from the front and not the top of the head.  She benefits from therapy.  · Nasal rinse and may be Flonase as needed to help with nasal congestion  · Counseled for weight loss  · Counseled against driving or operating heavy machinery when sleepy        Time 22 minutes    This note was dictated utilizing [email or web address] dictation

## 2022-05-17 DIAGNOSIS — B37.0 THRUSH: ICD-10-CM

## 2022-05-17 RX ORDER — FLUCONAZOLE 150 MG/1
150 TABLET ORAL
Qty: 5 TABLET | Refills: 0 | Status: SHIPPED | OUTPATIENT
Start: 2022-05-17 | End: 2022-05-24 | Stop reason: SDUPTHER

## 2022-05-24 ENCOUNTER — OFFICE VISIT (OUTPATIENT)
Dept: INTERNAL MEDICINE | Facility: CLINIC | Age: 42
End: 2022-05-24

## 2022-05-24 VITALS
SYSTOLIC BLOOD PRESSURE: 131 MMHG | BODY MASS INDEX: 45.82 KG/M2 | HEART RATE: 91 BPM | DIASTOLIC BLOOD PRESSURE: 82 MMHG | WEIGHT: 268.4 LBS | HEIGHT: 64 IN | TEMPERATURE: 97.5 F | OXYGEN SATURATION: 96 %

## 2022-05-24 DIAGNOSIS — F50.81 BINGE EATING DISORDER: Primary | ICD-10-CM

## 2022-05-24 DIAGNOSIS — B37.0 THRUSH: ICD-10-CM

## 2022-05-24 PROCEDURE — 99214 OFFICE O/P EST MOD 30 MIN: CPT | Performed by: INTERNAL MEDICINE

## 2022-05-24 RX ORDER — FLUCONAZOLE 150 MG/1
150 TABLET ORAL DAILY
Qty: 14 TABLET | Refills: 0 | Status: SHIPPED | OUTPATIENT
Start: 2022-05-24 | End: 2022-06-07

## 2022-05-24 RX ORDER — ELAGOLIX 150 MG/1
TABLET, FILM COATED ORAL
COMMUNITY

## 2022-05-24 NOTE — PROGRESS NOTES
Chief Complaint  binge eating disorder and Med Refill    Subjective          Malinda Saucedo presents to [facility]  History of Present Illness  Obesity- patient has noticed some help with vyvanse in helping curb appetite, but wishes it would do better. Patient can tell she is focusing very well. Patient also sleeping ok. Patient is amenable to nutrition counseling. Patient reports dietary noncompliance. Patient is trying to walk more often with weather improving.   Patient reports recurrent thrush. thought related to being on humira. Patient has done recurrent nystatin mouth rinses. Patient amenable to diflucan daily for 2-3 weeks.     Current Outpatient Medications   Medication Instructions   • aspirin 81 mg, Oral, Daily   • buPROPion XL (WELLBUTRIN XL) 300 mg, Oral, Every Morning   • busPIRone (BUSPAR) 10 MG tablet TAKE 1 TABLET BY MOUTH TWICE DAILY   • cetirizine (ZYRTEC) 10 mg, Oral, Daily   • clobetasol (TEMOVATE) 0.05 % external solution clobetasol 0.05 % scalp solution   • DULoxetine (CYMBALTA) 60 mg, Oral, Daily, In addition to the Duloxetine 30mg   • DULoxetine (CYMBALTA) 30 mg, Oral, Daily   • Elagolix Sodium (Orilissa) 150 MG tablet Oral   • EPINEPHrine (EPIPEN) 0.3 MG/0.3ML solution auto-injector injection epinephrine 0.3 mg/0.3 mL injection, auto-injector   • fluconazole (DIFLUCAN) 150 mg, Oral, Daily   • gabapentin (NEURONTIN) 600 MG tablet TAKE 1 TABLET BY MOUTH EVERY 8 HOURS AS DIRECTED   • Humira Pen 40 MG/0.4ML Pen-injector Kit No dose, route, or frequency recorded.   • ibuprofen (ADVIL,MOTRIN) 800 MG tablet No dose, route, or frequency recorded.   • lisdexamfetamine (VYVANSE) 70 mg, Oral, Every Morning   • montelukast (SINGULAIR) 10 mg, Oral, Nightly   • norethindrone (AYGESTIN) 5 MG tablet No dose, route, or frequency recorded.   • ondansetron ODT (ZOFRAN ODT) 4 mg, Translingual, Every 8 Hours PRN       The following portions of the patient's  "history were reviewed and updated as appropriate: allergies, current medications, past family history, past medical history, past social history, past surgical history, and problem list.    Objective   Vital Signs:   /82   Pulse 91   Temp 97.5 °F (36.4 °C) (Temporal)   Ht 162.6 cm (64\")   Wt 122 kg (268 lb 6.4 oz)   SpO2 96%   BMI 46.07 kg/m²     Wt Readings from Last 3 Encounters:   05/24/22 122 kg (268 lb 6.4 oz)   05/13/22 122 kg (270 lb)   04/21/22 122 kg (268 lb 12.8 oz)     BP Readings from Last 3 Encounters:   05/24/22 131/82   05/13/22 114/82   04/21/22 119/78     Physical Exam   Appearance: No acute distress, well-nourished  Head: normocephalic, atraumatic  Eyes: extraocular movements intact, no scleral icterus, no conjunctival injection  Ears, Nose, and Throat: external ears normal, nares patent, moist mucous membranes  Cardiovascular: regular rate and rhythm. no edema  Respiratory: breathing comfortably, symmetric chest rise  Neuro: alert and oriented to time, place, and person. Normal gait  Psych: normal mood and affect     Result Review :   The following data was reviewed by: Ant Carlos Jr, MD on 05/24/2022:  Common labs    Common Labsle 12/28/21 12/28/21 12/28/21 12/28/21 2/8/22 2/8/22    1147 1147 1147 1147 1645 1645   Glucose    95  75   BUN    10  8   Creatinine    1.12 (A)  1.04 (A)   eGFR African Am    65  71   Sodium    141  139   Potassium    4.4  4.1   Chloride    111 (A)  108 (A)   Calcium    9.5  8.9   Albumin    4.50  4.00   Total Bilirubin    0.3  0.2   Alkaline Phosphatase    98  90   AST (SGOT)    15  13   ALT (SGPT)    15  14   WBC 7.36    8.13    Hemoglobin 15.0    14.7    Hematocrit 45.2    44.1    Platelets 265    299    Total Cholesterol   150      Triglycerides   69      HDL Cholesterol   64 (A)      LDL Cholesterol    72      Hemoglobin A1C  4.94       (A) Abnormal value                  Lab Results   Component Value Date    SARSANTIGEN Not Detected " 04/14/2022    COVID19 Not Detected 04/14/2022    RAPFLUA Negative 04/14/2022    RAPFLUB Negative 04/14/2022    FLUAAG Not Detected 12/22/2021    FLUBAG Not Detected 12/22/2021    RAPSCRN Negative 04/14/2022    INR 1.1 02/05/2019     Last Urine Toxicity     LAST URINE TOXICITY RESULTS Latest Ref Rng & Units 3/11/2022    AMPHETAMINES SCREEN, URINE Negative Negative    BARBITURATES SCREEN Negative Negative    BENZODIAZEPINE SCREEN, URINE Negative Negative    BUPRENORPHINEUR Negative Negative    COCAINE SCREEN, URINE Negative Negative    METHADONE SCREEN, URINE Negative Negative    METHAMPHETAMINEUR Negative Negative          Assessment and Plan    Diagnoses and all orders for this visit:    1. Binge eating disorder (Primary)  Comments:  inc vyvanse into higher dosage to help with appetite suppression. UDS and isabela reviewed. monitor. also discussed exercise and food choices.  Orders:  -     lisdexamfetamine (Vyvanse) 70 MG capsule; Take 1 capsule by mouth Every Morning  Dispense: 30 capsule; Refill: 0  -     Ambulatory Referral to Nutrition Services    2. Thrush  -     fluconazole (DIFLUCAN) 150 MG tablet; Take 1 tablet by mouth Daily for 14 days.  Dispense: 14 tablet; Refill: 0          Medications Discontinued During This Encounter   Medication Reason   • lisdexamfetamine (Vyvanse) 50 MG capsule Reorder   • fluconazole (DIFLUCAN) 150 MG tablet Reorder          Follow Up   Return in about 4 weeks (around 6/21/2022) for Recheck.  Patient was given instructions and counseling regarding her condition or for health maintenance advice. Please see specific information pulled into the AVS if appropriate.       Ant Carlos Jr, MD  05/25/22  15:44 EDT

## 2022-05-26 ENCOUNTER — TELEPHONE (OUTPATIENT)
Dept: INTERNAL MEDICINE | Facility: CLINIC | Age: 42
End: 2022-05-26

## 2022-05-26 DIAGNOSIS — L73.2 HIDRADENITIS SUPPURATIVA: Primary | ICD-10-CM

## 2022-05-26 NOTE — TELEPHONE ENCOUNTER
I need more info - what is the antibiotic for? May walk in for urine dip or strep,covid, flu swabs if any symptoms related to those

## 2022-05-26 NOTE — TELEPHONE ENCOUNTER
Caller: Malinda Saucedo    Relationship: Self    Best call back number: [phone]    What medication are you requesting: ANTIBIOTIC    Have you had these symptoms before:    [] Yes  [x] No    Have you been treated for these symptoms before:   [] Yes  [x] No    If a prescription is needed, what is your preferred pharmacy and phone number:    [id number] - [place], [place] - [address] [facility] AT [facility] OF [address] & [place] - [phone] [facility] [phone]   [phone]    Additional notes:  PATIENT CALLED REQUESTING ANTIBIOTIC THAT SHE WAS TRYING TO GET FROM DERMATOLOGY. SHE STATED IT MUST COME FROM PRIMARY CARE PROVIDER. SHE STATED THAT SHE IS LEAVING [facility] BUT WOULD LIKE TO HAVE THIS SENT TO [facility] AS SOON AS POSSIBLE. SHE IS REQUESTING CALL TO FURTHER DISCUSS THIS.

## 2022-05-26 NOTE — TELEPHONE ENCOUNTER
Caller: Malinda Saucedo    Relationship: Self    Best call back number: [phone]    What specialty or service is being requested: DERMATOLOGY    Any additional details:   PATIENT CALLED TO REQUEST DERMATOLOGY REFERRAL TO SOMEWHERE OTHER THAN WHERE SHE HAS BEEN REFERRED TO BEFORE. SHE IS REQUESTING CALL TO FURTHER DISCUSS.

## 2022-05-27 RX ORDER — DOXYCYCLINE HYCLATE 100 MG/1
100 CAPSULE ORAL 2 TIMES DAILY
Qty: 14 CAPSULE | Refills: 0 | Status: SHIPPED | OUTPATIENT
Start: 2022-05-27 | End: 2022-06-03

## 2022-06-07 ENCOUNTER — TELEMEDICINE (OUTPATIENT)
Dept: PSYCHIATRY | Facility: CLINIC | Age: 42
End: 2022-06-07

## 2022-06-07 DIAGNOSIS — F33.1 MAJOR DEPRESSIVE DISORDER, RECURRENT EPISODE, MODERATE: Primary | ICD-10-CM

## 2022-06-07 DIAGNOSIS — F41.1 GENERALIZED ANXIETY DISORDER: ICD-10-CM

## 2022-06-07 DIAGNOSIS — F51.05 INSOMNIA DUE TO MENTAL CONDITION: ICD-10-CM

## 2022-06-07 PROCEDURE — 90833 PSYTX W PT W E/M 30 MIN: CPT | Performed by: STUDENT IN AN ORGANIZED HEALTH CARE EDUCATION/TRAINING PROGRAM

## 2022-06-07 PROCEDURE — 99214 OFFICE O/P EST MOD 30 MIN: CPT | Performed by: STUDENT IN AN ORGANIZED HEALTH CARE EDUCATION/TRAINING PROGRAM

## 2022-06-07 RX ORDER — BUPROPION HYDROCHLORIDE 300 MG/1
300 TABLET ORAL EVERY MORNING
Qty: 90 TABLET | Refills: 1 | Status: SHIPPED | OUTPATIENT
Start: 2022-07-17 | End: 2022-12-02

## 2022-06-07 RX ORDER — DULOXETIN HYDROCHLORIDE 30 MG/1
30 CAPSULE, DELAYED RELEASE ORAL DAILY
Qty: 90 CAPSULE | Refills: 0 | Status: SHIPPED | OUTPATIENT
Start: 2022-07-17 | End: 2022-12-02 | Stop reason: SDUPTHER

## 2022-06-07 RX ORDER — DULOXETIN HYDROCHLORIDE 60 MG/1
60 CAPSULE, DELAYED RELEASE ORAL DAILY
Qty: 90 CAPSULE | Refills: 1 | Status: SHIPPED | OUTPATIENT
Start: 2022-06-07 | End: 2022-09-06

## 2022-06-07 RX ORDER — ITRACONAZOLE 100 MG/1
CAPSULE ORAL
COMMUNITY
Start: 2022-06-03 | End: 2022-06-21

## 2022-06-07 RX ORDER — DOXYCYCLINE HYCLATE 100 MG/1
CAPSULE ORAL
COMMUNITY
End: 2022-06-07

## 2022-06-07 RX ORDER — BUSPIRONE HYDROCHLORIDE 10 MG/1
10 TABLET ORAL 2 TIMES DAILY
Qty: 60 TABLET | Refills: 5 | Status: SHIPPED | OUTPATIENT
Start: 2022-06-07 | End: 2022-08-08 | Stop reason: SDUPTHER

## 2022-06-07 RX ORDER — ISOTRETINOIN 30 MG/1
CAPSULE ORAL
COMMUNITY
Start: 2022-06-03 | End: 2022-06-21

## 2022-06-07 NOTE — PROGRESS NOTES
"Subjective   Malinda Saucedo is a 42 y.o. female who presents today for initial evaluation     Referring Provider:  No referring provider defined for this encounter.    Chief Complaint:  mdd vivi    History of Present Illness:     Chart review 9/29: Seen September 10 to establish care.  Previously was at [facility] office.  Labs are consistent with Sjogren's.  Psoriasis and arthritis are under control.  On Topamax for migraines.  On Lunesta for insomnia.  History of anxiety and depression.  On gabapentin 600 mg 3 times daily, Strattera 100 mg daily, BuSpar 10 mg, duloxetine 60 mg, bupropion 300 mg.  Denied anxiety in January 2019.  Has been on Prozac in the past.  BMP demonstrates creatinine of 1.68, alk phos of 116, otherwise unremarkable.  hCG was followed in 2019.  CBC unremarkable, no head imaging or EKG.    \"Malinda\"  [facility]  is name of her therapy clinic, [facility], accepts     6/7: Virtual visit via Zoom audio and video due to the COVID-19 pandemic.  Patient is accepting of and agreeable to visit.  The visit consisted of the patient and I. The patient is at home, and I am at the office.  Interview:  1. Chart review: Patient is on Vyvanse 70 mg a day for binge eating disorder.  Saw primary care in May.  2. Planning:  3. \"Good.\"  a. Good unless I miss meds.  b. \"I am having a libido problem.\"  i. Interested, but having trouble lubricating.  ii. She did recently start Vyvanse in April, though I don't think this is the culprit.  c. Growing her practice  4. Mood/Depression: under control  5. Anxiety: under control  6. Panic attacks: n  7. Energy: much better  8. Concentration: better  9. Sleeping: well on CPAP  10. Eating: less, has lost 4 lbs  11. Refills: y  12. Substances: n  13. Therapy: n  14. Medication compliant: y  15. No SI HI AVH.      4/18: Virtual visit via Zoom audio and video due to the COVID-19 pandemic.  Patient is accepting of and agreeable to visit.  The visit consisted " "of the patient and I. The patient is at home, and I am at the office.  Interview:  16. Chart review: Seen by primary care this month for sore throat.  February labs show decreased CO2 20.3, elevated chloride 108, slightly elevated creatinine 1.04, otherwise CMP and CBC are reassuring.  17. \"I'm good.\"  a. \"I've been great!\"  b. Mood is good. Some anxiety, but manageable. No depression.  c. Lost some weight. Achieving her goals and that is definitely helping with mood.  d. Doesn't want to add any meds, change meds. Doesn't want to remove anything either because she is doing well.  e. I notified her that I filled out her form for gastric surgery.  f. Driving during interview  18. Energy: kindof down, \"could be better\"  19. Concentration:  20. Sleeping: \"ok\" still tossing and turning a lot. CPAP helps.  21. Eating: stable  22. Refills:  23. Substances:  24. Therapy:  25. Medication compliant: y  26. No SI HI AVH.      3/7: Virtual visit via Zoom audio and video due to the COVID-19 pandemic.  Patient is accepting of and agreeable to visit.  The visit consisted of the patient and I. The patient is at home, and I am at the office.  Interview:  27. Chart review: Patient is concerned about her weight.  Labs from February show elevated creatinine 1.04, reassuring LFTs, low CO2 20.3, elevated chloride 108, reassuring CBC.  28. \" I am gaining weight.\"  a. Patient reports she is gained about 5 pounds in the last few weeks.  It is not clear per the system how much weight she has actually gained.  b. Denies hallucinations, anxiety and depression are very well controlled at this time.  c. Having some trouble with her  since around November, but they have had this happen before and \"he is not going anywhere.\"  29. Medication compliant: Yes  30. No SI HI AVH.      2/17: Virtual visit via Zoom audio and video due to the COVID-19 pandemic.  Patient is accepting of and agreeable to visit.  The visit consisted of the patient and " "I. The patient is at home, and I am at the office.  Interview:  31. Chart review: Seen by primary care February 15.  For cough.  COVID negative.  32. \"I never went up on the two of abilify.\"  a. \"Same symptoms.\" Still hears voices, but not as often. Also,  \"seeing things isn't as bad.\" Hears people calling her name. Random music.  b. Now has a sinus infection.  c. \"There are at least 3 family members who have schizophrenia.\"  d. Fearful that she may have schizophrenia; fearful that she may deteriorate like her uncle and cousin.  33. Sleeping: not well, waiting on equipment for GISELA.  34. Substances: stopped using CBD gummies first week of January 35. Therapy: n  36. Medication compliant: m  37. No SI HI AVH.      1/11: Virtual visit via Zoom audio and video due to the COVID-19 pandemic.  Patient is accepting of and agreeable to visit.  The visit consisted of the patient and I.  Interview:  38. Chart review: Seen in the emergency room December 31 for left arm numbness pain and swelling after having an IV placed to her ACE 2 days ago.  Patient saw primary care December 28 and wanted to be referred to neurology after her sleep study showed abnormal brain waves.  Labs from December 28 show low CO2 21.4, elevated chloride 111, elevated creatinine 1.12, A1c.  Thyroid studies, lipid profile, CBC are unremarkable.  No DVT found.  39. \"Good actually. The abilify helped a lot.\"  a. Sees things out of the corner of her eye, never directly.  b. Still hears voices stating her name.  40. Depression/Mood: stable  41. Anxiety: stable  42. Refills: n  43. Sleeping: initial insomnia. GISELA confirmed by sleep study.  44. Eating: stable  45. Substances: n  46. Therapy: n  47. Medication compliant: y  48. No SI HI AVH.      12/14: Virtual visit via Zoom audio and video due to the COVID-19 pandemic.  Patient is accepting of and agreeable to visit.  The visit consisted of the patient and I.  Interview:  49. Chart review: Seen for thrush by " "PCP 12/8, seen 11/11 for cough, sore throat (COVID neg). Referred to sleep medicine.  50. \"I'm ok.\"  a. Things have \"been weird.\"  b. I was having auditory and visual hallucinations when \"I first started seeing you.\"  i. Her son or  saying her name.  ii. Sees \"scary shadows\" out of the corner of her eyes, especially at night.  iii. \"Afraid I have bipolar disorder.\"  1. \"Started a practice out of nowhere.\"  2. \"I have spending sprees.\" $2,000 at a time. In the middle of one right now. Bought a bedset, tables, talked  into getting a new TV. Now in a new house.  c. Uncle has dx of schizophrenia, sister dx'd with bipolar d/o.  d. Also found out recently on the Sjogren's/Lupus spectrum.  e. Also has sleep study scheduled.  f. Will be seeing a neurologist because \"I'm losing time, like 5 minutes.\"  g. Willing to reduce the number of medications she is on and start a mood stabilizer.  h. \"Scared of these. Scared it will get worse.\"  i. Stopped buspar 2 weeks ago and is more irritable.  51. Depression/Mood:  1. Depressed mood: y  2. Seasonal pattern: denies  3. Severity: moderate  4. Anhedonia: mild, but enjoys spending time with son, shopping  5. Guilt or hopelessness:  6. Energy: \"horrible\"  7. Concentration: poor  8. Weight loss or weight gain: gain, 2 lbs since 2 weeks  9. Psychomotor retardation or agitation: def  10. Insomnia:  a. Topamax makes her sleepy, bed at 11, no initial insomnia, but wakes at 2 am, eats, sleeps in an hour, wakes at 6:30 am.  11. Duration: months  52. Anxiety:  1. Uncontrolled worrying: y  2. Severity: moderate  3. Muscle tension: y  4. Fatigue: y  5. Concentration: poor  6. Restlessness/feeling on edge: y  7. Irritability: y  8. Insomnia: maintenance insomnia  9. Duration: months  10. Panic attacks: def  53. Refills: none  54. Sleeping: above, sleep study set up  55. Eating: above  56. Substances: CBD gummy to sleep  57. Therapy: declines  58. Medication compliant: y  59. No SI " "[facility].      11/10: Virtual visit via Zoom audio and video due to the COVID-19 pandemic.  Patient is accepting of and agreeable to visit.  The visit consisted of the patient and I.  Interview:  60. Chart review: No new developments.  61. \"I'm ok.\"  a. More emotional; horribly.  b. Mostly down.  c. Pharmacy has not filled her 30 mg cap of duloxetine in weeks; unsure why.  d. Also feels sick too; congested today  62. Depression/Mood: depressed mood  12. Guilt or hopelessness: denies  13. Energy: poor  14. Concentration: poor  63. Anxiety: not bad  64. Sleeping:  a. Initial insomnia  b. Maintenance insomnia  65. Eating: stable  66. Substances:  67. Therapy: denies  68. Medication compliant: no, inadvertently  69. No SI [facility].      10/13: Virtual visit via Zoom audio and video due to the COVID-19 pandemic.  Patient is accepting of and agreeable to visit.  The visit consisted of the patient and I.  Interview:  70. Chart review: No new developments.  71. \"I've seen some small changes, but not, like, horrible.\"  a. Attention drifts a little more, in the mornings.  72. Depression/Mood: \"increasing the duloxetine has helped.\"  a. Usually feels really sad before her cycle, but doesn't this time  73. Anxiety:  a. Not as irritable  74. Sleeping: yes  75. Eating: stable  76. Substances: denies  77. Therapy: denies  78. Medication compliant: y  79. No SI [facility]      9/29: Virtual visit via Zoom audio and video due to the COVID-19 pandemic.  Patient is accepting of and agreeable to visit.  The visit consisted of the patient and I.  Interview:  80. Her story: \"Well, it started in [employer].\"  a. Originally PMDD (2009) for years  b. Got out 2011  c. Then dx'd with depression and YENNY  d. Has been on medications since  i. Was on prozac from 2009 until 2015, stopped due to pregnancy  ii. 2018, started wellbutrin only. Which was horrible by itself. They added buspar 10 mg bid, with it. Then duloxetine 60 mg daily added. Better " "combination.  iii. Now on the above, and also on strattera 100 mg daily for ADHD. Also on gabapentin 600 tid.  e. Stimulants put her to sleep: adderall, vyvanse.  Did not try extended release formulations.  81. Mood: much better than it was in the past, but still could use some help.  82. Stressors:  a. Niece started having seizures at 21 and lost her memory, doesn't remember anyone or anybody.  b. In the middle of buying a house  c. Finances  d. Starting my own practice.  e. Son having school problems; hanging with \"bad kids.\"  83. Anxiety: manageable.  a. Worrying a lot  84. Coping: goes to [employer], trusts in God  85. Sleeping: yes  86. Eating: stable  87. Medication compliant: yes  88. Psych ROS: D, A.  Negative for psychosis.  Unclear gorge.  89. No SI HI AVH    Depression:  90. Anhedonia: denies  91. Guilt or hopelessness:   a. Feelings of guilt about how she could have done more for her nieces and nephews  92. Energy: horrible  93. Concentration: \"I have to force myself to focus.\"  a. If stops to eat, it ends her day  94. Weight loss or weight gain: stable over last few months, on weight loss pills, however  95. Psychomotor retardation or agitation: frequently  96. Insomnia: yes, on the lunesta  97. Duration: for years      Access to Firearms: yes, locked away    PHQ-9 Depression Screening  PHQ-9 Total Score:      Little interest or pleasure in doing things?     Feeling down, depressed, or hopeless?     Trouble falling or staying asleep, or sleeping too much?     Feeling tired or having little energy?     Poor appetite or overeating?     Feeling bad about yourself - or that you are a failure or have let yourself or your family down?     Trouble concentrating on things, such as reading the newspaper or watching television?     Moving or speaking so slowly that other people could have noticed? Or the opposite - being so fidgety or restless that you have been moving around a lot more than usual?     Thoughts that " you would be better off dead, or of hurting yourself in some way?     PHQ-9 Total Score       YENNY-7       Past Surgical History:  Past Surgical History:   Procedure Laterality Date   •  SECTION     • CYSTECTOMY     • ENDOMETRIAL ABLATION  , ,    • EYE SURGERY     • MYOMECTOMY         Problem List:  Patient Active Problem List   Diagnosis   • YENNY (generalized anxiety disorder)   • PTSD (post-traumatic stress disorder)   • Attention deficit hyperactivity disorder   • Hidradenitis suppurativa   • Intramural and subserous leiomyoma of uterus   • Lumbosacral spondylosis without myelopathy   • Endometriosis determined by laparoscopy   • PMDD (premenstrual dysphoric disorder)   • Psoriasis   • Thrombophilia ([facility])   • Seasonal allergies   • Major depressive disorder in partial remission ([facility])   • Body mass index (BMI) 40.0-44.9, adult ([facility])   • Anaphylactic reaction to bee sting   • Insomnia, unspecified   • Low back pain   • Major depressive disorder, recurrent, moderate ([facility])   • History of thromboembolism of vein   • Major depressive disorder, single episode, unspecified       Allergy:   Allergies   Allergen Reactions   • Methotrexate Rash        Discontinued Medications:  Medications Discontinued During This Encounter   Medication Reason   • doxycycline (VIBRAMYCIN) 100 MG capsule *Therapy completed   • norethindrone (AYGESTIN) 5 MG tablet *Therapy completed   • ondansetron ODT (Zofran ODT) 4 MG disintegrating tablet *Therapy completed   • DULoxetine (CYMBALTA) 60 MG capsule Reorder   • busPIRone (BUSPAR) 10 MG tablet Reorder   • buPROPion XL (WELLBUTRIN XL) 300 MG 24 hr tablet Reorder   • DULoxetine (CYMBALTA) 30 MG capsule Reorder       Current Medications:   Current Outpatient Medications   Medication Sig Dispense Refill   • aspirin (aspirin) 81 MG EC tablet Take 1 tablet by mouth Daily. 90 tablet 3   • [START ON 2022] buPROPion XL (WELLBUTRIN XL) 300 MG 24 hr tablet Take 1 tablet by mouth  Every Morning. 90 tablet 1   • busPIRone (BUSPAR) 10 MG tablet Take 1 tablet by mouth 2 (Two) Times a Day. 60 tablet 5   • cetirizine (zyrTEC) 10 MG tablet Take 1 tablet by mouth Daily. 90 tablet 3   • clobetasol (TEMOVATE) 0.05 % external solution clobetasol 0.05 % scalp solution     • [START ON 7/17/2022] DULoxetine (CYMBALTA) 30 MG capsule Take 1 capsule by mouth Daily. 90 capsule 0   • DULoxetine (CYMBALTA) 60 MG capsule Take 1 capsule by mouth Daily. In addition to the Duloxetine 30mg 90 capsule 1   • Elagolix Sodium (Orilissa) 150 MG tablet Take  by mouth.     • EPINEPHrine (EPIPEN) 0.3 MG/0.3ML solution auto-injector injection epinephrine 0.3 mg/0.3 mL injection, auto-injector     • fluconazole (DIFLUCAN) 150 MG tablet Take 1 tablet by mouth Daily for 14 days. 14 tablet 0   • gabapentin (NEURONTIN) 600 MG tablet TAKE 1 TABLET BY MOUTH EVERY 8 HOURS AS DIRECTED     • Humira Pen 40 MG/0.4ML Pen-injector Kit      • ibuprofen (ADVIL,MOTRIN) 800 MG tablet      • ISOtretinoin (ACCUTANE) 30 MG capsule      • itraconazole (SPORANOX) 100 MG capsule      • lisdexamfetamine (Vyvanse) 70 MG capsule Take 1 capsule by mouth Every Morning 30 capsule 0   • montelukast (Singulair) 10 MG tablet Take 1 tablet by mouth Every Night. 90 tablet 1     No current facility-administered medications for this visit.       Past Medical History:  Past Medical History:   Diagnosis Date   • Allergic    • Anxiety    • Arthritis    • Chronic pain disorder    • Deep vein thrombosis ([facility])    • Depression    • Ectopic pregnancy without intrauterine pregnancy 1/25/2019    Formatting of this note might be different from the original. - Day 1 = 1/25/19 -> beta 4,927 MTX #1 given (110mg) - Day 4 = 1/28/19 -> beta 11,077 - Day 7 = 1/31/19 -> beta 11,923 MTX #2 given (110mg) - Day 11 = 2/5/19 -> beta 11,406 - Day 14 = 2/8/19 -> scheduled visit and beta - Day 19 =  2/13/19 -> beta 6,584 - Day 26 = 2/20/19 -> beta 3,111 - Day 34 = 3/1/19 -> beta 553  ([facility]   • Endometriosis    • Fibroids    • Headache    • Hidradenitis    • Low back pain ?    DDD   • Obesity    • Panic disorder    • PTSD (post-traumatic stress disorder)        Past Psychiatric History:  Began Treatment:   Diagnoses: D, A, insomnia  Psychiatrist: Last was months ago for a couple times; previously NP for 2 years  Therapist: yes, therapy has not helped, two bad experiences  Admission History: denies  Medication Trials: see hpi  Self Harm: Denies  Suicide Attempts:Denies   Psychosis, Anxiety, Depression: denies    Substance Abuse History:   Types:Denies all, including illicit  Withdrawal Symptoms:Denies  Longest Period Sober:Not Applicable   AA: Not applicable     Social History:  Martial Status:  Employed: therapist, 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  Kids: one  House: apartment   History: army, honorable discharge, see hpi    Social History     Socioeconomic History   • Marital status:    Tobacco Use   • Smoking status: Never Smoker   • Smokeless tobacco: Never Used   Vaping Use   • Vaping Use: Never used   Substance and Sexual Activity   • Alcohol use: Not Currently   • Drug use: Never   • Sexual activity: Not Currently     Partners: Male     Birth control/protection: None       Family History:   Suicide Attempts:  1st cousin, maternal; another 1st cousin maternal  Suicide Completions: 1st cousin, maternal  Dx'd her sister herself that she has bipolar.    Family History   Problem Relation Age of Onset   • ADD / ADHD Mother    • OCD Mother    • Arthritis Mother    • Hyperlipidemia Mother    • Hypertension Mother    • Thyroid disease Mother    • Anxiety disorder Mother    • ADD / ADHD Sister    • Anxiety disorder Sister    • Bipolar disorder Sister    • Drug abuse Maternal Aunt    • Depression Maternal Aunt    • Alcohol abuse Maternal Uncle    • Drug abuse Maternal Uncle    • Schizophrenia Maternal Uncle    • Depression Maternal Grandmother    • Other  "Maternal Grandmother         Colon cancer   • Anxiety disorder Maternal Grandmother    • ADD / ADHD Cousin    • OCD Cousin    • Suicide Attempts Cousin    • Alcohol abuse Cousin    • Depression Cousin    • Seizures Niece    • Arthritis Sister    • Depression Sister    • Hyperlipidemia Sister    • Asthma Sister    • Hypertension Sister    • Miscarriages / Stillbirths Sister    • Mental illness Maternal Uncle    • Alcohol abuse Maternal Uncle        Developmental History:   Born: [place]  Siblings: 1 sister, older cousin who lived with them  Childhood: no abuse  High School:Completed  College: 4 yrs at [employer] 3 years degree in counseling    · Mental Status Exam  · Appearance  · : groomed, good eye contact, normal street clothes, in her car  · Behavior  · : pleasant and cooperative  · Motor  · : No abnormal  · Speech  · :normal rhythm, rate, volume, tone, not hyperverbal, not pressured, normal prosidy  · Mood  · : \"I am great\"  · Affect  · : euthymic, mood congruent, good variability  · Thought Content  · : negative suicidal ideations, negative homicidal ideations, negative obsessions  · Perceptions  · : negative auditory hallucinations, negative visual hallucinations  · Thought Process  · : linear  · Insight/Judgement  · : Fair/fair  · Cognition  · : grossly intact  · Attention   : intact    Review of Systems:  Review of Systems   Constitutional: Negative for diaphoresis and fatigue.   Eyes: Negative for visual disturbance.   Respiratory: Negative for cough and shortness of breath.    Cardiovascular: Negative for chest pain, palpitations and leg swelling.   Gastrointestinal: Negative for nausea and vomiting.   Endocrine: Negative for cold intolerance and heat intolerance.   Genitourinary: Negative for difficulty urinating.   Musculoskeletal: Positive for joint swelling.   Allergic/Immunologic: Positive for immunocompromised state.   Neurological: Positive for dizziness and light-headedness. Negative " for seizures, speech difficulty and numbness.         Physical Exam:  Physical Exam    Vital Signs:   There were no vitals taken for this visit.     Lab Results:   Clinical Support on 04/14/2022   Component Date Value Ref Range Status   • SARS Antigen 04/14/2022 Not Detected  Not Detected Final   • Internal Control 04/14/2022 Passed  Passed Final   • Lot Number 04/14/2022 [id number]   Final   • Expiration Date 04/14/2022 09/20/2023   Final   • Rapid Influenza A Ag 04/14/2022 Negative  Negative Final   • Rapid Influenza B Ag 04/14/2022 Negative  Negative Final   • Internal Control 04/14/2022 Passed  Passed Final   • Lot Number 04/14/2022 [id number]   Final   • Expiration Date 04/14/2022 07/01/2023   Final   • Rapid Strep A Screen 04/14/2022 Negative  Negative, VALID, INVALID, Not Performed Final   • Internal Control 04/14/2022 Passed  Passed Final   • Lot Number 04/14/2022 [id number]   Final   • Expiration Date 04/14/2022 08/02/2023   Final   • Throat Culture, Beta Strep 04/14/2022 No Beta Hemolytic Streptococcus Isolated   Final   • COVID19 04/14/2022 Not Detected  Not Detected - Ref. Range Final   Office Visit on 03/11/2022   Component Date Value Ref Range Status   • Amphetamine Screen, Urine 03/11/2022 Negative  Negative Final   • AMP INTERNAL CONTROL 03/11/2022 Passed  Passed Final   • Barbiturates Screen, Urine 03/11/2022 Negative  Negative Final   • BARBITURATE INTERNAL CONTROL 03/11/2022 Passed  Passed Final   • Buprenorphine, Screen, Urine 03/11/2022 Negative  Negative Final   • BUPRENORPHINE INTERNAL CONTROL 03/11/2022 Passed  Passed Final   • Benzodiazepine Screen, Urine 03/11/2022 Negative  Negative Final   • BENZODIAZEPINE INTERNAL CONTROL 03/11/2022 Passed  Passed Final   • Cocaine Screen, Urine 03/11/2022 Negative  Negative Final   • COCAINE INTERNAL CONTROL 03/11/2022 Passed  Passed Final   • MDMA (ECSTASY) 03/11/2022 Negative  Negative Final   • MDMA (ECSTASY) INTERNAL CONTROL 03/11/2022 Passed  Passed Final    • Methamphetamine, Ur 03/11/2022 Negative  Negative Final   • METHAMPHETAMINE INTERNAL CONTROL 03/11/2022 Passed  Passed Final   • Methadone Screen, Urine 03/11/2022 Negative  Negative Final   • METHADONE INTERNAL CONTROL 03/11/2022 Passed  Passed Final   • Opiate Screen 03/11/2022 Negative  Negative Final   • OPIATES INTERNAL CONTROL 03/11/2022 Passed  Passed Final   • Oxycodone Screen, Urine 03/11/2022 Negative  Negative Final   • OXYCODONE INTERNAL CONTROL 03/11/2022 Passed  Passed Final   • Phencyclidine (PCP), Urine 03/11/2022 Negative  Negative Final   • PHENCYCLIDINE INTERNAL CONTROL 03/11/2022 Passed  Passed Final   • THC, Screen, Urine 03/11/2022 Positive (A) Negative Final   • THC INTERNAL CONTROL 03/11/2022 Passed  Passed Final   • Lot Number 03/11/2022 [id number]   Final   • Expiration Date 03/11/2022 04/30/2022   Final   Office Visit on 02/15/2022   Component Date Value Ref Range Status   • Rapid Influenza A Ag 02/15/2022 Negative  Negative Final   • Rapid Influenza B Ag 02/15/2022 Negative  Negative Final   • Internal Control 02/15/2022 Passed  Passed Final   • Lot Number 02/15/2022 [id number]   Final   • Expiration Date 02/15/2022 05/23/2023   Final   • SARS Antigen 02/15/2022 Not Detected  Not Detected Final   • Internal Control 02/15/2022 Passed  Passed Final   • Lot Number 02/15/2022 [id number]   Final   • Expiration Date 02/15/2022 09/20/2023   Final   Lab on 02/08/2022   Component Date Value Ref Range Status   • Glucose 02/08/2022 75  65 - 99 mg/dL Final   • BUN 02/08/2022 8  6 - 20 mg/dL Final   • Creatinine 02/08/2022 1.04 (A) 0.57 - 1.00 mg/dL Final   • Sodium 02/08/2022 139  136 - 145 mmol/L Final   • Potassium 02/08/2022 4.1  3.5 - 5.2 mmol/L Final   • Chloride 02/08/2022 108 (A) 98 - 107 mmol/L Final   • CO2 02/08/2022 20.3 (A) 22.0 - 29.0 mmol/L Final   • Calcium 02/08/2022 8.9  8.6 - 10.5 mg/dL Final   • Total Protein 02/08/2022 6.7  6.0 - 8.5 g/dL Final   • Albumin 02/08/2022 4.00  3.50 - 5.20  g/dL Final   • ALT (SGPT) 02/08/2022 14  1 - 33 U/L Final   • AST (SGOT) 02/08/2022 13  1 - 32 U/L Final   • Alkaline Phosphatase 02/08/2022 90  39 - 117 U/L Final   • Total Bilirubin 02/08/2022 0.2  0.0 - 1.2 mg/dL Final   • eGFR   Amer 02/08/2022 71  >60 mL/min/1.73 Final   • Globulin 02/08/2022 2.7  gm/dL Final   • A/G Ratio 02/08/2022 1.5  g/dL Final   • BUN/Creatinine Ratio 02/08/2022 7.7  7.0 - 25.0 Final   • Anion Gap 02/08/2022 10.7  5.0 - 15.0 mmol/L Final   • WBC 02/08/2022 8.13  3.40 - 10.80 10*3/mm3 Final   • RBC 02/08/2022 4.77  3.77 - 5.28 10*6/mm3 Final   • Hemoglobin 02/08/2022 14.7  12.0 - 15.9 g/dL Final   • Hematocrit 02/08/2022 44.1  34.0 - 46.6 % Final   • MCV 02/08/2022 92.5  79.0 - 97.0 fL Final   • MCH 02/08/2022 30.8  26.6 - 33.0 pg Final   • MCHC 02/08/2022 33.3  31.5 - 35.7 g/dL Final   • RDW 02/08/2022 12.4  12.3 - 15.4 % Final   • RDW-SD 02/08/2022 42.4  37.0 - 54.0 fl Final   • MPV 02/08/2022 10.1  6.0 - 12.0 fL Final   • Platelets 02/08/2022 299  140 - 450 10*3/mm3 Final   • Neutrophil % 02/08/2022 61.1  42.7 - 76.0 % Final   • Lymphocyte % 02/08/2022 30.6  19.6 - 45.3 % Final   • Monocyte % 02/08/2022 5.2  5.0 - 12.0 % Final   • Eosinophil % 02/08/2022 2.3  0.3 - 6.2 % Final   • Basophil % 02/08/2022 0.6  0.0 - 1.5 % Final   • Immature Grans % 02/08/2022 0.2  0.0 - 0.5 % Final   • Neutrophils, Absolute 02/08/2022 4.96  1.70 - 7.00 10*3/mm3 Final   • Lymphocytes, Absolute 02/08/2022 2.49  0.70 - 3.10 10*3/mm3 Final   • Monocytes, Absolute 02/08/2022 0.42  0.10 - 0.90 10*3/mm3 Final   • Eosinophils, Absolute 02/08/2022 0.19  0.00 - 0.40 10*3/mm3 Final   • Basophils, Absolute 02/08/2022 0.05  0.00 - 0.20 10*3/mm3 Final   • Immature Grans, Absolute 02/08/2022 0.02  0.00 - 0.05 10*3/mm3 Final   • nRBC 02/08/2022 0.0  0.0 - 0.2 /100 WBC Final   Admission on 01/21/2022, Discharged on 01/21/2022   Component Date Value Ref Range Status   • COVID19 01/21/2022 Detected (A) Not Detected -  Ref. Range Final   Admission on 12/31/2021, Discharged on 01/01/2022   Component Date Value Ref Range Status   • Target HR (85%) 12/31/2021 153  bpm Final   • Max. Pred. HR (100%) 12/31/2021 180  bpm Final   • Right Internal Jugular Compress 12/31/2021 C   Final   • Right Internal Jugular Phasic 12/31/2021 N   Final   • Right Internal Jugular Spont 12/31/2021 Y   Final   • Left Internal Jugular Compress 12/31/2021 C   Final   • Left Internal Jugular Phasic 12/31/2021 Y   Final   • Left Internal Jugular Spont 12/31/2021 Y   Final   • Right Subclavian Compress 12/31/2021 C   Final   • Right Subclavian Phasic 12/31/2021 N   Final   • Right Subclavian Spont 12/31/2021 Y   Final   • Left Subclavian Augment 12/31/2021 Y   Final   • Left Subclavian Compress 12/31/2021 C   Final   • Left Subclavian Phasic 12/31/2021 Y   Final   • Left Subclavian Spont 12/31/2021 Y   Final   • Left Axillary Augment 12/31/2021 Y   Final   • Left Axillary Compress 12/31/2021 C   Final   • Left Axillary Phasic 12/31/2021 Y   Final   • Left Axillary Spont 12/31/2021 Y   Final   • Left Brachial Augment 12/31/2021 Y   Final   • Left Brachial Compress 12/31/2021 C   Final   • Left Radial Augment 12/31/2021 Y   Final   • Left Radial Compress 12/31/2021 C   Final   • Left Ulnar Augment 12/31/2021 Y   Final   • Left Ulnar Compress 12/31/2021 C   Final   • Left Basilic Upper Compress 12/31/2021 C   Final   • Left Basilic Forearm Compress 12/31/2021 C   Final   • Left Cephalic Upper Compress 12/31/2021 C   Final   • Left Cephalic Forearm Compress 12/31/2021 C   Final   Office Visit on 12/28/2021   Component Date Value Ref Range Status   • Total Cholesterol 12/28/2021 150  0 - 200 mg/dL Final   • Triglycerides 12/28/2021 69  0 - 150 mg/dL Final   • HDL Cholesterol 12/28/2021 64 (A) 40 - 60 mg/dL Final   • LDL Cholesterol  12/28/2021 72  0 - 100 mg/dL Final   • VLDL Cholesterol 12/28/2021 14  5 - 40 mg/dL Final   • LDL/HDL Ratio 12/28/2021 1.13   Final    • Glucose 12/28/2021 95  65 - 99 mg/dL Final   • BUN 12/28/2021 10  6 - 20 mg/dL Final   • Creatinine 12/28/2021 1.12 (A) 0.57 - 1.00 mg/dL Final   • Sodium 12/28/2021 141  136 - 145 mmol/L Final   • Potassium 12/28/2021 4.4  3.5 - 5.2 mmol/L Final   • Chloride 12/28/2021 111 (A) 98 - 107 mmol/L Final   • CO2 12/28/2021 21.4 (A) 22.0 - 29.0 mmol/L Final   • Calcium 12/28/2021 9.5  8.6 - 10.5 mg/dL Final   • Total Protein 12/28/2021 6.9  6.0 - 8.5 g/dL Final   • Albumin 12/28/2021 4.50  3.50 - 5.20 g/dL Final   • ALT (SGPT) 12/28/2021 15  1 - 33 U/L Final   • AST (SGOT) 12/28/2021 15  1 - 32 U/L Final   • Alkaline Phosphatase 12/28/2021 98  39 - 117 U/L Final   • Total Bilirubin 12/28/2021 0.3  0.0 - 1.2 mg/dL Final   • eGFR   Amer 12/28/2021 65  >60 mL/min/1.73 Final   • Globulin 12/28/2021 2.4  gm/dL Final   • A/G Ratio 12/28/2021 1.9  g/dL Final   • BUN/Creatinine Ratio 12/28/2021 8.9  7.0 - 25.0 Final   • Anion Gap 12/28/2021 8.6  5.0 - 15.0 mmol/L Final   • Hemoglobin A1C 12/28/2021 4.94  4.80 - 5.60 % Final   • Sed Rate 12/28/2021 20  0 - 20 mm/hr Final   • TSH 12/28/2021 1.100  0.270 - 4.200 uIU/mL Final   • WBC 12/28/2021 7.36  3.40 - 10.80 10*3/mm3 Final   • RBC 12/28/2021 4.91  3.77 - 5.28 10*6/mm3 Final   • Hemoglobin 12/28/2021 15.0  12.0 - 15.9 g/dL Final   • Hematocrit 12/28/2021 45.2  34.0 - 46.6 % Final   • MCV 12/28/2021 92.1  79.0 - 97.0 fL Final   • MCH 12/28/2021 30.5  26.6 - 33.0 pg Final   • MCHC 12/28/2021 33.2  31.5 - 35.7 g/dL Final   • RDW 12/28/2021 12.4  12.3 - 15.4 % Final   • RDW-SD 12/28/2021 41.7  37.0 - 54.0 fl Final   • MPV 12/28/2021 10.5  6.0 - 12.0 fL Final   • Platelets 12/28/2021 265  140 - 450 10*3/mm3 Final   • Neutrophil % 12/28/2021 62.4  42.7 - 76.0 % Final   • Lymphocyte % 12/28/2021 26.9  19.6 - 45.3 % Final   • Monocyte % 12/28/2021 7.5  5.0 - 12.0 % Final   • Eosinophil % 12/28/2021 2.0  0.3 - 6.2 % Final   • Basophil % 12/28/2021 0.8  0.0 - 1.5 % Final   •  Immature Grans % 12/28/2021 0.4  0.0 - 0.5 % Final   • Neutrophils, Absolute 12/28/2021 4.59  1.70 - 7.00 10*3/mm3 Final   • Lymphocytes, Absolute 12/28/2021 1.98  0.70 - 3.10 10*3/mm3 Final   • Monocytes, Absolute 12/28/2021 0.55  0.10 - 0.90 10*3/mm3 Final   • Eosinophils, Absolute 12/28/2021 0.15  0.00 - 0.40 10*3/mm3 Final   • Basophils, Absolute 12/28/2021 0.06  0.00 - 0.20 10*3/mm3 Final   • Immature Grans, Absolute 12/28/2021 0.03  0.00 - 0.05 10*3/mm3 Final   • nRBC 12/28/2021 0.0  0.0 - 0.2 /100 WBC Final   Clinical Support on 12/22/2021   Component Date Value Ref Range Status   • SARS Antigen 12/22/2021 Not Detected  Not Detected Final   • Influenza A Antigen BIANCA 12/22/2021 Not Detected   Final   • Influenza B Antigen BIANCA 12/22/2021 Not Detected   Final   • Internal Control 12/22/2021 Passed  Passed Final   • Lot Number 12/22/2021 [id number]   Final   • Expiration Date 12/22/2021 [id number]   Final   • COVID19 12/22/2021 Not Detected  Not Detected - Ref. Range Final       EKG Results:  No orders to display       Imaging Results:  No Images in the past 120 days found..      Assessment & Plan   Diagnoses and all orders for this visit:    1. Major depressive disorder, recurrent episode, moderate (HCC) (Primary)  -     buPROPion XL (WELLBUTRIN XL) 300 MG 24 hr tablet; Take 1 tablet by mouth Every Morning.  Dispense: 90 tablet; Refill: 1  -     busPIRone (BUSPAR) 10 MG tablet; Take 1 tablet by mouth 2 (Two) Times a Day.  Dispense: 60 tablet; Refill: 5  -     DULoxetine (CYMBALTA) 30 MG capsule; Take 1 capsule by mouth Daily.  Dispense: 90 capsule; Refill: 0  -     DULoxetine (CYMBALTA) 60 MG capsule; Take 1 capsule by mouth Daily. In addition to the Duloxetine 30mg  Dispense: 90 capsule; Refill: 1    2. Generalized anxiety disorder  -     buPROPion XL (WELLBUTRIN XL) 300 MG 24 hr tablet; Take 1 tablet by mouth Every Morning.  Dispense: 90 tablet; Refill: 1  -     busPIRone (BUSPAR) 10 MG tablet; Take 1 tablet by  mouth 2 (Two) Times a Day.  Dispense: 60 tablet; Refill: 5  -     DULoxetine (CYMBALTA) 30 MG capsule; Take 1 capsule by mouth Daily.  Dispense: 90 capsule; Refill: 0  -     DULoxetine (CYMBALTA) 60 MG capsule; Take 1 capsule by mouth Daily. In addition to the Duloxetine 30mg  Dispense: 90 capsule; Refill: 1    3. Insomnia due to mental condition        Visit Diagnoses:    ICD-10-CM ICD-9-CM   1. Major depressive disorder, recurrent episode, moderate (HCC)  F33.1 296.32   2. Generalized anxiety disorder  F41.1 300.02   3. Insomnia due to mental condition  F51.05 300.9     327.02     6/7: Consider therapy here. Stable, well, no SE. 17 minutes of supportive psychotherapy with goal to strengthen defenses, promote problems solving, restore adaptive functioning and provide symptom relief. The therapeutic alliance was strengthened to encourage the patient to express their thoughts and feelings. Esteem building was enhanced through praise, reassurance, normalizing and encouragement. Coping skills were enhanced to build distress tolerance skills and emotional regulation. Allowed patient to freely discuss issues without interruption or judgement with unconditional positive regard, active listening skills, and empathy. Provided a safe, confidential environment to facilitate the development of a positive therapeutic relationship and encourage open, honest communication. Assisted patient in identifying risk factors which would indicate the need for higher level of care including thoughts to harm self or others and/or self-harming behavior and encouraged patient to contact this office, call 911, or present to the nearest emergency room should any of these events occur. Assisted patient in processing session content; acknowledged and normalized patient’s thoughts, feelings, and concerns by utilizing a person-centered approach in efforts to build appropriate rapport and a positive therapeutic relationship with open and honest  communication. Patient given education on medication side effects, diagnosis/illness and relapse symptoms. Plan to continue supportive psychotherapy in next appointment to provide symptom relief.  Diagnoses: as above  Symptoms: as above  Functional status: good  Mental Status Exam: as above    Treatment plan: Medication management and supportive psychotherapy  Prognosis: good  Progress: better  2 mos      4/18: Doing well, no hallucinations, depression and anxiety are basically under control.  Patient is accomplishing her goals to lose weight, etc., which is helping.  7 minutes of supportive psychotherapy with goal to strengthen defenses, promote problems solving, restore adaptive functioning and provide symptom relief. The therapeutic alliance was strengthened to encourage the patient to express their thoughts and feelings. Esteem building was enhanced through praise, reassurance, normalizing and encouragement. Coping skills were enhanced to build distress tolerance skills and emotional regulation. Patient given education on medication side effects, diagnosis/illness and relapse symptoms. Plan to continue supportive psychotherapy in next appointment to provide symptom relief.  6 weeks    3/7: Discontinue Abilify due to weight gain.  Now denies having any hallucinations.  Patient to monitor her weight.  Patient will also keep me posted on her relationship with her  which is no doubt a contributor to depression and anxiety.  16 minutes of supportive psychotherapy with goal to strengthen defenses, promote problems solving, restore adaptive functioning and provide symptom relief. The therapeutic alliance was strengthened to encourage the patient to express their thoughts and feelings. Esteem building was enhanced through praise, reassurance, normalizing and encouragement. Coping skills were enhanced to build distress tolerance skills and emotional regulation. Patient given education on medication side effects,  diagnosis/illness and relapse symptoms. Plan to continue supportive psychotherapy in next appointment to provide symptom relief.  5 weeks    2/17: Increase Abilify.  Patient is fearful that she will develop schizophrenia and deteriorate like her family.  She will start therapy as well.  18 minutes of supportive psychotherapy with goal to strengthen defenses, promote problems solving, restore adaptive functioning and provide symptom relief. The therapeutic alliance was strengthened to encourage the patient to express their thoughts and feelings. Esteem building was enhanced through praise, reassurance, normalizing and encouragement. Coping skills were enhanced to build distress tolerance skills and emotional regulation. Patient given education on medication side effects, diagnosis/illness and relapse symptoms. Plan to continue supportive psychotherapy in next appointment to provide symptom relief.  4 weeks    1/11: Significant improvement in symptoms, however residual auditory hallucinations.  Increase Abilify. 5 minutes of supportive psychotherapy with goal to strengthen defenses, promote problems solving, restore adaptive functioning and provide symptom relief. The therapeutic alliance was strengthened to encourage the patient to express their thoughts and feelings. Esteem building was enhanced through praise, reassurance, normalizing and encouragement. Coping skills were enhanced to build distress tolerance skills and emotional regulation. Patient given education on medication side effects, diagnosis/illness and relapse symptoms. Plan to continue supportive psychotherapy in next appointment to provide symptom relief.  4 weeks    12/14: Mood reactivity versus actual bipolar II disorder. Take strattera every other day for 3 doses then stop. Start abilify 5 mg day. 20 minutes of supportive psychotherapy with goal to strengthen defenses, promote problems solving, restore adaptive functioning and provide symptom  relief. The therapeutic alliance was strengthened to encourage the patient to express their thoughts and feelings. Esteem building was enhanced through praise, reassurance, normalizing and encouragement. Coping skills were enhanced to build distress tolerance skills and emotional regulation. Patient given education on medication side effects, diagnosis/illness and relapse symptoms. Plan to continue supportive psychotherapy in next appointment to provide symptom relief.  4 wks.    11/10: Start melatonin, restart duloxetine 17 minutes of supportive psychotherapy with goal to strengthen defenses, promote problems solving, restore adaptive functioning and provide symptom relief. The therapeutic alliance was strengthened to encourage the patient to express their thoughts and feelings. Esteem building was enhanced through praise, reassurance, normalizing and encouragement. Coping skills were enhanced to build distress tolerance skills and emotional regulation. Patient given education on medication side effects, diagnosis/illness and relapse symptoms. Plan to continue supportive psychotherapy in next appointment to provide symptom relief.  4 wks.    10/13: Better mood. Reduce strattera to 40 mg nightly (not daily, it is sedating). 4 wks.    9/29: Records release will be signed by patient.  Patient is unsure if Strattera helped.  Reduce the dose.  Residual depressive symptoms.  Increase duloxetine.  Continue gabapentin and BuSpar.  Therapy referral made.  See back in 2 weeks to try to titrate off of Strattera entirely.  It does not sound like she has ever tried extended release formulations of stimulants for ADHD.  Does not sound like she got neuropsychological testing for ADHD.  Rule out gorge.    PLAN:  98. Safety: No acute safety concerns  99. Therapy:  Referral made.  100. Risk Assessment: Risk of self-harm acutely is moderate.  Risk factors include anxiety disorder, mood disorder, and recent psychosocial stressors  (pandemic). Protective factors include no family history, denies access to guns/weapons, no present SI, no history of suicide attempts or self-harm in the past, minimal AODA, healthcare seeking, future orientation, willingness to engage in care.  Risk of self-harm chronically is also moderate, but could be further elevated in the event of treatment noncompliance and/or AODA.  101. Meds:.  a. CONTINUE duloxetine 90 mg a day. Risks, benefits, alternatives discussed with patient including GI upset, nausea vomiting diarrhea, theoretical decrease of seizure threshold predisposing the patient to a slightly higher seizure risk, headaches, sexual dysfunction, serotonin syndrome, bleeding risk, increased suicidality in patients 24 years and younger.  Also constipation and urinary retention.  After discussion of these risks and benefits, the patient voiced understanding and agreed to proceed.  b. CONTINUE BuSpar 10 mg p.o. twice daily (refilled today). Risks, benefits, alternatives discussed with patient including nausea, GI upset, mild sedation, falls risk.  After discussion of these risks and benefits, the patient voiced understanding and agreed to proceed.  c. CONTINUE bupropion  mg p.o. daily. Risks, benefits, alternatives discussed with patient including nausea, GI upset, increased energy, exacerbation of irritability, insomnia, lowering of seizure threshold.  After discussion of these risks and benefits, the patient voiced understanding and agreed to proceed.  d. CONTINUE gabapentin 600 mg PO TID. Risks, benefits, alternatives discussed with patient including sedation, dizziness/falls risk, GI upset, weight gain.  After discussion of these risks and benefits, the patient voiced understanding and agreed to proceed. Ramin MAURICIO ordered. Verbally signed controlled substances agreement.  e. S/P:  i. Strattera 40 mg daily: stopped 1/11/21 to reduce medication regimen.  Might have been beneficial.  ii. Never started  melatonin  iii. abilify 4 mg daily.  Wg.  Months.  102. Labs: no recs  103. Follow up: 6 wks    Patient screened positive for depression based on a PHQ-9 score of  on . Follow-up recommendations include: Prescribed antidepressant medication treatment.           TREATMENT PLAN/GOALS: Continue supportive psychotherapy efforts and medications as indicated. Treatment and medication options discussed during today's visit. Patient acknowledged and verbally consented to continue with current treatment plan and was educated on the importance of compliance with treatment and follow-up appointments.    MEDICATION ISSUES:  LILIAN reviewed as expected.  Discussed medication options and treatment plan of prescribed medication as well as the risks, benefits, and side effects including potential falls, possible impaired driving and metabolic adversities among others. Patient is agreeable to call the office with any worsening of symptoms or onset of side effects. Patient is agreeable to call 911 or go to the nearest ER should he/she begin having SI/HI. No medication side effects or related complaints today.     MEDS ORDERED DURING VISIT:  New Medications Ordered This Visit   Medications   • buPROPion XL (WELLBUTRIN XL) 300 MG 24 hr tablet     Sig: Take 1 tablet by mouth Every Morning.     Dispense:  90 tablet     Refill:  1   • busPIRone (BUSPAR) 10 MG tablet     Sig: Take 1 tablet by mouth 2 (Two) Times a Day.     Dispense:  60 tablet     Refill:  5   • DULoxetine (CYMBALTA) 30 MG capsule     Sig: Take 1 capsule by mouth Daily.     Dispense:  90 capsule     Refill:  0     Total dose is 30 + 60 = 90 mg daily.   • DULoxetine (CYMBALTA) 60 MG capsule     Sig: Take 1 capsule by mouth Daily. In addition to the Duloxetine 30mg     Dispense:  90 capsule     Refill:  1       Return in about 2 months (around 8/7/2022).         This document has been electronically signed by Brooks Lynn MD  June 7, 2022 11:44 EDT      Part of this note  may be an electronic transcription/translation of spoken language to printed text using the Dragon Dictation System.

## 2022-06-07 NOTE — PATIENT INSTRUCTIONS
1.  Please return to clinic at your next scheduled visit.  Contact the clinic ([phone]) at least 24 hours prior in the event you need to cancel.  2.  Do no harm to yourself or others.    3.  Avoid alcohol and drugs.    4.  Take all medications as prescribed.  Please contact the clinic with any concerns. If you are in need of medication refills, please call the clinic at [phone].    5. Should you want to get in touch with your provider, Dr. Brooks Lynn, please utilize [email or web address] or contact the office ([phone]), and staff will be able to page Dr. Lynn directly.  6.  In the event you have personal crisis, contact the following crisis numbers: Suicide Prevention Hotline 1-894.145.4547; [facility] Helpline 4-678-410-HUSM; [facility] Emergency Room [phone]; text HELLO to 918611; or 306.     SPECIFIC RECOMMENDATIONS:     1.      Medications discussed at this encounter:                   - no changes     2.      Psychotherapy recommendations:      3.     Return to clinic: 2 mos

## 2022-06-21 ENCOUNTER — OFFICE VISIT (OUTPATIENT)
Dept: INTERNAL MEDICINE | Facility: CLINIC | Age: 42
End: 2022-06-21

## 2022-06-21 ENCOUNTER — NUTRITION (OUTPATIENT)
Dept: NUTRITION | Facility: HOSPITAL | Age: 42
End: 2022-06-21

## 2022-06-21 VITALS
HEART RATE: 84 BPM | BODY MASS INDEX: 46.4 KG/M2 | HEIGHT: 64 IN | SYSTOLIC BLOOD PRESSURE: 136 MMHG | TEMPERATURE: 97.9 F | WEIGHT: 271.8 LBS | OXYGEN SATURATION: 98 % | DIASTOLIC BLOOD PRESSURE: 91 MMHG

## 2022-06-21 DIAGNOSIS — G89.29 CHRONIC NONINTRACTABLE HEADACHE, UNSPECIFIED HEADACHE TYPE: ICD-10-CM

## 2022-06-21 DIAGNOSIS — R51.9 CHRONIC NONINTRACTABLE HEADACHE, UNSPECIFIED HEADACHE TYPE: ICD-10-CM

## 2022-06-21 DIAGNOSIS — R63.5 WEIGHT GAIN: ICD-10-CM

## 2022-06-21 DIAGNOSIS — R60.0 LOCALIZED EDEMA: Primary | ICD-10-CM

## 2022-06-21 DIAGNOSIS — L73.2 HIDRADENITIS SUPPURATIVA: ICD-10-CM

## 2022-06-21 PROCEDURE — 97802 MEDICAL NUTRITION INDIV IN: CPT

## 2022-06-21 PROCEDURE — 99214 OFFICE O/P EST MOD 30 MIN: CPT | Performed by: INTERNAL MEDICINE

## 2022-06-21 RX ORDER — EPINEPHRINE 0.3 MG/.3ML
0.3 INJECTION SUBCUTANEOUS ONCE
Qty: 1 EACH | Refills: 3 | Status: SHIPPED | OUTPATIENT
Start: 2022-06-21 | End: 2022-06-21

## 2022-06-21 RX ORDER — SPIRONOLACTONE AND HYDROCHLOROTHIAZIDE 25; 25 MG/1; MG/1
1 TABLET ORAL DAILY
Qty: 90 TABLET | Refills: 1 | Status: SHIPPED | OUTPATIENT
Start: 2022-06-21 | End: 2022-07-22 | Stop reason: ALTCHOICE

## 2022-06-21 NOTE — CONSULTS
Nutrition Services    Patient Name: Malinda Saucedo  YOB: 1980  MRN: [id number]  Appointment: 06/23/22 10:18 EDT    Nutrition Assessment      Reason for Assessment Malinda Saucedo is a 42 y.o. female being seen as initial appointment for Weight management.      H&P:    Past Medical History:   Diagnosis Date   • Allergic    • Anxiety    • Arthritis    • Chronic pain disorder    • Deep vein thrombosis (HCC)    • Depression    • Ectopic pregnancy without intrauterine pregnancy 1/25/2019    Formatting of this note might be different from the original. - Day 1 = 1/25/19 -> beta 4,927 MTX #1 given (110mg) - Day 4 = 1/28/19 -> beta 11,077 - Day 7 = 1/31/19 -> beta 11,923 MTX #2 given (110mg) - Day 11 = 2/5/19 -> beta 11,406 - Day 14 = 2/8/19 -> scheduled visit and beta - Day 19 =  2/13/19 -> beta 6,584 - Day 26 = 2/20/19 -> beta 3,111 - Day 34 = 3/1/19 -> [facility] 553 [facility]   • Endometriosis    • Fibroids    • Headache    • Hidradenitis    • Low back pain 2013?    DDD   • Obesity    • Panic disorder    • PTSD (post-traumatic stress disorder)           Labs/Medications         Pertinent Labs Reviewed.     COVID19   Date Value Ref Range Status   04/14/2022 Not Detected Not Detected - Ref. Range Final     Lab Results   Component Value Date    HGBA1C 4.94 12/28/2021         Pertinent Medications Reviewed.     Nutrition/Diet History         Narrative     Pt states she is contemplating having a gastric sleeve done in the next year. She works in a sedentary job. Has a 6 year old child. Does deal with hypoglycemia. Spouse typically cooks dinner.   Usual Intake Cereal with milk or chickfila Rodriguez, egg, and cheese sandwich with a parfait.     Snacks between sessions - candy, chocolate, poptarts, chips    Skips lunch    Meat with a vegetables (asparagus, green beans, zucchini)    Drinks 2 regular sodas daily  Coffee with three creamers (sweetened), and 6 splendas    Snacks on candy at home  "  Factors Affecting Intake No issues chewing or swallowing   Support System Self   Activity Level Sedentary   Motivation/Barriers Pt is in contemplation stage     Anthropometrics         Current Height, Weight Height: 162.6 cm (64\")  Weight: 123 kg (271 lb 2.7 oz)    Current BMI Body mass index is 46.55 kg/m².         Weight Hx  Wt Readings from Last 30 Encounters:   06/23/22 1017 123 kg (271 lb 2.7 oz)   06/21/22 1111 123 kg (271 lb 12.8 oz)   05/24/22 1602 122 kg (268 lb 6.4 oz)   05/13/22 0900 122 kg (270 lb)   04/21/22 1611 122 kg (268 lb 12.8 oz)   03/11/22 0845 120 kg (265 lb 6.4 oz)   03/04/22 1300 118 kg (260 lb)   02/15/22 1227 118 kg (260 lb 6.4 oz)   12/31/21 1939 117 kg (257 lb 15 oz)   12/28/21 1100 116 kg (254 lb 12.8 oz)   12/17/21 1300 127 kg (280 lb)   12/08/21 1015 115 kg (253 lb 8 oz)   11/11/21 1636 111 kg (244 lb 9.6 oz)   09/10/21 1427 115 kg (253 lb 9.6 oz)           Wt Change Observation +8kg in 9 months     Estimated/Assessed Needs    Using IBW of 55kg    Calories 1375 kcal (25 kcal/kg)    Protein 55 gPro (1.0 g/kg)    Fluid 1375 ml (55 ml/kg)     Nutrition Diagnosis         PES Overweight/Obesity related to lack of prior nutrition related education as evidenced by unintended wt change., body composition changes. and patient report.       Nutrition Intervention        RD Action Provided Medical Nutrition Therapy for obesity   Goals Pt established the following goals:  1. Decrease intake of sugar sweetened beverages  2. Increase physical activity by walking for 15 minutes three days a week  3. Increase protein intake at breakfast and snacks    Adapted based on patient readiness, skills, resources, culture, and lifestyle    Established intervention with patient collaboration    Offered action strategies and steps to help patient reach nutrition related goals     Medical Nutrition Therapy/Nutrition Education       Learner   Readiness Patient  Eager   Method   Response Explanation  Verbalizes " understanding      Monitor/Evaluation         Copy of current note sent to referring physician, Follow up with RD PRN and Pt to self-monitor       Electronically signed by:  Vale Rowley RD  06/23/22 10:18 EDT  Pt seen from: [id number]

## 2022-06-23 ENCOUNTER — PATIENT ROUNDING (BHMG ONLY) (OUTPATIENT)
Dept: INTERNAL MEDICINE | Facility: CLINIC | Age: 42
End: 2022-06-23

## 2022-06-23 VITALS — HEIGHT: 64 IN | BODY MASS INDEX: 46.29 KG/M2 | WEIGHT: 271.17 LBS

## 2022-06-24 ENCOUNTER — TELEPHONE (OUTPATIENT)
Dept: INTERNAL MEDICINE | Facility: CLINIC | Age: 42
End: 2022-06-24

## 2022-06-24 DIAGNOSIS — J03.91 RECURRENT TONSILLITIS: Primary | ICD-10-CM

## 2022-06-24 DIAGNOSIS — F50.81 BINGE EATING DISORDER: ICD-10-CM

## 2022-06-24 NOTE — TELEPHONE ENCOUNTER
CONTROLLED MEDICATION REFILL REQUEST    STATE REGULATION APPT EVERY 3 MONTHS    UDS(URINE DRUG SCREEN) EVERY 6 MONTHS    NEW NARC CONSENT EVERY YEAR     URINE DRUG SCREEN: POC Urine Drug Screen [facility] (03/11/2022 10:06)    LAST OFFICE VISIT: Office Visit with Ant Carlos Jr., MD (06/21/2022)    NARC CONSENT: CONTROLLED SUBSTANCE AGREEMENT - SCAN - cONTROLLED SUBSTANCE AGREEMENT (03/11/2022)    NEXT OFFICE VISIT: Appointment with Ant Carlos Jr., MD (07/22/2022)    MEDICATION: lisdexamfetamine (Vyvanse) 70 MG capsule (05/24/2022)

## 2022-06-24 NOTE — TELEPHONE ENCOUNTER
Caller: Malinda Saucedo    Relationship: Self    Best call back number: [phone]    Requested Prescriptions:   Requested Prescriptions     Pending Prescriptions Disp Refills   • lisdexamfetamine (Vyvanse) 70 MG capsule 30 capsule 0     Sig: Take 1 capsule by mouth Every Morning        Pharmacy where request should be sent: [id number] - [place], [place] - [address] [facility] AT [facility] OF [address] & [place] - [phone] [facility] [phone] FX         Does the patient have less than a 3 day supply:  [x] Yes  [] No    Carlos Leon Rep   06/24/22 11:53 EDT

## 2022-06-24 NOTE — TELEPHONE ENCOUNTER
Caller: Malinda Saucedo    Relationship: Self    Best call back number: [phone]    What is the medical concern/diagnosis: SINUS INFECTIONS    What specialty or service is being requested: TONSIL REMOVAL    What is the provider, practice or medical service name:[facility]     What is the office location:[address] [facility]

## 2022-07-20 ENCOUNTER — APPOINTMENT (OUTPATIENT)
Dept: NUTRITION | Facility: HOSPITAL | Age: 42
End: 2022-07-20

## 2022-07-22 ENCOUNTER — OFFICE VISIT (OUTPATIENT)
Dept: INTERNAL MEDICINE | Facility: CLINIC | Age: 42
End: 2022-07-22

## 2022-07-22 VITALS
TEMPERATURE: 96.6 F | WEIGHT: 258.4 LBS | DIASTOLIC BLOOD PRESSURE: 91 MMHG | HEIGHT: 64 IN | HEART RATE: 111 BPM | SYSTOLIC BLOOD PRESSURE: 139 MMHG | BODY MASS INDEX: 44.12 KG/M2 | OXYGEN SATURATION: 97 %

## 2022-07-22 DIAGNOSIS — R51.9 CHRONIC NONINTRACTABLE HEADACHE, UNSPECIFIED HEADACHE TYPE: ICD-10-CM

## 2022-07-22 DIAGNOSIS — L73.2 HIDRADENITIS SUPPURATIVA: ICD-10-CM

## 2022-07-22 DIAGNOSIS — F50.81 BINGE EATING DISORDER: Primary | ICD-10-CM

## 2022-07-22 DIAGNOSIS — R60.0 LOCALIZED EDEMA: ICD-10-CM

## 2022-07-22 DIAGNOSIS — G89.29 CHRONIC NONINTRACTABLE HEADACHE, UNSPECIFIED HEADACHE TYPE: ICD-10-CM

## 2022-07-22 DIAGNOSIS — Z23 NEED FOR VACCINATION: ICD-10-CM

## 2022-07-22 PROCEDURE — 99214 OFFICE O/P EST MOD 30 MIN: CPT | Performed by: INTERNAL MEDICINE

## 2022-07-22 RX ORDER — EPINEPHRINE 0.3 MG/.3ML
0.3 INJECTION SUBCUTANEOUS ONCE AS NEEDED
COMMUNITY
Start: 2022-06-22

## 2022-07-22 NOTE — PROGRESS NOTES
Chief Complaint  Weight Loss (4 week follow up)    Subjective          Malinda Sue Saucedo presents to [facility]  History of Present Illness  Weight loss- patient is down 13 lbs in approx 1 month. Patient drinking less sugary drinks. Patient does think vyvanse is helping. Patient noticed big difference when she skipped a dose.   Edema- patient unsure if urinating more often, but she does think the edema is improved. Weight is decreased.   Migraines- patient reports improvement in her Has as well. Patient thinks also related to teeth grinding. Hydroxyzine helps.   HS- patient reports improvement in aldactazide.       Current Outpatient Medications   Medication Instructions   • aspirin 81 mg, Oral, Daily   • buPROPion XL (WELLBUTRIN XL) 300 mg, Oral, Every Morning   • busPIRone (BUSPAR) 10 mg, Oral, 2 Times Daily   • cetirizine (ZYRTEC) 10 mg, Oral, Daily   • clobetasol (TEMOVATE) 0.05 % external solution clobetasol 0.05 % scalp solution   • DULoxetine (CYMBALTA) 30 mg, Oral, Daily   • DULoxetine (CYMBALTA) 60 mg, Oral, Daily, In addition to the Duloxetine 30mg   • Elagolix Sodium (Orilissa) 150 MG tablet Oral   • EPINEPHrine (EPIPEN) 0.3 MG/0.3ML solution auto-injector injection 0.3 mL, Subcutaneous, Once As Needed   • gabapentin (NEURONTIN) 600 MG tablet TAKE 1 TABLET BY MOUTH EVERY 8 HOURS AS DIRECTED   • Humira Pen 40 MG/0.4ML Pen-injector Kit No dose, route, or frequency recorded.   • ibuprofen (ADVIL,MOTRIN) 800 mg, Oral, Every 8 Hours PRN   • lisdexamfetamine (VYVANSE) 70 mg, Oral, Every Morning   • montelukast (SINGULAIR) 10 mg, Oral, Nightly   • spironolactone-hydrochlorothiazide (Aldactazide) 50-50 MG per tablet 1 tablet, Oral, Daily       The following portions of the patient's history were reviewed and updated as appropriate: allergies, current medications, past family history, past medical history, past social history, past surgical history, and problem  "list.    Objective   Vital Signs:   /91   Pulse 111   Temp 96.6 °F (35.9 °C)   Ht 162.6 cm (64\")   Wt 117 kg (258 lb 6.4 oz)   SpO2 97%   BMI 44.35 kg/m²     Wt Readings from Last 3 Encounters:   07/22/22 117 kg (258 lb 6.4 oz)   06/23/22 123 kg (271 lb 2.7 oz)   06/21/22 123 kg (271 lb 12.8 oz)     BP Readings from Last 3 Encounters:   07/22/22 139/91   06/21/22 136/91   05/24/22 131/82     Physical Exam   Appearance: No acute distress, well-nourished  Head: normocephalic, atraumatic  Eyes: extraocular movements intact, no scleral icterus, no conjunctival injection  Ears, Nose, and Throat: external ears normal, nares patent, moist mucous membranes  Cardiovascular: regular rate and rhythm. no murmurs, rubs, or gallops. no edema  Respiratory: breathing comfortably, symmetric chest rise, clear to auscultation bilaterally. No wheezes, rales, or rhonchi.  Neuro: alert and oriented to time, place, and person. Normal gait  Psych: normal mood and affect     Result Review :   The following data was reviewed by: Ant Carlos Jr, MD on 07/22/2022:  Common labs    Common Labsle 12/28/21 12/28/21 12/28/21 12/28/21 2/8/22 2/8/22    1147 1147 1147 1147 1645 1645   Glucose    95  75   BUN    10  8   Creatinine    1.12 (A)  1.04 (A)   eGFR African Am    65  71   Sodium    141  139   Potassium    4.4  4.1   Chloride    111 (A)  108 (A)   Calcium    9.5  8.9   Albumin    4.50  4.00   Total Bilirubin    0.3  0.2   Alkaline Phosphatase    98  90   AST (SGOT)    15  13   ALT (SGPT)    15  14   WBC 7.36    8.13    Hemoglobin 15.0    14.7    Hematocrit 45.2    44.1    Platelets 265    299    Total Cholesterol   150      Triglycerides   69      HDL Cholesterol   64 (A)      LDL Cholesterol    72      Hemoglobin A1C  4.94       (A) Abnormal value              Lab Results   Component Value Date    SARSANTIGEN Not Detected 04/14/2022    COVID19 Not Detected 04/14/2022    RAPFLUA Negative 04/14/2022    RAPFLUB Negative " 04/14/2022    FLUAAG Not Detected 12/22/2021    FLUBAG Not Detected 12/22/2021    RAPSCRN Negative 04/14/2022    INR 1.1 02/05/2019        Assessment and Plan    Diagnoses and all orders for this visit:    1. Binge eating disorder (Primary)  Comments:  cont vyvanse to help with appetite suppression. UDS and isabela reviewed.  Orders:  -     lisdexamfetamine (Vyvanse) 70 MG capsule; Take 1 capsule by mouth Every Morning  Dispense: 30 capsule; Refill: 0    2. Need for vaccination  -     Tdap Vaccine Greater Than or Equal To 8yo IM    3. Localized edema  Comments:  improved. cont diuretic. hope for help with HAs and BP control as well  Orders:  -     spironolactone-hydrochlorothiazide (Aldactazide) 50-50 MG per tablet; Take 1 tablet by mouth Daily.  Dispense: 90 tablet; Refill: 3    4. Hidradenitis suppurativa  Comments:  improved on aldactazide. pt has close f/u with derma. cont aldactone.     5. Chronic nonintractable headache, unspecified headache type  Comments:  improved on current regimen. may be related to BP control.     Other orders  -     ibuprofen (ADVIL,MOTRIN) 800 MG tablet; Take 1 tablet by mouth Every 8 (Eight) Hours As Needed for Moderate Pain .  Dispense: 90 tablet; Refill: 3        Medications Discontinued During This Encounter   Medication Reason   • spironolactone-hydrochlorothiazide (Aldactazide) 25-25 MG tablet Alternate therapy   • ibuprofen (ADVIL,MOTRIN) 800 MG tablet Reorder   • lisdexamfetamine (Vyvanse) 70 MG capsule Reorder          Follow Up   Return in about 4 weeks (around 8/19/2022) for Recheck.  Patient was given instructions and counseling regarding her condition or for health maintenance advice. Please see specific information pulled into the AVS if appropriate.       Ant Carlos Jr, MD  07/23/22  14:12 EDT

## 2022-07-23 RX ORDER — SPIRONOLACTONE AND HYDROCHLOROTHIAZIDE 50; 50 MG/1; MG/1
1 TABLET, FILM COATED ORAL DAILY
Qty: 90 TABLET | Refills: 3 | Status: SHIPPED | OUTPATIENT
Start: 2022-07-23 | End: 2023-07-23

## 2022-07-23 RX ORDER — IBUPROFEN 800 MG/1
800 TABLET ORAL EVERY 8 HOURS PRN
Qty: 90 TABLET | Refills: 3 | Status: SHIPPED | OUTPATIENT
Start: 2022-07-23 | End: 2022-09-16

## 2022-08-08 ENCOUNTER — TELEMEDICINE (OUTPATIENT)
Dept: PSYCHIATRY | Facility: CLINIC | Age: 42
End: 2022-08-08

## 2022-08-08 ENCOUNTER — TELEPHONE (OUTPATIENT)
Dept: INTERNAL MEDICINE | Facility: CLINIC | Age: 42
End: 2022-08-08

## 2022-08-08 DIAGNOSIS — F41.1 GENERALIZED ANXIETY DISORDER: ICD-10-CM

## 2022-08-08 DIAGNOSIS — F33.1 MAJOR DEPRESSIVE DISORDER, RECURRENT EPISODE, MODERATE: Primary | ICD-10-CM

## 2022-08-08 DIAGNOSIS — F51.05 INSOMNIA DUE TO MENTAL CONDITION: ICD-10-CM

## 2022-08-08 PROCEDURE — 90833 PSYTX W PT W E/M 30 MIN: CPT | Performed by: STUDENT IN AN ORGANIZED HEALTH CARE EDUCATION/TRAINING PROGRAM

## 2022-08-08 PROCEDURE — 99214 OFFICE O/P EST MOD 30 MIN: CPT | Performed by: STUDENT IN AN ORGANIZED HEALTH CARE EDUCATION/TRAINING PROGRAM

## 2022-08-08 RX ORDER — CYCLOBENZAPRINE HCL 10 MG
10 TABLET ORAL 2 TIMES DAILY PRN
COMMUNITY
Start: 2022-07-31

## 2022-08-08 RX ORDER — FOLIC ACID 1 MG/1
2 TABLET ORAL DAILY
COMMUNITY
Start: 2022-08-01

## 2022-08-08 RX ORDER — BUSPIRONE HYDROCHLORIDE 10 MG/1
20 TABLET ORAL 2 TIMES DAILY
Qty: 120 TABLET | Refills: 2 | Status: SHIPPED | OUTPATIENT
Start: 2022-08-08 | End: 2022-11-04

## 2022-08-08 RX ORDER — FLUCONAZOLE 150 MG/1
TABLET ORAL
COMMUNITY
Start: 2022-07-30 | End: 2022-08-08

## 2022-08-08 RX ORDER — HYDROXYZINE HYDROCHLORIDE 25 MG/1
25 TABLET, FILM COATED ORAL DAILY PRN
Qty: 30 TABLET | Refills: 2 | Status: SHIPPED | OUTPATIENT
Start: 2022-08-08 | End: 2022-12-21

## 2022-08-08 NOTE — TELEPHONE ENCOUNTER
methotrexate 2.5 MG tablet (08/01/2022)    PT(PATIENT) STATES SHE JUST STARTED TO TAKE THE MEDICATION ON Friday PER HER RHEUMATOLOGIST     PT(PATIENT) STATES SHE HAS HEART PALPITATIONS AND SHORTNESS OF BREATH    PT(PATIENT) HAS SINCE BEEN ADVISED TO STOP TAKING THE MEDICATION     PT(PATIENT) STATES SHE HAS CONTACTED HER RHEUMATOLOGY OFFICE     PER RHEUMATOLOGY, PT(PATIENT) WAS ADVISED TO FOLLOW UP WITH PCP TO MAKE SURE SYMPS COULDN'T BE FROM SOMETHING ELSE     PT(PATIENT) DENIES HISTORY OF SHORTNESS OF BREATH AND HEART PALPITATIONS     PT(PATIENT) WOULD LIKE TO KNOW IF SHE NEEDS TO MAKE AN APPT WITH PROVIDER     PROVIDER PLEASE ADVISE

## 2022-08-08 NOTE — TELEPHONE ENCOUNTER
I would assess how she is feeling since stopping the medication. If better, then medication side effect. Certainly if shortness of breath or palpitations worsen, would go to ER for evaluation.

## 2022-08-08 NOTE — TREATMENT PLAN
Multi-Disciplinary Problems (from Behavioral Health Treatment Plan)    Active Problems     Problem: Anxiety  Start Date: 08/08/22    Problem Details: The patient self-scales this problem as a 6 with 10 being the worst.        Goal Priority Start Date Expected End Date End Date    Patient will develop and implement behavioral and cognitive strategies to reduce anxiety and irrational fears. -- 08/08/22 -- --    Goal Details: Progress toward goal:  Not appropriate to rate progress toward goal since this is the initial treatment plan.        Goal Intervention Frequency Start Date End Date    Help patient explore past emotional issues in relation to present anxiety. Q Month 08/08/22 --    Intervention Details: Duration of treatment until until remission of symptoms.        Goal Intervention Frequency Start Date End Date    Help patient develop an awareness of their cognitive and physical responses to anxiety. Q Month 08/08/22 --    Intervention Details: Duration of treatment until until remission of symptoms.              Problem: Depression  Start Date: 08/08/22    Problem Details: The patient self-scales this problem as a 2 with 10 being the worst.        Goal Priority Start Date Expected End Date End Date    Patient will demonstrate the ability to initiate new constructive life skills outside of sessions on a consistent basis. -- 08/08/22 -- --    Goal Details: Progress toward goal:  Not appropriate to rate progress toward goal since this is the initial treatment plan.        Goal Intervention Frequency Start Date End Date    Assist patient in setting attainable activities of daily living goals. PRN 08/08/22 --    Goal Intervention Frequency Start Date End Date    Provide education about depression Q Month 08/08/22 --    Intervention Details: Duration of treatment until until remission of symptoms.        Goal Intervention Frequency Start Date End Date    Assist patient in developing healthy coping strategies. Q Month  08/08/22 --    Intervention Details: Duration of treatment until until remission of symptoms.                    Reviewed By     Brooks Lynn MD 08/08/22 [id number]                 I have discussed and reviewed this treatment plan with the patient.

## 2022-08-08 NOTE — PROGRESS NOTES
"Subjective   Malinda Saucedo is a 42 y.o. female who presents today for initial evaluation     Referring Provider:  No referring provider defined for this encounter.    Chief Complaint:  mdd vivi    History of Present Illness:     Chart review 9/29: Seen September 10 to establish care.  Previously was at [facility] office.  Labs are consistent with Sjogren's.  Psoriasis and arthritis are under control.  On Topamax for migraines.  On Lunesta for insomnia.  History of anxiety and depression.  On gabapentin 600 mg 3 times daily, Strattera 100 mg daily, BuSpar 10 mg, duloxetine 60 mg, bupropion 300 mg.  Denied anxiety in January 2019.  Has been on Prozac in the past.  BMP demonstrates creatinine of 1.68, alk phos of 116, otherwise unremarkable.  hCG was followed in 2019.  CBC unremarkable, no head imaging or EKG.    \"Malinda\"  [facility] is name of her therapy clinic, [facility], accepts     8/8: Virtual visit via Zoom audio and video due to the COVID-19 pandemic.  Patient is accepting of and agreeable to visit.  The visit consisted of the patient and I. The patient is at home, and I am at the office.  Interview:  1. Chart review: Seen by internal medicine outpatient on July 22.  Has lost 13 pounds in a month.  Vyvanse is helping.  Also on hydroxyzine for migraines.  February creatinine is elevated at 1.04.  2. Planning: No changes at last visit.  3. \"Busy.\"  a. Therapy clinic gets lighter over the summers. (vacations, kids being home)  b. Also has Lupus, so on methotrexate  c. Taking hydroxyzine (old prescription) for \"emergencies\"  4. Mood/Depression: 2 or 3/10  5. Anxiety: 6/10  a. Some irritability  b. Might be related to vyvanse, but this medication is tremendously beneficial to her (losing weight)  6. Panic attacks: none  7. Energy: good  8. Concentration: better  9. Sleeping: good  10. Eating: losing weight  11. Refills: n  12. Substances: CBD  13. Therapy: n  14. Medication compliant: " "y  15. No SI HI AVH.      : Virtual visit via Zoom audio and video due to the COVID-19 pandemic.  Patient is accepting of and agreeable to visit.  The visit consisted of the patient and I. The patient is at home, and I am at the office.  Interview:  16. Chart review: Patient is on Vyvanse 70 mg a day for binge eating disorder.  Saw primary care in May.  17. Plannin. \"Good.\"  a. Good unless I miss meds.  b. \"I am having a libido problem.\"  i. Interested, but having trouble lubricating.  ii. She did recently start Vyvanse in April, though I don't think this is the culprit.  c. Growing her practice  19. Mood/Depression: under control  20. Anxiety: under control  21. Panic attacks: n  22. Energy: much better  23. Concentration: better  24. Sleeping: well on CPAP  25. Eating: less, has lost 4 lbs  26. Refills: y  27. Substances: n  28. Therapy: n  29. Medication compliant: y  30. No SI HI AVH.      : Virtual visit via Zoom audio and video due to the COVID-19 pandemic.  Patient is accepting of and agreeable to visit.  The visit consisted of the patient and I. The patient is at home, and I am at the office.  Interview:  31. Chart review: Seen by primary care this month for sore throat.  February labs show decreased CO2 20.3, elevated chloride 108, slightly elevated creatinine 1.04, otherwise CMP and CBC are reassuring.  32. \"I'm good.\"  a. \"I've been great!\"  b. Mood is good. Some anxiety, but manageable. No depression.  c. Lost some weight. Achieving her goals and that is definitely helping with mood.  d. Doesn't want to add any meds, change meds. Doesn't want to remove anything either because she is doing well.  e. I notified her that I filled out her form for gastric surgery.  f. Driving during interview  33. Energy: kindof down, \"could be better\"  34. Concentration:  35. Sleeping: \"ok\" still tossing and turning a lot. CPAP helps.  36. Eating: " "stable  37. Refills:  38. Substances:  39. Therapy:  40. Medication compliant: y  41. No SI HI AVH.      3/7: Virtual visit via Zoom audio and video due to the COVID-19 pandemic.  Patient is accepting of and agreeable to visit.  The visit consisted of the patient and I. The patient is at home, and I am at the office.  Interview:  42. Chart review: Patient is concerned about her weight.  Labs from February show elevated creatinine 1.04, reassuring LFTs, low CO2 20.3, elevated chloride 108, reassuring CBC.  43. \" I am gaining weight.\"  a. Patient reports she is gained about 5 pounds in the last few weeks.  It is not clear per the system how much weight she has actually gained.  b. Denies hallucinations, anxiety and depression are very well controlled at this time.  c. Having some trouble with her  since around November, but they have had this happen before and \"he is not going anywhere.\"  44. Medication compliant: Yes  45. No SI HI AVH.      2/17: Virtual visit via Zoom audio and video due to the COVID-19 pandemic.  Patient is accepting of and agreeable to visit.  The visit consisted of the patient and I. The patient is at home, and I am at the office.  Interview:  46. Chart review: Seen by primary care February 15.  For cough.  COVID negative.  47. \"I never went up on the two of abilify.\"  a. \"Same symptoms.\" Still hears voices, but not as often. Also,  \"seeing things isn't as bad.\" Hears people calling her name. Random music.  b. Now has a sinus infection.  c. \"There are at least 3 family members who have schizophrenia.\"  d. Fearful that she may have schizophrenia; fearful that she may deteriorate like her uncle and cousin.  48. Sleeping: not well, waiting on equipment for GISELA.  49. Substances: stopped using CBD gummies first week of January 50. Therapy: n  51. Medication compliant: m  52. No SI HI AVH.      1/11: Virtual visit via Zoom audio and video due to the COVID-19 pandemic.  Patient is accepting of " "and agreeable to visit.  The visit consisted of the patient and I.  Interview:  53. Chart review: Seen in the emergency room December 31 for left arm numbness pain and swelling after having an IV placed to her ACE 2 days ago.  Patient saw primary care December 28 and wanted to be referred to neurology after her sleep study showed abnormal brain waves.  Labs from December 28 show low CO2 21.4, elevated chloride 111, elevated creatinine 1.12, A1c.  Thyroid studies, lipid profile, CBC are unremarkable.  No DVT found.  54. \"Good actually. The abilify helped a lot.\"  a. Sees things out of the corner of her eye, never directly.  b. Still hears voices stating her name.  55. Depression/Mood: stable  56. Anxiety: stable  57. Refills: n  58. Sleeping: initial insomnia. GISELA confirmed by sleep study.  59. Eating: stable  60. Substances: n  61. Therapy: n  62. Medication compliant: y  63. No SI HI AVH.      12/14: Virtual visit via Zoom audio and video due to the COVID-19 pandemic.  Patient is accepting of and agreeable to visit.  The visit consisted of the patient and I.  Interview:  64. Chart review: Seen for thrush by PCP 12/8, seen 11/11 for cough, sore throat (COVID neg). Referred to sleep medicine.  65. \"I'm ok.\"  a. Things have \"been weird.\"  b. I was having auditory and visual hallucinations when \"I first started seeing you.\"  i. Her son or  saying her name.  ii. Sees \"scary shadows\" out of the corner of her eyes, especially at night.  iii. \"Afraid I have bipolar disorder.\"  1. \"Started a practice out of nowhere.\"  2. \"I have spending sprees.\" $2,000 at a time. In the middle of one right now. Bought a bedset, tables, talked  into getting a new TV. Now in a new house.  c. Uncle has dx of schizophrenia, sister dx'd with bipolar d/o.  d. Also found out recently on the Sjogren's/Lupus spectrum.  e. Also has sleep study scheduled.  f. Will be seeing a neurologist because \"I'm losing time, like 5 " "minutes.\"  g. Willing to reduce the number of medications she is on and start a mood stabilizer.  h. \"Scared of these. Scared it will get worse.\"  i. Stopped buspar 2 weeks ago and is more irritable.  66. Depression/Mood:  1. Depressed mood: y  2. Seasonal pattern: denies  3. Severity: moderate  4. Anhedonia: mild, but enjoys spending time with son, shopping  5. Guilt or hopelessness:  6. Energy: \"horrible\"  7. Concentration: poor  8. Weight loss or weight gain: gain, 2 lbs since 2 weeks  9. Psychomotor retardation or agitation: def  10. Insomnia:  a. Topamax makes her sleepy, bed at 11, no initial insomnia, but wakes at 2 am, eats, sleeps in an hour, wakes at 6:30 am.  11. Duration: months  67. Anxiety:  1. Uncontrolled worrying: y  2. Severity: moderate  3. Muscle tension: y  4. Fatigue: y  5. Concentration: poor  6. Restlessness/feeling on edge: y  7. Irritability: y  8. Insomnia: maintenance insomnia  9. Duration: months  10. Panic attacks: def  68. Refills: none  69. Sleeping: above, sleep study set up  70. Eating: above  71. Substances: CBD gummy to sleep  72. Therapy: declines  73. Medication compliant: y  74. No SI HI AVH.      11/10: Virtual visit via Zoom audio and video due to the COVID-19 pandemic.  Patient is accepting of and agreeable to visit.  The visit consisted of the patient and I.  Interview:  75. Chart review: No new developments.  76. \"I'm ok.\"  a. More emotional; horribly.  b. Mostly down.  c. Pharmacy has not filled her 30 mg cap of duloxetine in weeks; unsure why.  d. Also feels sick too; congested today  77. Depression/Mood: depressed mood  12. Guilt or hopelessness: denies  13. Energy: poor  14. Concentration: poor  78. Anxiety: not bad  79. Sleeping:  a. Initial insomnia  b. Maintenance insomnia  80. Eating: stable  81. Substances:  82. Therapy: denies  83. Medication compliant: no, inadvertently  84. No SI HI AVH.      10/13: Virtual visit via Zoom audio and video due to the COVID-19 " "pandemic.  Patient is accepting of and agreeable to visit.  The visit consisted of the patient and I.  Interview:  85. Chart review: No new developments.  86. \"I've seen some small changes, but not, like, horrible.\"  a. Attention drifts a little more, in the mornings.  87. Depression/Mood: \"increasing the duloxetine has helped.\"  a. Usually feels really sad before her cycle, but doesn't this time  88. Anxiety:  a. Not as irritable  89. Sleeping: yes  90. Eating: stable  91. Substances: denies  92. Therapy: denies  93. Medication compliant: y  94. No SI HI AVH      9/29: Virtual visit via Zoom audio and video due to the COVID-19 pandemic.  Patient is accepting of and agreeable to visit.  The visit consisted of the patient and I.  Interview:  95. Her story: \"Well, it started in [employer].\"  a. Originally PMDD (2009) for years  b. Got out 2011  c. Then dx'd with depression and YENNY  d. Has been on medications since  i. Was on prozac from 2009 until 2015, stopped due to pregnancy  ii. 2018, started wellbutrin only. Which was horrible by itself. They added buspar 10 mg bid, with it. Then duloxetine 60 mg daily added. Better combination.  iii. Now on the above, and also on strattera 100 mg daily for ADHD. Also on gabapentin 600 tid.  e. Stimulants put her to sleep: adderall, vyvanse.  Did not try extended release formulations.  96. Mood: much better than it was in the past, but still could use some help.  97. Stressors:  a. Niece started having seizures at 21 and lost her memory, doesn't remember anyone or anybody.  b. In the middle of buying a house  c. Finances  d. Starting my own practice.  e. Son having school problems; hanging with \"bad kids.\"  98. Anxiety: manageable.  a. Worrying a lot  99. Coping: goes to Religious, trusts in God  100. Sleeping: yes  101. Eating: stable  102. Medication compliant: yes  103. Psych ROS: D, A.  Negative for psychosis.  Unclear gorge.  104. No SI HI AVH    Depression:  105. Anhedonia: " "denies  106. Guilt or hopelessness:   a. Feelings of guilt about how she could have done more for her nieces and nephews  107. Energy: horrible  108. Concentration: \"I have to force myself to focus.\"  a. If stops to eat, it ends her day  109. Weight loss or weight gain: stable over last few months, on weight loss pills, however  110. Psychomotor retardation or agitation: frequently  111. Insomnia: yes, on the lunesta  112. Duration: for years      Access to Firearms: yes, locked away    PHQ-9 Depression Screening  PHQ-9 Total Score:      Little interest or pleasure in doing things?     Feeling down, depressed, or hopeless?     Trouble falling or staying asleep, or sleeping too much?     Feeling tired or having little energy?     Poor appetite or overeating?     Feeling bad about yourself - or that you are a failure or have let yourself or your family down?     Trouble concentrating on things, such as reading the newspaper or watching television?     Moving or speaking so slowly that other people could have noticed? Or the opposite - being so fidgety or restless that you have been moving around a lot more than usual?     Thoughts that you would be better off dead, or of hurting yourself in some way?     PHQ-9 Total Score       YENNY-7       Past Surgical History:  Past Surgical History:   Procedure Laterality Date   •  SECTION     • CYSTECTOMY     • ENDOMETRIAL ABLATION  , ,    • EYE SURGERY     • MYOMECTOMY         Problem List:  Patient Active Problem List   Diagnosis   • YENNY (generalized anxiety disorder)   • PTSD (post-traumatic stress disorder)   • Attention deficit hyperactivity disorder   • Hidradenitis suppurativa   • Intramural and subserous leiomyoma of uterus   • Lumbosacral spondylosis without myelopathy   • Endometriosis determined by laparoscopy   • PMDD (premenstrual dysphoric disorder)   • Psoriasis   • Thrombophilia (HCC)   • Seasonal allergies   • Major depressive disorder in " partial remission (HCC)   • Body mass index (BMI) 40.0-44.9, adult (HCC)   • Anaphylactic reaction to bee sting   • Insomnia, unspecified   • Low back pain   • Major depressive disorder, recurrent, moderate (HCC)   • History of thromboembolism of vein   • Major depressive disorder, single episode, unspecified       Allergy:   No Known Allergies     Discontinued Medications:  Medications Discontinued During This Encounter   Medication Reason   • fluconazole (DIFLUCAN) 150 MG tablet *Therapy completed   • busPIRone (BUSPAR) 10 MG tablet Reorder       Current Medications:   Current Outpatient Medications   Medication Sig Dispense Refill   • aspirin (aspirin) 81 MG EC tablet Take 1 tablet by mouth Daily. 90 tablet 3   • buPROPion XL (WELLBUTRIN XL) 300 MG 24 hr tablet Take 1 tablet by mouth Every Morning. 90 tablet 1   • busPIRone (BUSPAR) 10 MG tablet Take 2 tablets by mouth 2 (Two) Times a Day. 120 tablet 2   • cetirizine (zyrTEC) 10 MG tablet Take 1 tablet by mouth Daily. 90 tablet 3   • clobetasol (TEMOVATE) 0.05 % external solution clobetasol 0.05 % scalp solution     • cyclobenzaprine (FLEXERIL) 10 MG tablet Take 10 mg by mouth 2 (Two) Times a Day.     • DULoxetine (CYMBALTA) 30 MG capsule Take 1 capsule by mouth Daily. 90 capsule 0   • DULoxetine (CYMBALTA) 60 MG capsule Take 1 capsule by mouth Daily. In addition to the Duloxetine 30mg 90 capsule 1   • Elagolix Sodium (Orilissa) 150 MG tablet Take  by mouth.     • EPINEPHrine (EPIPEN) 0.3 MG/0.3ML solution auto-injector injection Inject 0.3 mL under the skin into the appropriate area as directed 1 (One) Time As Needed.     • folic acid (FOLVITE) 1 MG tablet Take 1,000 mcg by mouth Daily.     • gabapentin (NEURONTIN) 600 MG tablet TAKE 1 TABLET BY MOUTH EVERY 8 HOURS AS DIRECTED     • Humira Pen 40 MG/0.4ML Pen-injector Kit      • ibuprofen (ADVIL,MOTRIN) 800 MG tablet Take 1 tablet by mouth Every 8 (Eight) Hours As Needed for Moderate Pain . 90 tablet 3   •  lisdexamfetamine (Vyvanse) 70 MG capsule Take 1 capsule by mouth Every Morning 30 capsule 0   • methotrexate 2.5 MG tablet TAKE 6 TABLETS BY MOUTH 1 TIME EVERY WEEK     • montelukast (Singulair) 10 MG tablet Take 1 tablet by mouth Every Night. 90 tablet 1   • spironolactone-hydrochlorothiazide (Aldactazide) 50-50 MG per tablet Take 1 tablet by mouth Daily. 90 tablet 3   • hydrOXYzine (ATARAX) 25 MG tablet Take 1 tablet by mouth Daily As Needed for Anxiety. 30 tablet 2     No current facility-administered medications for this visit.       Past Medical History:  Past Medical History:   Diagnosis Date   • ADHD (attention deficit hyperactivity disorder)    • Allergic    • Anxiety    • Arthritis    • Chronic pain disorder    • Deep vein thrombosis (HCC)    • Depression    • Ectopic pregnancy without intrauterine pregnancy 2019    Formatting of this note might be different from the original. - Day 1 = 19 -> beta 4,927 MTX #1 given (110mg) - Day 4 = 19 -> beta 11,077 - Day 7 = 19 -> beta 11,923 MTX #2 given (110mg) - Day 11 = 19 -> beta 11,406 - Day 14 = 19 -> scheduled visit and beta - Day 19 =  19 -> beta 6,584 - Day 26 = 19 -> beta 3,111 - Day 34 = 3/1/19 -> beta 553 (seen at [facility]   • Endometriosis    • Fibroids    • Headache    • Hidradenitis    • Low back pain ?    DDD   • Obesity    • Panic disorder    • PTSD (post-traumatic stress disorder)        Past Psychiatric History:  Began Treatment:   Diagnoses: D, A, insomnia  Psychiatrist: Last was months ago for a couple times; previously NP for 2 years  Therapist: yes, therapy has not helped, two bad experiences  Admission History: denies  Medication Trials: see hpi  Self Harm: Denies  Suicide Attempts:Denies   Psychosis, Anxiety, Depression: denies    Substance Abuse History:   Types:Denies all, including illicit  Withdrawal Symptoms:Denies  Longest Period Sober:Not Applicable   AA: Not applicable      Social History:  Martial Status:  Employed: therapist, 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    Family History   Problem Relation Age of Onset   • ADD / ADHD Mother    • OCD Mother    • Arthritis Mother    • Hyperlipidemia Mother    • Hypertension Mother    • Thyroid disease Mother    • Anxiety disorder Mother    • ADD / ADHD Sister    • Anxiety disorder Sister    • Bipolar disorder Sister    • Drug abuse Maternal Aunt    • Depression Maternal Aunt    • Alcohol abuse Maternal Uncle    • Drug abuse Maternal Uncle    • Schizophrenia Maternal Uncle    • Depression Maternal Grandmother    • Other Maternal Grandmother         Colon cancer   • Anxiety disorder Maternal Grandmother    • ADD / ADHD Cousin    • OCD Cousin    • Suicide Attempts Cousin    • Alcohol abuse Cousin    • Depression Cousin    • Seizures Niece    • Arthritis Sister    • Depression Sister    • Hyperlipidemia Sister    • Asthma Sister    • Hypertension Sister    • Miscarriages / Stillbirths Sister    • Mental illness Maternal Uncle    • Alcohol abuse Maternal Uncle        Developmental History:   Born: [place]  Siblings: 1 sister, older cousin who lived with them  Childhood: no abuse  High School:Completed  College: 4 yrs at [employer] 3 years degree in counseling    · Mental Status Exam  · Appearance  · : groomed, good eye contact, normal  "street clothes  · Behavior  · : pleasant and cooperative  · Motor  · : No abnormal  · Speech  · :normal rhythm, rate, volume, tone, not hyperverbal, not pressured, normal prosidy  · Mood  · : \"good\"  · Affect  · : euthymic, mood congruent, good variability  · Thought Content  · : negative suicidal ideations, negative homicidal ideations, negative obsessions  · Perceptions  · : negative auditory hallucinations, negative visual hallucinations  · Thought Process  · : linear  · Insight/Judgement  · : Fair/fair  · Cognition  · : grossly intact  · Attention   : intact    Review of Systems:  Review of Systems   Constitutional: Positive for diaphoresis. Negative for fatigue.   HENT: Negative for drooling.    Eyes: Negative for visual disturbance.   Respiratory: Positive for shortness of breath. Negative for cough.    Cardiovascular: Positive for palpitations. Negative for chest pain and leg swelling.   Gastrointestinal: Positive for nausea. Negative for vomiting.   Endocrine: Positive for cold intolerance and heat intolerance.   Genitourinary: Negative for difficulty urinating.   Musculoskeletal: Positive for joint swelling.   Allergic/Immunologic: Positive for immunocompromised state.   Neurological: Positive for dizziness and light-headedness. Negative for seizures, speech difficulty and numbness.         Physical Exam:  Physical Exam    Vital Signs:   There were no vitals taken for this visit.     Lab Results:   Clinical Support on 04/14/2022   Component Date Value Ref Range Status   • SARS Antigen 04/14/2022 Not Detected  Not Detected Final   • Internal Control 04/14/2022 Passed  Passed Final   • Lot Number 04/14/2022 [id number]   Final   • Expiration Date 04/14/2022 09/20/2023   Final   • Rapid Influenza A Ag 04/14/2022 Negative  Negative Final   • Rapid Influenza B Ag 04/14/2022 Negative  Negative Final   • Internal Control 04/14/2022 Passed  Passed Final   • Lot Number 04/14/2022 [id number]   Final   • Expiration Date " 04/14/2022 07/01/2023   Final   • Rapid Strep A Screen 04/14/2022 Negative  Negative, VALID, INVALID, Not Performed Final   • Internal Control 04/14/2022 Passed  Passed Final   • Lot Number 04/14/2022 [id number]   Final   • Expiration Date 04/14/2022 08/02/2023   Final   • Throat Culture, Beta Strep 04/14/2022 No Beta Hemolytic Streptococcus Isolated   Final   • COVID19 04/14/2022 Not Detected  Not Detected - Ref. Range Final   Office Visit on 03/11/2022   Component Date Value Ref Range Status   • Amphetamine Screen, Urine 03/11/2022 Negative  Negative Final   • AMP INTERNAL CONTROL 03/11/2022 Passed  Passed Final   • Barbiturates Screen, Urine 03/11/2022 Negative  Negative Final   • BARBITURATE INTERNAL CONTROL 03/11/2022 Passed  Passed Final   • Buprenorphine, Screen, Urine 03/11/2022 Negative  Negative Final   • BUPRENORPHINE INTERNAL CONTROL 03/11/2022 Passed  Passed Final   • Benzodiazepine Screen, Urine 03/11/2022 Negative  Negative Final   • BENZODIAZEPINE INTERNAL CONTROL 03/11/2022 Passed  Passed Final   • Cocaine Screen, Urine 03/11/2022 Negative  Negative Final   • COCAINE INTERNAL CONTROL 03/11/2022 Passed  Passed Final   • MDMA (ECSTASY) 03/11/2022 Negative  Negative Final   • MDMA (ECSTASY) INTERNAL CONTROL 03/11/2022 Passed  Passed Final   • Methamphetamine, Ur 03/11/2022 Negative  Negative Final   • METHAMPHETAMINE INTERNAL CONTROL 03/11/2022 Passed  Passed Final   • Methadone Screen, Urine 03/11/2022 Negative  Negative Final   • METHADONE INTERNAL CONTROL 03/11/2022 Passed  Passed Final   • Opiate Screen 03/11/2022 Negative  Negative Final   • OPIATES INTERNAL CONTROL 03/11/2022 Passed  Passed Final   • Oxycodone Screen, Urine 03/11/2022 Negative  Negative Final   • OXYCODONE INTERNAL CONTROL 03/11/2022 Passed  Passed Final   • Phencyclidine (PCP), Urine 03/11/2022 Negative  Negative Final   • PHENCYCLIDINE INTERNAL CONTROL 03/11/2022 Passed  Passed Final   • THC, Screen, Urine 03/11/2022 Positive (A)  Negative Final   • THC INTERNAL CONTROL 03/11/2022 Passed  Passed Final   • Lot Number 03/11/2022 [id number]   Final   • Expiration Date 03/11/2022 04/30/2022   Final   Office Visit on 02/15/2022   Component Date Value Ref Range Status   • Rapid Influenza A Ag 02/15/2022 Negative  Negative Final   • Rapid Influenza B Ag 02/15/2022 Negative  Negative Final   • Internal Control 02/15/2022 Passed  Passed Final   • Lot Number 02/15/2022 [id number]   Final   • Expiration Date 02/15/2022 05/23/2023   Final   • SARS Antigen 02/15/2022 Not Detected  Not Detected Final   • Internal Control 02/15/2022 Passed  Passed Final   • Lot Number 02/15/2022 [id number]   Final   • Expiration Date 02/15/2022 09/20/2023   Final       EKG Results:  No orders to display       Imaging Results:  No Images in the past 120 days found..      Assessment & Plan   Diagnoses and all orders for this visit:    1. Major depressive disorder, recurrent episode, moderate (HCC) (Primary)  -     busPIRone (BUSPAR) 10 MG tablet; Take 2 tablets by mouth 2 (Two) Times a Day.  Dispense: 120 tablet; Refill: 2    2. Generalized anxiety disorder  -     hydrOXYzine (ATARAX) 25 MG tablet; Take 1 tablet by mouth Daily As Needed for Anxiety.  Dispense: 30 tablet; Refill: 2  -     busPIRone (BUSPAR) 10 MG tablet; Take 2 tablets by mouth 2 (Two) Times a Day.  Dispense: 120 tablet; Refill: 2    3. Insomnia due to mental condition        Visit Diagnoses:    ICD-10-CM ICD-9-CM   1. Major depressive disorder, recurrent episode, moderate (HCC)  F33.1 296.32   2. Generalized anxiety disorder  F41.1 300.02   3. Insomnia due to mental condition  F51.05 300.9     327.02     8/8: Increase BuSpar to target anxiety, which may be related to Vyvanse, but also may not be.  Regardless, Vyvanse has given her tremendous benefits.  Consider increasing Cymbalta at the next visit.  16 minutes of supportive psychotherapy with goal to strengthen defenses, promote problems solving, restore adaptive  functioning and provide symptom relief. The therapeutic alliance was strengthened to encourage the patient to express their thoughts and feelings. Esteem building was enhanced through praise, reassurance, normalizing and encouragement. Coping skills were enhanced to build distress tolerance skills and emotional regulation. Allowed patient to freely discuss issues without interruption or judgement with unconditional positive regard, active listening skills, and empathy. Provided a safe, confidential environment to facilitate the development of a positive therapeutic relationship and encourage open, honest communication. Assisted patient in identifying risk factors which would indicate the need for higher level of care including thoughts to harm self or others and/or self-harming behavior and encouraged patient to contact this office, call 911, or present to the nearest emergency room should any of these events occur. Assisted patient in processing session content; acknowledged and normalized patient’s thoughts, feelings, and concerns by utilizing a person-centered approach in efforts to build appropriate rapport and a positive therapeutic relationship with open and honest communication. Patient given education on medication side effects, diagnosis/illness and relapse symptoms. Plan to continue supportive psychotherapy in next appointment to provide symptom relief.  Diagnoses: as above  Symptoms: as above  Functional status: Good  Mental Status Exam: as above    Treatment plan: Medication management and supportive psychotherapy  Prognosis: Good  Progress: Anxiety might be a little worse  6 weeks    6/7: Consider therapy here. Stable, well, no SE. 17 minutes of supportive psychotherapy with goal to strengthen defenses, promote problems solving, restore adaptive functioning and provide symptom relief. The therapeutic alliance was strengthened to encourage the patient to express their thoughts and feelings. Esteem  building was enhanced through praise, reassurance, normalizing and encouragement. Coping skills were enhanced to build distress tolerance skills and emotional regulation. Allowed patient to freely discuss issues without interruption or judgement with unconditional positive regard, active listening skills, and empathy. Provided a safe, confidential environment to facilitate the development of a positive therapeutic relationship and encourage open, honest communication. Assisted patient in identifying risk factors which would indicate the need for higher level of care including thoughts to harm self or others and/or self-harming behavior and encouraged patient to contact this office, call 911, or present to the nearest emergency room should any of these events occur. Assisted patient in processing session content; acknowledged and normalized patient’s thoughts, feelings, and concerns by utilizing a person-centered approach in efforts to build appropriate rapport and a positive therapeutic relationship with open and honest communication. Patient given education on medication side effects, diagnosis/illness and relapse symptoms. Plan to continue supportive psychotherapy in next appointment to provide symptom relief.  Diagnoses: as above  Symptoms: as above  Functional status: good  Mental Status Exam: as above    Treatment plan: Medication management and supportive psychotherapy  Prognosis: good  Progress: better  2 mos      4/18: Doing well, no hallucinations, depression and anxiety are basically under control.  Patient is accomplishing her goals to lose weight, etc., which is helping.  7 minutes of supportive psychotherapy with goal to strengthen defenses, promote problems solving, restore adaptive functioning and provide symptom relief. The therapeutic alliance was strengthened to encourage the patient to express their thoughts and feelings. Esteem building was enhanced through praise, reassurance, normalizing and  encouragement. Coping skills were enhanced to build distress tolerance skills and emotional regulation. Patient given education on medication side effects, diagnosis/illness and relapse symptoms. Plan to continue supportive psychotherapy in next appointment to provide symptom relief.  6 weeks    3/7: Discontinue Abilify due to weight gain.  Now denies having any hallucinations.  Patient to monitor her weight.  Patient will also keep me posted on her relationship with her  which is no doubt a contributor to depression and anxiety.  16 minutes of supportive psychotherapy with goal to strengthen defenses, promote problems solving, restore adaptive functioning and provide symptom relief. The therapeutic alliance was strengthened to encourage the patient to express their thoughts and feelings. Esteem building was enhanced through praise, reassurance, normalizing and encouragement. Coping skills were enhanced to build distress tolerance skills and emotional regulation. Patient given education on medication side effects, diagnosis/illness and relapse symptoms. Plan to continue supportive psychotherapy in next appointment to provide symptom relief.  5 weeks    2/17: Increase Abilify.  Patient is fearful that she will develop schizophrenia and deteriorate like her family.  She will start therapy as well.  18 minutes of supportive psychotherapy with goal to strengthen defenses, promote problems solving, restore adaptive functioning and provide symptom relief. The therapeutic alliance was strengthened to encourage the patient to express their thoughts and feelings. Esteem building was enhanced through praise, reassurance, normalizing and encouragement. Coping skills were enhanced to build distress tolerance skills and emotional regulation. Patient given education on medication side effects, diagnosis/illness and relapse symptoms. Plan to continue supportive psychotherapy in next appointment to provide symptom relief.  4  weeks    1/11: Significant improvement in symptoms, however residual auditory hallucinations.  Increase Abilify. 5 minutes of supportive psychotherapy with goal to strengthen defenses, promote problems solving, restore adaptive functioning and provide symptom relief. The therapeutic alliance was strengthened to encourage the patient to express their thoughts and feelings. Esteem building was enhanced through praise, reassurance, normalizing and encouragement. Coping skills were enhanced to build distress tolerance skills and emotional regulation. Patient given education on medication side effects, diagnosis/illness and relapse symptoms. Plan to continue supportive psychotherapy in next appointment to provide symptom relief.  4 weeks    12/14: Mood reactivity versus actual bipolar II disorder. Take strattera every other day for 3 doses then stop. Start abilify 5 mg day. 20 minutes of supportive psychotherapy with goal to strengthen defenses, promote problems solving, restore adaptive functioning and provide symptom relief. The therapeutic alliance was strengthened to encourage the patient to express their thoughts and feelings. Esteem building was enhanced through praise, reassurance, normalizing and encouragement. Coping skills were enhanced to build distress tolerance skills and emotional regulation. Patient given education on medication side effects, diagnosis/illness and relapse symptoms. Plan to continue supportive psychotherapy in next appointment to provide symptom relief.  4 wks.    11/10: Start melatonin, restart duloxetine 17 minutes of supportive psychotherapy with goal to strengthen defenses, promote problems solving, restore adaptive functioning and provide symptom relief. The therapeutic alliance was strengthened to encourage the patient to express their thoughts and feelings. Esteem building was enhanced through praise, reassurance, normalizing and encouragement. Coping skills were enhanced to build  distress tolerance skills and emotional regulation. Patient given education on medication side effects, diagnosis/illness and relapse symptoms. Plan to continue supportive psychotherapy in next appointment to provide symptom relief.  4 wks.    10/13: Better mood. Reduce strattera to 40 mg nightly (not daily, it is sedating). 4 wks.    9/29: Records release will be signed by patient.  Patient is unsure if Strattera helped.  Reduce the dose.  Residual depressive symptoms.  Increase duloxetine.  Continue gabapentin and BuSpar.  Therapy referral made.  See back in 2 weeks to try to titrate off of Strattera entirely.  It does not sound like she has ever tried extended release formulations of stimulants for ADHD.  Does not sound like she got neuropsychological testing for ADHD.  Rule out gorge.    PLAN:  113. Safety: No acute safety concerns  114. Therapy:  Referral made.  115. Risk Assessment: Risk of self-harm acutely is moderate.  Risk factors include anxiety disorder, mood disorder, and recent psychosocial stressors (pandemic). Protective factors include no family history, denies access to guns/weapons, no present SI, no history of suicide attempts or self-harm in the past, minimal AODA, healthcare seeking, future orientation, willingness to engage in care.  Risk of self-harm chronically is also moderate, but could be further elevated in the event of treatment noncompliance and/or AODA.  116. Meds:.  a. RESTART hydroxyzine 25 mg daily as needed anxiety. Risks, benefits, alternatives discussed with patient including sedation, dizziness, fall risk, GI upset, and risk of increased CNS depression and elevated heart rate if taken with other antihistamines.  After discussion of these risks and benefits, the patient voiced understanding and agreed to proceed.  b. CONTINUE duloxetine 90 mg a day. Risks, benefits, alternatives discussed with patient including GI upset, nausea vomiting diarrhea, theoretical decrease of seizure  threshold predisposing the patient to a slightly higher seizure risk, headaches, sexual dysfunction, serotonin syndrome, bleeding risk, increased suicidality in patients 24 years and younger.  Also constipation and urinary retention.  After discussion of these risks and benefits, the patient voiced understanding and agreed to proceed.  c. INCREASE BuSpar 10 to 20 mg p.o. twice daily (refilled today). Risks, benefits, alternatives discussed with patient including nausea, GI upset, mild sedation, falls risk.  After discussion of these risks and benefits, the patient voiced understanding and agreed to proceed.  d. CONTINUE bupropion  mg p.o. daily. Risks, benefits, alternatives discussed with patient including nausea, GI upset, increased energy, exacerbation of irritability, insomnia, lowering of seizure threshold.  After discussion of these risks and benefits, the patient voiced understanding and agreed to proceed.  e. AGREE with gabapentin 600 mg PO TID. Risks, benefits, alternatives discussed with patient including sedation, dizziness/falls risk, GI upset, weight gain.  After discussion of these risks and benefits, the patient voiced understanding and agreed to proceed. Ramin MAURICIO ordered. Verbally signed controlled substances agreement.  f. AGREE with Vyvanse 70 mg daily.  g. S/P:  i. Strattera 40 mg daily: stopped 1/11/21 to reduce medication regimen.  Might have been beneficial.  ii. Never started melatonin  iii. abilify 4 mg daily.  Wg.  Months.  117. Labs: no recs  118. Follow up: 6 wks    Patient screened positive for depression based on a PHQ-9 score of  on . Follow-up recommendations include: Prescribed antidepressant medication treatment.           TREATMENT PLAN/GOALS: Continue supportive psychotherapy efforts and medications as indicated. Treatment and medication options discussed during today's visit. Patient acknowledged and verbally consented to continue with current treatment plan and was  educated on the importance of compliance with treatment and follow-up appointments.    MEDICATION ISSUES:  LILIAN reviewed as expected.  Discussed medication options and treatment plan of prescribed medication as well as the risks, benefits, and side effects including potential falls, possible impaired driving and metabolic adversities among others. Patient is agreeable to call the office with any worsening of symptoms or onset of side effects. Patient is agreeable to call 911 or go to the nearest ER should he/she begin having SI/HI. No medication side effects or related complaints today.     MEDS ORDERED DURING VISIT:  New Medications Ordered This Visit   Medications   • hydrOXYzine (ATARAX) 25 MG tablet     Sig: Take 1 tablet by mouth Daily As Needed for Anxiety.     Dispense:  30 tablet     Refill:  2   • busPIRone (BUSPAR) 10 MG tablet     Sig: Take 2 tablets by mouth 2 (Two) Times a Day.     Dispense:  120 tablet     Refill:  2       No follow-ups on file.         This document has been electronically signed by Brooks Lynn MD  August 8, 2022 10:31 EDT      Part of this note may be an electronic transcription/translation of spoken language to printed text using the Dragon Dictation System.

## 2022-08-08 NOTE — PATIENT INSTRUCTIONS
1.  Please return to clinic at your next scheduled visit.  Contact the clinic ([phone]) at least 24 hours prior in the event you need to cancel.  2.  Do no harm to yourself or others.    3.  Avoid alcohol and drugs.    4.  Take all medications as prescribed.  Please contact the clinic with any concerns. If you are in need of medication refills, please call the clinic at [phone].    5. Should you want to get in touch with your provider, Dr. Brooks Lynn, please utilize TAKO or contact the office ([phone]), and staff will be able to page Dr. Lynn directly.  6.  In the event you have personal crisis, contact the following crisis numbers: Suicide Prevention Hotline 1-696.824.5223; KELLY Helpline 5-998-753-TZKS; [facility] Emergency Room [phone]; text HELLO to 555834; or 598.     SPECIFIC RECOMMENDATIONS:     1.      Medications discussed at this encounter:                   - increase buspar     2.      Psychotherapy recommendations:      3.     Return to clinic: 6 weeks

## 2022-08-09 NOTE — TELEPHONE ENCOUNTER
PT(PATIENT) CALLED BACK AND STATED THE SYMPTOMS HAVE GOTTEN BETTER, BUT ONLY TAKES THE MEDICATION ONCE PER WEEK.  PT(PATIENT) STATED SHE WILL TAKE THE NEXT DOSE AND IF SHE STILL HAS SYMPTOMS WILL STOP THE MEDICATION.    PT(PATIENT) ADVISED IF SYMPTOMS DO GET WORSE TO GO TO THE ER.

## 2022-08-09 NOTE — TELEPHONE ENCOUNTER
ATTEMPTED TO CONTACT PT PER PROVIDER'S INSTRUCTIONS      NO ANSWER      LEFT VOICEMAIL WITH INSTRUCTIONS TO RETURN CALL TO OFFICE AT [phone]     OK FOR HUB TO ADVISE/READ   Ant Carlos Jr., MD 16 hours ago (4:59 PM)        I would assess how she is feeling since stopping the medication. If better, then medication side effect. Certainly if shortness of breath or palpitations worsen, would go to ER for evaluation.

## 2022-08-12 ENCOUNTER — OFFICE VISIT (OUTPATIENT)
Dept: OTOLARYNGOLOGY | Facility: CLINIC | Age: 42
End: 2022-08-12

## 2022-08-12 ENCOUNTER — OFFICE VISIT (OUTPATIENT)
Dept: SLEEP MEDICINE | Facility: HOSPITAL | Age: 42
End: 2022-08-12

## 2022-08-12 VITALS — WEIGHT: 254.8 LBS | HEIGHT: 64 IN | BODY MASS INDEX: 43.5 KG/M2 | TEMPERATURE: 98 F

## 2022-08-12 VITALS
OXYGEN SATURATION: 99 % | WEIGHT: 251.6 LBS | HEIGHT: 64 IN | SYSTOLIC BLOOD PRESSURE: 132 MMHG | HEART RATE: 97 BPM | DIASTOLIC BLOOD PRESSURE: 84 MMHG | BODY MASS INDEX: 42.95 KG/M2

## 2022-08-12 DIAGNOSIS — J32.9 RECURRENT SINUS INFECTIONS: Primary | ICD-10-CM

## 2022-08-12 DIAGNOSIS — R09.82 POST-NASAL DRAINAGE: ICD-10-CM

## 2022-08-12 DIAGNOSIS — J02.9 SORE THROAT: ICD-10-CM

## 2022-08-12 DIAGNOSIS — G47.33 OSA (OBSTRUCTIVE SLEEP APNEA): ICD-10-CM

## 2022-08-12 PROCEDURE — 99204 OFFICE O/P NEW MOD 45 MIN: CPT | Performed by: NURSE PRACTITIONER

## 2022-08-12 PROCEDURE — G0463 HOSPITAL OUTPT CLINIC VISIT: HCPCS

## 2022-08-12 RX ORDER — ASPIRIN 81 MG/81MG
1 CAPSULE ORAL DAILY
COMMUNITY
End: 2022-08-26 | Stop reason: ALTCHOICE

## 2022-08-12 RX ORDER — AZELASTINE 1 MG/ML
2 SPRAY, METERED NASAL 2 TIMES DAILY
Qty: 30 ML | Refills: 5 | Status: SHIPPED | OUTPATIENT
Start: 2022-08-12 | End: 2022-09-16

## 2022-08-12 RX ORDER — ATOMOXETINE 40 MG/1
CAPSULE ORAL
COMMUNITY
Start: 2022-08-09 | End: 2022-08-12

## 2022-08-12 RX ORDER — SPIRONOLACTONE 50 MG/1
1 TABLET, FILM COATED ORAL DAILY
COMMUNITY
End: 2022-08-26

## 2022-08-12 RX ORDER — AMOXICILLIN AND CLAVULANATE POTASSIUM 875; 125 MG/1; MG/1
1 TABLET, FILM COATED ORAL EVERY 12 HOURS SCHEDULED
Qty: 20 TABLET | Refills: 0 | Status: SHIPPED | OUTPATIENT
Start: 2022-08-12 | End: 2022-08-22

## 2022-08-12 RX ORDER — ARIPIPRAZOLE 2 MG/1
TABLET ORAL
COMMUNITY
Start: 2022-08-09 | End: 2022-08-12

## 2022-08-12 NOTE — PROGRESS NOTES
Patient Name: Malinda Saucedo   Visit Date: 08/12/2022   Patient ID: [id number]  Provider: CARLA Valladares    Sex: female  Location: [facility]   YOB: 1980  Location Address: [address] [place] [address], [address] [place] [address],?[place]?[id number]    Primary Care Provider Ant Carlos Jr., MD  Location Phone: [phone]    Referring Provider: Ant Carlos *        Chief Complaint  Nasal Congestion and Tonsils    Subjective   Malinda Saucedo is a 42 y.o. female who presents to [facility] today as a consult from Ant Carlos * for evaluation of her tonsils and sinuses.  Patient states that when she was younger she was supposed to have her tonsils removed after having recurrent strep throat.  She states as an adult she has had a few episodes of strep throat but the last one was 2 years ago.  She states that she does frequently get a sore throat.  She feels like she has recurrent sinus infections and postnasal drainage.  She states she will get 3-4 sinus infections per year.  She states she feels like she is getting 1 now she is having a lot of postnasal drainage, raspiness to her voice and pain and pressure around her right eye.  She states that she takes Zyrtec and Singulair daily.  She was prescribed Flonase but states she does not like the taste of this.  She has done a sinus rinse in the past but is not doing one currently.  She does have obstructive sleep apnea and has been prescribed a CPAP.  However she does have a hard time wearing this as she feels like it is pushing too much air and her chest.      Current Outpatient Medications on File Prior to Visit   Medication Sig   • aspirin (aspirin) 81 MG EC tablet Take 1 tablet by mouth Daily.   • buPROPion XL (WELLBUTRIN XL) 300 MG 24 hr tablet Take 1 tablet by mouth Every Morning.   • busPIRone (BUSPAR) 10 MG tablet Take 2 tablets by mouth 2 (Two) Times a Day.   •  cetirizine (zyrTEC) 10 MG tablet Take 1 tablet by mouth Daily.   • clobetasol (TEMOVATE) 0.05 % external solution clobetasol 0.05 % scalp solution   • cyclobenzaprine (FLEXERIL) 10 MG tablet Take 10 mg by mouth 2 (Two) Times a Day.   • DULoxetine (CYMBALTA) 30 MG capsule Take 1 capsule by mouth Daily.   • DULoxetine (CYMBALTA) 60 MG capsule Take 1 capsule by mouth Daily. In addition to the Duloxetine 30mg   • Elagolix Sodium (Orilissa) 150 MG tablet Take  by mouth.   • EPINEPHrine (EPIPEN) 0.3 MG/0.3ML solution auto-injector injection Inject 0.3 mL under the skin into the appropriate area as directed 1 (One) Time As Needed.   • folic acid (FOLVITE) 1 MG tablet Take 1,000 mcg by mouth Daily.   • gabapentin (NEURONTIN) 600 MG tablet TAKE 1 TABLET BY MOUTH EVERY 8 HOURS AS DIRECTED   • Humira Pen 40 MG/0.4ML Pen-injector Kit    • hydrOXYzine (ATARAX) 25 MG tablet Take 1 tablet by mouth Daily As Needed for Anxiety.   • ibuprofen (ADVIL,MOTRIN) 800 MG tablet Take 1 tablet by mouth Every 8 (Eight) Hours As Needed for Moderate Pain .   • lisdexamfetamine (Vyvanse) 70 MG capsule Take 1 capsule by mouth Every Morning   • methotrexate 2.5 MG tablet TAKE 6 TABLETS BY MOUTH 1 TIME EVERY WEEK   • montelukast (Singulair) 10 MG tablet Take 1 tablet by mouth Every Night.   • spironolactone (ALDACTONE) 50 MG tablet Take 1 tablet by mouth Daily.   • spironolactone-hydrochlorothiazide (Aldactazide) 50-50 MG per tablet Take 1 tablet by mouth Daily.   • Aspirin (Vazalore) 81 MG capsule Take 1 capsule by mouth Daily.   • [DISCONTINUED] ARIPiprazole (ABILIFY) 2 MG tablet    • [DISCONTINUED] atomoxetine (STRATTERA) 40 MG capsule      No current facility-administered medications on file prior to visit.        Social History     Tobacco Use   • Smoking status: Never Smoker   • Smokeless tobacco: Never Used   Vaping Use   • Vaping Use: Never used   Substance Use Topics   • Alcohol use: Never   • Drug use: Never       Objective     Vital  "Signs:   Temp 98 °F (36.7 °C) (Temporal)   Ht 162.6 cm (64\")   Wt 116 kg (254 lb 12.8 oz)   BMI 43.74 kg/m²       Physical Exam  Constitutional:       General: She is not in acute distress.     Appearance: Normal appearance. She is not ill-appearing.   HENT:      Head: Normocephalic and atraumatic.      Jaw: There is normal jaw occlusion. No tenderness or pain on movement.      Salivary Glands: Right salivary gland is not diffusely enlarged or tender. Left salivary gland is not diffusely enlarged or tender.      Right Ear: Tympanic membrane, ear canal and external ear normal.      Left Ear: Tympanic membrane, ear canal and external ear normal.      Nose: Nose normal. No septal deviation.      Right Sinus: No maxillary sinus tenderness or frontal sinus tenderness.      Left Sinus: No maxillary sinus tenderness or frontal sinus tenderness.      Mouth/Throat:      Lips: Pink. No lesions.      Mouth: Mucous membranes are moist. No oral lesions.      Dentition: Normal dentition.      Tongue: No lesions.      Palate: No mass and lesions.      Pharynx: Oropharynx is clear. Uvula midline.      Tonsils: No tonsillar exudate. 1+ on the right. 1+ on the left.   Eyes:      Extraocular Movements: Extraocular movements intact.      Conjunctiva/sclera: Conjunctivae normal.      Pupils: Pupils are equal, round, and reactive to light.   Neck:      Thyroid: No thyroid mass, thyromegaly or thyroid tenderness.      Trachea: Trachea normal.   Pulmonary:      Effort: Pulmonary effort is normal. No respiratory distress.   Musculoskeletal:         General: Normal range of motion.      Cervical back: Normal range of motion and neck supple. No tenderness.   Lymphadenopathy:      Cervical: No cervical adenopathy.   Skin:     General: Skin is warm and dry.   Neurological:      General: No focal deficit present.      Mental Status: She is alert and oriented to person, place, and time.      Cranial Nerves: No cranial nerve deficit.      " Motor: No weakness.      Gait: Gait normal.   Psychiatric:         Mood and Affect: Mood normal.         Behavior: Behavior normal.         Thought Content: Thought content normal.         Judgment: Judgment normal.               Result Review :              Assessment and Plan    Diagnoses and all orders for this visit:    1. Recurrent sinus infections (Primary)  -     amoxicillin-clavulanate (AUGMENTIN) 875-125 MG per tablet; Take 1 tablet by mouth Every 12 (Twelve) Hours for 10 days.  Dispense: 20 tablet; Refill: 0    2. Sore throat    3. Post-nasal drainage  -     azelastine (ASTELIN) 0.1 % nasal spray; 2 sprays into the nostril(s) as directed by provider 2 (Two) Times a Day. Use in each nostril as directed  Dispense: 30 mL; Refill: 5    On examination today her tonsils are small, 1+.  She does have a narrow posterior oropharynx but I do not see any obvious obstruction because of the tonsils.  They do not look currently infected.  We have discussed her desire for tonsillectomy and risk and benefits of this.  Based on her symptoms I am not sure that this would help much as she seems to be having more issues with postnasal drainage and recurrent sinusitis.  Going to have her start on azelastine nasal spray since she could not tolerate the Flonase.  Additionally I will have her start doing a daily rinse of her sinuses and we will start her on a course of an antibiotic for sinus infection.  I will see her back in 1 month for follow-up.  If no improvement in her symptoms we will consider imaging of her sinuses.       Follow Up   No follow-ups on file.  Patient was given instructions and counseling regarding her condition or for health maintenance advice. Please see specific information pulled into the AVS if appropriate.      CARLA Valladares

## 2022-08-12 NOTE — PROGRESS NOTES
[facility]                          Chief Complaint:   Follow up for obstructive sleep apnea and hypersomnia    History of present illness:   Subjective     Patient is a 42 y.o. female  Patient with hx of anxiety and depression who complains of excessive daytime sleepiness, frequent nocturnal awakenings & snoring. BMI=44.     PSG 12/22/21:   AHI= 14.6/h; RDI=30/h; REM-AHI= 54.3/h.     She started CPAP in March 2022.   She initially had some issues using it and had recurrent sinus infection but then she was compliant with therapy.  However lately, she has been having frequent burping and feeling like the air is stuck in her stomach and not been able to get it out which is causing discomfort and therefore she has stopped using it for a month or so.    She is experiencing frequent symptoms of snoring and awakening and excessive daytime fatigue.        ESS: Total score: 17.  She denies sleep paralysis, hallucination or cataplexy    DME: [employer].   Mask: Dreamwear FFM (switched from nasal pillow).    Depression:  On multiple medications including duloxetine and bupropion and hydroxyzine and Vyvanse.  Symptoms are now controlled    REVIEW OF SYSTEMS:   HEENT: Nasal congestion & postnasal drip.   CARDIOVASCULAR: No chest pain, chest pressure or chest discomfort. No palpitations or edema.   RESPIRATORY: Shortness of breath. No cough or sputum.   GASTROINTESTINAL: No abdominal bloating or reflux   NEUROLOGICAL/PSYCHOATRY: Anxiety and depression. No headache.    Past Medical History:  Past Medical History:   Diagnosis Date   • ADHD (attention deficit hyperactivity disorder)    • Allergic    • Anxiety    • Arthritis    • Chronic pain disorder    • Deep vein thrombosis (HCC)    • Depression    • Ectopic pregnancy without intrauterine pregnancy 01/25/2019    Formatting of this note might be different from the original. - Day 1 = 1/25/19 -> beta 4,927 MTX #1 given (110mg) - Day 4 = 1/28/19 ->  beta 11,077 - Day 7 = 19 -> beta 11,923 MTX #2 given (110mg) - Day 11 = 19 -> beta 11,406 - Day 14 = 19 -> scheduled visit and beta - Day 19 =  19 -> beta 6,584 - Day 26 = 19 -> beta 3,111 - Day 34 = 3/1/19 -> beta 553 ([facility]   • Endometriosis    • Fibroids    • Headache    • Hidradenitis    • Low back pain ?    DDD   • Lupus (HCC)    • Obesity    • Panic disorder    • PTSD (post-traumatic stress disorder)    ,   Past Surgical History:   Procedure Laterality Date   •  SECTION     • CYSTECTOMY     • ENDOMETRIAL ABLATION  , ,    • EYE SURGERY     • MYOMECTOMY     ,   Social History     Socioeconomic History   • Marital status:    Tobacco Use   • Smoking status: Never Smoker   • Smokeless tobacco: Never Used   Vaping Use   • Vaping Use: Never used   Substance and Sexual Activity   • Alcohol use: Never   • Drug use: Never   • Sexual activity: Yes     Partners: Male     Birth control/protection: None     E-cigarette/Vaping   • E-cigarette/Vaping Use Never User         and Allergies:  Patient has no known allergies.    Medication Review:     Current Outpatient Medications:   •  amoxicillin-clavulanate (AUGMENTIN) 875-125 MG per tablet, Take 1 tablet by mouth Every 12 (Twelve) Hours for 10 days., Disp: 20 tablet, Rfl: 0  •  aspirin (aspirin) 81 MG EC tablet, Take 1 tablet by mouth Daily., Disp: 90 tablet, Rfl: 3  •  Aspirin (Vazalore) 81 MG capsule, Take 1 capsule by mouth Daily., Disp: , Rfl:   •  azelastine (ASTELIN) 0.1 % nasal spray, 2 sprays into the nostril(s) as directed by provider 2 (Two) Times a Day. Use in each nostril as directed, Disp: 30 mL, Rfl: 5  •  buPROPion XL (WELLBUTRIN XL) 300 MG 24 hr tablet, Take 1 tablet by mouth Every Morning., Disp: 90 tablet, Rfl: 1  •  busPIRone (BUSPAR) 10 MG tablet, Take 2 tablets by mouth 2 (Two) Times a Day., Disp: 120 tablet, Rfl: 2  •  cetirizine (zyrTEC) 10 MG tablet, Take 1 tablet by mouth Daily.,  "Disp: 90 tablet, Rfl: 3  •  clobetasol (TEMOVATE) 0.05 % external solution, clobetasol 0.05 % scalp solution, Disp: , Rfl:   •  cyclobenzaprine (FLEXERIL) 10 MG tablet, Take 10 mg by mouth 2 (Two) Times a Day., Disp: , Rfl:   •  DULoxetine (CYMBALTA) 30 MG capsule, Take 1 capsule by mouth Daily., Disp: 90 capsule, Rfl: 0  •  DULoxetine (CYMBALTA) 60 MG capsule, Take 1 capsule by mouth Daily. In addition to the Duloxetine 30mg, Disp: 90 capsule, Rfl: 1  •  Elagolix Sodium (Orilissa) 150 MG tablet, Take  by mouth., Disp: , Rfl:   •  EPINEPHrine (EPIPEN) 0.3 MG/0.3ML solution auto-injector injection, Inject 0.3 mL under the skin into the appropriate area as directed 1 (One) Time As Needed., Disp: , Rfl:   •  folic acid (FOLVITE) 1 MG tablet, Take 1,000 mcg by mouth Daily., Disp: , Rfl:   •  gabapentin (NEURONTIN) 600 MG tablet, TAKE 1 TABLET BY MOUTH EVERY 8 HOURS AS DIRECTED, Disp: , Rfl:   •  Humira Pen 40 MG/0.4ML Pen-injector Kit, , Disp: , Rfl:   •  hydrOXYzine (ATARAX) 25 MG tablet, Take 1 tablet by mouth Daily As Needed for Anxiety., Disp: 30 tablet, Rfl: 2  •  ibuprofen (ADVIL,MOTRIN) 800 MG tablet, Take 1 tablet by mouth Every 8 (Eight) Hours As Needed for Moderate Pain ., Disp: 90 tablet, Rfl: 3  •  lisdexamfetamine (Vyvanse) 70 MG capsule, Take 1 capsule by mouth Every Morning, Disp: 30 capsule, Rfl: 0  •  methotrexate 2.5 MG tablet, TAKE 6 TABLETS BY MOUTH 1 TIME EVERY WEEK, Disp: , Rfl:   •  montelukast (Singulair) 10 MG tablet, Take 1 tablet by mouth Every Night., Disp: 90 tablet, Rfl: 1  •  spironolactone (ALDACTONE) 50 MG tablet, Take 1 tablet by mouth Daily., Disp: , Rfl:   •  spironolactone-hydrochlorothiazide (Aldactazide) 50-50 MG per tablet, Take 1 tablet by mouth Daily., Disp: 90 tablet, Rfl: 3      Objective   Vital Signs:  Vitals:    08/12/22 1100   BP: 132/84   Pulse: 97   SpO2: 99%   Weight: 114 kg (251 lb 9.6 oz)   Height: 162.6 cm (64.02\")     Body mass index is 43.17 kg/m².    "       Physical Exam:   General Appearance:    Alert, cooperative, in no acute distress   ENMT:  Freidman score 3, narrow distance in between the posterior pharyngeal pillars   Neck:  Large. Trachea midline. No thyromegaly.   Lungs:     Clear to auscultation,respirations regular, even and                  unlabored    Heart:    Regular rhythm and normal rate, normal S1 and S2, no            Murmur.   Abdomen:     Obese.  Soft.  No tenderness.  No HSM    Neuro:   Conscious, alert, oriented x3. Appropriate mood and affect.    Extremities:   Moves all extremities well, no edema, no cyanosis, no             Redness              Diagnostic data:    Download over the last 90 days:  Usage = 54%.  Usage >4 H =39%  Average use: 4 hours and 53 minutes  P 95% = 14.9  Leak 95% = 23.  AHI = 1    Lab Results   Component Value Date    HGBA1C 4.94 12/28/2021     Total Cholesterol   Date Value Ref Range Status   12/28/2021 150 0 - 200 mg/dL Final     Triglycerides   Date Value Ref Range Status   12/28/2021 69 0 - 150 mg/dL Final     HDL Cholesterol   Date Value Ref Range Status   12/28/2021 64 (H) 40 - 60 mg/dL Final     Hemoglobin   Date Value Ref Range Status   02/08/2022 14.7 12.0 - 15.9 g/dL Final   02/05/2019 13.5 12.0 - 16.0 g/dL Final     CO2   Date Value Ref Range Status   02/08/2022 20.3 (L) 22.0 - 29.0 mmol/L Final     Total CO2   Date Value Ref Range Status   02/05/2019 22 22 - 30 mmol/L Final        Assessment   1. GISELA  2. Hypersomnia, secondary to #1  3. Aerophagia  4. Morbid obesity, BMI 43  5. Depression/PTSD      PLAN:  · Discussed the download.  Poor compliance with therapy now.  Discussed raising the head of the bed to minimize aspiration of air and also using Tums.  If that does not improve her symptoms of aerophagia then we could consider lowering the pressure little bit on the expense of having frequent apneas.    · Discussed the importance of compliance with PAP therapy in the setting of comorbid depression and  mood disorders  · Nasal rinse and may be Flonase as needed to help with nasal congestion  · Counseled for weight loss  · Counseled against driving or operating heavy machinery when sleepy        Time 22 minutes    This note was dictated utilizing Dragon dictation

## 2022-08-15 ENCOUNTER — LAB (OUTPATIENT)
Dept: INTERNAL MEDICINE | Facility: CLINIC | Age: 42
End: 2022-08-15

## 2022-08-15 DIAGNOSIS — Z11.1 SCREENING FOR TUBERCULOSIS: ICD-10-CM

## 2022-08-15 DIAGNOSIS — Z13.220 SCREENING FOR LIPID DISORDERS: ICD-10-CM

## 2022-08-15 DIAGNOSIS — L73.2 HIDRADENITIS SUPPURATIVA: ICD-10-CM

## 2022-08-15 DIAGNOSIS — Z11.59 NEED FOR HEPATITIS B SCREENING TEST: ICD-10-CM

## 2022-08-15 DIAGNOSIS — Z78.9 HEPATITIS A IMMUNE: ICD-10-CM

## 2022-08-15 DIAGNOSIS — D68.59 THROMBOPHILIA: Primary | ICD-10-CM

## 2022-08-15 DIAGNOSIS — L40.9 PSORIASIS: ICD-10-CM

## 2022-08-15 DIAGNOSIS — D68.59 THROMBOPHILIA: ICD-10-CM

## 2022-08-15 LAB
ALBUMIN SERPL-MCNC: 4.5 G/DL (ref 3.5–5.2)
ALBUMIN/GLOB SERPL: 1.7 G/DL
ALP SERPL-CCNC: 117 U/L (ref 39–117)
ALT SERPL W P-5'-P-CCNC: 33 U/L (ref 1–33)
ANION GAP SERPL CALCULATED.3IONS-SCNC: 13 MMOL/L (ref 5–15)
AST SERPL-CCNC: 29 U/L (ref 1–32)
BASOPHILS # BLD AUTO: 0.03 10*3/MM3 (ref 0–0.2)
BASOPHILS NFR BLD AUTO: 0.4 % (ref 0–1.5)
BILIRUB SERPL-MCNC: 0.3 MG/DL (ref 0–1.2)
BUN SERPL-MCNC: 13 MG/DL (ref 6–20)
BUN/CREAT SERPL: 15.5 (ref 7–25)
CALCIUM SPEC-SCNC: 9.9 MG/DL (ref 8.6–10.5)
CHLORIDE SERPL-SCNC: 97 MMOL/L (ref 98–107)
CHOLEST SERPL-MCNC: 194 MG/DL (ref 0–200)
CO2 SERPL-SCNC: 27 MMOL/L (ref 22–29)
CREAT SERPL-MCNC: 0.84 MG/DL (ref 0.57–1)
DEPRECATED RDW RBC AUTO: 40.6 FL (ref 37–54)
EGFRCR SERPLBLD CKD-EPI 2021: 89.7 ML/MIN/1.73
EOSINOPHIL # BLD AUTO: 0.08 10*3/MM3 (ref 0–0.4)
EOSINOPHIL NFR BLD AUTO: 1 % (ref 0.3–6.2)
ERYTHROCYTE [DISTWIDTH] IN BLOOD BY AUTOMATED COUNT: 12.7 % (ref 12.3–15.4)
GLOBULIN UR ELPH-MCNC: 2.7 GM/DL
GLUCOSE SERPL-MCNC: 72 MG/DL (ref 65–99)
HCT VFR BLD AUTO: 45.5 % (ref 34–46.6)
HDLC SERPL-MCNC: 69 MG/DL (ref 40–60)
HGB BLD-MCNC: 15.4 G/DL (ref 12–15.9)
HOLD SPECIMEN: NORMAL
IMM GRANULOCYTES # BLD AUTO: 0.03 10*3/MM3 (ref 0–0.05)
IMM GRANULOCYTES NFR BLD AUTO: 0.4 % (ref 0–0.5)
LDLC SERPL CALC-MCNC: 105 MG/DL (ref 0–100)
LDLC/HDLC SERPL: 1.48 {RATIO}
LYMPHOCYTES # BLD AUTO: 2.97 10*3/MM3 (ref 0.7–3.1)
LYMPHOCYTES NFR BLD AUTO: 39 % (ref 19.6–45.3)
MCH RBC QN AUTO: 29.9 PG (ref 26.6–33)
MCHC RBC AUTO-ENTMCNC: 33.8 G/DL (ref 31.5–35.7)
MCV RBC AUTO: 88.3 FL (ref 79–97)
MONOCYTES # BLD AUTO: 0.35 10*3/MM3 (ref 0.1–0.9)
MONOCYTES NFR BLD AUTO: 4.6 % (ref 5–12)
NEUTROPHILS NFR BLD AUTO: 4.16 10*3/MM3 (ref 1.7–7)
NEUTROPHILS NFR BLD AUTO: 54.6 % (ref 42.7–76)
NRBC BLD AUTO-RTO: 0 /100 WBC (ref 0–0.2)
PLATELET # BLD AUTO: 314 10*3/MM3 (ref 140–450)
PMV BLD AUTO: 10.4 FL (ref 6–12)
POTASSIUM SERPL-SCNC: 3.7 MMOL/L (ref 3.5–5.2)
PROT SERPL-MCNC: 7.2 G/DL (ref 6–8.5)
RBC # BLD AUTO: 5.15 10*6/MM3 (ref 3.77–5.28)
SODIUM SERPL-SCNC: 137 MMOL/L (ref 136–145)
TRIGL SERPL-MCNC: 115 MG/DL (ref 0–150)
VLDLC SERPL-MCNC: 20 MG/DL (ref 5–40)
WBC NRBC COR # BLD: 7.62 10*3/MM3 (ref 3.4–10.8)

## 2022-08-15 PROCEDURE — 80053 COMPREHEN METABOLIC PANEL: CPT | Performed by: INTERNAL MEDICINE

## 2022-08-15 PROCEDURE — 86706 HEP B SURFACE ANTIBODY: CPT | Performed by: INTERNAL MEDICINE

## 2022-08-15 PROCEDURE — 86708 HEPATITIS A ANTIBODY: CPT | Performed by: INTERNAL MEDICINE

## 2022-08-15 PROCEDURE — 87340 HEPATITIS B SURFACE AG IA: CPT | Performed by: INTERNAL MEDICINE

## 2022-08-15 PROCEDURE — 86704 HEP B CORE ANTIBODY TOTAL: CPT | Performed by: INTERNAL MEDICINE

## 2022-08-15 PROCEDURE — 86480 TB TEST CELL IMMUN MEASURE: CPT | Performed by: INTERNAL MEDICINE

## 2022-08-15 PROCEDURE — 80061 LIPID PANEL: CPT

## 2022-08-15 PROCEDURE — 36415 COLL VENOUS BLD VENIPUNCTURE: CPT | Performed by: INTERNAL MEDICINE

## 2022-08-15 PROCEDURE — 86803 HEPATITIS C AB TEST: CPT | Performed by: INTERNAL MEDICINE

## 2022-08-15 PROCEDURE — 85025 COMPLETE CBC W/AUTO DIFF WBC: CPT | Performed by: INTERNAL MEDICINE

## 2022-08-15 PROCEDURE — 86709 HEPATITIS A IGM ANTIBODY: CPT | Performed by: INTERNAL MEDICINE

## 2022-08-17 ENCOUNTER — TELEPHONE (OUTPATIENT)
Dept: INTERNAL MEDICINE | Facility: CLINIC | Age: 42
End: 2022-08-17

## 2022-08-17 ENCOUNTER — CLINICAL SUPPORT (OUTPATIENT)
Dept: INTERNAL MEDICINE | Facility: CLINIC | Age: 42
End: 2022-08-17

## 2022-08-17 ENCOUNTER — NURSE TRIAGE (OUTPATIENT)
Dept: CALL CENTER | Facility: HOSPITAL | Age: 42
End: 2022-08-17

## 2022-08-17 VITALS — DIASTOLIC BLOOD PRESSURE: 85 MMHG | SYSTOLIC BLOOD PRESSURE: 126 MMHG | HEART RATE: 87 BPM | OXYGEN SATURATION: 97 %

## 2022-08-17 DIAGNOSIS — R00.2 PALPITATIONS: Primary | ICD-10-CM

## 2022-08-17 LAB
GAMMA INTERFERON BACKGROUND BLD IA-ACNC: 0.08 IU/ML
HAV AB SER QL IA: POSITIVE
HAV IGM SERPL QL IA: NEGATIVE
HBV CORE AB SERPL QL IA: NEGATIVE
HBV SURFACE AB SER QL: REACTIVE
HBV SURFACE AG SERPL QL IA: NEGATIVE
HCV AB S/CO SERPL IA: 0.1 S/CO RATIO (ref 0–0.9)
HCV AB SERPL QL IA: NORMAL
M TB IFN-G BLD-IMP: NEGATIVE
M TB IFN-G CD4+ BCKGRND COR BLD-ACNC: 0.06 IU/ML
M TB IFN-G CD4+CD8+ BCKGRND COR BLD-ACNC: 0.06 IU/ML
MITOGEN IGNF BLD-ACNC: >10 IU/ML
QUANTIFERON INCUBATION: NORMAL
SERVICE CMNT-IMP: NORMAL

## 2022-08-17 PROCEDURE — 93000 ELECTROCARDIOGRAM COMPLETE: CPT | Performed by: INTERNAL MEDICINE

## 2022-08-17 NOTE — PROGRESS NOTES
ECG 12 Lead    Date/Time: 8/17/2022 12:21 PM  Performed by: Ant Carlos Jr., MD  Authorized by: Ant Carlos Jr., MD   Comparison: not compared with previous ECG   Rhythm: sinus rhythm  Rate: normal  Conduction: conduction normal  ST Segments: ST segments normal  T Waves: T waves normal  QRS axis: normal  Other: no other findings    Clinical impression: normal ECG

## 2022-08-17 NOTE — TELEPHONE ENCOUNTER
Reviewed guideline with caller, advises to be evaluated. Caller states she has been going thru evaluation for this and had called for her test results. Wants to make sure this will not affect her Clotting problem that she has. Warm transfer to [facility] at [facility].    Reason for Disposition  • [1] Skipped or extra beat(s) AND [2] increases with exercise or exertion    Additional Information  • Negative: Passed out (i.e., lost consciousness, collapsed and was not responding)  • Negative: Shock suspected (e.g., cold/pale/clammy skin, too weak to stand, low BP, rapid pulse)  • Negative: Difficult to awaken or acting confused (e.g., disoriented, slurred speech)  • Negative: Visible sweat on face or sweat dripping down face  • Negative: Unable to walk, or can only walk with assistance (e.g., requires support)  • Negative: [1] Received SHOCK from implantable cardiac defibrillator AND [2] persisting symptoms (i.e., palpitations, lightheadedness)  • Negative: [1] Dizziness, lightheadedness, or weakness AND [2] heart beating very rapidly (e.g., > 140 / minute)  • Negative: [1] Dizziness, lightheadedness, or weakness AND [2] heart beating very slowly  (e.g., < 50 / minute)  • Negative: Sounds like a life-threatening emergency to the triager  • Negative: Chest pain  • Negative: Implantable Cardiac Defibrillator (ICD) or a pacemaker symptoms or questions  • Negative: Difficulty breathing  • Negative: Dizziness, lightheadedness, or weakness  • Negative: [1] Heart beating very rapidly (e.g., > 140 / minute) AND [2] present now  (Exception: during exercise)  • Negative: Heart beating very slowly (e.g., < 50 / minute)  (Exception: athlete and heart rate normal for caller)  • Negative: New or worsened shortness of breath with activity (dyspnea on exertion)  • Negative: Patient sounds very sick or weak to the triager  • Negative: [1] Heart beating very rapidly (e.g., > 140 / minute) AND [2] not present now  (Exception: during  "exercise)    Answer Assessment - Initial Assessment Questions  1. DESCRIPTION: \"Please describe your heart rate or heartbeat that you are having\" (e.g., fast/slow, regular/irregular, skipped or extra beats, \"palpitations\")      palpitations  2. ONSET: \"When did it start?\" (Minutes, hours or days)       2 weeks ago   3. DURATION: \"How long does it last\" (e.g., seconds, minutes, hours)      A few minutes  4. PATTERN \"Does it come and go, or has it been constant since it started?\"  \"Does it get worse with exertion?\"   \"Are you feeling it now?\"       Comes and goes, worse with exertion  5. TAP: \"Using your hand, can you tap out what you are feeling on a chair or table in front of you, so that I can hear?\" (Note: not all patients can do this)        Unable to do   6. HEART RATE: \"Can you tell me your heart rate?\" \"How many beats in 15 seconds?\"  (Note: not all patients can do this)        Last night it was ok. Highest is 125  7. RECURRENT SYMPTOM: \"Have you ever had this before?\" If Yes, ask: \"When was the last time?\" and \"What happened that time?\"       Yes, can't remember, it went away on it's own  8. CAUSE: \"What do you think is causing the palpitations?\"      unknonw  9. CARDIAC HISTORY: \"Do you have any history of heart disease?\" (e.g., heart attack, angina, bypass surgery, angioplasty, arrhythmia)       no  10. OTHER SYMPTOMS: \"Do you have any other symptoms?\" (e.g., dizziness, chest pain, sweating, difficulty breathing)        shortness of breath and dizziness  11. PREGNANCY: \"Is there any chance you are pregnant?\" \"When was your last menstrual period?\"        no    Protocols used: HEART RATE AND HEARTBEAT QUESTIONS-ADULT-AH      "

## 2022-08-18 ENCOUNTER — PATIENT MESSAGE (OUTPATIENT)
Dept: INTERNAL MEDICINE | Facility: CLINIC | Age: 42
End: 2022-08-18

## 2022-08-23 NOTE — TELEPHONE ENCOUNTER
From: Malinda Saucedo  To: Ant Carlos Jr, MD  Sent: 8/18/2022 7:37 PM EDT  Subject: Question regarding HEPATITIS A VIRUS (HAV) ANTIBODY, TOTAL WITH REFLEX TO IGM    Should I be concerned that this is positive? Doesn't that mean I have Hep A? Should I discontinue the methotrexate and humira?    Thank you,    Malinda

## 2022-08-26 ENCOUNTER — OFFICE VISIT (OUTPATIENT)
Dept: INTERNAL MEDICINE | Facility: CLINIC | Age: 42
End: 2022-08-26

## 2022-08-26 VITALS
HEIGHT: 64 IN | DIASTOLIC BLOOD PRESSURE: 80 MMHG | TEMPERATURE: 97.4 F | HEART RATE: 97 BPM | OXYGEN SATURATION: 96 % | BODY MASS INDEX: 42.88 KG/M2 | SYSTOLIC BLOOD PRESSURE: 116 MMHG | WEIGHT: 251.2 LBS

## 2022-08-26 DIAGNOSIS — F50.81 BINGE EATING DISORDER: ICD-10-CM

## 2022-08-26 DIAGNOSIS — U07.1 COVID-19: ICD-10-CM

## 2022-08-26 DIAGNOSIS — J30.2 SEASONAL ALLERGIES: ICD-10-CM

## 2022-08-26 PROCEDURE — 99214 OFFICE O/P EST MOD 30 MIN: CPT | Performed by: INTERNAL MEDICINE

## 2022-08-26 RX ORDER — CETIRIZINE HYDROCHLORIDE 10 MG/1
10 TABLET ORAL DAILY
Qty: 90 TABLET | Refills: 3 | Status: SHIPPED | OUTPATIENT
Start: 2022-08-26 | End: 2022-12-12 | Stop reason: SDUPTHER

## 2022-08-26 RX ORDER — SECUKINUMAB 150 MG/ML
INJECTION SUBCUTANEOUS
COMMUNITY
Start: 2022-08-22

## 2022-08-26 RX ORDER — FLUCONAZOLE 150 MG/1
150 TABLET ORAL
Qty: 5 TABLET | Refills: 0 | Status: SHIPPED | OUTPATIENT
Start: 2022-08-26 | End: 2022-08-31

## 2022-08-26 RX ORDER — MONTELUKAST SODIUM 10 MG/1
10 TABLET ORAL NIGHTLY
Qty: 90 TABLET | Refills: 1 | Status: SHIPPED | OUTPATIENT
Start: 2022-08-26 | End: 2023-02-15

## 2022-08-26 RX ORDER — ASPIRIN 81 MG/1
81 TABLET ORAL DAILY
Qty: 90 TABLET | Refills: 3 | Status: SHIPPED | OUTPATIENT
Start: 2022-08-26

## 2022-08-26 RX ORDER — FLUCONAZOLE 150 MG/1
TABLET ORAL
COMMUNITY
Start: 2022-08-15 | End: 2022-08-26 | Stop reason: SDUPTHER

## 2022-08-26 NOTE — PROGRESS NOTES
Chief Complaint  Follow-up (1 month follow up for weight management )    Subjective          Malinda Saucedo presents to [facility]  History of Present Illness  Patient is losing weight. Patient is being more active with exercise machine at home. Patient reports vyvanse helped, she would like to take it BID.   Patient requests refills on allergy medications.     Current Outpatient Medications   Medication Instructions   • aspirin 81 mg, Oral, Daily   • azelastine (ASTELIN) 0.1 % nasal spray 2 sprays, Nasal, 2 Times Daily, Use in each nostril as directed   • buPROPion XL (WELLBUTRIN XL) 300 mg, Oral, Every Morning   • busPIRone (BUSPAR) 20 mg, Oral, 2 Times Daily   • cetirizine (ZYRTEC) 10 mg, Oral, Daily   • clobetasol (TEMOVATE) 0.05 % external solution clobetasol 0.05 % scalp solution   • Cosentyx Sensoready, 300 MG, 150 MG/ML solution auto-injector No dose, route, or frequency recorded.   • cyclobenzaprine (FLEXERIL) 10 mg, Oral, 2 Times Daily   • DULoxetine (CYMBALTA) 30 mg, Oral, Daily   • DULoxetine (CYMBALTA) 60 mg, Oral, Daily, In addition to the Duloxetine 30mg   • Elagolix Sodium (Orilissa) 150 MG tablet Oral   • EPINEPHrine (EPIPEN) 0.3 MG/0.3ML solution auto-injector injection 0.3 mL, Subcutaneous, Once As Needed   • fluconazole (DIFLUCAN) 150 mg, Oral, Every 3 Days   • folic acid (FOLVITE) 1,000 mcg, Oral, Daily   • gabapentin (NEURONTIN) 600 MG tablet TAKE 1 TABLET BY MOUTH EVERY 8 HOURS AS DIRECTED   • Humira Pen 40 MG/0.4ML Pen-injector Kit No dose, route, or frequency recorded.   • hydrOXYzine (ATARAX) 25 mg, Oral, Daily PRN   • ibuprofen (ADVIL,MOTRIN) 800 mg, Oral, Every 8 Hours PRN   • lisdexamfetamine (VYVANSE) 70 mg, Oral, Every Morning   • methotrexate 2.5 MG tablet TAKE 6 TABLETS BY MOUTH 1 TIME EVERY WEEK   • montelukast (SINGULAIR) 10 mg, Oral, Nightly   • spironolactone-hydrochlorothiazide (Aldactazide) 50-50 MG per tablet 1 tablet,  "Oral, Daily       The following portions of the patient's history were reviewed and updated as appropriate: allergies, current medications, past family history, past medical history, past social history, past surgical history, and problem list.    Objective   Vital Signs:   /80 (BP Location: Left arm, Patient Position: Sitting, Cuff Size: Adult)   Pulse 97   Temp 97.4 °F (36.3 °C)   Ht 162.6 cm (64.02\")   Wt 114 kg (251 lb 3.2 oz)   SpO2 96%   BMI 43.09 kg/m²     Wt Readings from Last 3 Encounters:   08/26/22 114 kg (251 lb 3.2 oz)   08/12/22 114 kg (251 lb 9.6 oz)   08/12/22 116 kg (254 lb 12.8 oz)     BP Readings from Last 3 Encounters:   08/26/22 116/80   08/17/22 126/85   08/12/22 132/84     Physical Exam   Appearance: No acute distress, well-nourished  Head: normocephalic, atraumatic  Eyes: extraocular movements intact, no scleral icterus, no conjunctival injection  Ears, Nose, and Throat: external ears normal, nares patent, moist mucous membranes  Cardiovascular: regular rate. no edema  Respiratory: breathing comfortably, symmetric chest rise  Neuro: alert and oriented to time, place, and person. Normal gait  Psych: normal mood and affect     Result Review :   The following data was reviewed by: Ant Carlos Jr, MD on 08/26/2022:  Common labs    Common Labsle 12/28/21 12/28/21 12/28/21 12/28/21 2/8/22 2/8/22 8/15/22 8/15/22 8/15/22    1147 1147 1147 1147 1645 1645 1324 1324 1324   Glucose    95  75   72   BUN    10  8   13   Creatinine    1.12 (A)  1.04 (A)   0.84   eGFR  Am    65  71      Sodium    141  139   137   Potassium    4.4  4.1   3.7   Chloride    111 (A)  108 (A)   97 (A)   Calcium    9.5  8.9   9.9   Albumin    4.50  4.00   4.50   Total Bilirubin    0.3  0.2   0.3   Alkaline Phosphatase    98  90   117   AST (SGOT)    15  13   29   ALT (SGPT)    15  14   33   WBC 7.36    8.13  7.62     Hemoglobin 15.0    14.7  15.4     Hematocrit 45.2    44.1  45.5     Platelets 265    " 299  314     Total Cholesterol   150     194    Triglycerides   69     115    HDL Cholesterol   64 (A)     69 (A)    LDL Cholesterol    72     105 (A)    Hemoglobin A1C  4.94          (A) Abnormal value            Lab Results   Component Value Date    SARSANTIGEN Not Detected 04/14/2022    COVID19 Not Detected 04/14/2022    RAPFLUA Negative 04/14/2022    RAPFLUB Negative 04/14/2022    FLUAAG Not Detected 12/22/2021    FLUBAG Not Detected 12/22/2021    RAPSCRN Negative 04/14/2022    INR 1.1 02/05/2019            Assessment and Plan    Diagnoses and all orders for this visit:    1. Body mass index (BMI) 40.0-44.9, adult ([facility]) (Primary)  Comments:  cont vyvanse. discussed benefits of regular exercise. pt unsure of pursuing bariatric surgery at this time.     2. COVID-19  Comments:  given duration of symptoms, add pulmicort inhaler, azithromycin, famotidine, and ASA daily. CXR reviewed. exam reassuring.   Orders:  -     aspirin (aspirin) 81 MG EC tablet; Take 1 tablet by mouth Daily.  Dispense: 90 tablet; Refill: 3    3. Seasonal allergies  -     cetirizine (zyrTEC) 10 MG tablet; Take 1 tablet by mouth Daily.  Dispense: 90 tablet; Refill: 3  -     montelukast (Singulair) 10 MG tablet; Take 1 tablet by mouth Every Night.  Dispense: 90 tablet; Refill: 1    4. Binge eating disorder  Comments:  cont vyvanse to help with appetite suppression. UDS and isabela reviewed. encouraged by weight loss.   Orders:  -     lisdexamfetamine (Vyvanse) 70 MG capsule; Take 1 capsule by mouth Every Morning  Dispense: 30 capsule; Refill: 0    Other orders  -     fluconazole (DIFLUCAN) 150 MG tablet; Take 1 tablet by mouth Every 3 (Three) Days for 5 days.  Dispense: 5 tablet; Refill: 0        Medications Discontinued During This Encounter   Medication Reason   • aspirin (aspirin) 81 MG EC tablet Reorder   • cetirizine (zyrTEC) 10 MG tablet Reorder   • montelukast (Singulair) 10 MG tablet Reorder   • lisdexamfetamine (Vyvanse) 70 MG capsule  Reorder   • fluconazole (DIFLUCAN) 150 MG tablet Reorder   • Aspirin (Vazalore) 81 MG capsule Alternate therapy   • spironolactone (ALDACTONE) 50 MG tablet *Therapy completed          Follow Up   No follow-ups on file.  Patient was given instructions and counseling regarding her condition or for health maintenance advice. Please see specific information pulled into the AVS if appropriate.       Ant Carlos Jr, MD  08/26/22  13:49 EDT

## 2022-09-05 DIAGNOSIS — F33.1 MAJOR DEPRESSIVE DISORDER, RECURRENT EPISODE, MODERATE: ICD-10-CM

## 2022-09-05 DIAGNOSIS — F41.1 GENERALIZED ANXIETY DISORDER: ICD-10-CM

## 2022-09-06 RX ORDER — DULOXETIN HYDROCHLORIDE 60 MG/1
CAPSULE, DELAYED RELEASE ORAL
Qty: 90 CAPSULE | Refills: 1 | Status: SHIPPED | OUTPATIENT
Start: 2022-09-06 | End: 2023-02-03

## 2022-09-06 NOTE — TELEPHONE ENCOUNTER
SNRI Protocol Passed 09/05/2022 06:22 AM   Protocol Details  No active pregnancy on record    No positive pregnancy test in past 12 months    Blood Pressure on record in past 12 months    PHQ-2 or PHQ-9 within last 12 Mo    Recent or Future Visit (12mo/30days)    No BP Higher than 180/110 in past 12 Mo     NEXT APPT WITH PROVIDER  Appointment with Brooks Lynn MD (09/16/2022)    LAST MED REFILL  DULoxetine (CYMBALTA) 30 MG capsule (07/17/2022)    PROVIDER PLEASE ADVISE

## 2022-09-16 ENCOUNTER — TELEMEDICINE (OUTPATIENT)
Dept: PSYCHIATRY | Facility: CLINIC | Age: 42
End: 2022-09-16

## 2022-09-16 ENCOUNTER — TELEPHONE (OUTPATIENT)
Dept: PSYCHIATRY | Facility: CLINIC | Age: 42
End: 2022-09-16

## 2022-09-16 DIAGNOSIS — F51.05 INSOMNIA DUE TO MENTAL CONDITION: ICD-10-CM

## 2022-09-16 DIAGNOSIS — F41.1 GENERALIZED ANXIETY DISORDER: ICD-10-CM

## 2022-09-16 DIAGNOSIS — F33.1 MAJOR DEPRESSIVE DISORDER, RECURRENT EPISODE, MODERATE: Primary | ICD-10-CM

## 2022-09-16 PROCEDURE — 99214 OFFICE O/P EST MOD 30 MIN: CPT | Performed by: STUDENT IN AN ORGANIZED HEALTH CARE EDUCATION/TRAINING PROGRAM

## 2022-09-16 NOTE — PATIENT INSTRUCTIONS
1.  Please return to clinic at your next scheduled visit.  Contact the clinic ([phone]) at least 24 hours prior in the event you need to cancel.  2.  Do no harm to yourself or others.    3.  Avoid alcohol and drugs.    4.  Take all medications as prescribed.  Please contact the clinic with any concerns. If you are in need of medication refills, please call the clinic at [phone].    5. Should you want to get in touch with your provider, Dr. Brooks Lynn, please utilize [employer] or contact the office ([phone]), and staff will be able to page Dr. Lynn directly.  6.  In the event you have personal crisis, contact the following crisis numbers: Suicide Prevention Hotline 1-827.521.7207; [facility] Helpline 0-314-381-IAQC; [facility] Emergency Room [phone]; text HELLO to 433429; or 192.     SPECIFIC RECOMMENDATIONS:     1.      Medications discussed at this encounter:                   - increase buspar     2.      Psychotherapy recommendations:      3.     Return to clinic: 4 wks

## 2022-09-16 NOTE — PROGRESS NOTES
"Subjective   Malinda Sue Saucedo is a 42 y.o. female who presents today for initial evaluation     Referring Provider:  Ant Carlos Jr., MD  [facility]  JOSE [address] [place] [address],  [place] [address]    Chief Complaint:  mdd vivi    History of Present Illness:     Chart review 9/29: Seen September 10 to establish care.  Previously was at [facility] office.  Labs are consistent with Sjogren's.  Psoriasis and arthritis are under control.  On Topamax for migraines.  On Lunesta for insomnia.  History of anxiety and depression.  On gabapentin 600 mg 3 times daily, Strattera 100 mg daily, BuSpar 10 mg, duloxetine 60 mg, bupropion 300 mg.  Denied anxiety in January 2019.  Has been on Prozac in the past.  BMP demonstrates creatinine of 1.68, alk phos of 116, otherwise unremarkable.  hCG was followed in 2019.  CBC unremarkable, no head imaging or EKG.    \"Malinda\"  [facility]  is name of her therapy clinic, [facility], accepts   Lupus  SET UP THERAPY 9/16 9/16: Virtual visit via Zoom audio and video due to the COVID-19 pandemic.  Patient is accepting of and agreeable to visit.  The visit consisted of the patient and I. The patient is at home, and I am at the office.  Interview:  1. Chart review: Seen by primary care August 26.  Losing weight.  Reassuring CMP.  Vyvanse continued.  2. Planning: Increased BuSpar to target anxiety.  Consider increasing Cymbalta.  3. \"I forgot to take the buspar higher dose!\"  a. \"OK\" a lot of lupus flare ups leading to fatigue, but has been managing them ok.  b. MRI set up due to memory loss  4. Mood/Depression: stable, nearly at goal  5. Anxiety: nearly at goal  6. Panic attacks: n  7. Energy: stable  8. Concentration: forgetful, but SLUMS 30/30 recently via neurology  9. Sleeping: stable  10. Eating: stable  11. Refills: n  12. Substances: defer  13. Therapy: still looking for a therapist  14. Medication compliant: y  15. No SI HI AVH.      8/8: Virtual visit via Zoom " "audio and video due to the COVID-19 pandemic.  Patient is accepting of and agreeable to visit.  The visit consisted of the patient and I. The patient is at home, and I am at the office.  Interview:  16. Chart review: Seen by internal medicine outpatient on .  Has lost 13 pounds in a month.  Vyvanse is helping.  Also on hydroxyzine for migraines.  February creatinine is elevated at 1.04.  17. Planning: No changes at last visit.  18. \"Busy.\"  a. Therapy clinic gets lighter over the summers. (vacations, kids being home)  b. Also has Lupus, so on methotrexate  c. Taking hydroxyzine (old prescription) for \"emergencies\"  19. Mood/Depression: 2 or 3/10  20. Anxiety: 6/10  a. Some irritability  b. Might be related to vyvanse, but this medication is tremendously beneficial to her (losing weight)  21. Panic attacks: none  22. Energy: good  23. Concentration: better  24. Sleeping: good  25. Eating: losing weight  26. Refills: n  27. Substances: CBD  28. Therapy: n  29. Medication compliant: y  30. No SI HI AVH.      : Virtual visit via Zoom audio and video due to the COVID-19 pandemic.  Patient is accepting of and agreeable to visit.  The visit consisted of the patient and I. The patient is at home, and I am at the office.  Interview:  31. Chart review: Patient is on Vyvanse 70 mg a day for binge eating disorder.  Saw primary care in May.  32. Plannin. \"Good.\"  a. Good unless I miss meds.  b. \"I am having a libido problem.\"  i. Interested, but having trouble lubricating.  ii. She did recently start Vyvanse in April, though I don't think this is the culprit.  c. Growing her practice  34. Mood/Depression: under control  35. Anxiety: under control  36. Panic attacks: n  37. Energy: much better  38. Concentration: better  39. Sleeping: well on CPAP  40. Eating: less, has lost 4 lbs  41. Refills: y  42. Substances: n  43. Therapy: n  44. Medication compliant: y  45. No SI HI AVH.      : Virtual visit via Zoom " "audio and video due to the COVID-19 pandemic.  Patient is accepting of and agreeable to visit.  The visit consisted of the patient and I. The patient is at home, and I am at the office.  Interview:  46. Chart review: Seen by primary care this month for sore throat.  February labs show decreased CO2 20.3, elevated chloride 108, slightly elevated creatinine 1.04, otherwise CMP and CBC are reassuring.  47. \"I'm good.\"  a. \"I've been great!\"  b. Mood is good. Some anxiety, but manageable. No depression.  c. Lost some weight. Achieving her goals and that is definitely helping with mood.  d. Doesn't want to add any meds, change meds. Doesn't want to remove anything either because she is doing well.  e. I notified her that I filled out her form for gastric surgery.  f. Driving during interview  48. Energy: kindof down, \"could be better\"  49. Concentration:  50. Sleeping: \"ok\" still tossing and turning a lot. CPAP helps.  51. Eating: stable  52. Refills:  53. Substances:  54. Therapy:  55. Medication compliant: y  56. No SI HI AVH.      3/7: Virtual visit via Zoom audio and video due to the COVID-19 pandemic.  Patient is accepting of and agreeable to visit.  The visit consisted of the patient and I. The patient is at home, and I am at the office.  Interview:  57. Chart review: Patient is concerned about her weight.  Labs from February show elevated creatinine 1.04, reassuring LFTs, low CO2 20.3, elevated chloride 108, reassuring CBC.  58. \" I am gaining weight.\"  a. Patient reports she is gained about 5 pounds in the last few weeks.  It is not clear per the system how much weight she has actually gained.  b. Denies hallucinations, anxiety and depression are very well controlled at this time.  c. Having some trouble with her  since around November, but they have had this happen before and \"he is not going anywhere.\"  59. Medication compliant: Yes  60. No SI HI AVH.      2/17: Virtual visit via Zoom audio and video due " "to the COVID-19 pandemic.  Patient is accepting of and agreeable to visit.  The visit consisted of the patient and I. The patient is at home, and I am at the office.  Interview:  61. Chart review: Seen by primary care February 15.  For cough.  COVID negative.  62. \"I never went up on the two of abilify.\"  a. \"Same symptoms.\" Still hears voices, but not as often. Also,  \"seeing things isn't as bad.\" Hears people calling her name. Random music.  b. Now has a sinus infection.  c. \"There are at least 3 family members who have schizophrenia.\"  d. Fearful that she may have schizophrenia; fearful that she may deteriorate like her uncle and cousin.  63. Sleeping: not well, waiting on equipment for GISELA.  64. Substances: stopped using CBD gummies first week of January  65. Therapy: n  66. Medication compliant: m  67. No SI HI AVH.      1/11: Virtual visit via Zoom audio and video due to the COVID-19 pandemic.  Patient is accepting of and agreeable to visit.  The visit consisted of the patient and I.  Interview:  68. Chart review: Seen in the emergency room December 31 for left arm numbness pain and swelling after having an IV placed to her ACE 2 days ago.  Patient saw primary care December 28 and wanted to be referred to neurology after her sleep study showed abnormal brain waves.  Labs from December 28 show low CO2 21.4, elevated chloride 111, elevated creatinine 1.12, A1c.  Thyroid studies, lipid profile, CBC are unremarkable.  No DVT found.  69. \"Good actually. The abilify helped a lot.\"  a. Sees things out of the corner of her eye, never directly.  b. Still hears voices stating her name.  70. Depression/Mood: stable  71. Anxiety: stable  72. Refills: n  73. Sleeping: initial insomnia. GISELA confirmed by sleep study.  74. Eating: stable  75. Substances: n  76. Therapy: n  77. Medication compliant: y  78. No SI HI AVH.      12/14: Virtual visit via Zoom audio and video due to the COVID-19 pandemic.  Patient is accepting of " "and agreeable to visit.  The visit consisted of the patient and I.  Interview:  79. Chart review: Seen for thrush by PCP 12/8, seen 11/11 for cough, sore throat (COVID neg). Referred to sleep medicine.  80. \"I'm ok.\"  a. Things have \"been weird.\"  b. I was having auditory and visual hallucinations when \"I first started seeing you.\"  i. Her son or  saying her name.  ii. Sees \"scary shadows\" out of the corner of her eyes, especially at night.  iii. \"Afraid I have bipolar disorder.\"  1. \"Started a practice out of nowhere.\"  2. \"I have spending sprees.\" $2,000 at a time. In the middle of one right now. Bought a bedset, tables, talked  into getting a new TV. Now in a new house.  c. Uncle has dx of schizophrenia, sister dx'd with bipolar d/o.  d. Also found out recently on the Sjogren's/Lupus spectrum.  e. Also has sleep study scheduled.  f. Will be seeing a neurologist because \"I'm losing time, like 5 minutes.\"  g. Willing to reduce the number of medications she is on and start a mood stabilizer.  h. \"Scared of these. Scared it will get worse.\"  i. Stopped buspar 2 weeks ago and is more irritable.  81. Depression/Mood:  1. Depressed mood: y  2. Seasonal pattern: denies  3. Severity: moderate  4. Anhedonia: mild, but enjoys spending time with son, shopping  5. Guilt or hopelessness:  6. Energy: \"horrible\"  7. Concentration: poor  8. Weight loss or weight gain: gain, 2 lbs since 2 weeks  9. Psychomotor retardation or agitation: def  10. Insomnia:  a. Topamax makes her sleepy, bed at 11, no initial insomnia, but wakes at 2 am, eats, sleeps in an hour, wakes at 6:30 am.  11. Duration: months  82. Anxiety:  1. Uncontrolled worrying: y  2. Severity: moderate  3. Muscle tension: y  4. Fatigue: y  5. Concentration: poor  6. Restlessness/feeling on edge: y  7. Irritability: y  8. Insomnia: maintenance insomnia  9. Duration: months  10. Panic attacks: def  83. Refills: none  84. Sleeping: above, sleep study set " "up  85. Eating: above  86. Substances: CBD gummy to sleep  87. Therapy: declines  88. Medication compliant: y  89. No SI HI AVH.      11/10: Virtual visit via Zoom audio and video due to the COVID-19 pandemic.  Patient is accepting of and agreeable to visit.  The visit consisted of the patient and I.  Interview:  90. Chart review: No new developments.  91. \"I'm ok.\"  a. More emotional; horribly.  b. Mostly down.  c. Pharmacy has not filled her 30 mg cap of duloxetine in weeks; unsure why.  d. Also feels sick too; congested today  92. Depression/Mood: depressed mood  12. Guilt or hopelessness: denies  13. Energy: poor  14. Concentration: poor  93. Anxiety: not bad  94. Sleeping:  a. Initial insomnia  b. Maintenance insomnia  95. Eating: stable  96. Substances:  97. Therapy: denies  98. Medication compliant: no, inadvertently  99. No SI HI AVH.      10/13: Virtual visit via Zoom audio and video due to the COVID-19 pandemic.  Patient is accepting of and agreeable to visit.  The visit consisted of the patient and I.  Interview:  100. Chart review: No new developments.  101. \"I've seen some small changes, but not, like, horrible.\"  a. Attention drifts a little more, in the mornings.  102. Depression/Mood: \"increasing the duloxetine has helped.\"  a. Usually feels really sad before her cycle, but doesn't this time  103. Anxiety:  a. Not as irritable  104. Sleeping: yes  105. Eating: stable  106. Substances: denies  107. Therapy: denies  108. Medication compliant: y  109. No SI HI AVH      9/29: Virtual visit via Zoom audio and video due to the COVID-19 pandemic.  Patient is accepting of and agreeable to visit.  The visit consisted of the patient and I.  Interview:  110. Her story: \"Well, it started in [employer].\"  a. Originally PMDD (2009) for years  b. Got out 2011  c. Then dx'd with depression and YENNY  d. Has been on medications since  i. Was on prozac from 2009 until 2015, stopped due to pregnancy  ii. 2018, started " "wellbutrin only. Which was horrible by itself. They added buspar 10 mg bid, with it. Then duloxetine 60 mg daily added. Better combination.  iii. Now on the above, and also on strattera 100 mg daily for ADHD. Also on gabapentin 600 tid.  e. Stimulants put her to sleep: adderall, vyvanse.  Did not try extended release formulations.  111. Mood: much better than it was in the past, but still could use some help.  112. Stressors:  a. Niece started having seizures at 21 and lost her memory, doesn't remember anyone or anybody.  b. In the middle of buying a house  c. Finances  d. Starting my own practice.  e. Son having school problems; hanging with \"bad kids.\"  113. Anxiety: manageable.  a. Worrying a lot  114. Coping: goes to [employer], trusts in God  115. Sleeping: yes  116. Eating: stable  117. Medication compliant: yes  118. Psych ROS: D, A.  Negative for psychosis.  Unclear gorge.  119. No SI HI AVH    Depression:  120. Anhedonia: denies  121. Guilt or hopelessness:   a. Feelings of guilt about how she could have done more for her nieces and nephews  122. Energy: horrible  123. Concentration: \"I have to force myself to focus.\"  a. If stops to eat, it ends her day  124. Weight loss or weight gain: stable over last few months, on weight loss pills, however  125. Psychomotor retardation or agitation: frequently  126. Insomnia: yes, on the lunesta  127. Duration: for years      Access to Firearms: yes, locked away    PHQ-9 Depression Screening  PHQ-9 Total Score:      Little interest or pleasure in doing things?     Feeling down, depressed, or hopeless?     Trouble falling or staying asleep, or sleeping too much?     Feeling tired or having little energy?     Poor appetite or overeating?     Feeling bad about yourself - or that you are a failure or have let yourself or your family down?     Trouble concentrating on things, such as reading the newspaper or watching television?     Moving or speaking so slowly that other " people could have noticed? Or the opposite - being so fidgety or restless that you have been moving around a lot more than usual?     Thoughts that you would be better off dead, or of hurting yourself in some way?     PHQ-9 Total Score       YENNY-7       Past Surgical History:  Past Surgical History:   Procedure Laterality Date   •  SECTION     • CYSTECTOMY     • ENDOMETRIAL ABLATION  , ,    • EYE SURGERY     • MYOMECTOMY         Problem List:  Patient Active Problem List   Diagnosis   • YENNY (generalized anxiety disorder)   • PTSD (post-traumatic stress disorder)   • Attention deficit hyperactivity disorder   • Hidradenitis suppurativa   • Intramural and subserous leiomyoma of uterus   • Lumbosacral spondylosis without myelopathy   • Endometriosis determined by laparoscopy   • PMDD (premenstrual dysphoric disorder)   • Psoriasis   • Thrombophilia ([facility])   • Seasonal allergies   • Major depressive disorder in partial remission ([facility])   • Body mass index (BMI) 40.0-44.9, adult ([facility])   • Anaphylactic reaction to bee sting   • Insomnia, unspecified   • Low back pain   • Major depressive disorder, recurrent, moderate ([facility])   • History of thromboembolism of vein   • Major depressive disorder, single episode, unspecified       Allergy:   No Known Allergies     Discontinued Medications:  Medications Discontinued During This Encounter   Medication Reason   • azelastine (ASTELIN) 0.1 % nasal spray *Therapy completed   • Humira Pen 40 MG/0.4ML Pen-injector Kit *Therapy completed   • ibuprofen (ADVIL,MOTRIN) 800 MG tablet *Therapy completed   • Secukinumab, 300 MG Dose, 150 MG/ML solution auto-injector *Therapy completed       Current Medications:   Current Outpatient Medications   Medication Sig Dispense Refill   • aspirin (aspirin) 81 MG EC tablet Take 1 tablet by mouth Daily. 90 tablet 3   • buPROPion XL (WELLBUTRIN XL) 300 MG 24 hr tablet Take 1 tablet by mouth Every Morning. 90 tablet 1   • busPIRone  (BUSPAR) 10 MG tablet Take 2 tablets by mouth 2 (Two) Times a Day. 120 tablet 2   • cetirizine (zyrTEC) 10 MG tablet Take 1 tablet by mouth Daily. 90 tablet 3   • clobetasol (TEMOVATE) 0.05 % external solution clobetasol 0.05 % scalp solution     • Cosentyx Sensoready, 300 MG, 150 MG/ML solution auto-injector      • cyclobenzaprine (FLEXERIL) 10 MG tablet Take 10 mg by mouth 2 (Two) Times a Day.     • DULoxetine (CYMBALTA) 30 MG capsule Take 1 capsule by mouth Daily. 90 capsule 0   • DULoxetine (CYMBALTA) 60 MG capsule TAKE 1 CAPSULE BY MOUTH DAILY IN ADDITION TO THE DULOXETINE 30 MG 90 capsule 1   • Elagolix Sodium (Orilissa) 150 MG tablet Take  by mouth.     • EPINEPHrine (EPIPEN) 0.3 MG/0.3ML solution auto-injector injection Inject 0.3 mL under the skin into the appropriate area as directed 1 (One) Time As Needed.     • folic acid (FOLVITE) 1 MG tablet Take 1,000 mcg by mouth Daily.     • gabapentin (NEURONTIN) 600 MG tablet TAKE 1 TABLET BY MOUTH EVERY 8 HOURS AS DIRECTED     • hydrOXYzine (ATARAX) 25 MG tablet Take 1 tablet by mouth Daily As Needed for Anxiety. 30 tablet 2   • lisdexamfetamine (Vyvanse) 70 MG capsule Take 1 capsule by mouth Every Morning 30 capsule 0   • methotrexate 2.5 MG tablet TAKE 6 TABLETS BY MOUTH 1 TIME EVERY WEEK     • montelukast (Singulair) 10 MG tablet Take 1 tablet by mouth Every Night. 90 tablet 1   • spironolactone-hydrochlorothiazide (Aldactazide) 50-50 MG per tablet Take 1 tablet by mouth Daily. 90 tablet 3     No current facility-administered medications for this visit.       Past Medical History:  Past Medical History:   Diagnosis Date   • ADHD (attention deficit hyperactivity disorder)    • Allergic    • Anxiety    • Arthritis    • Chronic pain disorder    • Deep vein thrombosis (HCC)    • Depression    • Ectopic pregnancy without intrauterine pregnancy 01/25/2019    Formatting of this note might be different from the original. - Day 1 = 1/25/19 -> beta 4,927 MTX #1 given  (110mg) - Day 4 = 19 -> beta 11,077 - Day 7 = 19 -> beta 11,923 MTX #2 given (110mg) - Day 11 = 19 -> beta 11,406 - Day 14 = 19 -> scheduled visit and beta - Day 19 =  19 -> beta 6,584 - Day 26 = 19 -> beta 3,111 - Day 34 = 3/1/19 -> [facility]   • Endometriosis    • Fibroids    • Headache    • Hidradenitis    • Low back pain ?    DDD   • Lupus (HCC)    • Obesity    • Panic disorder    • PTSD (post-traumatic stress disorder)        Past Psychiatric History:  Began Treatment:   Diagnoses: D, A, insomnia  Psychiatrist: Last was months ago for a couple times; previously NP for 2 years  Therapist: yes, therapy has not helped, two bad experiences  Admission History: denies  Medication Trials: see hpi  Self Harm: Denies  Suicide Attempts:Denies   Psychosis, Anxiety, Depression: denies    Substance Abuse History:   Types:Denies all, including illicit  Withdrawal Symptoms:Denies  Longest Period Sober:Not Applicable   AA: Not applicable     Social History:  Martial Status:  Employed: therapist, 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    Family History   Problem Relation Age of Onset   • ADD / ADHD Mother    • OCD Mother    • Arthritis Mother    • Hyperlipidemia Mother    • Hypertension Mother    • Thyroid disease Mother    • Anxiety disorder Mother    • ADD / ADHD  "Sister    • Anxiety disorder Sister    • Bipolar disorder Sister    • Drug abuse Maternal Aunt    • Depression Maternal Aunt    • Alcohol abuse Maternal Uncle    • Drug abuse Maternal Uncle    • Schizophrenia Maternal Uncle    • Depression Maternal Grandmother    • Other Maternal Grandmother         Colon cancer   • Anxiety disorder Maternal Grandmother    • ADD / ADHD Cousin    • OCD Cousin    • Suicide Attempts Cousin    • Alcohol abuse Cousin    • Depression Cousin    • Seizures Niece    • Arthritis Sister    • Depression Sister    • Hyperlipidemia Sister    • Asthma Sister    • Hypertension Sister    • Miscarriages / Stillbirths Sister    • Mental illness Maternal Uncle    • Alcohol abuse Maternal Uncle        Developmental History:   Born: [place]  Siblings: 1 sister, older cousin who lived with them  Childhood: no abuse  High School:Completed  College: 4 yrs at [employer] 3 years degree in counseling    · Mental Status Exam  · Appearance  · : groomed, good eye contact, normal street clothes  · Behavior  · : pleasant and cooperative  · Motor  · : No abnormal  · Speech  · :normal rhythm, rate, volume, tone, not hyperverbal, not pressured, normal prosidy  · Mood  · : \"ok\"  · Affect  · : euthymic, mood congruent, good variability  · Thought Content  · : negative suicidal ideations, negative homicidal ideations, negative obsessions  · Perceptions  · : negative auditory hallucinations, negative visual hallucinations  · Thought Process  · : linear  · Insight/Judgement  · : Fair/fair  · Cognition  · : grossly intact  · Attention   : intact    Review of Systems:  Review of Systems   Constitutional: Positive for fatigue. Negative for diaphoresis.   HENT: Negative for drooling.    Eyes: Negative for visual disturbance.   Respiratory: Positive for shortness of breath. Negative for cough.    Cardiovascular: Positive for palpitations. Negative for chest pain and leg swelling.   Gastrointestinal: Positive for " nausea. Negative for vomiting.   Endocrine: Positive for cold intolerance and heat intolerance.   Genitourinary: Negative for difficulty urinating.   Musculoskeletal: Positive for joint swelling.   Allergic/Immunologic: Positive for immunocompromised state.   Neurological: Positive for dizziness and light-headedness. Negative for seizures, speech difficulty and numbness.         Physical Exam:  Physical Exam    Vital Signs:   There were no vitals taken for this visit.     Lab Results:   Lab on 08/15/2022   Component Date Value Ref Range Status   • Total Cholesterol 08/15/2022 194  0 - 200 mg/dL Final   • Triglycerides 08/15/2022 115  0 - 150 mg/dL Final   • HDL Cholesterol 08/15/2022 69 (A) 40 - 60 mg/dL Final   • LDL Cholesterol  08/15/2022 105 (A) 0 - 100 mg/dL Final   • VLDL Cholesterol 08/15/2022 20  5 - 40 mg/dL Final   • LDL/HDL Ratio 08/15/2022 1.48   Final   • Hep A Total Ab 08/15/2022 Positive (A) Negative Final   • Hepatitis B Surface Ag 08/15/2022 Negative  Negative Final   • Hep B S Ab 08/15/2022 Reactive   Final                  Non Reactive: Inconsistent with immunity,                              less than 10 mIU/mL                Reactive:     Consistent with immunity,                              greater than 9.9 mIU/mL   • Hep B Core Total Ab 08/15/2022 Negative  Negative Final   • Hepatitis C Ab 08/15/2022 0.1  0.0 - 0.9 s/co ratio Final   • Glucose 08/15/2022 72  65 - 99 mg/dL Final   • BUN 08/15/2022 13  6 - 20 mg/dL Final   • Creatinine 08/15/2022 0.84  0.57 - 1.00 mg/dL Final   • Sodium 08/15/2022 137  136 - 145 mmol/L Final   • Potassium 08/15/2022 3.7  3.5 - 5.2 mmol/L Final   • Chloride 08/15/2022 97 (A) 98 - 107 mmol/L Final   • CO2 08/15/2022 27.0  22.0 - 29.0 mmol/L Final   • Calcium 08/15/2022 9.9  8.6 - 10.5 mg/dL Final   • Total Protein 08/15/2022 7.2  6.0 - 8.5 g/dL Final   • Albumin 08/15/2022 4.50  3.50 - 5.20 g/dL Final   • ALT (SGPT) 08/15/2022 33  1 - 33 U/L Final   • AST (SGOT)  08/15/2022 29  1 - 32 U/L Final   • Alkaline Phosphatase 08/15/2022 117  39 - 117 U/L Final   • Total Bilirubin 08/15/2022 0.3  0.0 - 1.2 mg/dL Final   • Globulin 08/15/2022 2.7  gm/dL Final   • A/G Ratio 08/15/2022 1.7  g/dL Final   • BUN/Creatinine Ratio 08/15/2022 15.5  7.0 - 25.0 Final   • Anion Gap 08/15/2022 13.0  5.0 - 15.0 mmol/L Final   • eGFR 08/15/2022 89.7  >60.0 mL/min/1.73 Final    [facility] Foundation and American Society of Nephrology (ASN) Task Force recommended calculation based on the Chronic Kidney Disease Epidemiology Collaboration (CKD-EPI) equation refit without adjustment for race.   • QuantiFERON Incubation 08/15/2022 Incubation performed.   Final   • QuantiFERON Criteria 08/15/2022 Comment   Final    QuantiFERON-TB Gold Plus is a qualitative indirect test for  M tuberculosis infection (including disease) and is intended for use  in conjunction with risk assessment, radiography, and other medical  and diagnostic evaluations. The QuantiFERON-TB Gold Plus result is  determined by subtracting the Nil value from either TB antigen (Ag)  value. The Mitogen tube serves as a control for the test.   • QUANTIFERON TB1 AG VALUE 08/15/2022 0.06  IU/mL Final   • QUANTIFERON TB2 AG VALUE 08/15/2022 0.06  IU/mL Final   • QuantiFERON Nil Value 08/15/2022 0.08  IU/mL Final   • QuantiFERON Mitogen Value 08/15/2022 >10.00  IU/mL Final   • QUANTIFERON-TB GOLD PLUS 08/15/2022 Negative  Negative Final    No response to M tuberculosis antigens detected.  Infection with M tuberculosis is unlikely, but high risk  individuals should be considered for additional testing  (ATS/IDSA/CDC Clinical Practice Guidelines, 2017). The  reference range is an Antigen minus Nil result of <0.35 IU/mL.  Chemiluminescence immunoassay methodology   • WBC 08/15/2022 7.62  3.40 - 10.80 10*3/mm3 Final   • RBC 08/15/2022 5.15  3.77 - 5.28 10*6/mm3 Final   • Hemoglobin 08/15/2022 15.4  12.0 - 15.9 g/dL Final   • Hematocrit 08/15/2022  45.5  34.0 - 46.6 % Final   • MCV 08/15/2022 88.3  79.0 - 97.0 fL Final   • MCH 08/15/2022 29.9  26.6 - 33.0 pg Final   • MCHC 08/15/2022 33.8  31.5 - 35.7 g/dL Final   • RDW 08/15/2022 12.7  12.3 - 15.4 % Final   • RDW-SD 08/15/2022 40.6  37.0 - 54.0 fl Final   • MPV 08/15/2022 10.4  6.0 - 12.0 fL Final   • Platelets 08/15/2022 314  140 - 450 10*3/mm3 Final   • Neutrophil % 08/15/2022 54.6  42.7 - 76.0 % Final   • Lymphocyte % 08/15/2022 39.0  19.6 - 45.3 % Final   • Monocyte % 08/15/2022 4.6 (A) 5.0 - 12.0 % Final   • Eosinophil % 08/15/2022 1.0  0.3 - 6.2 % Final   • Basophil % 08/15/2022 0.4  0.0 - 1.5 % Final   • Immature Grans % 08/15/2022 0.4  0.0 - 0.5 % Final   • Neutrophils, Absolute 08/15/2022 4.16  1.70 - 7.00 10*3/mm3 Final   • Lymphocytes, Absolute 08/15/2022 2.97  0.70 - 3.10 10*3/mm3 Final   • Monocytes, Absolute 08/15/2022 0.35  0.10 - 0.90 10*3/mm3 Final   • Eosinophils, Absolute 08/15/2022 0.08  0.00 - 0.40 10*3/mm3 Final   • Basophils, Absolute 08/15/2022 0.03  0.00 - 0.20 10*3/mm3 Final   • Immature Grans, Absolute 08/15/2022 0.03  0.00 - 0.05 10*3/mm3 Final   • nRBC 08/15/2022 0.0  0.0 - 0.2 /100 WBC Final   • Extra Tube 08/15/2022 Hold for add-ons.   Final    Auto resulted.   • Interpretation 08/15/2022 Comment   Final    Negative  Not infected with HCV, unless recent infection is suspected or other  evidence exists to indicate HCV infection.   • Hep A IgM 08/15/2022 Negative  Negative Final   Clinical Support on 04/14/2022   Component Date Value Ref Range Status   • SARS Antigen 04/14/2022 Not Detected  Not Detected Final   • Internal Control 04/14/2022 Passed  Passed Final   • Lot Number 04/14/2022 [id number]   Final   • Expiration Date 04/14/2022 09/20/2023   Final   • Rapid Influenza A Ag 04/14/2022 Negative  Negative Final   • Rapid Influenza B Ag 04/14/2022 Negative  Negative Final   • Internal Control 04/14/2022 Passed  Passed Final   • Lot Number 04/14/2022 [id number]   Final   • Expiration  Date 04/14/2022 07/01/2023   Final   • Rapid Strep A Screen 04/14/2022 Negative  Negative, VALID, INVALID, Not Performed Final   • Internal Control 04/14/2022 Passed  Passed Final   • Lot Number 04/14/2022 [id number]   Final   • Expiration Date 04/14/2022 08/02/2023   Final   • Throat Culture, Beta Strep 04/14/2022 No Beta Hemolytic Streptococcus Isolated   Final   • COVID19 04/14/2022 Not Detected  Not Detected - Ref. Range Final       EKG Results:  No orders to display       Imaging Results:  No Images in the past 120 days found..      Assessment & Plan   Diagnoses and all orders for this visit:    1. Major depressive disorder, recurrent episode, moderate (HCC) (Primary)    2. Generalized anxiety disorder    3. Insomnia due to mental condition        Visit Diagnoses:    ICD-10-CM ICD-9-CM   1. Major depressive disorder, recurrent episode, moderate (HCC)  F33.1 296.32   2. Generalized anxiety disorder  F41.1 300.02   3. Insomnia due to mental condition  F51.05 300.9     327.02     9/16: Never increased BuSpar, but is doing well.  Still wants to increase it now.  Follow up in 4 weeks to set up psychotherapy with our office.  12 minutes of supportive psychotherapy with goal to strengthen defenses, promote problems solving, restore adaptive functioning and provide symptom relief. The therapeutic alliance was strengthened to encourage the patient to express their thoughts and feelings. Esteem building was enhanced through praise, reassurance, normalizing and encouragement. Coping skills were enhanced to build distress tolerance skills and emotional regulation. Allowed patient to freely discuss issues without interruption or judgement with unconditional positive regard, active listening skills, and empathy. Provided a safe, confidential environment to facilitate the development of a positive therapeutic relationship and encourage open, honest communication. Assisted patient in identifying risk factors which would indicate  the need for higher level of care including thoughts to harm self or others and/or self-harming behavior and encouraged patient to contact this office, call 911, or present to the nearest emergency room should any of these events occur. Assisted patient in processing session content; acknowledged and normalized patient’s thoughts, feelings, and concerns by utilizing a person-centered approach in efforts to build appropriate rapport and a positive therapeutic relationship with open and honest communication. Patient given education on medication side effects, diagnosis/illness and relapse symptoms. Plan to continue supportive psychotherapy in next appointment to provide symptom relief.  Diagnoses: as above  Symptoms: as above  Functional status: Good  Mental Status Exam: as above    Treatment plan: Medication management and supportive psychotherapy  Prognosis: Good  Progress: Improved, well  Four weeks, urgent    8/8: Increase BuSpar to target anxiety, which may be related to Vyvanse.  Regardless, Vyvanse has given her tremendous benefits.  Consider increasing Cymbalta at the next visit.  16 minutes of supportive psychotherapy Treatment plan: Medication management and supportive psychotherapy  Prognosis: Good  Progress: Anxiety might be a little worse  6 weeks    6/7: Consider therapy here. Stable, well, no SE. 17 minutes of supportive psychotherapy Treatment plan: Medication management and supportive psychotherapy  Prognosis: good  Progress: better  2 mos      4/18: Doing well, no hallucinations, depression and anxiety are basically under control.  Patient is accomplishing her goals to lose weight, etc., which is helping.  7 minutes of supportive psychotherapy   6 weeks    3/7: Discontinue Abilify due to weight gain.  Now denies having any hallucinations.  Patient to monitor her weight.  Patient will also keep me posted on her relationship with her  which is no doubt a contributor to depression and anxiety.   16 minutes of supportive psychotherapy   5 weeks    2/17: Increase Abilify.  Patient is fearful that she will develop schizophrenia and deteriorate like her family.  She will start therapy as well.  18 minutes of supportive psychotherapy   4 weeks    1/11: Significant improvement in symptoms, however residual auditory hallucinations.  Increase Abilify. 5 minutes of supportive psychotherapy   4 weeks    12/14: Mood reactivity versus actual bipolar II disorder. Take strattera every other day for 3 doses then stop. Start abilify 5 mg day. 20 minutes of supportive psychotherapy   4 wks.    11/10: Start melatonin, restart duloxetine 17 minutes of supportive psychotherapy   4 wks.    10/13: Better mood. Reduce strattera to 40 mg nightly (not daily, it is sedating). 4 wks.    9/29: Records release will be signed by patient.  Patient is unsure if Strattera helped.  Reduce the dose.  Residual depressive symptoms.  Increase duloxetine.  Continue gabapentin and BuSpar.  Therapy referral made.  See back in 2 weeks to try to titrate off of Strattera entirely.  It does not sound like she has ever tried extended release formulations of stimulants for ADHD.  Does not sound like she got neuropsychological testing for ADHD.  Rule out gorge.    PLAN:  128. Safety: No acute safety concerns  129. Therapy:  Referral made.  130. Risk Assessment: Risk of self-harm acutely is moderate.  Risk factors include anxiety disorder, mood disorder, and recent psychosocial stressors (pandemic). Protective factors include no family history, denies access to guns/weapons, no present SI, no history of suicide attempts or self-harm in the past, minimal AODA, healthcare seeking, future orientation, willingness to engage in care.  Risk of self-harm chronically is also moderate, but could be further elevated in the event of treatment noncompliance and/or AODA.  131. Meds:.  a. CONTINUE hydroxyzine 25 mg daily as needed anxiety. Mostly at night. Risks,  benefits, alternatives discussed with patient including sedation, dizziness, fall risk, GI upset, and risk of increased CNS depression and elevated heart rate if taken with other antihistamines.  After discussion of these risks and benefits, the patient voiced understanding and agreed to proceed.  b. CONTINUE duloxetine 90 mg a day. Risks, benefits, alternatives discussed with patient including GI upset, nausea vomiting diarrhea, theoretical decrease of seizure threshold predisposing the patient to a slightly higher seizure risk, headaches, sexual dysfunction, serotonin syndrome, bleeding risk, increased suicidality in patients 24 years and younger.  Also constipation and urinary retention.  After discussion of these risks and benefits, the patient voiced understanding and agreed to proceed.  c. INCREASE BuSpar 10 to 20 mg p.o. twice daily (refilled today). Risks, benefits, alternatives discussed with patient including nausea, GI upset, mild sedation, falls risk.  After discussion of these risks and benefits, the patient voiced understanding and agreed to proceed.  d. CONTINUE bupropion  mg p.o. daily. Risks, benefits, alternatives discussed with patient including nausea, GI upset, increased energy, exacerbation of irritability, insomnia, lowering of seizure threshold.  After discussion of these risks and benefits, the patient voiced understanding and agreed to proceed.  e. AGREE with gabapentin 600 mg PO TID. Risks, benefits, alternatives discussed with patient including sedation, dizziness/falls risk, GI upset, weight gain.  After discussion of these risks and benefits, the patient voiced understanding and agreed to proceed. LULY, Ramin ordered. Verbally signed controlled substances agreement.  f. AGREE with Vyvanse 70 mg daily.  g. S/P:  i. Strattera 40 mg daily: stopped 1/11/21 to reduce medication regimen.  Might have been beneficial.  ii. Never started melatonin  iii. abilify 4 mg daily.  Wg.   Months.  132. Labs: no recs  133. Follow up: 4 weeks    Patient screened positive for depression based on a PHQ-9 score of  on . Follow-up recommendations include: Prescribed antidepressant medication treatment.           TREATMENT PLAN/GOALS: Continue supportive psychotherapy efforts and medications as indicated. Treatment and medication options discussed during today's visit. Patient acknowledged and verbally consented to continue with current treatment plan and was educated on the importance of compliance with treatment and follow-up appointments.    MEDICATION ISSUES:  LILIAN reviewed as expected.  Discussed medication options and treatment plan of prescribed medication as well as the risks, benefits, and side effects including potential falls, possible impaired driving and metabolic adversities among others. Patient is agreeable to call the office with any worsening of symptoms or onset of side effects. Patient is agreeable to call 911 or go to the nearest ER should he/she begin having SI/HI. No medication side effects or related complaints today.     MEDS ORDERED DURING VISIT:  No orders of the defined types were placed in this encounter.      Return in about 4 weeks (around 10/14/2022) for Urgent.         This document has been electronically signed by Brooks Lynn MD  September 16, 2022 10:37 EDT      Part of this note may be an electronic transcription/translation of spoken language to printed text using the Dragon Dictation System.

## 2022-10-02 DIAGNOSIS — F50.81 BINGE EATING DISORDER: ICD-10-CM

## 2022-10-03 ENCOUNTER — TELEPHONE (OUTPATIENT)
Dept: INTERNAL MEDICINE | Facility: CLINIC | Age: 42
End: 2022-10-03

## 2022-10-03 NOTE — TELEPHONE ENCOUNTER
Caller: Malinda Saucedo    Relationship: Self    Best call back number: [phone]    What is the best time to reach you: ANYTIME    Who are you requesting to speak with (clinical staff, provider,  specific staff member): CLINICAL    Do you know the name of the person who called:PATIENT    What was the call regarding: PATIENT WOULD LIKE TO KNOW IF SHE CAN COME IN AND GET A B12 SHOT OR DOES SHE HAVE TO GO TO HER RHEUMATOLOGIST. PLEASE CALL PATIENT BACK AND ADVISE.    Do you require a callback: YES

## 2022-10-03 NOTE — TELEPHONE ENCOUNTER
Refill request for  controlled substance.      Date of request: 10/3/2022    Medication requested: Vyvanse   Last OV: 8/18/2022  Last UDS: 03/11/2022  Contract signed: yes    Date:3/11/2022  Next office visit: 12/02/2022    Chelly Lamb

## 2022-10-04 DIAGNOSIS — R53.83 OTHER FATIGUE: Primary | ICD-10-CM

## 2022-10-14 ENCOUNTER — TELEMEDICINE (OUTPATIENT)
Dept: PSYCHIATRY | Facility: CLINIC | Age: 42
End: 2022-10-14

## 2022-10-14 DIAGNOSIS — F51.05 INSOMNIA DUE TO MENTAL CONDITION: ICD-10-CM

## 2022-10-14 DIAGNOSIS — F33.1 MAJOR DEPRESSIVE DISORDER, RECURRENT EPISODE, MODERATE: Primary | ICD-10-CM

## 2022-10-14 DIAGNOSIS — F41.1 GENERALIZED ANXIETY DISORDER: ICD-10-CM

## 2022-10-14 PROCEDURE — 99214 OFFICE O/P EST MOD 30 MIN: CPT | Performed by: STUDENT IN AN ORGANIZED HEALTH CARE EDUCATION/TRAINING PROGRAM

## 2022-10-14 RX ORDER — CLOBETASOL PROPIONATE 0.46 MG/ML
SOLUTION TOPICAL
COMMUNITY
Start: 2022-09-26

## 2022-10-14 NOTE — PROGRESS NOTES
"Subjective   Malinda Sue Saucedo is a 42 y.o. female who presents today for initial evaluation     Referring Provider:  Ant Carlos Jr., MD  [address] [facility] [place], [place]  [address]  [place] [address]    Chief Complaint:  mdd vivi    History of Present Illness:     Chart review 9/29: Seen September 10 to establish care.  Previously was at [facility] office.  Labs are consistent with Sjogren's.  Psoriasis and arthritis are under control.  On Topamax for migraines.  On Lunesta for insomnia.  History of anxiety and depression.  On gabapentin 600 mg 3 times daily, Strattera 100 mg daily, BuSpar 10 mg, duloxetine 60 mg, bupropion 300 mg.  Denied anxiety in January 2019.  Has been on Prozac in the past.  BMP demonstrates creatinine of 1.68, alk phos of 116, otherwise unremarkable.  hCG was followed in 2019.  CBC unremarkable, no head imaging or EKG.    \"Malinda\"  [facility]  is name of her therapy clinic, [facility], accepts   Lupus  SET UP THERAPY 9/16    10/14: Virtual visit via Zoom audio and video due to the COVID-19 pandemic.  Patient is accepting of and agreeable to visit.  The visit consisted of the patient and I. The patient is at home, and I am at the office.  Interview:  1. Chart review: No new.  2. Planning: Increased BuSpar at last visit.  Set up psychotherapy today.  3. \"No difference on higher dose of buspar.\"  a. Changed humira to cosentyx.  i. Worsening dep and anxiety  ii. Another reason to keep things the same  b. \"Let's keep things where they are for a while.\"  4. Mood/Depression: worse, see above  5. Anxiety: worse, see above  a. G15  6. Panic attacks: n  7. Sleeping: stable  8. Eating: stable  9. Refills: y  10. Substances: n  11. Therapy: set up with [facility]  12. Medication compliant: y  13. No SI HI AVH.      9/16: Virtual visit via Zoom audio and video due to the COVID-19 pandemic.  Patient is accepting of and agreeable to visit.  The visit consisted of the patient and I. The " "patient is at home, and I am at the office.  Interview:  14. Chart review: Seen by primary care August 26.  Losing weight.  Reassuring CMP.  Vyvanse continued.  15. Planning: Increased BuSpar to target anxiety.  Consider increasing Cymbalta.  16. \"I forgot to take the buspar higher dose!\"  a. \"OK\" a lot of lupus flare ups leading to fatigue, but has been managing them ok.  b. MRI set up due to memory loss  17. Mood/Depression: stable, nearly at goal  18. Anxiety: nearly at goal  19. Panic attacks: n  20. Energy: stable  21. Concentration: forgetful, but SLUMS 30/30 recently via neurology  22. Sleeping: stable  23. Eating: stable  24. Refills: n  25. Substances: defer  26. Therapy: still looking for a therapist  27. Medication compliant: y  28. No SI HI AVH.      8/8: Virtual visit via Zoom audio and video due to the COVID-19 pandemic.  Patient is accepting of and agreeable to visit.  The visit consisted of the patient and I. The patient is at home, and I am at the office.  Interview:  29. Chart review: Seen by internal medicine outpatient on July 22.  Has lost 13 pounds in a month.  Vyvanse is helping.  Also on hydroxyzine for migraines.  February creatinine is elevated at 1.04.  30. Planning: No changes at last visit.  31. \"Busy.\"  a. Therapy clinic gets lighter over the summers. (vacations, kids being home)  b. Also has Lupus, so on methotrexate  c. Taking hydroxyzine (old prescription) for \"emergencies\"  32. Mood/Depression: 2 or 3/10  33. Anxiety: 6/10  a. Some irritability  b. Might be related to vyvanse, but this medication is tremendously beneficial to her (losing weight)  34. Panic attacks: none  35. Energy: good  36. Concentration: better  37. Sleeping: good  38. Eating: losing weight  39. Refills: n  40. Substances: CBD  41. Therapy: n  42. Medication compliant: y  43. No SI HI AVH.      6/7: Virtual visit via Zoom audio and video due to the COVID-19 pandemic.  Patient is accepting of and agreeable to " "visit.  The visit consisted of the patient and I. The patient is at home, and I am at the office.  Interview:  44. Chart review: Patient is on Vyvanse 70 mg a day for binge eating disorder.  Saw primary care in May.  45. Plannin. \"Good.\"  a. Good unless I miss meds.  b. \"I am having a libido problem.\"  i. Interested, but having trouble lubricating.  ii. She did recently start Vyvanse in April, though I don't think this is the culprit.  c. Growing her practice  47. Mood/Depression: under control  48. Anxiety: under control  49. Panic attacks: n  50. Energy: much better  51. Concentration: better  52. Sleeping: well on CPAP  53. Eating: less, has lost 4 lbs  54. Refills: y  55. Substances: n  56. Therapy: n  57. Medication compliant: y  58. No SI HI AVH.      : Virtual visit via Zoom audio and video due to the COVID-19 pandemic.  Patient is accepting of and agreeable to visit.  The visit consisted of the patient and I. The patient is at home, and I am at the office.  Interview:  59. Chart review: Seen by primary care this month for sore throat.  February labs show decreased CO2 20.3, elevated chloride 108, slightly elevated creatinine 1.04, otherwise CMP and CBC are reassuring.  60. \"I'm good.\"  a. \"I've been great!\"  b. Mood is good. Some anxiety, but manageable. No depression.  c. Lost some weight. Achieving her goals and that is definitely helping with mood.  d. Doesn't want to add any meds, change meds. Doesn't want to remove anything either because she is doing well.  e. I notified her that I filled out her form for gastric surgery.  f. Driving during interview  61. Energy: kindof down, \"could be better\"  62. Concentration:  63. Sleeping: \"ok\" still tossing and turning a lot. CPAP helps.  64. Eating: stable  65. Refills:  66. Substances:  67. Therapy:  68. Medication compliant: y  69. No SI HI AVH.      3/7: Virtual visit via Zoom audio and video due to the COVID-19 pandemic.  Patient is accepting of " "and agreeable to visit.  The visit consisted of the patient and I. The patient is at home, and I am at the office.  Interview:  70. Chart review: Patient is concerned about her weight.  Labs from February show elevated creatinine 1.04, reassuring LFTs, low CO2 20.3, elevated chloride 108, reassuring CBC.  71. \" I am gaining weight.\"  a. Patient reports she is gained about 5 pounds in the last few weeks.  It is not clear per the system how much weight she has actually gained.  b. Denies hallucinations, anxiety and depression are very well controlled at this time.  c. Having some trouble with her  since around November, but they have had this happen before and \"he is not going anywhere.\"  72. Medication compliant: Yes  73. No SI HI AVH.      2/17: Virtual visit via Zoom audio and video due to the COVID-19 pandemic.  Patient is accepting of and agreeable to visit.  The visit consisted of the patient and I. The patient is at home, and I am at the office.  Interview:  74. Chart review: Seen by primary care February 15.  For cough.  COVID negative.  75. \"I never went up on the two of abilify.\"  a. \"Same symptoms.\" Still hears voices, but not as often. Also,  \"seeing things isn't as bad.\" Hears people calling her name. Random music.  b. Now has a sinus infection.  c. \"There are at least 3 family members who have schizophrenia.\"  d. Fearful that she may have schizophrenia; fearful that she may deteriorate like her uncle and cousin.  76. Sleeping: not well, waiting on equipment for GISELA.  77. Substances: stopped using CBD gummies first week of January 78. Therapy: n  79. Medication compliant: m  80. No SI HI AVH.      1/11: Virtual visit via Zoom audio and video due to the COVID-19 pandemic.  Patient is accepting of and agreeable to visit.  The visit consisted of the patient and I.  Interview:  81. Chart review: Seen in the emergency room December 31 for left arm numbness pain and swelling after having an IV placed to " "her ACE 2 days ago.  Patient saw primary care December 28 and wanted to be referred to neurology after her sleep study showed abnormal brain waves.  Labs from December 28 show low CO2 21.4, elevated chloride 111, elevated creatinine 1.12, A1c.  Thyroid studies, lipid profile, CBC are unremarkable.  No DVT found.  82. \"Good actually. The abilify helped a lot.\"  a. Sees things out of the corner of her eye, never directly.  b. Still hears voices stating her name.  83. Depression/Mood: stable  84. Anxiety: stable  85. Refills: n  86. Sleeping: initial insomnia. GISELA confirmed by sleep study.  87. Eating: stable  88. Substances: n  89. Therapy: n  90. Medication compliant: y  91. No SI HI AVH.      12/14: Virtual visit via Zoom audio and video due to the COVID-19 pandemic.  Patient is accepting of and agreeable to visit.  The visit consisted of the patient and I.  Interview:  92. Chart review: Seen for thrush by PCP 12/8, seen 11/11 for cough, sore throat (COVID neg). Referred to sleep medicine.  93. \"I'm ok.\"  a. Things have \"been weird.\"  b. I was having auditory and visual hallucinations when \"I first started seeing you.\"  i. Her son or  saying her name.  ii. Sees \"scary shadows\" out of the corner of her eyes, especially at night.  iii. \"Afraid I have bipolar disorder.\"  1. \"Started a practice out of nowhere.\"  2. \"I have spending sprees.\" $2,000 at a time. In the middle of one right now. Bought a bedset, tables, talked  into getting a new TV. Now in a new house.  c. Uncle has dx of schizophrenia, sister dx'd with bipolar d/o.  d. Also found out recently on the Sjogren's/Lupus spectrum.  e. Also has sleep study scheduled.  f. Will be seeing a neurologist because \"I'm losing time, like 5 minutes.\"  g. Willing to reduce the number of medications she is on and start a mood stabilizer.  h. \"Scared of these. Scared it will get worse.\"  i. Stopped buspar 2 weeks ago and is more " "irritable.  94. Depression/Mood:  1. Depressed mood: y  2. Seasonal pattern: denies  3. Severity: moderate  4. Anhedonia: mild, but enjoys spending time with son, shopping  5. Guilt or hopelessness:  6. Energy: \"horrible\"  7. Concentration: poor  8. Weight loss or weight gain: gain, 2 lbs since 2 weeks  9. Psychomotor retardation or agitation: def  10. Insomnia:  a. Topamax makes her sleepy, bed at 11, no initial insomnia, but wakes at 2 am, eats, sleeps in an hour, wakes at 6:30 am.  11. Duration: months  95. Anxiety:  1. Uncontrolled worrying: y  2. Severity: moderate  3. Muscle tension: y  4. Fatigue: y  5. Concentration: poor  6. Restlessness/feeling on edge: y  7. Irritability: y  8. Insomnia: maintenance insomnia  9. Duration: months  10. Panic attacks: def  96. Refills: none  97. Sleeping: above, sleep study set up  98. Eating: above  99. Substances: CBD gummy to sleep  100. Therapy: declines  101. Medication compliant: y  102. No SI HI AV.      11/10: Virtual visit via Zoom audio and video due to the COVID-19 pandemic.  Patient is accepting of and agreeable to visit.  The visit consisted of the patient and I.  Interview:  103. Chart review: No new developments.  104. \"I'm ok.\"  a. More emotional; horribly.  b. Mostly down.  c. Pharmacy has not filled her 30 mg cap of duloxetine in weeks; unsure why.  d. Also feels sick too; congested today  105. Depression/Mood: depressed mood  12. Guilt or hopelessness: denies  13. Energy: poor  14. Concentration: poor  106. Anxiety: not bad  107. Sleeping:  a. Initial insomnia  b. Maintenance insomnia  108. Eating: stable  109. Substances:  110. Therapy: denies  111. Medication compliant: no, inadvertently  112. No SI HI AVH.      10/13: Virtual visit via Zoom audio and video due to the COVID-19 pandemic.  Patient is accepting of and agreeable to visit.  The visit consisted of the patient and I.  Interview:  113. Chart review: No new developments.  114. \"I've seen some " "small changes, but not, like, horrible.\"  a. Attention drifts a little more, in the mornings.  115. Depression/Mood: \"increasing the duloxetine has helped.\"  a. Usually feels really sad before her cycle, but doesn't this time  116. Anxiety:  a. Not as irritable  117. Sleeping: yes  118. Eating: stable  119. Substances: denies  120. Therapy: denies  121. Medication compliant: y  122. No SI HI AVH      9/29: Virtual visit via Zoom audio and video due to the COVID-19 pandemic.  Patient is accepting of and agreeable to visit.  The visit consisted of the patient and I.  Interview:  123. Her story: \"Well, it started in [employer].\"  a. Originally PMDD (2009) for years  b. Got out 2011  c. Then dx'd with depression and YENNY  d. Has been on medications since  i. Was on prozac from 2009 until 2015, stopped due to pregnancy  ii. 2018, started wellbutrin only. Which was horrible by itself. They added buspar 10 mg bid, with it. Then duloxetine 60 mg daily added. Better combination.  iii. Now on the above, and also on strattera 100 mg daily for ADHD. Also on gabapentin 600 tid.  e. Stimulants put her to sleep: adderall, vyvanse.  Did not try extended release formulations.  124. Mood: much better than it was in the past, but still could use some help.  125. Stressors:  a. Niece started having seizures at 21 and lost her memory, doesn't remember anyone or anybody.  b. In the middle of buying a house  c. Finances  d. Starting my own practice.  e. Son having school problems; hanging with \"bad kids.\"  126. Anxiety: manageable.  a. Worrying a lot  127. Coping: goes to Mandae[employer], trusts in God  128. Sleeping: yes  129. Eating: stable  130. Medication compliant: yes  131. Psych ROS: D, A.  Negative for psychosis.  Unclear gorge.  132. No SI HI AVH    Depression:  133. Anhedonia: denies  134. Guilt or hopelessness:   a. Feelings of guilt about how she could have done more for her nieces and nephews  135. Energy: horrible  136. Concentration: \"I " "have to force myself to focus.\"  a. If stops to eat, it ends her day  137. Weight loss or weight gain: stable over last few months, on weight loss pills, however  138. Psychomotor retardation or agitation: frequently  139. Insomnia: yes, on the lunesta  140. Duration: for years      Access to Firearms: yes, locked away    PHQ-9 Depression Screening  PHQ-9 Total Score:      Little interest or pleasure in doing things?     Feeling down, depressed, or hopeless?     Trouble falling or staying asleep, or sleeping too much?     Feeling tired or having little energy?     Poor appetite or overeating?     Feeling bad about yourself - or that you are a failure or have let yourself or your family down?     Trouble concentrating on things, such as reading the newspaper or watching television?     Moving or speaking so slowly that other people could have noticed? Or the opposite - being so fidgety or restless that you have been moving around a lot more than usual?     Thoughts that you would be better off dead, or of hurting yourself in some way?     PHQ-9 Total Score       YENNY-7  Feeling nervous, anxious or on edge: Nearly every day  Not being able to stop or control worrying: Nearly every day  Worrying too much about different things: Nearly every day  Trouble Relaxing: More than half the days  Being so restless that it is hard to sit still: Not at all  Feeling afraid as if something awful might happen: More than half the days  Becoming easily annoyed or irritable: More than half the days  YENNY 7 Total Score: 15  If you checked any problems, how difficult have these problems made it for you to do your work, take care of things at home, or get along with other people: Very difficult    Past Surgical History:  Past Surgical History:   Procedure Laterality Date   •  SECTION     • CYSTECTOMY     • ENDOMETRIAL ABLATION  , ,    • EYE SURGERY     • MYOMECTOMY         Problem List:  Patient Active Problem List "   Diagnosis   • YENNY (generalized anxiety disorder)   • PTSD (post-traumatic stress disorder)   • Attention deficit hyperactivity disorder   • Hidradenitis suppurativa   • Intramural and subserous leiomyoma of uterus   • Lumbosacral spondylosis without myelopathy   • Endometriosis determined by laparoscopy   • PMDD (premenstrual dysphoric disorder)   • Psoriasis   • Thrombophilia ([facility])   • Seasonal allergies   • Major depressive disorder in partial remission ([facility])   • Body mass index (BMI) 40.0-44.9, adult ([facility])   • Anaphylactic reaction to bee sting   • Insomnia, unspecified   • Low back pain   • Major depressive disorder, recurrent, moderate ([facility])   • History of thromboembolism of vein   • Major depressive disorder, single episode, unspecified       Allergy:   No Known Allergies     Discontinued Medications:  There are no discontinued medications.    Current Medications:   Current Outpatient Medications   Medication Sig Dispense Refill   • aspirin (aspirin) 81 MG EC tablet Take 1 tablet by mouth Daily. 90 tablet 3   • buPROPion XL (WELLBUTRIN XL) 300 MG 24 hr tablet Take 1 tablet by mouth Every Morning. (Patient taking differently: Take 1 tablet by mouth Every Morning. PT(PATIENT) REPORTS SHE TAKES MEDICATION AT BEDTIME) 90 tablet 1   • busPIRone (BUSPAR) 10 MG tablet Take 2 tablets by mouth 2 (Two) Times a Day. 120 tablet 2   • cetirizine (zyrTEC) 10 MG tablet Take 1 tablet by mouth Daily. 90 tablet 3   • clobetasol (TEMOVATE) 0.05 % external solution clobetasol 0.05 % scalp solution     • clobetasol (TEMOVATE) 0.05 % ointment APPLY TO THE AFFECTED AREA ON SCALP TWICE DAILY AS NEEDED FOR ITCHING FLARES     • Cosentyx Sensoready, 300 MG, 150 MG/ML solution auto-injector      • cyclobenzaprine (FLEXERIL) 10 MG tablet Take 10 mg by mouth 2 (Two) Times a Day.     • DULoxetine (CYMBALTA) 30 MG capsule Take 1 capsule by mouth Daily. 90 capsule 0   • DULoxetine (CYMBALTA) 60 MG capsule TAKE 1 CAPSULE BY MOUTH DAILY IN  ADDITION TO THE DULOXETINE 30 MG 90 capsule 1   • Elagolix Sodium (Orilissa) 150 MG tablet Take  by mouth.     • EPINEPHrine (EPIPEN) 0.3 MG/0.3ML solution auto-injector injection Inject 0.3 mL under the skin into the appropriate area as directed 1 (One) Time As Needed.     • folic acid (FOLVITE) 1 MG tablet Take 1,000 mcg by mouth Daily.     • gabapentin (NEURONTIN) 600 MG tablet TAKE 1 TABLET BY MOUTH EVERY 8 HOURS AS DIRECTED     • hydrOXYzine (ATARAX) 25 MG tablet Take 1 tablet by mouth Daily As Needed for Anxiety. 30 tablet 2   • lisdexamfetamine (Vyvanse) 70 MG capsule Take 1 capsule by mouth Every Morning 30 capsule 0   • methotrexate 2.5 MG tablet TAKE 6 TABLETS BY MOUTH 1 TIME EVERY WEEK     • montelukast (Singulair) 10 MG tablet Take 1 tablet by mouth Every Night. 90 tablet 1   • spironolactone-hydrochlorothiazide (Aldactazide) 50-50 MG per tablet Take 1 tablet by mouth Daily. 90 tablet 3     No current facility-administered medications for this visit.       Past Medical History:  Past Medical History:   Diagnosis Date   • ADHD (attention deficit hyperactivity disorder)    • Allergic    • Anxiety    • Arthritis    • Chronic pain disorder    • Deep vein thrombosis (HCC)    • Depression    • Ectopic pregnancy without intrauterine pregnancy 01/25/2019    Formatting of this note might be different from the original. - Day 1 = 1/25/19 -> beta 4,927 MTX #1 given (110mg) - Day 4 = 1/28/19 -> beta 11,077 - Day 7 = 1/31/19 -> beta 11,923 MTX #2 given (110mg) - Day 11 = 2/5/19 -> beta 11,406 - Day 14 = 2/8/19 -> scheduled visit and beta - Day 19 =  2/13/19 -> beta 6,584 - Day 26 = 2/20/19 -> beta 3,111 - Day 34 = 3/1/19 -> beta 553 (seen at [facility]   • Endometriosis    • Fibroids    • Headache    • Hidradenitis    • Low back pain 2013?    DDD   • Lupus (HCC)    • Obesity    • Panic disorder    • PTSD (post-traumatic stress disorder)        Past Psychiatric History:  Began Treatment: 2009  Diagnoses: D, A,  insomnia  Psychiatrist: Last was months ago for a couple times; previously NP for 2 years  Therapist: yes, therapy has not helped, two bad experiences  Admission History: denies  Medication Trials: see hpi  Self Harm: Denies  Suicide Attempts:Denies   Psychosis, Anxiety, Depression: denies    Substance Abuse History:   Types:Denies all, including illicit  Withdrawal Symptoms:Denies  Longest Period Sober:Not Applicable   AA: Not applicable     Social History:  Martial Status:  Employed: therapist, 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  Kids: one  House: apartment   History: army, honorable discharge, see hpi    Social History     Socioeconomic History   • Marital status:    Tobacco Use   • Smoking status: Never   • Smokeless tobacco: Never   Vaping Use   • Vaping Use: Never used   Substance and Sexual Activity   • Alcohol use: Never   • Drug use: Yes     Types: Other     Comment: OTC CBD   • Sexual activity: Yes     Partners: Male     Birth control/protection: None       Family History:   Suicide Attempts:  1st cousin, maternal; another 1st cousin maternal  Suicide Completions: 1st cousin, maternal  Dx'd her sister herself that she has bipolar.    Family History   Problem Relation Age of Onset   • ADD / ADHD Mother    • OCD Mother    • Arthritis Mother    • Hyperlipidemia Mother    • Hypertension Mother    • Thyroid disease Mother    • Anxiety disorder Mother    • ADD / ADHD Sister    • Anxiety disorder Sister    • Bipolar disorder Sister    • Drug abuse Maternal Aunt    • Depression Maternal Aunt    • Alcohol abuse Maternal Uncle    • Drug abuse Maternal Uncle    • Schizophrenia Maternal Uncle    • Depression Maternal Grandmother    • Other Maternal Grandmother         Colon cancer   • Anxiety disorder Maternal Grandmother    • ADD / ADHD Cousin    • OCD Cousin    • Suicide Attempts Cousin    • Alcohol abuse Cousin    • Depression Cousin    • Seizures Niece    • Arthritis Sister    • Depression  "Sister    • Hyperlipidemia Sister    • Asthma Sister    • Hypertension Sister    • Miscarriages / Stillbirths Sister    • Mental illness Maternal Uncle    • Alcohol abuse Maternal Uncle        Developmental History:   Born: [place]  Siblings: 1 sister, older cousin who lived with them  Childhood: no abuse  High School:Completed  College: 4 yrs at [employer] 3 years degree in counseling    · Mental Status Exam  · Appearance  · : groomed, good eye contact, normal street clothes  · Behavior  · : pleasant and cooperative  · Motor  · : No abnormal  · Speech  · :normal rhythm, rate, volume, tone, not hyperverbal, not pressured, normal prosidy  · Mood  · : \"worse, but it's due to cosentyx\"  · Affect  · : euthymic, mood incongruent, good variability  · Thought Content  · : negative suicidal ideations, negative homicidal ideations, negative obsessions  · Perceptions  · : negative auditory hallucinations, negative visual hallucinations  · Thought Process  · : linear  · Insight/Judgement  · : Fair/fair  · Cognition  · : grossly intact  · Attention   : intact    Review of Systems:  Review of Systems   Constitutional: Positive for diaphoresis and fatigue.   HENT: Negative for drooling.    Eyes: Positive for visual disturbance.   Respiratory: Positive for shortness of breath. Negative for cough.    Cardiovascular: Positive for palpitations and leg swelling. Negative for chest pain.   Gastrointestinal: Positive for nausea. Negative for diarrhea and vomiting.   Endocrine: Positive for cold intolerance and heat intolerance.   Genitourinary: Positive for difficulty urinating, frequency and urgency. Negative for dysuria.   Musculoskeletal: Positive for joint swelling.   Allergic/Immunologic: Positive for immunocompromised state.   Neurological: Positive for dizziness, light-headedness, numbness and headaches. Negative for seizures, syncope and speech difficulty.         Physical Exam:  Physical Exam    Vital Signs:   There " were no vitals taken for this visit.     Lab Results:   Lab on 08/15/2022   Component Date Value Ref Range Status   • Total Cholesterol 08/15/2022 194  0 - 200 mg/dL Final   • Triglycerides 08/15/2022 115  0 - 150 mg/dL Final   • HDL Cholesterol 08/15/2022 69 (H)  40 - 60 mg/dL Final   • LDL Cholesterol  08/15/2022 105 (H)  0 - 100 mg/dL Final   • VLDL Cholesterol 08/15/2022 20  5 - 40 mg/dL Final   • LDL/HDL Ratio 08/15/2022 1.48   Final   • Hep A Total Ab 08/15/2022 Positive (A)  Negative Final   • Hepatitis B Surface Ag 08/15/2022 Negative  Negative Final   • Hep B S Ab 08/15/2022 Reactive   Final                  Non Reactive: Inconsistent with immunity,                              less than 10 mIU/mL                Reactive:     Consistent with immunity,                              greater than 9.9 mIU/mL   • Hep B Core Total Ab 08/15/2022 Negative  Negative Final   • Hepatitis C Ab 08/15/2022 0.1  0.0 - 0.9 s/co ratio Final   • Glucose 08/15/2022 72  65 - 99 mg/dL Final   • BUN 08/15/2022 13  6 - 20 mg/dL Final   • Creatinine 08/15/2022 0.84  0.57 - 1.00 mg/dL Final   • Sodium 08/15/2022 137  136 - 145 mmol/L Final   • Potassium 08/15/2022 3.7  3.5 - 5.2 mmol/L Final   • Chloride 08/15/2022 97 (L)  98 - 107 mmol/L Final   • CO2 08/15/2022 27.0  22.0 - 29.0 mmol/L Final   • Calcium 08/15/2022 9.9  8.6 - 10.5 mg/dL Final   • Total Protein 08/15/2022 7.2  6.0 - 8.5 g/dL Final   • Albumin 08/15/2022 4.50  3.50 - 5.20 g/dL Final   • ALT (SGPT) 08/15/2022 33  1 - 33 U/L Final   • AST (SGOT) 08/15/2022 29  1 - 32 U/L Final   • Alkaline Phosphatase 08/15/2022 117  39 - 117 U/L Final   • Total Bilirubin 08/15/2022 0.3  0.0 - 1.2 mg/dL Final   • Globulin 08/15/2022 2.7  gm/dL Final   • A/G Ratio 08/15/2022 1.7  g/dL Final   • BUN/Creatinine Ratio 08/15/2022 15.5  7.0 - 25.0 Final   • Anion Gap 08/15/2022 13.0  5.0 - 15.0 mmol/L Final   • eGFR 08/15/2022 89.7  >60.0 mL/min/1.73 Final    National Kidney Foundation and  American Society of Nephrology (ASN) Task Force recommended calculation based on the Chronic Kidney Disease Epidemiology Collaboration (CKD-EPI) equation refit without adjustment for race.   • QuantiFERON Incubation 08/15/2022 Incubation performed.   Final   • QuantiFERON Criteria 08/15/2022 Comment   Final    QuantiFERON-TB Gold Plus is a qualitative indirect test for  M tuberculosis infection (including disease) and is intended for use  in conjunction with risk assessment, radiography, and other medical  and diagnostic evaluations. The QuantiFERON-TB Gold Plus result is  determined by subtracting the Nil value from either TB antigen (Ag)  value. The Mitogen tube serves as a control for the test.   • QUANTIFERON TB1 AG VALUE 08/15/2022 0.06  IU/mL Final   • QUANTIFERON TB2 AG VALUE 08/15/2022 0.06  IU/mL Final   • QuantiFERON Nil Value 08/15/2022 0.08  IU/mL Final   • QuantiFERON Mitogen Value 08/15/2022 >10.00  IU/mL Final   • QUANTIFERON-TB GOLD PLUS 08/15/2022 Negative  Negative Final    No response to M tuberculosis antigens detected.  Infection with M tuberculosis is unlikely, but high risk  individuals should be considered for additional testing  (ATS/IDSA/CDC Clinical Practice Guidelines, 2017). The  reference range is an Antigen minus Nil result of <0.35 IU/mL.  Chemiluminescence immunoassay methodology   • WBC 08/15/2022 7.62  3.40 - 10.80 10*3/mm3 Final   • RBC 08/15/2022 5.15  3.77 - 5.28 10*6/mm3 Final   • Hemoglobin 08/15/2022 15.4  12.0 - 15.9 g/dL Final   • Hematocrit 08/15/2022 45.5  34.0 - 46.6 % Final   • MCV 08/15/2022 88.3  79.0 - 97.0 fL Final   • MCH 08/15/2022 29.9  26.6 - 33.0 pg Final   • MCHC 08/15/2022 33.8  31.5 - 35.7 g/dL Final   • RDW 08/15/2022 12.7  12.3 - 15.4 % Final   • RDW-SD 08/15/2022 40.6  37.0 - 54.0 fl Final   • MPV 08/15/2022 10.4  6.0 - 12.0 fL Final   • Platelets 08/15/2022 314  140 - 450 10*3/mm3 Final   • Neutrophil % 08/15/2022 54.6  42.7 - 76.0 % Final   • Lymphocyte  % 08/15/2022 39.0  19.6 - 45.3 % Final   • Monocyte % 08/15/2022 4.6 (L)  5.0 - 12.0 % Final   • Eosinophil % 08/15/2022 1.0  0.3 - 6.2 % Final   • Basophil % 08/15/2022 0.4  0.0 - 1.5 % Final   • Immature Grans % 08/15/2022 0.4  0.0 - 0.5 % Final   • Neutrophils, Absolute 08/15/2022 4.16  1.70 - 7.00 10*3/mm3 Final   • Lymphocytes, Absolute 08/15/2022 2.97  0.70 - 3.10 10*3/mm3 Final   • Monocytes, Absolute 08/15/2022 0.35  0.10 - 0.90 10*3/mm3 Final   • Eosinophils, Absolute 08/15/2022 0.08  0.00 - 0.40 10*3/mm3 Final   • Basophils, Absolute 08/15/2022 0.03  0.00 - 0.20 10*3/mm3 Final   • Immature Grans, Absolute 08/15/2022 0.03  0.00 - 0.05 10*3/mm3 Final   • nRBC 08/15/2022 0.0  0.0 - 0.2 /100 WBC Final   • Extra Tube 08/15/2022 Hold for add-ons.   Final    Auto resulted.   • Interpretation 08/15/2022 Comment   Final    Negative  Not infected with HCV, unless recent infection is suspected or other  evidence exists to indicate HCV infection.   • Hep A IgM 08/15/2022 Negative  Negative Final       EKG Results:  No orders to display       Imaging Results:  No Images in the past 120 days found..      Assessment & Plan   Diagnoses and all orders for this visit:    1. Major depressive disorder, recurrent episode, moderate (HCC) (Primary)  -     Ambulatory Referral to Behavioral Health    2. Generalized anxiety disorder  -     Ambulatory Referral to Behavioral Health    3. Insomnia due to mental condition  -     Ambulatory Referral to Behavioral Health        Visit Diagnoses:    ICD-10-CM ICD-9-CM   1. Major depressive disorder, recurrent episode, moderate (HCC)  F33.1 296.32   2. Generalized anxiety disorder  F41.1 300.02   3. Insomnia due to mental condition  F51.05 300.9     327.02     10/14: No changes. Referral to therapy. 17 minutes of supportive psychotherapy with goal to strengthen defenses, promote problems solving, restore adaptive functioning and provide symptom relief. The therapeutic alliance was  strengthened to encourage the patient to express their thoughts and feelings. Esteem building was enhanced through praise, reassurance, normalizing and encouragement. Coping skills were enhanced to build distress tolerance skills and emotional regulation. Allowed patient to freely discuss issues without interruption or judgement with unconditional positive regard, active listening skills, and empathy. Provided a safe, confidential environment to facilitate the development of a positive therapeutic relationship and encourage open, honest communication. Assisted patient in identifying risk factors which would indicate the need for higher level of care including thoughts to harm self or others and/or self-harming behavior and encouraged patient to contact this office, call 911, or present to the nearest emergency room should any of these events occur. Assisted patient in processing session content; acknowledged and normalized patient’s thoughts, feelings, and concerns by utilizing a person-centered approach in efforts to build appropriate rapport and a positive therapeutic relationship with open and honest communication. Patient given education on medication side effects, diagnosis/illness and relapse symptoms. Plan to continue supportive psychotherapy in next appointment to provide symptom relief.  Diagnoses: as above  Symptoms: as above  Functional status: good  Mental Status Exam: as above    Treatment plan: Medication management and supportive psychotherapy  Prognosis: good  Progress: worse anx and dep (likely medication related)  6 wks      9/16: Never increased BuSpar, but is doing well.  Still wants to increase it now.  Follow up in 4 weeks to set up psychotherapy with our office.  12 minutes of supportive psychotherapy  Treatment plan: Medication management and supportive psychotherapy  Prognosis: Good  Progress: Improved, well  Four weeks, urgent    8/8: Increase BuSpar to target anxiety, which may be related  to Vyvanse.  Regardless, Vyvanse has given her tremendous benefits.  Consider increasing Cymbalta at the next visit.  16 minutes of supportive psychotherapy Treatment plan: Medication management and supportive psychotherapy  Prognosis: Good  Progress: Anxiety might be a little worse  6 weeks    6/7: Consider therapy here. Stable, well, no SE. 17 minutes of supportive psychotherapy Treatment plan: Medication management and supportive psychotherapy  Prognosis: good  Progress: better  2 mos      4/18: Doing well, no hallucinations, depression and anxiety are basically under control.  Patient is accomplishing her goals to lose weight, etc., which is helping.  7 minutes of supportive psychotherapy   6 weeks    3/7: Discontinue Abilify due to weight gain.  Now denies having any hallucinations.  Patient to monitor her weight.  Patient will also keep me posted on her relationship with her  which is no doubt a contributor to depression and anxiety.  16 minutes of supportive psychotherapy   5 weeks    2/17: Increase Abilify.  Patient is fearful that she will develop schizophrenia and deteriorate like her family.  She will start therapy as well.  18 minutes of supportive psychotherapy   4 weeks    1/11: Significant improvement in symptoms, however residual auditory hallucinations.  Increase Abilify. 5 minutes of supportive psychotherapy   4 weeks    12/14: Mood reactivity versus actual bipolar II disorder. Take strattera every other day for 3 doses then stop. Start abilify 5 mg day. 20 minutes of supportive psychotherapy   4 wks.    11/10: Start melatonin, restart duloxetine 17 minutes of supportive psychotherapy   4 wks.    10/13: Better mood. Reduce strattera to 40 mg nightly (not daily, it is sedating). 4 wks.    9/29: Records release will be signed by patient.  Patient is unsure if Strattera helped.  Reduce the dose.  Residual depressive symptoms.  Increase duloxetine.  Continue gabapentin and BuSpar.  Therapy  referral made.  See back in 2 weeks to try to titrate off of Strattera entirely.  It does not sound like she has ever tried extended release formulations of stimulants for ADHD.  Does not sound like she got neuropsychological testing for ADHD.  Rule out gorge.    PLAN:  141. Safety: No acute safety concerns  142. Therapy:  Referral made.  143. Risk Assessment: Risk of self-harm acutely is moderate.  Risk factors include anxiety disorder, mood disorder, and recent psychosocial stressors (pandemic). Protective factors include no family history, denies access to guns/weapons, no present SI, no history of suicide attempts or self-harm in the past, minimal AODA, healthcare seeking, future orientation, willingness to engage in care.  Risk of self-harm chronically is also moderate, but could be further elevated in the event of treatment noncompliance and/or AODA.  144. Meds:.  a. CONTINUE hydroxyzine 25 mg daily as needed anxiety. Mostly at night. Risks, benefits, alternatives discussed with patient including sedation, dizziness, fall risk, GI upset, and risk of increased CNS depression and elevated heart rate if taken with other antihistamines.  After discussion of these risks and benefits, the patient voiced understanding and agreed to proceed.  b. CONTINUE duloxetine 90 mg a day. Risks, benefits, alternatives discussed with patient including GI upset, nausea vomiting diarrhea, theoretical decrease of seizure threshold predisposing the patient to a slightly higher seizure risk, headaches, sexual dysfunction, serotonin syndrome, bleeding risk, increased suicidality in patients 24 years and younger.  Also constipation and urinary retention.  After discussion of these risks and benefits, the patient voiced understanding and agreed to proceed.  c. INCREASE BuSpar 10 to 20 mg p.o. twice daily (refilled today). Risks, benefits, alternatives discussed with patient including nausea, GI upset, mild sedation, falls risk.  After  discussion of these risks and benefits, the patient voiced understanding and agreed to proceed.  d. CONTINUE bupropion  mg p.o. daily. Risks, benefits, alternatives discussed with patient including nausea, GI upset, increased energy, exacerbation of irritability, insomnia, lowering of seizure threshold.  After discussion of these risks and benefits, the patient voiced understanding and agreed to proceed.  e. AGREE with gabapentin 600 mg PO TID. Risks, benefits, alternatives discussed with patient including sedation, dizziness/falls risk, GI upset, weight gain.  After discussion of these risks and benefits, the patient voiced understanding and agreed to proceed. Lilian MAURICIO ordered. Verbally signed controlled substances agreement.  f. AGREE with Vyvanse 70 mg daily.  g. S/P:  i. Strattera 40 mg daily: stopped 1/11/21 to reduce medication regimen.  Might have been beneficial.  ii. Never started melatonin  iii. abilify 4 mg daily.  Wg.  Months.  145. Labs: no recs  146. Follow up: 6 weeks    Patient screened positive for depression based on a PHQ-9 score of  on . Follow-up recommendations include: Prescribed antidepressant medication treatment.           TREATMENT PLAN/GOALS: Continue supportive psychotherapy efforts and medications as indicated. Treatment and medication options discussed during today's visit. Patient acknowledged and verbally consented to continue with current treatment plan and was educated on the importance of compliance with treatment and follow-up appointments.    MEDICATION ISSUES:  LILIAN reviewed as expected.  Discussed medication options and treatment plan of prescribed medication as well as the risks, benefits, and side effects including potential falls, possible impaired driving and metabolic adversities among others. Patient is agreeable to call the office with any worsening of symptoms or onset of side effects. Patient is agreeable to call 911 or go to the nearest ER should he/she  begin having SI/HI. No medication side effects or related complaints today.     MEDS ORDERED DURING VISIT:  No orders of the defined types were placed in this encounter.      Return in about 6 weeks (around 11/25/2022).         This document has been electronically signed by Brooks Lynn MD  October 14, 2022 13:05 EDT      Part of this note may be an electronic transcription/translation of spoken language to printed text using the Dragon Dictation System.

## 2022-10-14 NOTE — PATIENT INSTRUCTIONS
1.  Please return to clinic at your next scheduled visit.  Contact the clinic ([phone]) at least 24 hours prior in the event you need to cancel.  2.  Do no harm to yourself or others.    3.  Avoid alcohol and drugs.    4.  Take all medications as prescribed.  Please contact the clinic with any concerns. If you are in need of medication refills, please call the clinic at [phone].    5. Should you want to get in touch with your provider, Dr. Brooks Lynn, please utilize [email or web address] or contact the office ([phone]), and staff will be able to page Dr. Lynn directly.  6.  In the event you have personal crisis, contact the following crisis numbers: Suicide Prevention Hotline 1-201.957.6039; [facility] Helpline 1-413-195-WXTG; [facility] Emergency Room [phone]; text HELLO to 115882; or 743.     SPECIFIC RECOMMENDATIONS:     1.      Medications discussed at this encounter:                   - no changes     2.      Psychotherapy recommendations:      3.     Return to clinic: 6 weeks

## 2022-11-04 DIAGNOSIS — F33.1 MAJOR DEPRESSIVE DISORDER, RECURRENT EPISODE, MODERATE: ICD-10-CM

## 2022-11-04 DIAGNOSIS — F41.1 GENERALIZED ANXIETY DISORDER: ICD-10-CM

## 2022-11-04 RX ORDER — BUSPIRONE HYDROCHLORIDE 10 MG/1
TABLET ORAL
Qty: 120 TABLET | Refills: 2 | Status: SHIPPED | OUTPATIENT
Start: 2022-11-04 | End: 2023-02-03 | Stop reason: SDUPTHER

## 2022-11-07 DIAGNOSIS — F50.81 BINGE EATING DISORDER: ICD-10-CM

## 2022-11-07 NOTE — TELEPHONE ENCOUNTER
Caller: Malinda Saucedo Sue    Relationship: Self    Best call back number: [phone]    Requested Prescriptions:   Requested Prescriptions     Pending Prescriptions Disp Refills   • lisdexamfetamine (Vyvanse) 70 MG capsule 30 capsule 0     Sig: Take 1 capsule by mouth Every Morning        Pharmacy where request should be sent: [id number] - [place], [place] - [address] [facility] AT [facility] OF [address] & [place] - [phone] [facility] [phone] FX     Additional details provided by patient: PATIENT IS CURRENTLY OUT OF THIS MEDICATION. SHE STATED THAT SHE EMAILED ANSERT AND PHARMACY HAS NOT RECEIVED THE SCRIPT YET.     Does the patient have less than a 3 day supply:  [x] Yes  [] No    Carlos Mariscal Rep   11/07/22 16:32 EST

## 2022-11-08 ENCOUNTER — CLINICAL SUPPORT (OUTPATIENT)
Dept: INTERNAL MEDICINE | Facility: CLINIC | Age: 42
End: 2022-11-08

## 2022-11-08 ENCOUNTER — TELEPHONE (OUTPATIENT)
Dept: INTERNAL MEDICINE | Facility: CLINIC | Age: 42
End: 2022-11-08

## 2022-11-08 DIAGNOSIS — R50.9 FEVER, UNSPECIFIED FEVER CAUSE: Primary | ICD-10-CM

## 2022-11-08 PROCEDURE — 87428 SARSCOV & INF VIR A&B AG IA: CPT | Performed by: INTERNAL MEDICINE

## 2022-11-08 NOTE — TELEPHONE ENCOUNTER
CONTACTED PT REGARDING LAB RESULTS. PT VERIFIED . I INFORMED PATIENT POC RAPID FLU AND SARS TEST WAS NEGATIVE.

## 2022-12-02 ENCOUNTER — TELEMEDICINE (OUTPATIENT)
Dept: PSYCHIATRY | Facility: CLINIC | Age: 42
End: 2022-12-02

## 2022-12-02 DIAGNOSIS — F51.05 INSOMNIA DUE TO MENTAL CONDITION: ICD-10-CM

## 2022-12-02 DIAGNOSIS — F33.1 MAJOR DEPRESSIVE DISORDER, RECURRENT EPISODE, MODERATE: Primary | ICD-10-CM

## 2022-12-02 DIAGNOSIS — F41.1 GENERALIZED ANXIETY DISORDER: ICD-10-CM

## 2022-12-02 PROCEDURE — 99214 OFFICE O/P EST MOD 30 MIN: CPT | Performed by: STUDENT IN AN ORGANIZED HEALTH CARE EDUCATION/TRAINING PROGRAM

## 2022-12-02 PROCEDURE — 90833 PSYTX W PT W E/M 30 MIN: CPT | Performed by: STUDENT IN AN ORGANIZED HEALTH CARE EDUCATION/TRAINING PROGRAM

## 2022-12-02 RX ORDER — FLUOCINOLONE ACETONIDE 0.11 MG/ML
OIL TOPICAL
COMMUNITY
Start: 2022-10-14

## 2022-12-02 RX ORDER — DULOXETIN HYDROCHLORIDE 30 MG/1
30 CAPSULE, DELAYED RELEASE ORAL DAILY
Qty: 90 CAPSULE | Refills: 1 | Status: SHIPPED | OUTPATIENT
Start: 2022-12-02 | End: 2023-02-03

## 2022-12-02 RX ORDER — FLUCONAZOLE 150 MG/1
TABLET ORAL
COMMUNITY
End: 2023-01-14

## 2022-12-02 RX ORDER — PREDNISONE 1 MG/1
TABLET ORAL
COMMUNITY
Start: 2022-10-31 | End: 2022-12-02

## 2022-12-02 RX ORDER — IBUPROFEN 800 MG/1
800 TABLET ORAL EVERY 8 HOURS PRN
COMMUNITY
Start: 2022-11-04 | End: 2022-12-21

## 2022-12-02 NOTE — PROGRESS NOTES
"Subjective   Malinda Sue Saucedo is a 42 y.o. female who presents today for initial evaluation     Referring Provider:  Ant Carlos Jr., MD  [address] [facility] [place]  [address]  [place] [address]    Chief Complaint:  mdd vivi    History of Present Illness:     Chart review 9/29: Seen September 10 to establish care.  Previously was at \[facility]\"" office.  Labs are consistent with Sjogren's.  Psoriasis and arthritis are under control.  On Topamax for migraines.  On Lunesta for insomnia.  History of anxiety and depression.  On gabapentin 600 mg 3 times daily, Strattera 100 mg daily, BuSpar 10 mg, duloxetine 60 mg, bupropion 300 mg.  Denied anxiety in January 2019.  Has been on Prozac in the past.  BMP demonstrates creatinine of 1.68, alk phos of 116, otherwise unremarkable.  hCG was followed in 2019.  CBC unremarkable, no head imaging or EKG.    \"Malinda\"  [place] [facility]  is name of her therapy clinic, [facility], accepts [facility]  Lupus  Evaluate for ADHD 12/2 12/2: Virtual visit via Zoom audio and video due to the COVID-19 pandemic.  Patient is accepting of and agreeable to visit.  The visit consisted of the patient and I. The patient is at home, and I am at the office.  Interview:  1. Chart review: Seen by rheumatology for Sjogren's.  Plan is to discontinue methotrexate and taper off prednisone and continue Cosentyx.  More labs were ordered.  CMP from August is reassuring, as is CBC.  Elevated LDL.  There are multiple notes.  2. Planning: Referred to psychotherapy at last visit, no other changes  3. \"I'm good.\"  a. Had not been taking duloxetine and bupropion -- pharmacy's fault. Stopped bupropion because she didn't like it.   b. We discussed vyvanse: which was started for binge eating disorder 4/22  c. Pt states she was diagnosed with ADHD a couple years ago.   4. Mood/Depression: minimal depressed mood  5. Anxiety: minimal worrying  6. Panic attacks: n  7. Energy: good  8. Concentration: Still " "having problems with concentration  9. Sleeping: Stable  10. Eating: Stable weight  11. Refills: Yes  12. Substances: No  13. Therapy: No  14. Medication compliant: y  15. No SI HI AVH.      10/14: Virtual visit via Zoom audio and video due to the COVID-19 pandemic.  Patient is accepting of and agreeable to visit.  The visit consisted of the patient and I. The patient is at home, and I am at the office.  Interview:  16. Chart review: No new.  17. Planning: Increased BuSpar at last visit.  Set up psychotherapy today.  18. \"No difference on higher dose of buspar.\"  a. Changed humira to cosentyx.  i. Worsening dep and anxiety  ii. Another reason to keep things the same  b. \"Let's keep things where they are for a while.\"  19. Mood/Depression: worse, see above  20. Anxiety: worse, see above  a. G15  21. Panic attacks: n  22. Sleeping: stable  23. Eating: stable  24. Refills: y  25. Substances: n  26. Therapy: set up with Haylee  27. Medication compliant: y  28. No SI HI AVH.      9/16: Virtual visit via Zoom audio and video due to the COVID-19 pandemic.  Patient is accepting of and agreeable to visit.  The visit consisted of the patient and I. The patient is at home, and I am at the office.  Interview:  29. Chart review: Seen by primary care August 26.  Losing weight.  Reassuring CMP.  Vyvanse continued.  30. Planning: Increased BuSpar to target anxiety.  Consider increasing Cymbalta.  31. \"I forgot to take the buspar higher dose!\"  a. \"OK\" a lot of lupus flare ups leading to fatigue, but has been managing them ok.  b. MRI set up due to memory loss  32. Mood/Depression: stable, nearly at goal  33. Anxiety: nearly at goal  34. Panic attacks: n  35. Energy: stable  36. Concentration: forgetful, but SLUMS 30/30 recently via neurology  37. Sleeping: stable  38. Eating: stable  39. Refills: n  40. Substances: defer  41. Therapy: still looking for a therapist  42. Medication compliant: y  43. No SI HI AVH.      8/8: Virtual " "visit via Zoom audio and video due to the COVID-19 pandemic.  Patient is accepting of and agreeable to visit.  The visit consisted of the patient and I. The patient is at home, and I am at the office.  Interview:  44. Chart review: Seen by internal medicine outpatient on .  Has lost 13 pounds in a month.  Vyvanse is helping.  Also on hydroxyzine for migraines.  February creatinine is elevated at 1.04.  45. Planning: No changes at last visit.  46. \"Busy.\"  a. Therapy clinic gets lighter over the summers. (vacations, kids being home)  b. Also has Lupus, so on methotrexate  c. Taking hydroxyzine (old prescription) for \"emergencies\"  47. Mood/Depression: 2 or 3/10  48. Anxiety: 6/10  a. Some irritability  b. Might be related to vyvanse, but this medication is tremendously beneficial to her (losing weight)  49. Panic attacks: none  50. Energy: good  51. Concentration: better  52. Sleeping: good  53. Eating: losing weight  54. Refills: n  55. Substances: CBD  56. Therapy: n  57. Medication compliant: y  58. No SI HI AVH.      : Virtual visit via Zoom audio and video due to the COVID-19 pandemic.  Patient is accepting of and agreeable to visit.  The visit consisted of the patient and I. The patient is at home, and I am at the office.  Interview:  59. Chart review: Patient is on Vyvanse 70 mg a day for binge eating disorder.  Saw primary care in May.  60. Plannin. \"Good.\"  a. Good unless I miss meds.  b. \"I am having a libido problem.\"  i. Interested, but having trouble lubricating.  ii. She did recently start Vyvanse in April, though I don't think this is the culprit.  c. Growing her practice  62. Mood/Depression: under control  63. Anxiety: under control  64. Panic attacks: n  65. Energy: much better  66. Concentration: better  67. Sleeping: well on CPAP  68. Eating: less, has lost 4 lbs  69. Refills: y  70. Substances: n  71. Therapy: n  72. Medication compliant: y  73. No SI HI AVH.      : Virtual " "visit via Zoom audio and video due to the COVID-19 pandemic.  Patient is accepting of and agreeable to visit.  The visit consisted of the patient and I. The patient is at home, and I am at the office.  Interview:  74. Chart review: Seen by primary care this month for sore throat.  February labs show decreased CO2 20.3, elevated chloride 108, slightly elevated creatinine 1.04, otherwise CMP and CBC are reassuring.  75. \"I'm good.\"  a. \"I've been great!\"  b. Mood is good. Some anxiety, but manageable. No depression.  c. Lost some weight. Achieving her goals and that is definitely helping with mood.  d. Doesn't want to add any meds, change meds. Doesn't want to remove anything either because she is doing well.  e. I notified her that I filled out her form for gastric surgery.  f. Driving during interview  76. Energy: kindof down, \"could be better\"  77. Concentration:  78. Sleeping: \"ok\" still tossing and turning a lot. CPAP helps.  79. Eating: stable  80. Refills:  81. Substances:  82. Therapy:  83. Medication compliant: y  84. No SI HI AVH.      3/7: Virtual visit via Zoom audio and video due to the COVID-19 pandemic.  Patient is accepting of and agreeable to visit.  The visit consisted of the patient and I. The patient is at home, and I am at the office.  Interview:  85. Chart review: Patient is concerned about her weight.  Labs from February show elevated creatinine 1.04, reassuring LFTs, low CO2 20.3, elevated chloride 108, reassuring CBC.  86. \" I am gaining weight.\"  a. Patient reports she is gained about 5 pounds in the last few weeks.  It is not clear per the system how much weight she has actually gained.  b. Denies hallucinations, anxiety and depression are very well controlled at this time.  c. Having some trouble with her  since around November, but they have had this happen before and \"he is not going anywhere.\"  87. Medication compliant: Yes  88. No SI HI AVH.      2/17: Virtual visit via Zoom " "audio and video due to the COVID-19 pandemic.  Patient is accepting of and agreeable to visit.  The visit consisted of the patient and I. The patient is at home, and I am at the office.  Interview:  89. Chart review: Seen by primary care February 15.  For cough.  COVID negative.  90. \"I never went up on the two of abilify.\"  a. \"Same symptoms.\" Still hears voices, but not as often. Also,  \"seeing things isn't as bad.\" Hears people calling her name. Random music.  b. Now has a sinus infection.  c. \"There are at least 3 family members who have schizophrenia.\"  d. Fearful that she may have schizophrenia; fearful that she may deteriorate like her uncle and cousin.  91. Sleeping: not well, waiting on equipment for GISELA.  92. Substances: stopped using CBD gummies first week of January 93. Therapy: n  94. Medication compliant: m  95. No SI HI AVH.      1/11: Virtual visit via Zoom audio and video due to the COVID-19 pandemic.  Patient is accepting of and agreeable to visit.  The visit consisted of the patient and I.  Interview:  96. Chart review: Seen in the emergency room December 31 for left arm numbness pain and swelling after having an IV placed to her ACE 2 days ago.  Patient saw primary care December 28 and wanted to be referred to neurology after her sleep study showed abnormal brain waves.  Labs from December 28 show low CO2 21.4, elevated chloride 111, elevated creatinine 1.12, A1c.  Thyroid studies, lipid profile, CBC are unremarkable.  No DVT found.  97. \"Good actually. The abilify helped a lot.\"  a. Sees things out of the corner of her eye, never directly.  b. Still hears voices stating her name.  98. Depression/Mood: stable  99. Anxiety: stable  100. Refills: n  101. Sleeping: initial insomnia. GISELA confirmed by sleep study.  102. Eating: stable  103. Substances: n  104. Therapy: n  105. Medication compliant: y  106. No SI HI AVH.      12/14: Virtual visit via Zoom audio and video due to the COVID-19 pandemic.  " "Patient is accepting of and agreeable to visit.  The visit consisted of the patient and I.  Interview:  107. Chart review: Seen for thrush by PCP 12/8, seen 11/11 for cough, sore throat (COVID neg). Referred to sleep medicine.  108. \"I'm ok.\"  a. Things have \"been weird.\"  b. I was having auditory and visual hallucinations when \"I first started seeing you.\"  i. Her son or  saying her name.  ii. Sees \"scary shadows\" out of the corner of her eyes, especially at night.  iii. \"Afraid I have bipolar disorder.\"  1. \"Started a practice out of nowhere.\"  2. \"I have spending sprees.\" $2,000 at a time. In the middle of one right now. Bought a bedset, tables, talked  into getting a new TV. Now in a new house.  c. Uncle has dx of schizophrenia, sister dx'd with bipolar d/o.  d. Also found out recently on the Sjogren's/Lupus spectrum.  e. Also has sleep study scheduled.  f. Will be seeing a neurologist because \"I'm losing time, like 5 minutes.\"  g. Willing to reduce the number of medications she is on and start a mood stabilizer.  h. \"Scared of these. Scared it will get worse.\"  i. Stopped buspar 2 weeks ago and is more irritable.  109. Depression/Mood:  1. Depressed mood: y  2. Seasonal pattern: denies  3. Severity: moderate  4. Anhedonia: mild, but enjoys spending time with son, shopping  5. Guilt or hopelessness:  6. Energy: \"horrible\"  7. Concentration: poor  8. Weight loss or weight gain: gain, 2 lbs since 2 weeks  9. Psychomotor retardation or agitation: def  10. Insomnia:  a. Topamax makes her sleepy, bed at 11, no initial insomnia, but wakes at 2 am, eats, sleeps in an hour, wakes at 6:30 am.  11. Duration: months  110. Anxiety:  1. Uncontrolled worrying: y  2. Severity: moderate  3. Muscle tension: y  4. Fatigue: y  5. Concentration: poor  6. Restlessness/feeling on edge: y  7. Irritability: y  8. Insomnia: maintenance insomnia  9. Duration: months  10. Panic attacks: def  111. Refills: " "none  112. Sleeping: above, sleep study set up  113. Eating: above  114. Substances: CBD gummy to sleep  115. Therapy: declines  116. Medication compliant: y  117. No SI HI AVH.      11/10: Virtual visit via Zoom audio and video due to the COVID-19 pandemic.  Patient is accepting of and agreeable to visit.  The visit consisted of the patient and I.  Interview:  118. Chart review: No new developments.  119. \"I'm ok.\"  a. More emotional; horribly.  b. Mostly down.  c. Pharmacy has not filled her 30 mg cap of duloxetine in weeks; unsure why.  d. Also feels sick too; congested today  120. Depression/Mood: depressed mood  12. Guilt or hopelessness: denies  13. Energy: poor  14. Concentration: poor  121. Anxiety: not bad  122. Sleeping:  a. Initial insomnia  b. Maintenance insomnia  123. Eating: stable  124. Substances:  125. Therapy: denies  126. Medication compliant: no, inadvertently  127. No SI HI AVH.      10/13: Virtual visit via Zoom audio and video due to the COVID-19 pandemic.  Patient is accepting of and agreeable to visit.  The visit consisted of the patient and I.  Interview:  128. Chart review: No new developments.  129. \"I've seen some small changes, but not, like, horrible.\"  a. Attention drifts a little more, in the mornings.  130. Depression/Mood: \"increasing the duloxetine has helped.\"  a. Usually feels really sad before her cycle, but doesn't this time  131. Anxiety:  a. Not as irritable  132. Sleeping: yes  133. Eating: stable  134. Substances: denies  135. Therapy: denies  136. Medication compliant: y  137. No SI HI AVH      9/29: Virtual visit via Zoom audio and video due to the COVID-19 pandemic.  Patient is accepting of and agreeable to visit.  The visit consisted of the patient and I.  Interview:  138. Her story: \"Well, it started in [employer].\"  a. Originally PMDD (2009) for years  b. Got out 2011  c. Then dx'd with depression and YENNY  d. Has been on medications since  i. Was on prozac from 2009 until " "2015, stopped due to pregnancy  ii. 2018, started wellbutrin only. Which was horrible by itself. They added buspar 10 mg bid, with it. Then duloxetine 60 mg daily added. Better combination.  iii. Now on the above, and also on strattera 100 mg daily for ADHD. Also on gabapentin 600 tid.  e. Stimulants put her to sleep: adderall, vyvanse.  Did not try extended release formulations.  139. Mood: much better than it was in the past, but still could use some help.  140. Stressors:  a. Niece started having seizures at 21 and lost her memory, doesn't remember anyone or anybody.  b. In the middle of buying a house  c. Finances  d. Starting my own practice.  e. Son having school problems; hanging with \"bad kids.\"  141. Anxiety: manageable.  a. Worrying a lot  142. Coping: goes to Hinduism, trusts in God  143. Sleeping: yes  144. Eating: stable  145. Medication compliant: yes  146. Psych ROS: D, A.  Negative for psychosis.  Unclear gorge.  147. No SI HI AVH    Depression:  148. Anhedonia: denies  149. Guilt or hopelessness:   a. Feelings of guilt about how she could have done more for her nieces and nephews  150. Energy: horrible  151. Concentration: \"I have to force myself to focus.\"  a. If stops to eat, it ends her day  152. Weight loss or weight gain: stable over last few months, on weight loss pills, however  153. Psychomotor retardation or agitation: frequently  154. Insomnia: yes, on the lunesta  155. Duration: for years      Access to Firearms: yes, locked away    PHQ-9 Depression Screening  PHQ-9 Total Score:      Little interest or pleasure in doing things?     Feeling down, depressed, or hopeless?     Trouble falling or staying asleep, or sleeping too much?     Feeling tired or having little energy?     Poor appetite or overeating?     Feeling bad about yourself - or that you are a failure or have let yourself or your family down?     Trouble concentrating on things, such as reading the newspaper or watching " television?     Moving or speaking so slowly that other people could have noticed? Or the opposite - being so fidgety or restless that you have been moving around a lot more than usual?     Thoughts that you would be better off dead, or of hurting yourself in some way?     PHQ-9 Total Score       YENNY-7       Past Surgical History:  Past Surgical History:   Procedure Laterality Date   •  SECTION     • CYSTECTOMY     • ENDOMETRIAL ABLATION  , ,    • EYE SURGERY     • MYOMECTOMY         Problem List:  Patient Active Problem List   Diagnosis   • YENNY (generalized anxiety disorder)   • PTSD (post-traumatic stress disorder)   • Attention deficit hyperactivity disorder   • Hidradenitis suppurativa   • Intramural and subserous leiomyoma of uterus   • Lumbosacral spondylosis without myelopathy   • Endometriosis determined by laparoscopy   • PMDD (premenstrual dysphoric disorder)   • Psoriasis   • Thrombophilia ([facility])   • Seasonal allergies   • Major depressive disorder in partial remission ([facility])   • Body mass index (BMI) 40.0-44.9, adult ([facility])   • Anaphylactic reaction to bee sting   • Insomnia, unspecified   • Low back pain   • Major depressive disorder, recurrent, moderate ([facility])   • History of thromboembolism of vein   • Major depressive disorder, single episode, unspecified       Allergy:   No Known Allergies     Discontinued Medications:  Medications Discontinued During This Encounter   Medication Reason   • predniSONE (DELTASONE) 5 MG tablet *Therapy completed   • DULoxetine (CYMBALTA) 30 MG capsule Reorder   • buPROPion XL (WELLBUTRIN XL) 300 MG 24 hr tablet Not Efficacious       Current Medications:   Current Outpatient Medications   Medication Sig Dispense Refill   • aspirin (aspirin) 81 MG EC tablet Take 1 tablet by mouth Daily. 90 tablet 3   • busPIRone (BUSPAR) 10 MG tablet TAKE 2 TABLETS BY MOUTH TWICE DAILY 120 tablet 2   • clobetasol (TEMOVATE) 0.05 % external solution clobetasol 0.05 % scalp  solution     • clobetasol (TEMOVATE) 0.05 % ointment APPLY TO THE AFFECTED AREA ON SCALP TWICE DAILY AS NEEDED FOR ITCHING FLARES     • Cosentyx Sensoready, 300 MG, 150 MG/ML solution auto-injector      • cyclobenzaprine (FLEXERIL) 10 MG tablet Take 10 mg by mouth 2 (Two) Times a Day.     • DULoxetine (CYMBALTA) 30 MG capsule Take 1 capsule by mouth Daily. 90 capsule 1   • DULoxetine (CYMBALTA) 60 MG capsule TAKE 1 CAPSULE BY MOUTH DAILY IN ADDITION TO THE DULOXETINE 30 MG 90 capsule 1   • Elagolix Sodium (Orilissa) 150 MG tablet Take  by mouth.     • EPINEPHrine (EPIPEN) 0.3 MG/0.3ML solution auto-injector injection Inject 0.3 mL under the skin into the appropriate area as directed 1 (One) Time As Needed.     • Fluocinolone Acetonide Scalp 0.01 % oil APPLY TOPICALLY TO THE SCALP EVERY NIGHT AT BEDTIME AS NEEDED FOR PSORIASIS OR FLARES     • gabapentin (NEURONTIN) 600 MG tablet TAKE 1 TABLET BY MOUTH EVERY 8 HOURS AS DIRECTED     • hydrOXYzine (ATARAX) 25 MG tablet Take 1 tablet by mouth Daily As Needed for Anxiety. 30 tablet 2   • ibuprofen (ADVIL,MOTRIN) 800 MG tablet Take 800 mg by mouth Every 8 (Eight) Hours As Needed.     • lisdexamfetamine (Vyvanse) 70 MG capsule Take 1 capsule by mouth Every Morning 30 capsule 0   • montelukast (Singulair) 10 MG tablet Take 1 tablet by mouth Every Night. 90 tablet 1   • spironolactone-hydrochlorothiazide (Aldactazide) 50-50 MG per tablet Take 1 tablet by mouth Daily. 90 tablet 3   • cetirizine (zyrTEC) 10 MG tablet Take 1 tablet by mouth Daily. 90 tablet 3   • fluconazole (DIFLUCAN) 150 MG tablet fluconazole 150 mg tablet   TAKE 1 TABLET BY MOUTH THREE TIMES DAILY     • folic acid (FOLVITE) 1 MG tablet Take 1,000 mcg by mouth Daily.     • methotrexate 2.5 MG tablet TAKE 6 TABLETS BY MOUTH 1 TIME EVERY WEEK       No current facility-administered medications for this visit.       Past Medical History:  Past Medical History:   Diagnosis Date   • ADHD (attention deficit  hyperactivity disorder)    • Allergic    • Anxiety    • Arthritis    • Chronic pain disorder    • Deep vein thrombosis (HCC)    • Depression    • Ectopic pregnancy without intrauterine pregnancy 2019    Formatting of this note might be different from the original. - Day 1 = 19 -> beta 4,927 MTX #1 given (110mg) - Day 4 = 19 -> beta 11,077 - Day 7 = 19 -> beta 11,923 MTX #2 given (110mg) - Day 11 = 19 -> beta 11,406 - Day 14 = 19 -> scheduled visit and beta - Day 19 =  19 -> beta 6,584 - Day 26 = 19 -> beta 3,111 - Day 34 = 3/1/19 -> [facility]   • Endometriosis    • Fibroids    • Headache    • Hidradenitis    • Low back pain ?    DDD   • Lupus (HCC)    • Obesity    • Panic disorder    • PTSD (post-traumatic stress disorder)        Past Psychiatric History:  Began Treatment:   Diagnoses: D, A, insomnia  Psychiatrist: Last was months ago for a couple times; previously NP for 2 years  Therapist: yes, therapy has not helped, two bad experiences  Admission History: denies  Medication Trials: see hpi  Self Harm: Denies  Suicide Attempts:Denies   Psychosis, Anxiety, Depression: denies    Substance Abuse History:   Types:Denies all, including illicit  Withdrawal Symptoms:Denies  Longest Period Sober:Not Applicable   AA: Not applicable     Social History:  Martial Status:  Employed: therapist, 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  Kids: one  House: apartment   History: army, honorable discharge, see hpi    Social History     Socioeconomic History   • Marital status:    Tobacco Use   • Smoking status: Never   • Smokeless tobacco: Never   Vaping Use   • Vaping Use: Never used   Substance and Sexual Activity   • Alcohol use: Never   • Drug use: Yes     Types: Other     Comment: OTC CBD   • Sexual activity: Yes     Partners: Male     Birth control/protection: None       Family History:   Suicide Attempts:  1st cousin, maternal; another 1st  "cousin maternal  Suicide Completions: 1st cousin, maternal  Dx'd her sister herself that she has bipolar.    Family History   Problem Relation Age of Onset   • ADD / ADHD Mother    • OCD Mother    • Arthritis Mother    • Hyperlipidemia Mother    • Hypertension Mother    • Thyroid disease Mother    • Anxiety disorder Mother    • ADD / ADHD Sister    • Anxiety disorder Sister    • Bipolar disorder Sister    • Drug abuse Maternal Aunt    • Depression Maternal Aunt    • Alcohol abuse Maternal Uncle    • Drug abuse Maternal Uncle    • Schizophrenia Maternal Uncle    • Depression Maternal Grandmother    • Other Maternal Grandmother         Colon cancer   • Anxiety disorder Maternal Grandmother    • ADD / ADHD Cousin    • OCD Cousin    • Suicide Attempts Cousin    • Alcohol abuse Cousin    • Depression Cousin    • Seizures Niece    • Arthritis Sister    • Depression Sister    • Hyperlipidemia Sister    • Asthma Sister    • Hypertension Sister    • Miscarriages / Stillbirths Sister    • Mental illness Maternal Uncle    • Alcohol abuse Maternal Uncle        Developmental History:   Born: [place]  Siblings: 1 sister, older cousin who lived with them  Childhood: no abuse  High School:Completed  College: 4 yrs at [employer] 3 years degree in counseling    · Mental Status Exam  · Appearance  · : groomed, good eye contact, normal street clothes  · Behavior  · : pleasant and cooperative  · Motor  · : No abnormal  · Speech  · :normal rhythm, rate, volume, tone, not hyperverbal, not pressured, normal prosidy  · Mood  · : \"Really good\"  · Affect  · : euthymic, mood congruent, good variability  · Thought Content  · : negative suicidal ideations, negative homicidal ideations, negative obsessions  · Perceptions  · : negative auditory hallucinations, negative visual hallucinations  · Thought Process  · : linear  · Insight/Judgement  · : Fair/fair  · Cognition  · : grossly intact  · Attention   : intact    Review of " Systems:  Review of Systems   Constitutional: Positive for diaphoresis and fatigue.   HENT: Negative for drooling.    Eyes: Positive for visual disturbance.   Respiratory: Negative for cough and shortness of breath.    Cardiovascular: Positive for palpitations and leg swelling. Negative for chest pain.   Gastrointestinal: Positive for diarrhea and nausea. Negative for vomiting.   Endocrine: Positive for cold intolerance and heat intolerance.   Genitourinary: Positive for difficulty urinating.   Musculoskeletal: Positive for joint swelling.   Allergic/Immunologic: Positive for immunocompromised state.   Neurological: Positive for dizziness, light-headedness and headaches. Negative for seizures, syncope, speech difficulty and numbness.         Physical Exam:  Physical Exam    Vital Signs:   There were no vitals taken for this visit.     Lab Results:   Clinical Support on 11/08/2022   Component Date Value Ref Range Status   • SARS Antigen 11/08/2022 Not Detected  Not Detected, Presumptive Negative Final   • Influenza A Antigen BIANCA 11/08/2022 Not Detected  Not Detected Final   • Influenza B Antigen BIANCA 11/08/2022 Not Detected  Not Detected Final   • Internal Control 11/08/2022 Passed  Passed Final   • Lot Number 11/08/2022 [id number]   Final   • Expiration Date 11/08/2022 2023-08-11   Final   Lab on 08/15/2022   Component Date Value Ref Range Status   • Total Cholesterol 08/15/2022 194  0 - 200 mg/dL Final   • Triglycerides 08/15/2022 115  0 - 150 mg/dL Final   • HDL Cholesterol 08/15/2022 69 (H)  40 - 60 mg/dL Final   • LDL Cholesterol  08/15/2022 105 (H)  0 - 100 mg/dL Final   • VLDL Cholesterol 08/15/2022 20  5 - 40 mg/dL Final   • LDL/HDL Ratio 08/15/2022 1.48   Final   • Hep A Total Ab 08/15/2022 Positive (A)  Negative Final   • Hepatitis B Surface Ag 08/15/2022 Negative  Negative Final   • Hep B S Ab 08/15/2022 Reactive   Final                  Non Reactive: Inconsistent with immunity,                               less than 10 mIU/mL                Reactive:     Consistent with immunity,                              greater than 9.9 mIU/mL   • Hep B Core Total Ab 08/15/2022 Negative  Negative Final   • Hepatitis C Ab 08/15/2022 0.1  0.0 - 0.9 s/co ratio Final   • Glucose 08/15/2022 72  65 - 99 mg/dL Final   • BUN 08/15/2022 13  6 - 20 mg/dL Final   • Creatinine 08/15/2022 0.84  0.57 - 1.00 mg/dL Final   • Sodium 08/15/2022 137  136 - 145 mmol/L Final   • Potassium 08/15/2022 3.7  3.5 - 5.2 mmol/L Final   • Chloride 08/15/2022 97 (L)  98 - 107 mmol/L Final   • CO2 08/15/2022 27.0  22.0 - 29.0 mmol/L Final   • Calcium 08/15/2022 9.9  8.6 - 10.5 mg/dL Final   • Total Protein 08/15/2022 7.2  6.0 - 8.5 g/dL Final   • Albumin 08/15/2022 4.50  3.50 - 5.20 g/dL Final   • ALT (SGPT) 08/15/2022 33  1 - 33 U/L Final   • AST (SGOT) 08/15/2022 29  1 - 32 U/L Final   • Alkaline Phosphatase 08/15/2022 117  39 - 117 U/L Final   • Total Bilirubin 08/15/2022 0.3  0.0 - 1.2 mg/dL Final   • Globulin 08/15/2022 2.7  gm/dL Final   • A/G Ratio 08/15/2022 1.7  g/dL Final   • BUN/Creatinine Ratio 08/15/2022 15.5  7.0 - 25.0 Final   • Anion Gap 08/15/2022 13.0  5.0 - 15.0 mmol/L Final   • eGFR 08/15/2022 89.7  >60.0 mL/min/1.73 Final    National Kidney Foundation and American Society of Nephrology (ASN) Task Force recommended calculation based on the Chronic Kidney Disease Epidemiology Collaboration (CKD-EPI) equation refit without adjustment for race.   • QuantiFERON Incubation 08/15/2022 Incubation performed.   Final   • QuantiFERON Criteria 08/15/2022 Comment   Final    QuantiFERON-TB Gold Plus is a qualitative indirect test for  M tuberculosis infection (including disease) and is intended for use  in conjunction with risk assessment, radiography, and other medical  and diagnostic evaluations. The QuantiFERON-TB Gold Plus result is  determined by subtracting the Nil value from either TB antigen (Ag)  value. The Mitogen tube serves as a control for  the test.   • QUANTIFERON TB1 AG VALUE 08/15/2022 0.06  IU/mL Final   • QUANTIFERON TB2 AG VALUE 08/15/2022 0.06  IU/mL Final   • QuantiFERON Nil Value 08/15/2022 0.08  IU/mL Final   • QuantiFERON Mitogen Value 08/15/2022 >10.00  IU/mL Final   • QUANTIFERON-TB GOLD PLUS 08/15/2022 Negative  Negative Final    No response to M tuberculosis antigens detected.  Infection with M tuberculosis is unlikely, but high risk  individuals should be considered for additional testing  (ATS/IDSA/CDC Clinical Practice Guidelines, 2017). The  reference range is an Antigen minus Nil result of <0.35 IU/mL.  Chemiluminescence immunoassay methodology   • WBC 08/15/2022 7.62  3.40 - 10.80 10*3/mm3 Final   • RBC 08/15/2022 5.15  3.77 - 5.28 10*6/mm3 Final   • Hemoglobin 08/15/2022 15.4  12.0 - 15.9 g/dL Final   • Hematocrit 08/15/2022 45.5  34.0 - 46.6 % Final   • MCV 08/15/2022 88.3  79.0 - 97.0 fL Final   • MCH 08/15/2022 29.9  26.6 - 33.0 pg Final   • MCHC 08/15/2022 33.8  31.5 - 35.7 g/dL Final   • RDW 08/15/2022 12.7  12.3 - 15.4 % Final   • RDW-SD 08/15/2022 40.6  37.0 - 54.0 fl Final   • MPV 08/15/2022 10.4  6.0 - 12.0 fL Final   • Platelets 08/15/2022 314  140 - 450 10*3/mm3 Final   • Neutrophil % 08/15/2022 54.6  42.7 - 76.0 % Final   • Lymphocyte % 08/15/2022 39.0  19.6 - 45.3 % Final   • Monocyte % 08/15/2022 4.6 (L)  5.0 - 12.0 % Final   • Eosinophil % 08/15/2022 1.0  0.3 - 6.2 % Final   • Basophil % 08/15/2022 0.4  0.0 - 1.5 % Final   • Immature Grans % 08/15/2022 0.4  0.0 - 0.5 % Final   • Neutrophils, Absolute 08/15/2022 4.16  1.70 - 7.00 10*3/mm3 Final   • Lymphocytes, Absolute 08/15/2022 2.97  0.70 - 3.10 10*3/mm3 Final   • Monocytes, Absolute 08/15/2022 0.35  0.10 - 0.90 10*3/mm3 Final   • Eosinophils, Absolute 08/15/2022 0.08  0.00 - 0.40 10*3/mm3 Final   • Basophils, Absolute 08/15/2022 0.03  0.00 - 0.20 10*3/mm3 Final   • Immature Grans, Absolute 08/15/2022 0.03  0.00 - 0.05 10*3/mm3 Final   • nRBC 08/15/2022 0.0  0.0  - 0.2 /100 WBC Final   • Extra Tube 08/15/2022 Hold for add-ons.   Final    Auto resulted.   • Interpretation 08/15/2022 Comment   Final    Negative  Not infected with HCV, unless recent infection is suspected or other  evidence exists to indicate HCV infection.   • Hep A IgM 08/15/2022 Negative  Negative Final       EKG Results:  No orders to display       Imaging Results:  No Images in the past 120 days found..      Assessment & Plan   Diagnoses and all orders for this visit:    1. Major depressive disorder, recurrent episode, moderate (HCC) (Primary)  -     DULoxetine (CYMBALTA) 30 MG capsule; Take 1 capsule by mouth Daily.  Dispense: 90 capsule; Refill: 1    2. Generalized anxiety disorder  -     DULoxetine (CYMBALTA) 30 MG capsule; Take 1 capsule by mouth Daily.  Dispense: 90 capsule; Refill: 1    3. Insomnia due to mental condition        Visit Diagnoses:    ICD-10-CM ICD-9-CM   1. Major depressive disorder, recurrent episode, moderate (HCC)  F33.1 296.32   2. Generalized anxiety disorder  F41.1 300.02   3. Insomnia due to mental condition  F51.05 300.9     327.02     12/2: Restart Cymbalta at 90 mg rather than 60 mg.  Patient has stopped bupropion and agrees to stay off it.  Evaluate for ADHD at next visit.  Presently on Vyvanse for binge eating disorder.  17 minutes of supportive psychotherapy with goal to strengthen defenses, promote problems solving, restore adaptive functioning and provide symptom relief. The therapeutic alliance was strengthened to encourage the patient to express their thoughts and feelings. Esteem building was enhanced through praise, reassurance, normalizing and encouragement. Coping skills were enhanced to build distress tolerance skills and emotional regulation. Allowed patient to freely discuss issues without interruption or judgement with unconditional positive regard, active listening skills, and empathy. Provided a safe, confidential environment to facilitate the development of  a positive therapeutic relationship and encourage open, honest communication. Assisted patient in identifying risk factors which would indicate the need for higher level of care including thoughts to harm self or others and/or self-harming behavior and encouraged patient to contact this office, call 911, or present to the nearest emergency room should any of these events occur. Assisted patient in processing session content; acknowledged and normalized patient’s thoughts, feelings, and concerns by utilizing a person-centered approach in efforts to build appropriate rapport and a positive therapeutic relationship with open and honest communication. Patient given education on medication side effects, diagnosis/illness and relapse symptoms. Plan to continue supportive psychotherapy in next appointment to provide symptom relief.  Diagnoses: as above  Symptoms: as above  Functional status: Good   Mental Status Exam: as above    Treatment plan: Medication management and supportive psychotherapy  Prognosis: Good  Progress: Improved  2 months    10/14: No changes. Referral to therapy. 17 minutes of supportive psychotherapy  Treatment plan: Medication management and supportive psychotherapy  Prognosis: good  Progress: worse anx and dep (likely medication related)  6 wks      9/16: Never increased BuSpar, but is doing well.  Still wants to increase it now.  Follow up in 4 weeks to set up psychotherapy with our office.  12 minutes of supportive psychotherapy  Treatment plan: Medication management and supportive psychotherapy  Prognosis: Good  Progress: Improved, well  Four weeks, urgent    8/8: Increase BuSpar to target anxiety, which may be related to Vyvanse.  Regardless, Vyvanse has given her tremendous benefits.  Consider increasing Cymbalta at the next visit.  16 minutes of supportive psychotherapy Treatment plan: Medication management and supportive psychotherapy  Prognosis: Good  Progress: Anxiety might be a little  worse  6 weeks    6/7: Consider therapy here. Stable, well, no SE. 17 minutes of supportive psychotherapy Treatment plan: Medication management and supportive psychotherapy  Prognosis: good  Progress: better  2 mos      4/18: Doing well, no hallucinations, depression and anxiety are basically under control.  Patient is accomplishing her goals to lose weight, etc., which is helping.  7 minutes of supportive psychotherapy   6 weeks    3/7: Discontinue Abilify due to weight gain.  Now denies having any hallucinations.  Patient to monitor her weight.  Patient will also keep me posted on her relationship with her  which is no doubt a contributor to depression and anxiety.  16 minutes of supportive psychotherapy   5 weeks    2/17: Increase Abilify.  Patient is fearful that she will develop schizophrenia and deteriorate like her family.  She will start therapy as well.  18 minutes of supportive psychotherapy   4 weeks    1/11: Significant improvement in symptoms, however residual auditory hallucinations.  Increase Abilify. 5 minutes of supportive psychotherapy   4 weeks    12/14: Mood reactivity versus actual bipolar II disorder. Take strattera every other day for 3 doses then stop. Start abilify 5 mg day. 20 minutes of supportive psychotherapy   4 wks.    11/10: Start melatonin, restart duloxetine 17 minutes of supportive psychotherapy   4 wks.    10/13: Better mood. Reduce strattera to 40 mg nightly (not daily, it is sedating). 4 wks.    9/29: Records release will be signed by patient.  Patient is unsure if Strattera helped.  Reduce the dose.  Residual depressive symptoms.  Increase duloxetine.  Continue gabapentin and BuSpar.  Therapy referral made.  See back in 2 weeks to try to titrate off of Strattera entirely.  It does not sound like she has ever tried extended release formulations of stimulants for ADHD.  Does not sound like she got neuropsychological testing for ADHD.  Rule out gorge.    PLAN:  156. Safety:  No acute safety concerns  157. Therapy:  Referral made.  158. Risk Assessment: Risk of self-harm acutely is moderate.  Risk factors include anxiety disorder, mood disorder, and recent psychosocial stressors (pandemic). Protective factors include no family history, denies access to guns/weapons, no present SI, no history of suicide attempts or self-harm in the past, minimal AODA, healthcare seeking, future orientation, willingness to engage in care.  Risk of self-harm chronically is also moderate, but could be further elevated in the event of treatment noncompliance and/or AODA.  159. Meds:.  a. CONTINUE hydroxyzine 25 mg daily as needed anxiety. Mostly at night. Risks, benefits, alternatives discussed with patient including sedation, dizziness, fall risk, GI upset, and risk of increased CNS depression and elevated heart rate if taken with other antihistamines.  After discussion of these risks and benefits, the patient voiced understanding and agreed to proceed.  b. CONTINUE duloxetine 90 mg a day. Risks, benefits, alternatives discussed with patient including GI upset, nausea vomiting diarrhea, theoretical decrease of seizure threshold predisposing the patient to a slightly higher seizure risk, headaches, sexual dysfunction, serotonin syndrome, bleeding risk, increased suicidality in patients 24 years and younger.  Also constipation and urinary retention.  After discussion of these risks and benefits, the patient voiced understanding and agreed to proceed.  c. CONTINUE BuSpar 20 mg p.o. twice daily (refilled today). Risks, benefits, alternatives discussed with patient including nausea, GI upset, mild sedation, falls risk.  After discussion of these risks and benefits, the patient voiced understanding and agreed to proceed.  d. STOPPED bupropion  mg p.o. daily. It made no difference.  e. ALSO on with gabapentin 600 mg PO TID. Risks, benefits, alternatives discussed with patient including sedation,  dizziness/falls risk, GI upset, weight gain.  After discussion of these risks and benefits, the patient voiced understanding and agreed to proceed. Lilian MAURICIO ordered. Verbally signed controlled substances agreement.  f. ALSO on with Vyvanse 70 mg daily.  g. S/P:  i. Strattera 40 mg daily: stopped 1/11/21 to reduce medication regimen.  Might have been beneficial.  ii. Never started melatonin  iii. abilify 4 mg daily.  Wg.  Months.  160. Labs: no recs  161. Follow up: 2 months    Patient screened positive for depression based on a PHQ-9 score of  on . Follow-up recommendations include: Prescribed antidepressant medication treatment.           TREATMENT PLAN/GOALS: Continue supportive psychotherapy efforts and medications as indicated. Treatment and medication options discussed during today's visit. Patient acknowledged and verbally consented to continue with current treatment plan and was educated on the importance of compliance with treatment and follow-up appointments.    MEDICATION ISSUES:  LILIAN reviewed as expected.  Discussed medication options and treatment plan of prescribed medication as well as the risks, benefits, and side effects including potential falls, possible impaired driving and metabolic adversities among others. Patient is agreeable to call the office with any worsening of symptoms or onset of side effects. Patient is agreeable to call 911 or go to the nearest ER should he/she begin having SI/HI. No medication side effects or related complaints today.     MEDS ORDERED DURING VISIT:  New Medications Ordered This Visit   Medications   • DULoxetine (CYMBALTA) 30 MG capsule     Sig: Take 1 capsule by mouth Daily.     Dispense:  90 capsule     Refill:  1     Total dose is 30 + 60 = 90 mg daily.       Return in about 2 months (around 2/2/2023).         This document has been electronically signed by Brooks Lynn MD  December 2, 2022 12:00 EST      Part of this note may be an electronic  transcription/translation of spoken language to printed text using the Dragon Dictation System.

## 2022-12-02 NOTE — PATIENT INSTRUCTIONS
1.  Please return to clinic at your next scheduled visit.  Contact the clinic ([phone]) at least 24 hours prior in the event you need to cancel.  2.  Do no harm to yourself or others.    3.  Avoid alcohol and drugs.    4.  Take all medications as prescribed.  Please contact the clinic with any concerns. If you are in need of medication refills, please call the clinic at [phone].    5. Should you want to get in touch with your provider, Dr. Brooks Lynn, please utilize [facility] or contact the office ([phone]), and staff will be able to page Dr. Lynn directly.  6.  In the event you have personal crisis, contact the following crisis numbers: Suicide Prevention Hotline 1-698.504.2625; [facility] Helpline 5-164-009-WUAL; [facility] Emergency Room [phone]; text HELLO to [phone]; or 377.     SPECIFIC RECOMMENDATIONS:     1.      Medications discussed at this encounter:                   -Restart Cymbalta at higher dose, stop bupropion     2.      Psychotherapy recommendations:      3.     Return to clinic: 2 months

## 2022-12-12 DIAGNOSIS — J30.2 SEASONAL ALLERGIES: ICD-10-CM

## 2022-12-12 DIAGNOSIS — F50.81 BINGE EATING DISORDER: ICD-10-CM

## 2022-12-13 RX ORDER — CETIRIZINE HYDROCHLORIDE 10 MG/1
10 TABLET ORAL DAILY
Qty: 90 TABLET | Refills: 3 | Status: SHIPPED | OUTPATIENT
Start: 2022-12-13 | End: 2023-02-15 | Stop reason: SDUPTHER

## 2022-12-21 DIAGNOSIS — F41.1 GENERALIZED ANXIETY DISORDER: ICD-10-CM

## 2022-12-21 RX ORDER — IBUPROFEN 800 MG/1
TABLET ORAL
Qty: 90 TABLET | Refills: 0 | Status: SHIPPED | OUTPATIENT
Start: 2022-12-21 | End: 2023-01-20

## 2022-12-21 RX ORDER — HYDROXYZINE HYDROCHLORIDE 25 MG/1
25 TABLET, FILM COATED ORAL DAILY PRN
Qty: 30 TABLET | Refills: 2 | Status: SHIPPED | OUTPATIENT
Start: 2022-12-21 | End: 2023-02-03 | Stop reason: SDUPTHER

## 2022-12-21 RX ORDER — NEEDLES, SAFETY 22GX1 1/2"
NEEDLE, DISPOSABLE MISCELLANEOUS
COMMUNITY
Start: 2022-12-14

## 2022-12-21 RX ORDER — SECUKINUMAB 150 MG/ML
2 INJECTION SUBCUTANEOUS
COMMUNITY
End: 2023-01-14

## 2022-12-21 RX ORDER — METHOTREXATE 25 MG/ML
10 INJECTION INTRA-ARTERIAL; INTRAMUSCULAR; INTRATHECAL; INTRAVENOUS WEEKLY
COMMUNITY
Start: 2022-12-16

## 2022-12-21 RX ORDER — PREDNISONE 1 MG/1
TABLET ORAL
COMMUNITY
Start: 2022-12-05 | End: 2023-01-14

## 2023-01-14 ENCOUNTER — APPOINTMENT (OUTPATIENT)
Dept: CT IMAGING | Facility: HOSPITAL | Age: 43
End: 2023-01-14
Payer: OTHER GOVERNMENT

## 2023-01-14 ENCOUNTER — APPOINTMENT (OUTPATIENT)
Dept: GENERAL RADIOLOGY | Facility: HOSPITAL | Age: 43
End: 2023-01-14
Payer: OTHER GOVERNMENT

## 2023-01-14 ENCOUNTER — HOSPITAL ENCOUNTER (EMERGENCY)
Facility: HOSPITAL | Age: 43
Discharge: HOME | End: 2023-01-14
Attending: EMERGENCY MEDICINE | Admitting: EMERGENCY MEDICINE
Payer: OTHER GOVERNMENT

## 2023-01-14 VITALS
OXYGEN SATURATION: 99 % | RESPIRATION RATE: 20 BRPM | WEIGHT: 257.5 LBS | BODY MASS INDEX: 43.96 KG/M2 | HEART RATE: 94 BPM | TEMPERATURE: 98.1 F | HEIGHT: 64 IN | SYSTOLIC BLOOD PRESSURE: 124 MMHG | DIASTOLIC BLOOD PRESSURE: 77 MMHG

## 2023-01-14 DIAGNOSIS — R07.89 CHEST DISCOMFORT: Primary | ICD-10-CM

## 2023-01-14 DIAGNOSIS — J98.01 BRONCHOSPASM: ICD-10-CM

## 2023-01-14 DIAGNOSIS — R06.00 DYSPNEA, UNSPECIFIED TYPE: ICD-10-CM

## 2023-01-14 LAB
ALBUMIN SERPL-MCNC: 4.3 G/DL (ref 3.5–5.2)
ALBUMIN/GLOB SERPL: 1.4 G/DL
ALP SERPL-CCNC: 125 U/L (ref 39–117)
ALT SERPL W P-5'-P-CCNC: 22 U/L (ref 1–33)
ANION GAP SERPL CALCULATED.3IONS-SCNC: 8.4 MMOL/L (ref 5–15)
AST SERPL-CCNC: 16 U/L (ref 1–32)
BASOPHILS # BLD AUTO: 0.04 10*3/MM3 (ref 0–0.2)
BASOPHILS NFR BLD AUTO: 0.4 % (ref 0–1.5)
BILIRUB SERPL-MCNC: 0.2 MG/DL (ref 0–1.2)
BUN SERPL-MCNC: 14 MG/DL (ref 6–20)
BUN/CREAT SERPL: 15.6 (ref 7–25)
CALCIUM SPEC-SCNC: 9.3 MG/DL (ref 8.6–10.5)
CHLORIDE SERPL-SCNC: 98 MMOL/L (ref 98–107)
CO2 SERPL-SCNC: 27.6 MMOL/L (ref 22–29)
CREAT SERPL-MCNC: 0.9 MG/DL (ref 0.57–1)
D DIMER PPP FEU-MCNC: 0.95 MCGFEU/ML (ref 0–0.5)
DEPRECATED RDW RBC AUTO: 42.5 FL (ref 37–54)
EGFRCR SERPLBLD CKD-EPI 2021: 82.5 ML/MIN/1.73
EOSINOPHIL # BLD AUTO: 0.14 10*3/MM3 (ref 0–0.4)
EOSINOPHIL NFR BLD AUTO: 1.3 % (ref 0.3–6.2)
ERYTHROCYTE [DISTWIDTH] IN BLOOD BY AUTOMATED COUNT: 13.1 % (ref 12.3–15.4)
GLOBULIN UR ELPH-MCNC: 3 GM/DL
GLUCOSE SERPL-MCNC: 90 MG/DL (ref 65–99)
HCT VFR BLD AUTO: 40.8 % (ref 34–46.6)
HGB BLD-MCNC: 13.9 G/DL (ref 12–15.9)
HOLD SPECIMEN: NORMAL
HOLD SPECIMEN: NORMAL
IMM GRANULOCYTES # BLD AUTO: 0.03 10*3/MM3 (ref 0–0.05)
IMM GRANULOCYTES NFR BLD AUTO: 0.3 % (ref 0–0.5)
LYMPHOCYTES # BLD AUTO: 2.94 10*3/MM3 (ref 0.7–3.1)
LYMPHOCYTES NFR BLD AUTO: 27.8 % (ref 19.6–45.3)
MCH RBC QN AUTO: 30.8 PG (ref 26.6–33)
MCHC RBC AUTO-ENTMCNC: 34.1 G/DL (ref 31.5–35.7)
MCV RBC AUTO: 90.5 FL (ref 79–97)
MONOCYTES # BLD AUTO: 0.64 10*3/MM3 (ref 0.1–0.9)
MONOCYTES NFR BLD AUTO: 6.1 % (ref 5–12)
NEUTROPHILS NFR BLD AUTO: 6.78 10*3/MM3 (ref 1.7–7)
NEUTROPHILS NFR BLD AUTO: 64.1 % (ref 42.7–76)
NRBC BLD AUTO-RTO: 0 /100 WBC (ref 0–0.2)
NT-PROBNP SERPL-MCNC: <36 PG/ML (ref 0–450)
PLATELET # BLD AUTO: 357 10*3/MM3 (ref 140–450)
PMV BLD AUTO: 9.5 FL (ref 6–12)
POTASSIUM SERPL-SCNC: 3.8 MMOL/L (ref 3.5–5.2)
PROT SERPL-MCNC: 7.3 G/DL (ref 6–8.5)
RBC # BLD AUTO: 4.51 10*6/MM3 (ref 3.77–5.28)
SODIUM SERPL-SCNC: 134 MMOL/L (ref 136–145)
TROPONIN T SERPL-MCNC: <0.01 NG/ML (ref 0–0.03)
WBC NRBC COR # BLD: 10.57 10*3/MM3 (ref 3.4–10.8)
WHOLE BLOOD HOLD COAG: NORMAL
WHOLE BLOOD HOLD SPECIMEN: NORMAL

## 2023-01-14 PROCEDURE — 84484 ASSAY OF TROPONIN QUANT: CPT

## 2023-01-14 PROCEDURE — 85379 FIBRIN DEGRADATION QUANT: CPT | Performed by: EMERGENCY MEDICINE

## 2023-01-14 PROCEDURE — 85025 COMPLETE CBC W/AUTO DIFF WBC: CPT

## 2023-01-14 PROCEDURE — 80053 COMPREHEN METABOLIC PANEL: CPT

## 2023-01-14 PROCEDURE — 99284 EMERGENCY DEPT VISIT MOD MDM: CPT

## 2023-01-14 PROCEDURE — 83880 ASSAY OF NATRIURETIC PEPTIDE: CPT

## 2023-01-14 PROCEDURE — 0 IOPAMIDOL PER 1 ML: Performed by: EMERGENCY MEDICINE

## 2023-01-14 PROCEDURE — 96374 THER/PROPH/DIAG INJ IV PUSH: CPT

## 2023-01-14 PROCEDURE — 36415 COLL VENOUS BLD VENIPUNCTURE: CPT

## 2023-01-14 PROCEDURE — 71045 X-RAY EXAM CHEST 1 VIEW: CPT

## 2023-01-14 PROCEDURE — 71260 CT THORAX DX C+: CPT

## 2023-01-14 PROCEDURE — 25010000002 KETOROLAC TROMETHAMINE PER 15 MG: Performed by: EMERGENCY MEDICINE

## 2023-01-14 PROCEDURE — 93010 ELECTROCARDIOGRAM REPORT: CPT | Performed by: INTERNAL MEDICINE

## 2023-01-14 PROCEDURE — 93005 ELECTROCARDIOGRAM TRACING: CPT | Performed by: EMERGENCY MEDICINE

## 2023-01-14 PROCEDURE — 93005 ELECTROCARDIOGRAM TRACING: CPT

## 2023-01-14 RX ORDER — ALBUTEROL SULFATE 90 UG/1
2 AEROSOL, METERED RESPIRATORY (INHALATION) EVERY 4 HOURS PRN
Qty: 1 G | Refills: 0 | Status: SHIPPED | OUTPATIENT
Start: 2023-01-14 | End: 2023-01-20 | Stop reason: SDUPTHER

## 2023-01-14 RX ORDER — PREDNISONE 20 MG/1
TABLET ORAL
Qty: 15 TABLET | Refills: 0 | Status: SHIPPED | OUTPATIENT
Start: 2023-01-14 | End: 2023-02-03

## 2023-01-14 RX ORDER — SODIUM CHLORIDE 0.9 % (FLUSH) 0.9 %
10 SYRINGE (ML) INJECTION AS NEEDED
Status: DISCONTINUED | OUTPATIENT
Start: 2023-01-14 | End: 2023-01-14 | Stop reason: HOSPADM

## 2023-01-14 RX ORDER — KETOROLAC TROMETHAMINE 30 MG/ML
30 INJECTION, SOLUTION INTRAMUSCULAR; INTRAVENOUS ONCE
Status: COMPLETED | OUTPATIENT
Start: 2023-01-14 | End: 2023-01-14

## 2023-01-14 RX ADMIN — KETOROLAC TROMETHAMINE 30 MG: 30 INJECTION, SOLUTION INTRAMUSCULAR; INTRAVENOUS at 17:18

## 2023-01-14 RX ADMIN — IOPAMIDOL 100 ML: 755 INJECTION, SOLUTION INTRAVENOUS at 17:32

## 2023-01-14 NOTE — ED PROVIDER NOTES
Time: 4:42 PM EST  Date of encounter:  2023  Independent Historian/Clinical History and Information was obtained by:   Patient  Chief Complaint: chest pain and shortness of breath    History is limited by: N/A    History of Present Illness:  Patient is a 42 y.o. year old female who presents to the emergency department for evaluation of chest pain and shortness of breath.  The patient states over the last couple of days she has had both sharp and dull left chest pain which is worse with inspiration and movement.  The patient has had no recent fever chills or cough and she has had no nausea vomiting or diarrhea.  In addition the patient does complain of some shortness of breath and some dizziness which she describes as feeling more lightheaded.    HPI    Patient Care Team  Primary Care Provider: Ant Carlos Jr., MD    Past Medical History:     No Known Allergies  Past Medical History:   Diagnosis Date   • ADHD (attention deficit hyperactivity disorder)    • Allergic    • Anxiety    • Arthritis    • Chronic pain disorder    • Deep vein thrombosis (HCC)    • Depression    • Ectopic pregnancy without intrauterine pregnancy 2019    Formatting of this note might be different from the original. - Day 1 = 19 -> beta 4,927 MTX #1 given (110mg) - Day 4 = 19 -> beta 11,077 - Day 7 = 19 -> beta 11,923 MTX #2 given (110mg) - Day 11 = 19 -> beta 11,406 - Day 14 = 19 -> scheduled visit and beta - Day 19 =  19 -> beta 6,584 - Day 26 = 19 -> beta 3,111 - Day 34 = 3/1/19 -> beta 553 (seen at [facility]   • Endometriosis    • Fibroids    • Headache    • Hidradenitis    • Low back pain ?    DDD   • Lupus (HCC)    • Obesity    • Panic disorder    • PTSD (post-traumatic stress disorder)      Past Surgical History:   Procedure Laterality Date   •  SECTION     • CYSTECTOMY     • ENDOMETRIAL ABLATION  , ,    • EYE SURGERY     • MYOMECTOMY       Family History  "  Problem Relation Age of Onset   • ADD / ADHD Mother    • OCD Mother    • Arthritis Mother    • Hyperlipidemia Mother    • Hypertension Mother    • Thyroid disease Mother    • Anxiety disorder Mother    • ADD / ADHD Sister    • Anxiety disorder Sister    • Bipolar disorder Sister    • Drug abuse Maternal Aunt    • Depression Maternal Aunt    • Alcohol abuse Maternal Uncle    • Drug abuse Maternal Uncle    • Schizophrenia Maternal Uncle    • Depression Maternal Grandmother    • Other Maternal Grandmother         Colon cancer   • Anxiety disorder Maternal Grandmother    • ADD / ADHD Cousin    • OCD Cousin    • Suicide Attempts Cousin    • Alcohol abuse Cousin    • Depression Cousin    • Seizures Niece    • Arthritis Sister    • Depression Sister    • Hyperlipidemia Sister    • Asthma Sister    • Hypertension Sister    • Miscarriages / Stillbirths Sister    • Mental illness Maternal Uncle    • Alcohol abuse Maternal Uncle        Home Medications:  Prior to Admission medications    Medication Sig Start Date End Date Taking? Authorizing Provider   aspirin (aspirin) 81 MG EC tablet Take 1 tablet by mouth Daily. 8/26/22  Yes Ant Carlos Jr., MD   B-D TB SYRINGE 1CC/27GX1/2\" 27G X 1/2\" 1 ML misc USE 1 EACH WITH METHOTREXATE 12/14/22  Yes Merari Fischer MD   busPIRone (BUSPAR) 10 MG tablet TAKE 2 TABLETS BY MOUTH TWICE DAILY 11/4/22  Yes Brooks Lynn MD   cetirizine (zyrTEC) 10 MG tablet Take 1 tablet by mouth Daily. 12/13/22  Yes Ant Carlos Jr., MD   clobetasol (TEMOVATE) 0.05 % external solution OIL 9/26/22  Yes ProviderMerari MD   Cosentyx Sensoready, 300 MG, 150 MG/ML solution auto-injector  8/22/22  Yes ProviderMerari MD   cyclobenzaprine (FLEXERIL) 10 MG tablet Take 10 mg by mouth 2 (Two) Times a Day. 7/31/22  Yes Merari Fischer MD   DULoxetine (CYMBALTA) 30 MG capsule Take 1 capsule by mouth Daily. 12/2/22  Yes Brooks Lynn MD   DULoxetine (CYMBALTA) 60 MG " capsule TAKE 1 CAPSULE BY MOUTH DAILY IN ADDITION TO THE DULOXETINE 30 MG 9/6/22  Yes Brooks Lynn MD   Elagolix Sodium (Orilissa) 150 MG tablet Take  by mouth.   Yes Merari Fischer MD   Fluocinolone Acetonide Scalp 0.01 % oil APPLY TOPICALLY TO THE SCALP EVERY NIGHT AT BEDTIME AS NEEDED FOR PSORIASIS OR FLARES 10/14/22  Yes Merari Fischer MD   folic acid (FOLVITE) 1 MG tablet Take 1,000 mcg by mouth Daily. 8/1/22  Yes Merari Fischer MD   gabapentin (NEURONTIN) 600 MG tablet TAKE 1 TABLET BY MOUTH EVERY 8 HOURS AS DIRECTED 8/15/21  Yes Merari Fischer MD   hydrOXYzine (ATARAX) 25 MG tablet TAKE 1 TABLET BY MOUTH DAILY AS NEEDED FOR ANXIETY 12/21/22  Yes Brooks Lynn MD   ibuprofen (ADVIL,MOTRIN) 800 MG tablet TAKE 1 TABLET BY MOUTH EVERY 8 HOURS AS NEEDED FOR MODERATE PAIN 12/21/22  Yes Ant Carlos Jr., MD   lisdexamfetamine (Vyvanse) 70 MG capsule Take 1 capsule by mouth Every Morning 12/14/22  Yes Ant Carlos Jr., MD   Methotrexate Sodium (methotrexate PF) 50 MG/2ML chemo syringe  12/16/22  Yes Merari Fischer MD   montelukast (Singulair) 10 MG tablet Take 1 tablet by mouth Every Night. 8/26/22  Yes Ant Carlos Jr., MD   spironolactone-hydrochlorothiazide (Aldactazide) 50-50 MG per tablet Take 1 tablet by mouth Daily. 7/23/22 7/23/23 Yes Ant Carlos Jr., MD   EPINEPHrine (EPIPEN) 0.3 MG/0.3ML solution auto-injector injection Inject 0.3 mL under the skin into the appropriate area as directed 1 (One) Time As Needed. 6/22/22   Merari Fischer MD   clobetasol (TEMOVATE) 0.05 % external solution clobetasol 0.05 % scalp solution  1/14/23  Merari Fischer MD   fluconazole (DIFLUCAN) 150 MG tablet fluconazole 150 mg tablet   TAKE 1 TABLET BY MOUTH THREE TIMES DAILY  1/14/23  Merari Fischer MD   methotrexate 2.5 MG tablet TAKE 6 TABLETS BY MOUTH 1 TIME EVERY WEEK 8/1/22 1/14/23  Provider, MD Merari   predniSONE  "(DELTASONE) 5 MG tablet TAKE 3 TABLETS BY MOUTH DAILY FOR 3 DAYS THEN TAKE 2 TABLETS BY MOUTH DAILY FOR 3 DAYS THEN TAKE 1 TABLET BY MOUTH DAILY FOR 3 DAYS 12/5/22 1/14/23  ProviderMerari MD   Secukinumab (Cosentyx Sensoready Pen) 150 MG/ML solution auto-injector Inject 2 mL under the skin into the appropriate area as directed.  1/14/23  ProviderMerari MD        Social History:   Social History     Tobacco Use   • Smoking status: Never   • Smokeless tobacco: Never   Vaping Use   • Vaping Use: Never used   Substance Use Topics   • Alcohol use: Never   • Drug use: Yes     Types: Other     Comment: OTC CBD for pain         Review of Systems:  Review of Systems   Constitutional: Negative for chills and fever.   HENT: Negative for congestion, ear pain and sore throat.    Eyes: Negative for pain.   Respiratory: Positive for chest tightness and shortness of breath. Negative for wheezing and stridor.    Cardiovascular: Positive for chest pain. Negative for palpitations.   Gastrointestinal: Negative for abdominal pain, diarrhea, nausea and vomiting.   Endocrine: Negative for cold intolerance, polydipsia and polyuria.   Genitourinary: Negative for flank pain and hematuria.   Musculoskeletal: Negative for joint swelling.   Skin: Negative for pallor.   Neurological: Positive for light-headedness. Negative for seizures and headaches.   Hematological: Negative.    Psychiatric/Behavioral: Negative.    All other systems reviewed and are negative.       Physical Exam:  /77   Pulse 94   Temp 98.1 °F (36.7 °C)   Resp 20   Ht 162.6 cm (64\")   Wt 117 kg (257 lb 8 oz)   LMP 12/29/2022   SpO2 99%   BMI 44.20 kg/m²     Physical Exam  Vitals and nursing note reviewed.   Constitutional:       General: She is not in acute distress.     Appearance: Normal appearance. She is not toxic-appearing.   HENT:      Head: Normocephalic and atraumatic.      Mouth/Throat:      Mouth: Mucous membranes are moist.   Eyes:      " General: No scleral icterus.  Cardiovascular:      Rate and Rhythm: Normal rate and regular rhythm.      Pulses: Normal pulses.      Heart sounds: Normal heart sounds.   Pulmonary:      Effort: Pulmonary effort is normal. No respiratory distress.      Breath sounds: Normal breath sounds.   Abdominal:      General: Abdomen is flat.      Palpations: Abdomen is soft.      Tenderness: There is no abdominal tenderness.   Musculoskeletal:         General: Normal range of motion.      Cervical back: Normal range of motion and neck supple.   Skin:     General: Skin is warm and dry.      Capillary Refill: Capillary refill takes less than 2 seconds.   Neurological:      Mental Status: She is alert and oriented to person, place, and time. Mental status is at baseline.   Psychiatric:         Mood and Affect: Mood normal.         Behavior: Behavior normal.                  Procedures:  Procedures      Medical Decision Making:      Comorbidities that affect care:    Lupus    External Notes reviewed:    Previous Clinic Note      The following orders were placed and all results were independently analyzed by me:  Orders Placed This Encounter   Procedures   • XR Chest 1 View   • CT Chest With Contrast Diagnostic   • [place] Draw   • Comprehensive Metabolic Panel   • BNP   • Troponin   • CBC Auto Differential   • D-dimer, Quantitative   • NPO Diet NPO Type: Strict NPO   • Undress & Gown   • Cardiac Monitoring   • Continuous Pulse Oximetry   • Vital Signs   • Oxygen Therapy- Nasal Cannula; 2 LPM; Titrate for SPO2: equal to or greater than, 92%   • ECG 12 Lead ED Triage Standing Order; SOA   • Insert Peripheral IV   • CBC & Differential   • Green Top (Gel)   • Lavender Top   • Gold Top - SST   • Light Blue Top       Medications Given in the Emergency Department:  Medications   sodium chloride 0.9 % flush 10 mL (has no administration in time range)   ketorolac (TORADOL) injection 30 mg (30 mg Intravenous Given 1/14/23 7479)   iopamidol  (ISOVUE-370) 76 % injection 100 mL (100 mL Intravenous Given 1/14/23 1732)        ED Course:    Normal sinus rhythm with a rate of 94 bpm  Normal P wave and AK interval  Normal QRS and normal axis  Normal ST segments and normal QT/QTc interval               Labs:    Lab Results (last 24 hours)     Procedure Component Value Units Date/Time    CBC & Differential [[id number]]  (Normal) Collected: 01/14/23 1417    Specimen: Blood Updated: 01/14/23 1441    Narrative:      The following orders were created for panel order CBC & Differential.  Procedure                               Abnormality         Status                     ---------                               -----------         ------                     CBC Auto Differential[[id number]]        Normal              Final result                 Please view results for these tests on the individual orders.    Comprehensive Metabolic Panel [[id number]]  (Abnormal) Collected: 01/14/23 1417    Specimen: Blood Updated: 01/14/23 1502     Glucose 90 mg/dL      BUN 14 mg/dL      Creatinine 0.90 mg/dL      Sodium 134 mmol/L      Potassium 3.8 mmol/L      Chloride 98 mmol/L      CO2 27.6 mmol/L      Calcium 9.3 mg/dL      Total Protein 7.3 g/dL      Albumin 4.3 g/dL      ALT (SGPT) 22 U/L      AST (SGOT) 16 U/L      Alkaline Phosphatase 125 U/L      Total Bilirubin 0.2 mg/dL      Globulin 3.0 gm/dL      A/G Ratio 1.4 g/dL      BUN/Creatinine Ratio 15.6     Anion Gap 8.4 mmol/L      eGFR 82.5 mL/min/1.73      Comment: National Kidney Foundation and American Society of Nephrology (ASN) Task Force recommended calculation based on the Chronic Kidney Disease Epidemiology Collaboration (CKD-EPI) equation refit without adjustment for race.       Narrative:      GFR Normal >60  Chronic Kidney Disease <60  Kidney Failure <15      BNP [[id number]]  (Normal) Collected: 01/14/23 1417    Specimen: Blood Updated: 01/14/23 1500     proBNP <36.0 pg/mL     Narrative:      Among patients with  dyspnea, NT-proBNP is highly sensitive for the detection of acute congestive heart failure. In addition NT-proBNP of <300 pg/ml effectively rules out acute congestive heart failure with 99% negative predictive value.      Troponin [[id number]]  (Normal) Collected: 01/14/23 1417    Specimen: Blood Updated: 01/14/23 1502     Troponin T <0.010 ng/mL     Narrative:      Troponin T Reference Range:  <= 0.03 ng/mL-   Negative for AMI  >0.03 ng/mL-     Abnormal for myocardial necrosis.  Clinicians would have to utilize clinical acumen, EKG, Troponin and serial changes to determine if it is an Acute Myocardial Infarction or myocardial injury due to an underlying chronic condition.       Results may be falsely decreased if patient taking Biotin.      CBC Auto Differential [[id number]]  (Normal) Collected: 01/14/23 1417    Specimen: Blood Updated: 01/14/23 1441     WBC 10.57 10*3/mm3      RBC 4.51 10*6/mm3      Hemoglobin 13.9 g/dL      Hematocrit 40.8 %      MCV 90.5 fL      MCH 30.8 pg      MCHC 34.1 g/dL      RDW 13.1 %      RDW-SD 42.5 fl      MPV 9.5 fL      Platelets 357 10*3/mm3      Neutrophil % 64.1 %      Lymphocyte % 27.8 %      Monocyte % 6.1 %      Eosinophil % 1.3 %      Basophil % 0.4 %      Immature Grans % 0.3 %      Neutrophils, Absolute 6.78 10*3/mm3      Lymphocytes, Absolute 2.94 10*3/mm3      Monocytes, Absolute 0.64 10*3/mm3      Eosinophils, Absolute 0.14 10*3/mm3      Basophils, Absolute 0.04 10*3/mm3      Immature Grans, Absolute 0.03 10*3/mm3      nRBC 0.0 /100 WBC     D-dimer, Quantitative [[id number]]  (Abnormal) Collected: 01/14/23 1417    Specimen: Blood Updated: 01/14/23 1656     D-Dimer, Quantitative 0.95 MCGFEU/mL     Narrative:      According to the assay 's published package insert, a normal (<0.50 MCGFEU/mL) D-dimer result in conjunction with a non-high clinical probability assessment, excludes deep vein thrombosis (DVT) and pulmonary embolism (PE) with high  "sensitivity.    D-dimer values increase with age and this can make VTE exclusion of an older population difficult. To address this, the American College of Physicians, based on best available evidence and recent guidelines, recommends that clinicians use age-adjusted D-dimer thresholds in patients greater than 50 years of age with: a) a low probability of PE who do not meet all Pulmonary Embolism Rule Out Criteria, or b) in those with intermediate probability of PE.   The formula for an age-adjusted D-dimer cut-off is \"age/100\".  For example, a 60 year old patient would have an age-adjusted cut-off of 0.60 MCGFEU/mL and an 80 year old 0.80 MCGFEU/mL.           Imaging:    CT Chest With Contrast Diagnostic    Result Date: 1/14/2023  PROCEDURE: CT CHEST W CONTRAST DIAGNOSTIC  COMPARISON:  None INDICATIONS: CHEST PAIN, SOA  TECHNIQUE: After obtaining the patient's consent, CT images were obtained with non-ionic intravenous contrast material.   PROTOCOL:   Pulmonary embolism imaging protocol performed    RADIATION:   DLP: 527.9 mGy*cm   Automated exposure control was utilized to minimize radiation dose. CONTRAST: 75 cc Isovue 370 I.V.  FINDINGS:  Pulmonary arteries:  Adequately opacified.  No emboli demonstrated  Aida/mediastinum:  No adenopathy.  Thoracic aorta normal in caliber.  Indeterminate for coronary calcification due to motion.  No pericardial effusion  Lungs/pleura:  No infiltrate or edema.  No pleural effusion.  Mosaic attenuation in the lower lobes  Upper abdomen:  Unremarkable  Bones/soft tissues:  No acute bony abnormality         1. No evidence of pulmonary embolism  2. No acute infiltrate  3. Mosaic lung attenuation in the lower lobes suggests small airways obstructive disease     SYED PEREZ MD       Electronically Signed and Approved By: SYED PEREZ MD on 1/14/2023 at 17:55             XR Chest 1 View    Result Date: 1/14/2023  PROCEDURE: XR CHEST 1 VW  COMPARISON: [facility] " [facility], CR, XR CHEST PA AND LATERAL, 2/08/2022, 16:21.  INDICATIONS: SOA Triage Protocol  FINDINGS:  Heart size and pulmonary vasculature within normal limits.  Lungs clear.  Costophrenic angles sharp       No active cardiopulmonary disease       SYED PEREZ MD       Electronically Signed and Approved By: SYED PEREZ MD on 1/14/2023 at 15:07                 Differential Diagnosis and Discussion:    Chest Pain:  Based on the patient's signs and symptoms, I considered aortic dissection, myocardial infaction, pulmonary embolism, cardiac tamponade, pericarditis, pneumothorax, musculoskeletal chest pain and other differential diagnosis as an etiology of the patient's chest pain.   Dyspnea: Differential diagnosis includes but is not limited to metabolic acidosis, neurological disorders, psychogenic, asthma, pneumothorax, upper airway obstruction, COPD, pneumonia, noncardiogenic pulmonary edema, interstitial lung disease, anemia, congestive heart failure, and pulmonary embolism    All labs were reviewed and analyzed by me.  EKG was interpreted by me.    MDM         Patient Care Considerations:    No other considerations as a complete work-up was performed in the ED.      Consultants/Shared Management Plan:    None    Social Determinants of Health:    Patient is independent, reliable, and has access to care.       Disposition and Care Coordination:    Discharged: The patient is suitable and stable for discharge with no need for consideration of observation or admission.    I have explained discharge medications and the need for follow up with the patient/caretakers. This was also printed in the discharge instructions. Patient was discharged with the following medications and follow up:      Medication List      New Prescriptions    albuterol sulfate  (90 Base) MCG/ACT inhaler  Commonly known as: PROVENTIL HFA;VENTOLIN HFA;PROAIR HFA  Inhale 2 puffs Every 4 (Four) Hours As Needed (Shortness of breath).      predniSONE 20 MG tablet  Commonly known as: DELTASONE  Take 3 p.o. daily for 5 days.           Where to Get Your Medications      These medications were sent to [id number] - [place], [place] - [address] AT [facility] OF [address] [facility] & [place] - [phone]  - [phone] FX  [address] [place] [address] [place] [address]    Phone: [phone]   · albuterol sulfate  (90 Base) MCG/ACT inhaler  · predniSONE 20 MG tablet      Ant Carlos Jr., MD  [address] [facility] [address]  [address]  [facility] [address] [phone]    In 2 days  No better.       Final diagnoses:   Chest discomfort   Dyspnea, unspecified type   Bronchospasm        ED Disposition     ED Disposition   Discharge    Condition   Stable    Comment   --             This medical record created using voice recognition software.           Chirag Gage DO  01/14/23 [id number]

## 2023-01-14 NOTE — DISCHARGE INSTRUCTIONS
Drink plenty of fluids.  Take medications as directed.  Return for worsening symptoms.  Follow-up with your doctor in 2 days if no better.

## 2023-01-15 LAB — QT INTERVAL: 353 MS

## 2023-01-20 ENCOUNTER — OFFICE VISIT (OUTPATIENT)
Dept: INTERNAL MEDICINE | Facility: CLINIC | Age: 43
End: 2023-01-20
Payer: OTHER GOVERNMENT

## 2023-01-20 ENCOUNTER — PRIOR AUTHORIZATION (OUTPATIENT)
Dept: INTERNAL MEDICINE | Facility: CLINIC | Age: 43
End: 2023-01-20
Payer: OTHER GOVERNMENT

## 2023-01-20 VITALS
HEART RATE: 88 BPM | OXYGEN SATURATION: 97 % | SYSTOLIC BLOOD PRESSURE: 130 MMHG | TEMPERATURE: 97.9 F | DIASTOLIC BLOOD PRESSURE: 89 MMHG | BODY MASS INDEX: 43.84 KG/M2 | HEIGHT: 64 IN | WEIGHT: 256.8 LBS

## 2023-01-20 DIAGNOSIS — F50.81 BINGE EATING DISORDER: ICD-10-CM

## 2023-01-20 DIAGNOSIS — R07.89 CHEST DISCOMFORT: Primary | ICD-10-CM

## 2023-01-20 DIAGNOSIS — J98.01 BRONCHOSPASM: ICD-10-CM

## 2023-01-20 DIAGNOSIS — Z79.899 MEDICATION MANAGEMENT: ICD-10-CM

## 2023-01-20 DIAGNOSIS — R06.00 DYSPNEA, UNSPECIFIED TYPE: ICD-10-CM

## 2023-01-20 DIAGNOSIS — J98.01 BRONCHOSPASM: Primary | ICD-10-CM

## 2023-01-20 PROCEDURE — 99214 OFFICE O/P EST MOD 30 MIN: CPT | Performed by: STUDENT IN AN ORGANIZED HEALTH CARE EDUCATION/TRAINING PROGRAM

## 2023-01-20 PROCEDURE — 80305 DRUG TEST PRSMV DIR OPT OBS: CPT | Performed by: STUDENT IN AN ORGANIZED HEALTH CARE EDUCATION/TRAINING PROGRAM

## 2023-01-20 RX ORDER — FLUCONAZOLE 100 MG/1
TABLET ORAL
Qty: 8 TABLET | Refills: 0 | Status: SHIPPED | OUTPATIENT
Start: 2023-01-20

## 2023-01-20 RX ORDER — FLUTICASONE PROPIONATE AND SALMETEROL 100; 50 UG/1; UG/1
1 POWDER RESPIRATORY (INHALATION)
Qty: 60 EACH | Refills: 2 | Status: SHIPPED | OUTPATIENT
Start: 2023-01-20 | End: 2023-02-03 | Stop reason: SINTOL

## 2023-01-20 RX ORDER — ALBUTEROL SULFATE 90 UG/1
2 AEROSOL, METERED RESPIRATORY (INHALATION) EVERY 4 HOURS PRN
Qty: 1 G | Refills: 3 | Status: SHIPPED | OUTPATIENT
Start: 2023-01-20

## 2023-01-20 RX ORDER — BUDESONIDE AND FORMOTEROL FUMARATE DIHYDRATE 80; 4.5 UG/1; UG/1
2 AEROSOL RESPIRATORY (INHALATION)
Qty: 10.2 G | Refills: 12 | Status: SHIPPED | OUTPATIENT
Start: 2023-01-20 | End: 2023-01-20

## 2023-01-20 RX ORDER — IBUPROFEN 800 MG/1
TABLET ORAL
Qty: 90 TABLET | Refills: 0 | Status: SHIPPED | OUTPATIENT
Start: 2023-01-20 | End: 2023-02-15

## 2023-01-20 NOTE — PROGRESS NOTES
"Chief Complaint  Follow-up (ER follow up/ left arm pain/ soa/ rx for diflucan)    Subjective          Malinda Saucedo presents to [facility]  History of Present Illness    Seen in ED on 1/14/23 with chest discomfort.  Had CT showing no evidence of PE, but small airway obstructive disease.  EKG notable only for poor R wave progression.  Troponin was negative.  Labs and workup otherwise unremarkable/reassuring.  Treated with albuterol plus steroids.  Today is last day of prednisone.  Chest discomfort improved, but still with some chest discomfort and light-headedness.    Prior to UDS, she reports using CBD oil for her chronic pain.    Also, requesting diflucan as she feels she is about to develop thrush in her mouth.    Current Outpatient Medications   Medication Instructions   • albuterol sulfate  (90 Base) MCG/ACT inhaler 2 puffs, Inhalation, Every 4 Hours PRN   • aspirin 81 mg, Oral, Daily   • B-D TB SYRINGE 1CC/27GX1/2\" 27G X 1/2\" 1 ML misc USE 1 EACH WITH METHOTREXATE   • busPIRone (BUSPAR) 10 MG tablet TAKE 2 TABLETS BY MOUTH TWICE DAILY   • cetirizine (ZYRTEC) 10 mg, Oral, Daily   • clobetasol (TEMOVATE) 0.05 % external solution OIL   • Cosentyx Sensoready, 300 MG, 150 MG/ML solution auto-injector No dose, route, or frequency recorded.   • cyclobenzaprine (FLEXERIL) 10 mg, Oral, 2 Times Daily   • DULoxetine (CYMBALTA) 60 MG capsule TAKE 1 CAPSULE BY MOUTH DAILY IN ADDITION TO THE DULOXETINE 30 MG   • DULoxetine (CYMBALTA) 30 mg, Oral, Daily   • Elagolix Sodium (Orilissa) 150 MG tablet Oral   • EPINEPHrine (EPIPEN) 0.3 MG/0.3ML solution auto-injector injection 0.3 mL, Subcutaneous, Once As Needed   • fluconazole (Diflucan) 100 MG tablet Take two tablets by mouth (200 mg) on day 1, then 100 mg PO daily for six more days.   • Fluocinolone Acetonide Scalp 0.01 % oil APPLY TOPICALLY TO THE SCALP EVERY NIGHT AT BEDTIME AS NEEDED FOR PSORIASIS OR FLARES   • " "Fluticasone-Salmeterol (ADVAIR/WIXELA) 100-50 MCG/ACT DISKUS 1 puff, Inhalation, 2 Times Daily - RT   • folic acid (FOLVITE) 1,000 mcg, Oral, Daily   • gabapentin (NEURONTIN) 600 MG tablet TAKE 1 TABLET BY MOUTH EVERY 8 HOURS AS DIRECTED   • hydrOXYzine (ATARAX) 25 mg, Oral, Daily PRN   • ibuprofen (ADVIL,MOTRIN) 800 MG tablet TAKE 1 TABLET BY MOUTH EVERY 8 HOURS AS NEEDED FOR MODERATE PAIN   • lisdexamfetamine (VYVANSE) 70 mg, Oral, Every Morning   • Methotrexate Sodium (methotrexate PF) 50 MG/2ML chemo syringe No dose, route, or frequency recorded.   • montelukast (SINGULAIR) 10 mg, Oral, Nightly   • predniSONE (DELTASONE) 20 MG tablet Take 3 p.o. daily for 5 days.   • spironolactone-hydrochlorothiazide (Aldactazide) 50-50 MG per tablet 1 tablet, Oral, Daily       The following portions of the patient's history were reviewed and updated as appropriate: allergies, current medications, past family history, past medical history, past social history, past surgical history, and problem list.    Objective   Vital Signs:   /89   Pulse 88   Temp 97.9 °F (36.6 °C) (Temporal)   Ht 162.6 cm (64\")   Wt 116 kg (256 lb 12.8 oz)   SpO2 97%   BMI 44.08 kg/m²     Wt Readings from Last 3 Encounters:   01/20/23 116 kg (256 lb 12.8 oz)   01/14/23 117 kg (257 lb 8 oz)   01/14/23 118 kg (260 lb)     BP Readings from Last 3 Encounters:   01/20/23 130/89   01/14/23 124/77   01/14/23 142/83     Physical Exam  Vitals reviewed.   Constitutional:       General: She is not in acute distress.     Appearance: Normal appearance. She is not ill-appearing, toxic-appearing or diaphoretic.   HENT:      Head: Normocephalic and atraumatic.      Right Ear: External ear normal.      Left Ear: External ear normal.   Eyes:      Conjunctiva/sclera: Conjunctivae normal.   Cardiovascular:      Rate and Rhythm: Normal rate and regular rhythm.      Pulses: Normal pulses.      Heart sounds: Normal heart sounds. No murmur heard.    No friction rub. " No gallop.   Pulmonary:      Effort: Pulmonary effort is normal. No respiratory distress.      Breath sounds: Normal breath sounds. No stridor. No wheezing, rhonchi or rales.   Chest:      Chest wall: No tenderness.   Abdominal:      General: Abdomen is flat.      Palpations: Abdomen is soft. There is no mass.      Tenderness: There is no abdominal tenderness.   Musculoskeletal:      Right lower leg: No edema.      Left lower leg: No edema.   Skin:     General: Skin is warm and dry.   Neurological:      General: No focal deficit present.      Mental Status: She is alert. Mental status is at baseline.   Psychiatric:         Mood and Affect: Mood normal.         Behavior: Behavior normal.         Thought Content: Thought content normal.         Judgment: Judgment normal.       Result Review :   The following data was reviewed by: Clifton Harding MD on 01/20/2023:  Common labs    Common Labs 2/8/22 2/8/22 8/15/22 8/15/22 8/15/22 1/14/23 1/14/23    1645 1645 1324 1324 1324 1417 1417   Glucose  75   72  90   BUN  8   13  14   Creatinine  1.04 (A)   0.84  0.90   eGFR African Am  71        Sodium  139   137  134 (A)   Potassium  4.1   3.7  3.8   Chloride  108 (A)   97 (A)  98   Calcium  8.9   9.9  9.3   Albumin  4.00   4.50  4.3   Total Bilirubin  0.2   0.3  0.2   Alkaline Phosphatase  90   117  125 (A)   AST (SGOT)  13   29  16   ALT (SGPT)  14   33  22   WBC 8.13  7.62   10.57    Hemoglobin 14.7  15.4   13.9    Hematocrit 44.1  45.5   40.8    Platelets 299  314   357    Total Cholesterol    194      Triglycerides    115      HDL Cholesterol    69 (A)      LDL Cholesterol     105 (A)      (A) Abnormal value                   Lab Results   Component Value Date    SARSANTIGEN Not Detected 11/08/2022    COVID19 Not Detected 04/14/2022    RAPFLUA Negative 04/14/2022    RAPFLUB Negative 04/14/2022    FLUAAG Not Detected 11/08/2022    FLUBAG Not Detected 11/08/2022    RAPSCRN Negative 04/14/2022    INR 1.1 02/05/2019        Procedures        Assessment and Plan    Diagnoses and all orders for this visit:    1. Chest discomfort (Primary)    2. Bronchospasm  -     Full Pulmonary Function Test With Bronchodilator; Future  -     Discontinue: budesonide-formoterol (SYMBICORT) 80-4.5 MCG/ACT inhaler; Inhale 2 puffs 2 (Two) Times a Day.  Dispense: 10.2 g; Refill: 12  -     albuterol sulfate  (90 Base) MCG/ACT inhaler; Inhale 2 puffs Every 4 (Four) Hours As Needed (Shortness of breath).  Dispense: 1 g; Refill: 3    3. Dyspnea, unspecified type  -     Full Pulmonary Function Test With Bronchodilator; Future  -     Discontinue: budesonide-formoterol (SYMBICORT) 80-4.5 MCG/ACT inhaler; Inhale 2 puffs 2 (Two) Times a Day.  Dispense: 10.2 g; Refill: 12    4. Binge eating disorder  Comments:  cont vyvanse to help with appetite suppression.  Orders:  -     POC Urine Drug Screen [facility]  -     lisdexamfetamine (Vyvanse) 70 MG capsule; Take 1 capsule by mouth Every Morning  Dispense: 30 capsule; Refill: 0    5. Medication management  -     POC Urine Drug Screen [facility]    6. Binge eating disorder  Comments:  cont vyvanse to help with appetite suppression. LULY and isabela reviewed. encouraged by weight loss.   Orders:  -     POC Urine Drug Screen [facility]  -     lisdexamfetamine (Vyvanse) 70 MG capsule; Take 1 capsule by mouth Every Morning  Dispense: 30 capsule; Refill: 0    Other orders  -     fluconazole (Diflucan) 100 MG tablet; Take two tablets by mouth (200 mg) on day 1, then 100 mg PO daily for six more days.  Dispense: 8 tablet; Refill: 0      Chest discomfort:  -improved    Bronchospasm, Dyspnea:  -will obtain PFTs  -albuterol and Advair prescription sent      Medications Discontinued During This Encounter   Medication Reason   • albuterol sulfate  (90 Base) MCG/ACT inhaler Reorder   • lisdexamfetamine (Vyvanse) 70 MG capsule Reorder          Follow Up   Return in about 3 months (around 4/20/2023) for  Binge Eating Disorder.  Patient was given instructions and counseling regarding her condition or for health maintenance advice. Please see specific information pulled into the AVS if appropriate.       Clifton Harding MD  01/20/23  17:24 EST

## 2023-01-20 NOTE — TELEPHONE ENCOUNTER
PA REQUIRED    budesonide-formoterol (SYMBICORT) 80-4.5 MCG/ACT inhaler (01/20/2023)    INDEXED VIA ONBASE        PROVIDER PLEASE REVIEW THE BELOW LINK FOR COVERED MEDICATIONS:  REFERRAL/PRE-AUTH CSN - SCAN - PA REQUIRED SYMBICORT (01/20/2023)      PLEASE SEND IN MEDICATION THAT DOES NOT REQUIRE A PA IF OKAY FOR SUBSTITUTION. IF NOT PLEASE ADVISE SO PA CAN BE COMPLETED.

## 2023-01-27 ENCOUNTER — TELEPHONE (OUTPATIENT)
Dept: INTERNAL MEDICINE | Facility: CLINIC | Age: 43
End: 2023-01-27
Payer: OTHER GOVERNMENT

## 2023-01-27 NOTE — TELEPHONE ENCOUNTER
Caller: Malinda Saucedo    Relationship: Self    Best call back number: [phone]    What form or medical record are you requesting: MOST RECENT BLOOD WORK RESULTS    Who is requesting this form or medical record from you: [facility]    How would you like to receive the form or medical records (pick-up, mail, fax): FAX  If fax, what is the fax number: [phone]    Timeframe paperwork needed: AS SOON AS POSSIBLE

## 2023-01-28 ENCOUNTER — APPOINTMENT (OUTPATIENT)
Dept: CT IMAGING | Facility: HOSPITAL | Age: 43
End: 2023-01-28
Payer: OTHER GOVERNMENT

## 2023-01-28 ENCOUNTER — APPOINTMENT (OUTPATIENT)
Dept: GENERAL RADIOLOGY | Facility: HOSPITAL | Age: 43
End: 2023-01-28
Payer: OTHER GOVERNMENT

## 2023-01-28 ENCOUNTER — HOSPITAL ENCOUNTER (EMERGENCY)
Facility: HOSPITAL | Age: 43
Discharge: HOME | End: 2023-01-28
Attending: EMERGENCY MEDICINE | Admitting: EMERGENCY MEDICINE
Payer: OTHER GOVERNMENT

## 2023-01-28 VITALS
HEART RATE: 98 BPM | SYSTOLIC BLOOD PRESSURE: 112 MMHG | DIASTOLIC BLOOD PRESSURE: 79 MMHG | HEIGHT: 64 IN | BODY MASS INDEX: 44.68 KG/M2 | WEIGHT: 261.69 LBS | RESPIRATION RATE: 18 BRPM | OXYGEN SATURATION: 94 %

## 2023-01-28 DIAGNOSIS — T50.905A IDIOSYNCRATIC REACTION TO MEDICATION AFTER PROPER DOSE, INITIAL ENCOUNTER: Primary | ICD-10-CM

## 2023-01-28 LAB
AMPHET+METHAMPHET UR QL: POSITIVE
ARTERIAL PATENCY WRIST A: POSITIVE
BARBITURATES UR QL SCN: NEGATIVE
BASE EXCESS BLDA CALC-SCNC: 0.6 MMOL/L (ref -2–2)
BDY SITE: ABNORMAL
BENZODIAZ UR QL SCN: NEGATIVE
BILIRUB UR QL STRIP: NEGATIVE
CA-I BLDA-SCNC: 1.18 MMOL/L (ref 1.13–1.32)
CANNABINOIDS SERPL QL: POSITIVE
CHLORIDE BLDA-SCNC: 101 MMOL/L (ref 98–106)
CLARITY UR: CLEAR
COCAINE UR QL: NEGATIVE
COHGB MFR BLD: 0.9 % (ref 0–1.5)
COLOR UR: YELLOW
FHHB: 2.1 % (ref 0–5)
GAS FLOW AIRWAY: 2 LPM
GLUCOSE BLDA-MCNC: 150 MG/DL (ref 65–99)
GLUCOSE UR STRIP-MCNC: NEGATIVE MG/DL
HCO3 BLDA-SCNC: 26.5 MMOL/L (ref 22–26)
HGB BLDA-MCNC: 14.5 G/DL (ref 11.7–14.6)
HGB UR QL STRIP.AUTO: NEGATIVE
INHALED O2 CONCENTRATION: ABNORMAL %
KETONES UR QL STRIP: NEGATIVE
LACTATE BLDA-SCNC: 1.49 MMOL/L (ref 0.5–2)
LEUKOCYTE ESTERASE UR QL STRIP.AUTO: NEGATIVE
METHADONE UR QL SCN: NEGATIVE
METHGB BLD QL: 0.2 % (ref 0–1.5)
MODALITY: ABNORMAL
NITRITE UR QL STRIP: NEGATIVE
NOTE: ABNORMAL
OPIATES UR QL: NEGATIVE
OXYCODONE UR QL SCN: NEGATIVE
OXYHGB MFR BLDV: 96.8 % (ref 94–99)
PCO2 BLDA: 47.3 MM HG (ref 35–45)
PH BLDA: 7.37 PH UNITS (ref 7.35–7.45)
PH UR STRIP.AUTO: 6.5 [PH] (ref 5–8)
PO2 BLD: ABNORMAL MM[HG]
PO2 BLDA: 119.4 MM HG (ref 80–100)
POTASSIUM BLDA-SCNC: 3.84 MMOL/L (ref 3.5–5)
PROT UR QL STRIP: ABNORMAL
QT INTERVAL: 373 MS
SAO2 % BLDCOA: 97.9 % (ref 95–99)
SODIUM BLDA-SCNC: 139.6 MMOL/L (ref 136–146)
SP GR UR STRIP: 1.02 (ref 1–1.03)
UROBILINOGEN UR QL STRIP: ABNORMAL

## 2023-01-28 PROCEDURE — 93010 ELECTROCARDIOGRAM REPORT: CPT | Performed by: INTERNAL MEDICINE

## 2023-01-28 PROCEDURE — 80307 DRUG TEST PRSMV CHEM ANLYZR: CPT | Performed by: EMERGENCY MEDICINE

## 2023-01-28 PROCEDURE — 71045 X-RAY EXAM CHEST 1 VIEW: CPT

## 2023-01-28 PROCEDURE — 99284 EMERGENCY DEPT VISIT MOD MDM: CPT

## 2023-01-28 PROCEDURE — 82805 BLOOD GASES W/O2 SATURATION: CPT | Performed by: EMERGENCY MEDICINE

## 2023-01-28 PROCEDURE — 36600 WITHDRAWAL OF ARTERIAL BLOOD: CPT | Performed by: EMERGENCY MEDICINE

## 2023-01-28 PROCEDURE — 83050 HGB METHEMOGLOBIN QUAN: CPT | Performed by: EMERGENCY MEDICINE

## 2023-01-28 PROCEDURE — 82375 ASSAY CARBOXYHB QUANT: CPT | Performed by: EMERGENCY MEDICINE

## 2023-01-28 PROCEDURE — 93005 ELECTROCARDIOGRAM TRACING: CPT | Performed by: EMERGENCY MEDICINE

## 2023-01-28 PROCEDURE — P9612 CATHETERIZE FOR URINE SPEC: HCPCS

## 2023-01-28 PROCEDURE — 81003 URINALYSIS AUTO W/O SCOPE: CPT | Performed by: EMERGENCY MEDICINE

## 2023-01-28 PROCEDURE — 36415 COLL VENOUS BLD VENIPUNCTURE: CPT

## 2023-01-28 PROCEDURE — 74176 CT ABD & PELVIS W/O CONTRAST: CPT

## 2023-02-03 ENCOUNTER — TELEMEDICINE (OUTPATIENT)
Dept: PSYCHIATRY | Facility: CLINIC | Age: 43
End: 2023-02-03
Payer: OTHER GOVERNMENT

## 2023-02-03 DIAGNOSIS — F41.1 GENERALIZED ANXIETY DISORDER: Primary | ICD-10-CM

## 2023-02-03 DIAGNOSIS — F51.05 INSOMNIA DUE TO MENTAL CONDITION: ICD-10-CM

## 2023-02-03 DIAGNOSIS — F33.1 MAJOR DEPRESSIVE DISORDER, RECURRENT EPISODE, MODERATE: ICD-10-CM

## 2023-02-03 PROCEDURE — 90833 PSYTX W PT W E/M 30 MIN: CPT | Performed by: STUDENT IN AN ORGANIZED HEALTH CARE EDUCATION/TRAINING PROGRAM

## 2023-02-03 PROCEDURE — 99214 OFFICE O/P EST MOD 30 MIN: CPT | Performed by: STUDENT IN AN ORGANIZED HEALTH CARE EDUCATION/TRAINING PROGRAM

## 2023-02-03 RX ORDER — BUPROPION HYDROCHLORIDE 300 MG/1
300 TABLET ORAL EVERY MORNING
COMMUNITY
Start: 2023-01-20 | End: 2023-02-03 | Stop reason: SDUPTHER

## 2023-02-03 RX ORDER — HYDROXYZINE HYDROCHLORIDE 25 MG/1
25 TABLET, FILM COATED ORAL DAILY PRN
Qty: 90 TABLET | Refills: 2 | Status: SHIPPED | OUTPATIENT
Start: 2023-02-03

## 2023-02-03 RX ORDER — BUPROPION HYDROCHLORIDE 300 MG/1
300 TABLET ORAL EVERY MORNING
Qty: 90 TABLET | Refills: 1 | Status: SHIPPED | OUTPATIENT
Start: 2023-02-03

## 2023-02-03 RX ORDER — BUSPIRONE HYDROCHLORIDE 10 MG/1
20 TABLET ORAL 2 TIMES DAILY
Qty: 120 TABLET | Refills: 5 | Status: SHIPPED | OUTPATIENT
Start: 2023-02-03

## 2023-02-03 NOTE — PATIENT INSTRUCTIONS
1.  Please return to clinic at your next scheduled visit.  Contact the clinic ([phone]) at least 24 hours prior in the event you need to cancel.  2.  Do no harm to yourself or others.    3.  Avoid alcohol and drugs.    4.  Take all medications as prescribed.  Please contact the clinic with any concerns. If you are in need of medication refills, please call the clinic at [phone].    5. Should you want to get in touch with your provider, Dr. Brooks Lynn, please utilize [employer] or contact the office ([phone]), and staff will be able to page Dr. Lynn directly.  6.  In the event you have personal crisis, contact the following crisis numbers: Suicide Prevention Hotline 1-632.338.8813; [facility] Helpline 9-879-462-EUTI; [facility] Emergency Room [phone]; text HELLO to 552719; or 956.     SPECIFIC RECOMMENDATIONS:     1.      Medications discussed at this encounter:                   - stop cymbalta, start trintellix     2.      Psychotherapy recommendations:

## 2023-02-03 NOTE — PROGRESS NOTES
"Subjective   Malinda Sue Saucedo is a 42 y.o. female who presents today for initial evaluation     Referring Provider:  Ant Carlos Jr., MD  [address] [facility]  [address]  [place] [address]    Chief Complaint:  mdd vivi    History of Present Illness:     Chart review 9/29: Seen September 10 to establish care.  [facility].  Labs are consistent with Sjogren's.  Psoriasis and arthritis are under control.  On Topamax for migraines.  On Lunesta for insomnia.  History of anxiety and depression.  On gabapentin 600 mg 3 times daily, Strattera 100 mg daily, BuSpar 10 mg, duloxetine 60 mg, bupropion 300 mg.  Denied anxiety in January 2019.  Has been on Prozac in the past.  BMP demonstrates creatinine of 1.68, alk phos of 116, otherwise unremarkable.  hCG was followed in 2019.  CBC unremarkable, no head imaging or EKG.    \"Malinda\"  [facility]  is name of her therapy clinic, [facility], accepts   Lupus  Evaluate for ADHD 12/2    2/3: Virtual visit via Zoom audio and video due to the COVID-19 pandemic.  Patient is accepting of and agreeable to visit.  The visit consisted of the patient and I. The patient is at home, and I am at the office.  Interview:  1. Chart review:   a. UDS positive for THC in January.  b. Seen in the emergency room for spasms and vomiting.  She improved while in the ER.  Idiosyncratic reaction to a medication was a diagnosis.  2. Planning: Restart Cymbalta at 90 mg rather than 60 mg.  Patient has stopped bupropion and agrees to stay off it.  Evaluate for ADHD at next visit.  Presently on Vyvanse for binge eating disorder.  3. \"I'm good.\"  a. I restarted the wellbutrin because things were better.  b. Then I started having health problems  c. Then I started having convulsions. They thought it might be related to the methotrexate.  d. [facility]   e. I feel like the problems I'm having are related to reactions to medical " "conditions  f. 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  4. Mood/Depression: pretty bad  5. Anxiety: pretty bad  6. Panic attacks: No  7. Energy: Down  8. Concentration: Down  9. Sleeping: Fairly well  10. Eating: Stable  11. Refills: Yes  12. Substances: No  13. Therapy: [facility]  14. Medication compliant: y  15. No SI HI AVH.      12/2: Virtual visit via Zoom audio and video due to the COVID-19 pandemic.  Patient is accepting of and agreeable to visit.  The visit consisted of the patient and I. The patient is at home, and I am at the office.  Interview:  16. Chart review: Seen by rheumatology for Sjogren's.  Plan is to discontinue methotrexate and taper off prednisone and continue Cosentyx.  More labs were ordered.  CMP from August is reassuring, as is CBC.  Elevated LDL.  There are multiple notes.  17. Planning: Referred to psychotherapy at last visit, no other changes  18. \"I'm good.\"  a. Had not been taking duloxetine and bupropion -- pharmacy's fault. Stopped bupropion because she didn't like it.   b. We discussed vyvanse: which was started for binge eating disorder 4/22  c. Pt states she was diagnosed with ADHD a couple years ago.   19. Mood/Depression: minimal depressed mood  20. Anxiety: minimal worrying  21. Panic attacks: n  22. Energy: good  23. Concentration: Still having problems with concentration  24. Sleeping: Stable  25. Eating: Stable weight  26. Refills: Yes  27. Substances: No  28. Therapy: No  29. Medication compliant: y  30. No SI HI AVH.      10/14: Virtual visit via Zoom audio and video due to the COVID-19 pandemic.  Patient is accepting of and agreeable to visit.  The visit consisted of the patient and I. The patient is at home, and I am at the office.  Interview:  31. Chart review: No new.  32. Planning: Increased BuSpar at last visit.  Set up psychotherapy today.  33. \"No difference on higher dose of buspar.\"  a. Changed humira to cosentyx.  i. Worsening dep and " "anxiety  ii. Another reason to keep things the same  b. \"Let's keep things where they are for a while.\"  34. Mood/Depression: worse, see above  35. Anxiety: worse, see above  a. G15  36. Panic attacks: n  37. Sleeping: stable  38. Eating: stable  39. Refills: y  40. Substances: n  41. Therapy: set up with [facility]  42. Medication compliant: y  43. No SI HI AVH.      9/16: Virtual visit via Zoom audio and video due to the COVID-19 pandemic.  Patient is accepting of and agreeable to visit.  The visit consisted of the patient and I. The patient is at home, and I am at the office.  Interview:  44. Chart review: Seen by primary care August 26.  Losing weight.  Reassuring CMP.  Vyvanse continued.  45. Planning: Increased BuSpar to target anxiety.  Consider increasing Cymbalta.  46. \"I forgot to take the buspar higher dose!\"  a. \"OK\" a lot of lupus flare ups leading to fatigue, but has been managing them ok.  b. MRI set up due to memory loss  47. Mood/Depression: stable, nearly at goal  48. Anxiety: nearly at goal  49. Panic attacks: n  50. Energy: stable  51. Concentration: forgetful, but SLUMS 30/30 recently via neurology  52. Sleeping: stable  53. Eating: stable  54. Refills: n  55. Substances: defer  56. Therapy: still looking for a therapist  57. Medication compliant: y  58. No SI HI AVH.      8/8: Virtual visit via Zoom audio and video due to the COVID-19 pandemic.  Patient is accepting of and agreeable to visit.  The visit consisted of the patient and I. The patient is at home, and I am at the office.  Interview:  59. Chart review: Seen by internal medicine outpatient on July 22.  Has lost 13 pounds in a month.  Vyvanse is helping.  Also on hydroxyzine for migraines.  February creatinine is elevated at 1.04.  60. Planning: No changes at last visit.  61. \"Busy.\"  a. Therapy clinic gets lighter over the summers. (vacations, kids being home)  b. Also has Lupus, so on methotrexate  c. Taking hydroxyzine (old prescription) " "for \"emergencies\"  62. Mood/Depression: 2 or 3/10  63. Anxiety: 6/10  a. Some irritability  b. Might be related to vyvanse, but this medication is tremendously beneficial to her (losing weight)  64. Panic attacks: none  65. Energy: good  66. Concentration: better  67. Sleeping: good  68. Eating: losing weight  69. Refills: n  70. Substances: CBD  71. Therapy: n  72. Medication compliant: y  73. No SI HI AVH.      : Virtual visit via Zoom audio and video due to the COVID-19 pandemic.  Patient is accepting of and agreeable to visit.  The visit consisted of the patient and I. The patient is at home, and I am at the office.  Interview:  74. Chart review: Patient is on Vyvanse 70 mg a day for binge eating disorder.  Saw primary care in May.  75. Plannin. \"Good.\"  a. Good unless I miss meds.  b. \"I am having a libido problem.\"  i. Interested, but having trouble lubricating.  ii. She did recently start Vyvanse in April, though I don't think this is the culprit.  c. Growing her practice  77. Mood/Depression: under control  78. Anxiety: under control  79. Panic attacks: n  80. Energy: much better  81. Concentration: better  82. Sleeping: well on CPAP  83. Eating: less, has lost 4 lbs  84. Refills: y  85. Substances: n  86. Therapy: n  87. Medication compliant: y  88. No SI HI AVH.      : Virtual visit via Zoom audio and video due to the COVID-19 pandemic.  Patient is accepting of and agreeable to visit.  The visit consisted of the patient and I. The patient is at home, and I am at the office.  Interview:  89. Chart review: Seen by primary care this month for sore throat.  February labs show decreased CO2 20.3, elevated chloride 108, slightly elevated creatinine 1.04, otherwise CMP and CBC are reassuring.  90. \"I'm good.\"  a. \"I've been great!\"  b. Mood is good. Some anxiety, but manageable. No depression.  c. Lost some weight. Achieving her goals and that is definitely helping with mood.  d. Doesn't want to add " "any meds, change meds. Doesn't want to remove anything either because she is doing well.  e. I notified her that I filled out her form for gastric surgery.  f. Driving during interview  91. Energy: kindof down, \"could be better\"  92. Concentration:  93. Sleeping: \"ok\" still tossing and turning a lot. CPAP helps.  94. Eating: stable  95. Refills:  96. Substances:  97. Therapy:  98. Medication compliant: y  99. No SI HI AV.      3/7: Virtual visit via Zoom audio and video due to the COVID-19 pandemic.  Patient is accepting of and agreeable to visit.  The visit consisted of the patient and I. The patient is at home, and I am at the office.  Interview:  100. Chart review: Patient is concerned about her weight.  Labs from February show elevated creatinine 1.04, reassuring LFTs, low CO2 20.3, elevated chloride 108, reassuring CBC.  101. \" I am gaining weight.\"  a. Patient reports she is gained about 5 pounds in the last few weeks.  It is not clear per the system how much weight she has actually gained.  b. Denies hallucinations, anxiety and depression are very well controlled at this time.  c. Having some trouble with her  since around November, but they have had this happen before and \"he is not going anywhere.\"  102. Medication compliant: Yes  103. No SI HI AV.      2/17: Virtual visit via Zoom audio and video due to the COVID-19 pandemic.  Patient is accepting of and agreeable to visit.  The visit consisted of the patient and I. The patient is at home, and I am at the office.  Interview:  104. Chart review: Seen by primary care February 15.  For cough.  COVID negative.  105. \"I never went up on the two of abilify.\"  a. \"Same symptoms.\" Still hears voices, but not as often. Also,  \"seeing things isn't as bad.\" Hears people calling her name. Random music.  b. Now has a sinus infection.  c. \"There are at least 3 family members who have schizophrenia.\"  d. Fearful that she may have schizophrenia; fearful that she " "may deteriorate like her uncle and cousin.  106. Sleeping: not well, waiting on equipment for GISELA.  107. Substances: stopped using CBD gummies first week of January 108. Therapy: n  109. Medication compliant: m  110. No SI HI AVH.      1/11: Virtual visit via Zoom audio and video due to the COVID-19 pandemic.  Patient is accepting of and agreeable to visit.  The visit consisted of the patient and I.  Interview:  111. Chart review: Seen in the emergency room December 31 for left arm numbness pain and swelling after having an IV placed to her ACE 2 days ago.  Patient saw primary care December 28 and wanted to be referred to neurology after her sleep study showed abnormal brain waves.  Labs from December 28 show low CO2 21.4, elevated chloride 111, elevated creatinine 1.12, A1c.  Thyroid studies, lipid profile, CBC are unremarkable.  No DVT found.  112. \"Good actually. The abilify helped a lot.\"  a. Sees things out of the corner of her eye, never directly.  b. Still hears voices stating her name.  113. Depression/Mood: stable  114. Anxiety: stable  115. Refills: n  116. Sleeping: initial insomnia. GISELA confirmed by sleep study.  117. Eating: stable  118. Substances: n  119. Therapy: n  120. Medication compliant: y  121. No SI HI AVH.      12/14: Virtual visit via Zoom audio and video due to the COVID-19 pandemic.  Patient is accepting of and agreeable to visit.  The visit consisted of the patient and I.  Interview:  122. Chart review: Seen for thrush by PCP 12/8, seen 11/11 for cough, sore throat (COVID neg). Referred to sleep medicine.  123. \"I'm ok.\"  a. Things have \"been weird.\"  b. I was having auditory and visual hallucinations when \"I first started seeing you.\"  i. Her son or  saying her name.  ii. Sees \"scary shadows\" out of the corner of her eyes, especially at night.  iii. \"Afraid I have bipolar disorder.\"  1. \"Started a practice out of nowhere.\"  2. \"I have spending sprees.\" $2,000 at a time. In the " "middle of one right now. Bought a bedset, tables, talked  into getting a new TV. Now in a new house.  c. Uncle has dx of schizophrenia, sister dx'd with bipolar d/o.  d. Also found out recently on the Sjogren's/Lupus spectrum.  e. Also has sleep study scheduled.  f. Will be seeing a neurologist because \"I'm losing time, like 5 minutes.\"  g. Willing to reduce the number of medications she is on and start a mood stabilizer.  h. \"Scared of these. Scared it will get worse.\"  i. Stopped buspar 2 weeks ago and is more irritable.  124. Depression/Mood:  1. Depressed mood: y  2. Seasonal pattern: denies  3. Severity: moderate  4. Anhedonia: mild, but enjoys spending time with son, shopping  5. Guilt or hopelessness:  6. Energy: \"horrible\"  7. Concentration: poor  8. Weight loss or weight gain: gain, 2 lbs since 2 weeks  9. Psychomotor retardation or agitation: def  10. Insomnia:  a. Topamax makes her sleepy, bed at 11, no initial insomnia, but wakes at 2 am, eats, sleeps in an hour, wakes at 6:30 am.  11. Duration: months  125. Anxiety:  1. Uncontrolled worrying: y  2. Severity: moderate  3. Muscle tension: y  4. Fatigue: y  5. Concentration: poor  6. Restlessness/feeling on edge: y  7. Irritability: y  8. Insomnia: maintenance insomnia  9. Duration: months  10. Panic attacks: def  126. Refills: none  127. Sleeping: above, sleep study set up  128. Eating: above  129. Substances: CBD gummy to sleep  130. Therapy: declines  131. Medication compliant: y  132. No SI HI AVH.      11/10: Virtual visit via Zoom audio and video due to the COVID-19 pandemic.  Patient is accepting of and agreeable to visit.  The visit consisted of the patient and I.  Interview:  133. Chart review: No new developments.  134. \"I'm ok.\"  a. More emotional; horribly.  b. Mostly down.  c. Pharmacy has not filled her 30 mg cap of duloxetine in weeks; unsure why.  d. Also feels sick too; congested today  135. Depression/Mood: depressed " "mood  12. Guilt or hopelessness: denies  13. Energy: poor  14. Concentration: poor  136. Anxiety: not bad  137. Sleeping:  a. Initial insomnia  b. Maintenance insomnia  138. Eating: stable  139. Substances:  140. Therapy: denies  141. Medication compliant: no, inadvertently  142. No SI HI AVH.      10/13: Virtual visit via Zoom audio and video due to the COVID-19 pandemic.  Patient is accepting of and agreeable to visit.  The visit consisted of the patient and I.  Interview:  143. Chart review: No new developments.  144. \"I've seen some small changes, but not, like, horrible.\"  a. Attention drifts a little more, in the mornings.  145. Depression/Mood: \"increasing the duloxetine has helped.\"  a. Usually feels really sad before her cycle, but doesn't this time  146. Anxiety:  a. Not as irritable  147. Sleeping: yes  148. Eating: stable  149. Substances: denies  150. Therapy: denies  151. Medication compliant: y  152. No SI HI AV      9/29: Virtual visit via Zoom audio and video due to the COVID-19 pandemic.  Patient is accepting of and agreeable to visit.  The visit consisted of the patient and I.  Interview:  153. Her story: \"Well, it started in [employer].\"  a. Originally PMDD (2009) for years  b. Got out 2011  c. Then dx'd with depression and YENNY  d. Has been on medications since  i. Was on prozac from 2009 until 2015, stopped due to pregnancy  ii. 2018, started wellbutrin only. Which was horrible by itself. They added buspar 10 mg bid, with it. Then duloxetine 60 mg daily added. Better combination.  iii. Now on the above, and also on strattera 100 mg daily for ADHD. Also on gabapentin 600 tid.  e. Stimulants put her to sleep: adderall vyvanse.  Did not try extended release formulations.  154. Mood: much better than it was in the past, but still could use some help.  155. Stressors:  a. Niece started having seizures at 21 and lost her memory, doesn't remember anyone or anybody.  b. In the middle of buying a " "house  c. Finances  d. Starting my own practice.  e. Son having school problems; hanging with \"bad kids.\"  156. Anxiety: manageable.  a. Worrying a lot  157. Coping: goes to Mosque, trusts in God  158. Sleeping: yes  159. Eating: stable  160. Medication compliant: yes  161. Psych ROS: D, A.  Negative for psychosis.  Unclear gorge.  162. No SI HI AVH    Depression:  163. Anhedonia: denies  164. Guilt or hopelessness:   a. Feelings of guilt about how she could have done more for her nieces and nephews  165. Energy: horrible  166. Concentration: \"I have to force myself to focus.\"  a. If stops to eat, it ends her day  167. Weight loss or weight gain: stable over last few months, on weight loss pills, however  168. Psychomotor retardation or agitation: frequently  169. Insomnia: yes, on the lunesta  170. Duration: for years      Access to Firearms: yes, locked away    PHQ-9 Depression Screening  PHQ-9 Total Score:      Little interest or pleasure in doing things?     Feeling down, depressed, or hopeless?     Trouble falling or staying asleep, or sleeping too much?     Feeling tired or having little energy?     Poor appetite or overeating?     Feeling bad about yourself - or that you are a failure or have let yourself or your family down?     Trouble concentrating on things, such as reading the newspaper or watching television?     Moving or speaking so slowly that other people could have noticed? Or the opposite - being so fidgety or restless that you have been moving around a lot more than usual?     Thoughts that you would be better off dead, or of hurting yourself in some way?     PHQ-9 Total Score       YENNY-7  Feeling nervous, anxious or on edge: Nearly every day  Not being able to stop or control worrying: Nearly every day  Worrying too much about different things: Nearly every day  Trouble Relaxing: Nearly every day  Being so restless that it is hard to sit still: Nearly every day  Feeling afraid as if something " awful might happen: More than half the days  Becoming easily annoyed or irritable: Nearly every day  YENNY 7 Total Score: 20  If you checked any problems, how difficult have these problems made it for you to do your work, take care of things at home, or get along with other people: Very difficult    Past Surgical History:  Past Surgical History:   Procedure Laterality Date   •  SECTION     • CYSTECTOMY     • ENDOMETRIAL ABLATION  , ,    • EYE SURGERY     • MYOMECTOMY         Problem List:  Patient Active Problem List   Diagnosis   • YENNY (generalized anxiety disorder)   • PTSD (post-traumatic stress disorder)   • Attention deficit hyperactivity disorder   • Hidradenitis suppurativa   • Intramural and subserous leiomyoma of uterus   • Lumbosacral spondylosis without myelopathy   • Endometriosis determined by laparoscopy   • PMDD (premenstrual dysphoric disorder)   • Psoriasis   • Thrombophilia ([facility])   • Seasonal allergies   • Major depressive disorder in partial remission ([facility])   • Body mass index (BMI) 40.0-44.9, adult ([facility])   • Anaphylactic reaction to bee sting   • Insomnia, unspecified   • Low back pain   • Major depressive disorder, recurrent, moderate ([facility])   • History of thromboembolism of vein   • Major depressive disorder, single episode, unspecified       Allergy:   No Known Allergies     Discontinued Medications:  Medications Discontinued During This Encounter   Medication Reason   • Fluticasone-Salmeterol (ADVAIR/WIXELA) 100-50 MCG/ACT DISKUS Side effects   • predniSONE (DELTASONE) 20 MG tablet *Therapy completed   • DULoxetine (CYMBALTA) 30 MG capsule Not Efficacious   • DULoxetine (CYMBALTA) 60 MG capsule Not Efficacious   • busPIRone (BUSPAR) 10 MG tablet Reorder   • hydrOXYzine (ATARAX) 25 MG tablet Reorder   • buPROPion XL (WELLBUTRIN XL) 300 MG 24 hr tablet Reorder       Current Medications:   Current Outpatient Medications   Medication Sig Dispense Refill   • albuterol sulfate HFA  "108 (90 Base) MCG/ACT inhaler Inhale 2 puffs Every 4 (Four) Hours As Needed (Shortness of breath). 1 g 3   • aspirin (aspirin) 81 MG EC tablet Take 1 tablet by mouth Daily. 90 tablet 3   • B-D TB SYRINGE 1CC/27GX1/2\" 27G X 1/2\" 1 ML misc USE 1 EACH WITH METHOTREXATE     • buPROPion XL (WELLBUTRIN XL) 300 MG 24 hr tablet Take 1 tablet by mouth Every Morning. 90 tablet 1   • busPIRone (BUSPAR) 10 MG tablet Take 2 tablets by mouth 2 (Two) Times a Day. 120 tablet 5   • cetirizine (zyrTEC) 10 MG tablet Take 1 tablet by mouth Daily. 90 tablet 3   • clobetasol (TEMOVATE) 0.05 % external solution OIL     • Cosentyx Sensoready, 300 MG, 150 MG/ML solution auto-injector      • cyclobenzaprine (FLEXERIL) 10 MG tablet Take 10 mg by mouth 2 (Two) Times a Day As Needed.     • Elagolix Sodium (Orilissa) 150 MG tablet Take  by mouth.     • EPINEPHrine (EPIPEN) 0.3 MG/0.3ML solution auto-injector injection Inject 0.3 mL under the skin into the appropriate area as directed 1 (One) Time As Needed.     • fluconazole (Diflucan) 100 MG tablet Take two tablets by mouth (200 mg) on day 1, then 100 mg PO daily for six more days. 8 tablet 0   • Fluocinolone Acetonide Scalp 0.01 % oil APPLY TOPICALLY TO THE SCALP EVERY NIGHT AT BEDTIME AS NEEDED FOR PSORIASIS OR FLARES     • folic acid (FOLVITE) 1 MG tablet Take 2 mg by mouth Daily.     • gabapentin (NEURONTIN) 600 MG tablet TAKE 1 TABLET BY MOUTH EVERY 8 HOURS AS DIRECTED     • hydrOXYzine (ATARAX) 25 MG tablet Take 1 tablet by mouth Daily As Needed for Anxiety. 90 tablet 2   • ibuprofen (ADVIL,MOTRIN) 800 MG tablet TAKE 1 TABLET BY MOUTH EVERY 8 HOURS AS NEEDED FOR MODERATE PAIN 90 tablet 0   • lisdexamfetamine (Vyvanse) 70 MG capsule Take 1 capsule by mouth Every Morning 30 capsule 0   • Methotrexate Sodium (methotrexate PF) 50 MG/2ML chemo syringe 10 mg 1 (One) Time Per Week.     • montelukast (Singulair) 10 MG tablet Take 1 tablet by mouth Every Night. 90 tablet 1   • " spironolactone-hydrochlorothiazide (Aldactazide) 50-50 MG per tablet Take 1 tablet by mouth Daily. (Patient taking differently: Take 2 tablets by mouth Daily.) 90 tablet 3   • [START ON 2/10/2023] Vortioxetine HBr (Trintellix) 5 MG tablet tablet Take 1 tablet by mouth Daily With Breakfast. 30 tablet 5     No current facility-administered medications for this visit.       Past Medical History:  Past Medical History:   Diagnosis Date   • ADHD (attention deficit hyperactivity disorder)    • Allergic    • Anxiety    • Arthritis    • Asthma    • Chronic pain disorder    • Deep vein thrombosis (HCC)    • Depression    • Ectopic pregnancy without intrauterine pregnancy 2019    Formatting of this note might be different from the original. - Day 1 = 19 -> beta 4,927 MTX #1 given (110mg) - Day 4 = 19 -> beta 11,077 - Day 7 = 19 -> beta 11,923 MTX #2 given (110mg) - Day 11 = 19 -> beta 11,406 - Day 14 = 19 -> scheduled visit and beta - Day 19 =  19 -> beta 6,584 - Day 26 = 19 -> beta 3,111 - Day 34 = 3/1/19 -> beta 553 (seen at [facility]   • Endometriosis    • Fibroids    • Headache    • Hidradenitis    • Hyperlipidemia    • Low back pain ?    DDD   • Lupus (HCC)    • Neuromuscular disorder (HCC)    • Obesity    • Obstructive airway disease (HCC)    • Panic disorder    • PTSD (post-traumatic stress disorder)    • Visual impairment        Past Psychiatric History:  Began Treatment:   Diagnoses: D, A, insomnia  Psychiatrist: Last was months ago for a couple times; previously NP for 2 years  Therapist: yes, therapy has not helped, two bad experiences  Admission History: denies  Medication Trials: see hpi  Self Harm: Denies  Suicide Attempts:Denies   Psychosis, Anxiety, Depression: denies    Substance Abuse History:   Types:Denies all, including illicit  Withdrawal Symptoms:Denies  Longest Period Sober:Not Applicable   AA: Not applicable     Social History:  Martial  Status:  Employed: therapist, 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  Kids: one  House: apartment   History: army, honorable discharge, see hpi    Social History     Socioeconomic History   • Marital status:    Tobacco Use   • Smoking status: Never   • Smokeless tobacco: Never   Vaping Use   • Vaping Use: Never used   Substance and Sexual Activity   • Alcohol use: Never   • Drug use: Not Currently     Types: Other     Comment: CBD OIL   • Sexual activity: Yes     Partners: Male     Birth control/protection: Vasectomy       Family History:   Suicide Attempts:  1st cousin, maternal; another 1st cousin maternal  Suicide Completions: 1st cousin, maternal  Dx'd her sister herself that she has bipolar.    Family History   Problem Relation Age of Onset   • ADD / ADHD Mother    • OCD Mother    • Arthritis Mother    • Hyperlipidemia Mother    • Hypertension Mother    • Thyroid disease Mother    • Anxiety disorder Mother    • ADD / ADHD Sister    • Anxiety disorder Sister    • Bipolar disorder Sister    • Drug abuse Maternal Aunt    • Depression Maternal Aunt    • Alcohol abuse Maternal Uncle    • Drug abuse Maternal Uncle    • Schizophrenia Maternal Uncle    • Depression Maternal Grandmother    • Other Maternal Grandmother         Colon cancer   • Anxiety disorder Maternal Grandmother    • ADD / ADHD Cousin    • OCD Cousin    • Suicide Attempts Cousin    • Alcohol abuse Cousin    • Depression Cousin    • Seizures Niece    • Arthritis Sister    • Depression Sister    • Hyperlipidemia Sister    • Asthma Sister    • Hypertension Sister    • Miscarriages / Stillbirths Sister    • Mental illness Maternal Uncle    • Alcohol abuse Maternal Uncle        Developmental History:   Born: [place]  Siblings: 1 sister, older cousin who lived with them  Childhood: no abuse  High School:Completed  College: 4 yrs at [employer] 3 years degree in counseling    · Mental Status Exam  · Appearance  · : groomed, good eye  "contact, normal street clothes  · Behavior  · : pleasant and cooperative  · Motor  · : No abnormal  · Speech  · :normal rhythm, rate, volume, tone, not hyperverbal, not pressured, normal prosidy  · Mood  · : \"Not doing too well, health issues\"  · Affect  · : Mild depression, mood congruent, good variability  · Thought Content  · : negative suicidal ideations, negative homicidal ideations, negative obsessions  · Perceptions  · : negative auditory hallucinations, negative visual hallucinations  · Thought Process  · : linear  · Insight/Judgement  · : Fair/fair  · Cognition  · : grossly intact  · Attention   : intact    Review of Systems:  Review of Systems   Constitutional: Negative for diaphoresis and fatigue.   HENT: Negative for drooling.    Eyes: Positive for visual disturbance.   Respiratory: Positive for cough and shortness of breath.    Cardiovascular: Positive for chest pain, palpitations and leg swelling.   Gastrointestinal: Positive for constipation and diarrhea. Negative for nausea and vomiting.   Endocrine: Positive for cold intolerance and heat intolerance.   Genitourinary: Negative for difficulty urinating.   Musculoskeletal: Negative for joint swelling.   Allergic/Immunologic: Positive for immunocompromised state.   Neurological: Positive for dizziness, light-headedness and headaches. Negative for seizures, syncope, speech difficulty and numbness.         Physical Exam:  Physical Exam    Vital Signs:   There were no vitals taken for this visit.     Lab Results:   Admission on 01/28/2023, Discharged on 01/28/2023   Component Date Value Ref Range Status   • QT Interval 01/28/2023 373  ms Final   • pH, Arterial 01/28/2023 7.367  7.350 - 7.450 pH units Final   • pCO2, Arterial 01/28/2023 47.3 (H)  35.0 - 45.0 mm Hg Final   • pO2, Arterial 01/28/2023 119.4 (H)  80.0 - 100.0 mm Hg Final   • HCO3, Arterial 01/28/2023 26.5 (H)  22.0 - 26.0 mmol/L Final   • Base Excess, Arterial 01/28/2023 0.6  -2.0 - 2.0 mmol/L " Final   • O2 Saturation, Arterial 01/28/2023 97.9  95.0 - 99.0 % Final   • Hemoglobin, Blood Gas 01/28/2023 14.5  11.7 - 14.6 g/dL Final   • Carboxyhemoglobin 01/28/2023 0.9  0 - 1.5 % Final   • Methemoglobin 01/28/2023 0.20  0.00 - 1.50 % Final   • Oxyhemoglobin 01/28/2023 96.8  94 - 99 % Final   • FHHB 01/28/2023 2.1  0.0 - 5.0 % Final   • Rony's Test 01/28/2023 Positive   Final   • Site 01/28/2023 Arterial: left radial   Final   • Modality 01/28/2023 Nasal Cannula   Final   • Flow Rate 01/28/2023 2  lpm Final   • Sodium, Arterial 01/28/2023 139.6  136 - 146 mmol/L Final   • Potassium, Arterial 01/28/2023 3.84  3.5 - 5 mmol/L Final   • Ionized Calcium, Arterial 01/28/2023 1.18  1.13 - 1.32 mmol/L Final   • Chloride, Arterial 01/28/2023 101  98 - 106 mmol/L Final   • Glucose, Arterial 01/28/2023 150 (H)  65 - 99 mg/dL Final   • Lactate, Arterial 01/28/2023 1.49  0.5 - 2 mmol/L Final   • Color, UA 01/28/2023 Yellow  Yellow, Straw Final   • Appearance, UA 01/28/2023 Clear  Clear Final   • pH, UA 01/28/2023 6.5  5.0 - 8.0 Final   • Specific Gravity, UA 01/28/2023 1.021  1.005 - 1.030 Final   • Glucose, UA 01/28/2023 Negative  Negative Final   • Ketones, UA 01/28/2023 Negative  Negative Final   • Bilirubin, UA 01/28/2023 Negative  Negative Final   • Blood, UA 01/28/2023 Negative  Negative Final   • Protein, UA 01/28/2023 Trace (A)  Negative Final   • Leuk Esterase, UA 01/28/2023 Negative  Negative Final   • Nitrite, UA 01/28/2023 Negative  Negative Final   • Urobilinogen, UA 01/28/2023 1.0 E.U./dL  0.2 - 1.0 E.U./dL Final   • Amphet/Methamphet, Screen 01/28/2023 Positive (A)  Negative Final   • Barbiturates Screen, Urine 01/28/2023 Negative  Negative Final   • Benzodiazepine Screen, Urine 01/28/2023 Negative  Negative Final   • Cocaine Screen, Urine 01/28/2023 Negative  Negative Final   • Opiate Screen 01/28/2023 Negative  Negative Final   • THC, Screen, Urine 01/28/2023 Positive (A)  Negative Final   • Methadone  Screen, Urine 01/28/2023 Negative  Negative Final   • Oxycodone Screen, Urine 01/28/2023 Negative  Negative Final   Office Visit on 01/20/2023   Component Date Value Ref Range Status   • Amphetamine Screen, Urine 01/20/2023 Negative  Negative Final   • AMP INTERNAL CONTROL 01/20/2023 Passed  Passed Final   • Barbiturates Screen, Urine 01/20/2023 Negative  Negative Final   • BARBITURATE INTERNAL CONTROL 01/20/2023 Passed  Passed Final   • Buprenorphine, Screen, Urine 01/20/2023 Negative  Negative Final   • BUPRENORPHINE INTERNAL CONTROL 01/20/2023 Passed  Passed Final   • Benzodiazepine Screen, Urine 01/20/2023 Negative  Negative Final   • BENZODIAZEPINE INTERNAL CONTROL 01/20/2023 Passed  Passed Final   • Cocaine Screen, Urine 01/20/2023 Negative  Negative Final   • COCAINE INTERNAL CONTROL 01/20/2023 Passed  Passed Final   • MDMA (ECSTASY) 01/20/2023 Negative  Negative Final   • MDMA (ECSTASY) INTERNAL CONTROL 01/20/2023 Passed  Passed Final   • Methamphetamine, Ur 01/20/2023 Negative  Negative Final   • METHAMPHETAMINE INTERNAL CONTROL 01/20/2023 Passed  Passed Final   • Methadone Screen, Urine 01/20/2023 Negative  Negative Final   • METHADONE INTERNAL CONTROL 01/20/2023 Passed  Passed Final   • Opiate Screen 01/20/2023 Negative  Negative Final   • OPIATES INTERNAL CONTROL 01/20/2023 Passed  Passed Final   • Oxycodone Screen, Urine 01/20/2023 Negative  Negative Final   • OXYCODONE INTERNAL CONTROL 01/20/2023 Passed  Passed Final   • Phencyclidine (PCP), Urine 01/20/2023 Negative  Negative Final   • PHENCYCLIDINE INTERNAL CONTROL 01/20/2023 Passed  Passed Final   • THC, Screen, Urine 01/20/2023 Positive (A)  Negative Final   • THC INTERNAL CONTROL 01/20/2023 Passed  Passed Final   • Lot Number 01/20/2023 [id number]   Final   • Expiration Date 01/20/2023 02/29/2024   Final   Admission on 01/14/2023, Discharged on 01/14/2023   Component Date Value Ref Range Status   • QT Interval 01/14/2023 353  ms Final   • Glucose  01/14/2023 90  65 - 99 mg/dL Final   • BUN 01/14/2023 14  6 - 20 mg/dL Final   • Creatinine 01/14/2023 0.90  0.57 - 1.00 mg/dL Final   • Sodium 01/14/2023 134 (L)  136 - 145 mmol/L Final   • Potassium 01/14/2023 3.8  3.5 - 5.2 mmol/L Final   • Chloride 01/14/2023 98  98 - 107 mmol/L Final   • CO2 01/14/2023 27.6  22.0 - 29.0 mmol/L Final   • Calcium 01/14/2023 9.3  8.6 - 10.5 mg/dL Final   • Total Protein 01/14/2023 7.3  6.0 - 8.5 g/dL Final   • Albumin 01/14/2023 4.3  3.5 - 5.2 g/dL Final   • ALT (SGPT) 01/14/2023 22  1 - 33 U/L Final   • AST (SGOT) 01/14/2023 16  1 - 32 U/L Final   • Alkaline Phosphatase 01/14/2023 125 (H)  39 - 117 U/L Final   • Total Bilirubin 01/14/2023 0.2  0.0 - 1.2 mg/dL Final   • Globulin 01/14/2023 3.0  gm/dL Final   • A/G Ratio 01/14/2023 1.4  g/dL Final   • BUN/Creatinine Ratio 01/14/2023 15.6  7.0 - 25.0 Final   • Anion Gap 01/14/2023 8.4  5.0 - 15.0 mmol/L Final   • eGFR 01/14/2023 82.5  >60.0 mL/min/1.73 Final    National Kidney Foundation and American Society of Nephrology (ASN) Task Force recommended calculation based on the Chronic Kidney Disease Epidemiology Collaboration (CKD-EPI) equation refit without adjustment for race.   • proBNP 01/14/2023 <36.0  0.0 - 450.0 pg/mL Final   • Troponin T 01/14/2023 <0.010  0.000 - 0.030 ng/mL Final   • Extra Tube 01/14/2023 Hold for add-ons.   Final    Auto resulted.   • Extra Tube 01/14/2023 hold for add-on   Final    Auto resulted   • Extra Tube 01/14/2023 Hold for add-ons.   Final    Auto resulted.   • Extra Tube 01/14/2023 Hold for add-ons.   Final    Auto resulted   • WBC 01/14/2023 10.57  3.40 - 10.80 10*3/mm3 Final   • RBC 01/14/2023 4.51  3.77 - 5.28 10*6/mm3 Final   • Hemoglobin 01/14/2023 13.9  12.0 - 15.9 g/dL Final   • Hematocrit 01/14/2023 40.8  34.0 - 46.6 % Final   • MCV 01/14/2023 90.5  79.0 - 97.0 fL Final   • MCH 01/14/2023 30.8  26.6 - 33.0 pg Final   • MCHC 01/14/2023 34.1  31.5 - 35.7 g/dL Final   • RDW 01/14/2023 13.1   12.3 - 15.4 % Final   • RDW-SD 01/14/2023 42.5  37.0 - 54.0 fl Final   • MPV 01/14/2023 9.5  6.0 - 12.0 fL Final   • Platelets 01/14/2023 357  140 - 450 10*3/mm3 Final   • Neutrophil % 01/14/2023 64.1  42.7 - 76.0 % Final   • Lymphocyte % 01/14/2023 27.8  19.6 - 45.3 % Final   • Monocyte % 01/14/2023 6.1  5.0 - 12.0 % Final   • Eosinophil % 01/14/2023 1.3  0.3 - 6.2 % Final   • Basophil % 01/14/2023 0.4  0.0 - 1.5 % Final   • Immature Grans % 01/14/2023 0.3  0.0 - 0.5 % Final   • Neutrophils, Absolute 01/14/2023 6.78  1.70 - 7.00 10*3/mm3 Final   • Lymphocytes, Absolute 01/14/2023 2.94  0.70 - 3.10 10*3/mm3 Final   • Monocytes, Absolute 01/14/2023 0.64  0.10 - 0.90 10*3/mm3 Final   • Eosinophils, Absolute 01/14/2023 0.14  0.00 - 0.40 10*3/mm3 Final   • Basophils, Absolute 01/14/2023 0.04  0.00 - 0.20 10*3/mm3 Final   • Immature Grans, Absolute 01/14/2023 0.03  0.00 - 0.05 10*3/mm3 Final   • nRBC 01/14/2023 0.0  0.0 - 0.2 /100 WBC Final   • D-Dimer, Quantitative 01/14/2023 0.95 (H)  0.00 - 0.50 MCGFEU/mL Final   Clinical Support on 11/08/2022   Component Date Value Ref Range Status   • SARS Antigen 11/08/2022 Not Detected  Not Detected, Presumptive Negative Final   • Influenza A Antigen BIANCA 11/08/2022 Not Detected  Not Detected Final   • Influenza B Antigen BIANCA 11/08/2022 Not Detected  Not Detected Final   • Internal Control 11/08/2022 Passed  Passed Final   • Lot Number 11/08/2022 [id number]   Final   • Expiration Date 11/08/2022 2023-08-11   Final   Lab on 08/15/2022   Component Date Value Ref Range Status   • Total Cholesterol 08/15/2022 194  0 - 200 mg/dL Final   • Triglycerides 08/15/2022 115  0 - 150 mg/dL Final   • HDL Cholesterol 08/15/2022 69 (H)  40 - 60 mg/dL Final   • LDL Cholesterol  08/15/2022 105 (H)  0 - 100 mg/dL Final   • VLDL Cholesterol 08/15/2022 20  5 - 40 mg/dL Final   • LDL/HDL Ratio 08/15/2022 1.48   Final   • Hep A Total Ab 08/15/2022 Positive (A)  Negative Final   • Hepatitis B Surface Ag  08/15/2022 Negative  Negative Final   • Hep B S Ab 08/15/2022 Reactive   Final                  Non Reactive: Inconsistent with immunity,                              less than 10 mIU/mL                Reactive:     Consistent with immunity,                              greater than 9.9 mIU/mL   • Hep B Core Total Ab 08/15/2022 Negative  Negative Final   • Hepatitis C Ab 08/15/2022 0.1  0.0 - 0.9 s/co ratio Final   • Glucose 08/15/2022 72  65 - 99 mg/dL Final   • BUN 08/15/2022 13  6 - 20 mg/dL Final   • Creatinine 08/15/2022 0.84  0.57 - 1.00 mg/dL Final   • Sodium 08/15/2022 137  136 - 145 mmol/L Final   • Potassium 08/15/2022 3.7  3.5 - 5.2 mmol/L Final   • Chloride 08/15/2022 97 (L)  98 - 107 mmol/L Final   • CO2 08/15/2022 27.0  22.0 - 29.0 mmol/L Final   • Calcium 08/15/2022 9.9  8.6 - 10.5 mg/dL Final   • Total Protein 08/15/2022 7.2  6.0 - 8.5 g/dL Final   • Albumin 08/15/2022 4.50  3.50 - 5.20 g/dL Final   • ALT (SGPT) 08/15/2022 33  1 - 33 U/L Final   • AST (SGOT) 08/15/2022 29  1 - 32 U/L Final   • Alkaline Phosphatase 08/15/2022 117  39 - 117 U/L Final   • Total Bilirubin 08/15/2022 0.3  0.0 - 1.2 mg/dL Final   • Globulin 08/15/2022 2.7  gm/dL Final   • A/G Ratio 08/15/2022 1.7  g/dL Final   • BUN/Creatinine Ratio 08/15/2022 15.5  7.0 - 25.0 Final   • Anion Gap 08/15/2022 13.0  5.0 - 15.0 mmol/L Final   • eGFR 08/15/2022 89.7  >60.0 mL/min/1.73 Final    National Kidney Foundation and American Society of Nephrology (ASN) Task Force recommended calculation based on the Chronic Kidney Disease Epidemiology Collaboration (CKD-EPI) equation refit without adjustment for race.   • QuantiFERON Incubation 08/15/2022 Incubation performed.   Final   • QuantiFERON Criteria 08/15/2022 Comment   Final    QuantiFERON-TB Gold Plus is a qualitative indirect test for  M tuberculosis infection (including disease) and is intended for use  in conjunction with risk assessment, radiography, and other medical  and diagnostic  evaluations. The QuantiFERON-TB Gold Plus result is  determined by subtracting the Nil value from either TB antigen (Ag)  value. The Mitogen tube serves as a control for the test.   • QUANTIFERON TB1 AG VALUE 08/15/2022 0.06  IU/mL Final   • QUANTIFERON TB2 AG VALUE 08/15/2022 0.06  IU/mL Final   • QuantiFERON Nil Value 08/15/2022 0.08  IU/mL Final   • QuantiFERON Mitogen Value 08/15/2022 >10.00  IU/mL Final   • QUANTIFERON-TB GOLD PLUS 08/15/2022 Negative  Negative Final    No response to M tuberculosis antigens detected.  Infection with M tuberculosis is unlikely, but high risk  individuals should be considered for additional testing  (ATS/IDSA/CDC Clinical Practice Guidelines, 2017). The  reference range is an Antigen minus Nil result of <0.35 IU/mL.  Chemiluminescence immunoassay methodology   • WBC 08/15/2022 7.62  3.40 - 10.80 10*3/mm3 Final   • RBC 08/15/2022 5.15  3.77 - 5.28 10*6/mm3 Final   • Hemoglobin 08/15/2022 15.4  12.0 - 15.9 g/dL Final   • Hematocrit 08/15/2022 45.5  34.0 - 46.6 % Final   • MCV 08/15/2022 88.3  79.0 - 97.0 fL Final   • MCH 08/15/2022 29.9  26.6 - 33.0 pg Final   • MCHC 08/15/2022 33.8  31.5 - 35.7 g/dL Final   • RDW 08/15/2022 12.7  12.3 - 15.4 % Final   • RDW-SD 08/15/2022 40.6  37.0 - 54.0 fl Final   • MPV 08/15/2022 10.4  6.0 - 12.0 fL Final   • Platelets 08/15/2022 314  140 - 450 10*3/mm3 Final   • Neutrophil % 08/15/2022 54.6  42.7 - 76.0 % Final   • Lymphocyte % 08/15/2022 39.0  19.6 - 45.3 % Final   • Monocyte % 08/15/2022 4.6 (L)  5.0 - 12.0 % Final   • Eosinophil % 08/15/2022 1.0  0.3 - 6.2 % Final   • Basophil % 08/15/2022 0.4  0.0 - 1.5 % Final   • Immature Grans % 08/15/2022 0.4  0.0 - 0.5 % Final   • Neutrophils, Absolute 08/15/2022 4.16  1.70 - 7.00 10*3/mm3 Final   • Lymphocytes, Absolute 08/15/2022 2.97  0.70 - 3.10 10*3/mm3 Final   • Monocytes, Absolute 08/15/2022 0.35  0.10 - 0.90 10*3/mm3 Final   • Eosinophils, Absolute 08/15/2022 0.08  0.00 - 0.40 10*3/mm3 Final    • Basophils, Absolute 08/15/2022 0.03  0.00 - 0.20 10*3/mm3 Final   • Immature Grans, Absolute 08/15/2022 0.03  0.00 - 0.05 10*3/mm3 Final   • nRBC 08/15/2022 0.0  0.0 - 0.2 /100 WBC Final   • Extra Tube 08/15/2022 Hold for add-ons.   Final    Auto resulted.   • Interpretation 08/15/2022 Comment   Final    Negative  Not infected with HCV, unless recent infection is suspected or other  evidence exists to indicate HCV infection.   • Hep A IgM 08/15/2022 Negative  Negative Final       EKG Results:  No orders to display       Imaging Results:  No Images in the past 120 days found..      Assessment & Plan   Diagnoses and all orders for this visit:    1. Generalized anxiety disorder (Primary)  -     Vortioxetine HBr (Trintellix) 5 MG tablet tablet; Take 1 tablet by mouth Daily With Breakfast.  Dispense: 30 tablet; Refill: 5  -     busPIRone (BUSPAR) 10 MG tablet; Take 2 tablets by mouth 2 (Two) Times a Day.  Dispense: 120 tablet; Refill: 5  -     buPROPion XL (WELLBUTRIN XL) 300 MG 24 hr tablet; Take 1 tablet by mouth Every Morning.  Dispense: 90 tablet; Refill: 1  -     hydrOXYzine (ATARAX) 25 MG tablet; Take 1 tablet by mouth Daily As Needed for Anxiety.  Dispense: 90 tablet; Refill: 2    2. Major depressive disorder, recurrent episode, moderate (HCC)  -     Vortioxetine HBr (Trintellix) 5 MG tablet tablet; Take 1 tablet by mouth Daily With Breakfast.  Dispense: 30 tablet; Refill: 5  -     busPIRone (BUSPAR) 10 MG tablet; Take 2 tablets by mouth 2 (Two) Times a Day.  Dispense: 120 tablet; Refill: 5  -     buPROPion XL (WELLBUTRIN XL) 300 MG 24 hr tablet; Take 1 tablet by mouth Every Morning.  Dispense: 90 tablet; Refill: 1    3. Insomnia due to mental condition  -     Vortioxetine HBr (Trintellix) 5 MG tablet tablet; Take 1 tablet by mouth Daily With Breakfast.  Dispense: 30 tablet; Refill: 5        Visit Diagnoses:    ICD-10-CM ICD-9-CM   1. Generalized anxiety disorder  F41.1 300.02   2. Major depressive disorder,  recurrent episode, moderate (HCC)  F33.1 296.32   3. Insomnia due to mental condition  F51.05 300.9     327.02     2/3: Persistent depression and anxiety.  Stop Cymbalta start Trintellix.  17 minutes of supportive psychotherapy with goal to strengthen defenses, promote problems solving, restore adaptive functioning and provide symptom relief. The therapeutic alliance was strengthened to encourage the patient to express their thoughts and feelings. Esteem building was enhanced through praise, reassurance, normalizing and encouragement. Coping skills were enhanced to build distress tolerance skills and emotional regulation. Allowed patient to freely discuss issues without interruption or judgement with unconditional positive regard, active listening skills, and empathy. Provided a safe, confidential environment to facilitate the development of a positive therapeutic relationship and encourage open, honest communication. Assisted patient in identifying risk factors which would indicate the need for higher level of care including thoughts to harm self or others and/or self-harming behavior and encouraged patient to contact this office, call 911, or present to the nearest emergency room should any of these events occur. Assisted patient in processing session content; acknowledged and normalized patient’s thoughts, feelings, and concerns by utilizing a person-centered approach in efforts to build appropriate rapport and a positive therapeutic relationship with open and honest communication. Patient given education on medication side effects, diagnosis/illness and relapse symptoms. Plan to continue supportive psychotherapy in next appointment to provide symptom relief.  Diagnoses: as above  Symptoms: as above  Functional status: Disabled, 100% service connected  Mental Status Exam: as above    Treatment plan: Medication management and supportive psychotherapy  Prognosis: Fair to good  Progress: Minimal  6 weeks      12/2:  Restart Cymbalta at 90 mg rather than 60 mg.  Patient has stopped bupropion and agrees to stay off it.  Evaluate for ADHD at next visit.  Presently on Vyvanse for binge eating disorder.  17 minutes of supportive psychotherapy  Prognosis: Good  Progress: Improved  2 months      10/14: No changes. Referral to therapy. 17 minutes of supportive psychotherapy  Treatment plan: Medication management and supportive psychotherapy  Prognosis: good  Progress: worse anx and dep (likely medication related)  6 wks      9/16: Never increased BuSpar, but is doing well.  Still wants to increase it now.  Follow up in 4 weeks to set up psychotherapy with our office.  12 minutes of supportive psychotherapy  Treatment plan: Medication management and supportive psychotherapy  Prognosis: Good  Progress: Improved, well  Four weeks, urgent      8/8: Increase BuSpar to target anxiety, which may be related to Vyvanse.  Regardless, Vyvanse has given her tremendous benefits.  Consider increasing Cymbalta at the next visit.  16 minutes of supportive psychotherapy Treatment plan: Medication management and supportive psychotherapy  Prognosis: Good  Progress: Anxiety might be a little worse  6 weeks      6/7: Consider therapy here. Stable, well, no SE. 17 minutes of supportive psychotherapy Treatment plan: Medication management and supportive psychotherapy  Prognosis: good  Progress: better  2 mos      4/18: Doing well, no hallucinations, depression and anxiety are basically under control.  Patient is accomplishing her goals to lose weight, etc., which is helping.  7 minutes of supportive psychotherapy   6 weeks    3/7: Discontinue Abilify due to weight gain.  Now denies having any hallucinations.  Patient to monitor her weight.  Patient will also keep me posted on her relationship with her  which is no doubt a contributor to depression and anxiety.  16 minutes of supportive psychotherapy   5 weeks    2/17: Increase Abilify.  Patient is  fearful that she will develop schizophrenia and deteriorate like her family.  She will start therapy as well.  18 minutes of supportive psychotherapy   4 weeks    1/11: Significant improvement in symptoms, however residual auditory hallucinations.  Increase Abilify. 5 minutes of supportive psychotherapy   4 weeks    12/14: Mood reactivity versus actual bipolar II disorder. Take strattera every other day for 3 doses then stop. Start abilify 5 mg day. 20 minutes of supportive psychotherapy   4 wks.    11/10: Start melatonin, restart duloxetine 17 minutes of supportive psychotherapy   4 wks.    10/13: Better mood. Reduce strattera to 40 mg nightly (not daily, it is sedating). 4 wks.    9/29: Records release will be signed by patient.  Patient is unsure if Strattera helped.  Reduce the dose.  Residual depressive symptoms.  Increase duloxetine.  Continue gabapentin and BuSpar.  Therapy referral made.  See back in 2 weeks to try to titrate off of Strattera entirely.  It does not sound like she has ever tried extended release formulations of stimulants for ADHD.  Does not sound like she got neuropsychological testing for ADHD.  Rule out gorge.    PLAN:  171. Safety: No acute safety concerns  172. Therapy:  Referral made.  173. Risk Assessment: Risk of self-harm acutely is moderate.  Risk factors include anxiety disorder, mood disorder, and recent psychosocial stressors (pandemic). Protective factors include no family history, denies access to guns/weapons, no present SI, no history of suicide attempts or self-harm in the past, minimal AODA, healthcare seeking, future orientation, willingness to engage in care.  Risk of self-harm chronically is also moderate, but could be further elevated in the event of treatment noncompliance and/or AODA.  174. Meds:.  a. CONTINUE hydroxyzine 25 mg daily as needed anxiety. Mostly at night. Risks, benefits, alternatives discussed with patient including sedation, dizziness, fall risk, GI  upset, and risk of increased CNS depression and elevated heart rate if taken with other antihistamines.  After discussion of these risks and benefits, the patient voiced understanding and agreed to proceed.  b. REDUCE duloxetine 90 mg a day to 60 mg x 3 days, then 30 mg x 3 days, then STOP. Risks, benefits, alternatives discussed with patient including GI upset, nausea vomiting diarrhea, theoretical decrease of seizure threshold predisposing the patient to a slightly higher seizure risk, headaches, sexual dysfunction, serotonin syndrome, bleeding risk, increased suicidality in patients 24 years and younger.  Also constipation and urinary retention.  After discussion of these risks and benefits, the patient voiced understanding and agreed to proceed.  c. START Trintellix 5 mg daily after patient is off of duloxetine. Risks, benefits, alternatives discussed with patient including GI upset, nausea vomiting diarrhea, theoretical decrease of seizure threshold predisposing the patient to a slightly higher seizure risk, headaches, sexual dysfunction, serotonin syndrome, bleeding risk, increased suicidality in patients 24 years and younger, switching to gorge/hypomania.  After discussion of these risks and benefits, the patient voiced understanding and agreed to proceed.  d. CONTINUE BuSpar 20 mg p.o. twice daily (refilled today). Risks, benefits, alternatives discussed with patient including nausea, GI upset, mild sedation, falls risk.  After discussion of these risks and benefits, the patient voiced understanding and agreed to proceed.  e. RESTARTED wellbutrin  mg daily. Risks, benefits, alternatives discussed with patient including nausea, GI upset, increased energy, exacerbation of irritability, insomnia, lowering of seizure threshold.  After discussion of these risks and benefits, the patient voiced understanding and agreed to proceed.  f. ALSO on with gabapentin 600 mg PO TID. Risks, benefits, alternatives  discussed with patient including sedation, dizziness/falls risk, GI upset, weight gain.  After discussion of these risks and benefits, the patient voiced understanding and agreed to proceed. Lilian MAURICIO ordered. Verbally signed controlled substances agreement.  g. ALSO on with Vyvanse 70 mg daily.  h. S/P:  i. Strattera 40 mg daily: stopped 1/11/21 to reduce medication regimen.  Might have been beneficial.  ii. Never started melatonin  iii. abilify 4 mg daily.  Wg.  Months.  iv. Wellbutrin  mg daily made no difference.  175. Labs: no recs    Patient screened positive for depression based on a PHQ-9 score of  on . Follow-up recommendations include: Prescribed antidepressant medication treatment.           TREATMENT PLAN/GOALS: Continue supportive psychotherapy efforts and medications as indicated. Treatment and medication options discussed during today's visit. Patient acknowledged and verbally consented to continue with current treatment plan and was educated on the importance of compliance with treatment and follow-up appointments.    MEDICATION ISSUES:  LILIAN reviewed as expected.  Discussed medication options and treatment plan of prescribed medication as well as the risks, benefits, and side effects including potential falls, possible impaired driving and metabolic adversities among others. Patient is agreeable to call the office with any worsening of symptoms or onset of side effects. Patient is agreeable to call 911 or go to the nearest ER should he/she begin having SI/HI. No medication side effects or related complaints today.     MEDS ORDERED DURING VISIT:  New Medications Ordered This Visit   Medications   • Vortioxetine HBr (Trintellix) 5 MG tablet tablet     Sig: Take 1 tablet by mouth Daily With Breakfast.     Dispense:  30 tablet     Refill:  5   • busPIRone (BUSPAR) 10 MG tablet     Sig: Take 2 tablets by mouth 2 (Two) Times a Day.     Dispense:  120 tablet     Refill:  5   • buPROPion XL  (WELLBUTRIN XL) 300 MG 24 hr tablet     Sig: Take 1 tablet by mouth Every Morning.     Dispense:  90 tablet     Refill:  1   • hydrOXYzine (ATARAX) 25 MG tablet     Sig: Take 1 tablet by mouth Daily As Needed for Anxiety.     Dispense:  90 tablet     Refill:  2       Return in about 6 weeks (around 3/17/2023).         This document has been electronically signed by Brooks Lynn MD  February 3, 2023 11:42 EST      Part of this note may be an electronic transcription/translation of spoken language to printed text using the Dragon Dictation System.

## 2023-02-08 ENCOUNTER — PRIOR AUTHORIZATION (OUTPATIENT)
Dept: PSYCHIATRY | Facility: CLINIC | Age: 43
End: 2023-02-08
Payer: OTHER GOVERNMENT

## 2023-02-15 DIAGNOSIS — J30.2 SEASONAL ALLERGIES: ICD-10-CM

## 2023-02-15 DIAGNOSIS — F50.81 BINGE EATING DISORDER: ICD-10-CM

## 2023-02-15 RX ORDER — FLUCONAZOLE 100 MG/1
TABLET ORAL
Qty: 8 TABLET | Refills: 0 | OUTPATIENT
Start: 2023-02-15

## 2023-02-15 RX ORDER — MONTELUKAST SODIUM 10 MG/1
10 TABLET ORAL NIGHTLY
Qty: 90 TABLET | Refills: 1 | Status: SHIPPED | OUTPATIENT
Start: 2023-02-15

## 2023-02-15 RX ORDER — CETIRIZINE HYDROCHLORIDE 10 MG/1
10 TABLET ORAL DAILY
Qty: 90 TABLET | Refills: 3 | Status: SHIPPED | OUTPATIENT
Start: 2023-02-15

## 2023-02-15 RX ORDER — IBUPROFEN 800 MG/1
TABLET ORAL
Qty: 270 TABLET | Refills: 1 | Status: SHIPPED | OUTPATIENT
Start: 2023-02-15

## 2023-02-15 NOTE — TELEPHONE ENCOUNTER
Hard stop given due to patient being on Ibuprofen 800mg and Methotrexate.    Please advise if okay.     Singulair sent to pharmacy.

## 2023-02-15 NOTE — TELEPHONE ENCOUNTER
Refill request for  controlled substance.      Date of request: 2/15/2023   Medication requested: Vyvanse   Last OV: 1/20/23  Last UDS: 1/28/23  Contract signed: yes    Date:3/11/22  Next office visit: 5/5/23    Chelly Peace

## 2023-02-16 NOTE — TELEPHONE ENCOUNTER
PRIOR AUTHORIZATION FOR TRINTELLIX WAS APPROVED.  PATIENT ASKED FOR CLARIFICATION ON WHEN TO START TAKING IT.  I READ HER THE DOCTOR'S NOTE AND SHE HAD NO FURTHER QUESTIONS

## 2023-02-22 ENCOUNTER — HOSPITAL ENCOUNTER (OUTPATIENT)
Dept: RESPIRATORY THERAPY | Facility: HOSPITAL | Age: 43
Discharge: HOME | End: 2023-02-22
Admitting: STUDENT IN AN ORGANIZED HEALTH CARE EDUCATION/TRAINING PROGRAM
Payer: OTHER GOVERNMENT

## 2023-02-22 DIAGNOSIS — J98.01 BRONCHOSPASM: ICD-10-CM

## 2023-02-22 DIAGNOSIS — R06.00 DYSPNEA, UNSPECIFIED TYPE: ICD-10-CM

## 2023-02-22 PROCEDURE — 94726 PLETHYSMOGRAPHY LUNG VOLUMES: CPT

## 2023-02-22 PROCEDURE — 94060 EVALUATION OF WHEEZING: CPT

## 2023-02-22 RX ORDER — ALBUTEROL SULFATE 2.5 MG/3ML
2.5 SOLUTION RESPIRATORY (INHALATION) ONCE
Status: COMPLETED | OUTPATIENT
Start: 2023-02-22 | End: 2023-02-22

## 2023-02-22 RX ADMIN — ALBUTEROL SULFATE 2.5 MG: 2.5 SOLUTION RESPIRATORY (INHALATION) at 16:51

## 2023-03-04 ENCOUNTER — PATIENT MESSAGE (OUTPATIENT)
Dept: INTERNAL MEDICINE | Facility: CLINIC | Age: 43
End: 2023-03-04
Payer: OTHER GOVERNMENT

## 2023-03-06 PROCEDURE — 94726 PLETHYSMOGRAPHY LUNG VOLUMES: CPT | Performed by: INTERNAL MEDICINE

## 2023-03-06 PROCEDURE — 94060 EVALUATION OF WHEEZING: CPT | Performed by: INTERNAL MEDICINE

## 2023-03-06 NOTE — TELEPHONE ENCOUNTER
From: Malinda Saucedo  To: Ant Carlos  Sent: 3/4/2023 10:38 AM EST  Subject: Pulmonary test results    Good morning,     I had a pulmonary function test on the 23rd of February, but received a call from their office to schedule another bc they didn't have any record of it. Could I get clarification on whether I need to redo the test, or schedule an appt to review results. I'd appreciate it. Thank you and have a blessed day!     Malinda

## 2023-03-08 ENCOUNTER — TELEPHONE (OUTPATIENT)
Dept: INTERNAL MEDICINE | Facility: CLINIC | Age: 43
End: 2023-03-08
Payer: OTHER GOVERNMENT

## 2023-03-08 NOTE — TELEPHONE ENCOUNTER
Attempted to contact patient regarding lab results. Left Voicemail to call our office back.     HUB OK TO READ/ ADVISE:  ----- Message from Clifton Harding MD sent at 3/8/2023  7:05 AM EST -----  Pulmonary function test shows no evidence of obstruction.  No response to bronchodilator therapy.  Overall, this testing is reassuring.

## 2023-03-08 NOTE — TELEPHONE ENCOUNTER
----- Message from Clifton Harding MD sent at 3/8/2023  7:05 AM EST -----  Pulmonary function test shows no evidence of obstruction.  No response to bronchodilator therapy.  Overall, this testing is reassuring.

## 2023-03-08 NOTE — TELEPHONE ENCOUNTER
HUB READ PATIENT THE FOLLOWING MESSAGE.      HUB OK TO READ/ ADVISE:  ----- Message from Clifton Harding MD sent at 3/8/2023  7:05 AM EST -----  Pulmonary function test shows no evidence of obstruction.  No response to bronchodilator therapy.  Overall, this testing is reassuring.    PATIENT VOICED UNDERSTANDING BUT STATES THAT SHE WOULD STILL LIKE TO KNOW WHAT IS WRONG WITH HER BREATHING. PATIENT STATES THAT SHE MAY CALL BACK AND SCHEDULE ANOTHER APPOINTMENT REGARDING THIS ISSUE.

## 2023-03-18 DIAGNOSIS — F50.81 BINGE EATING DISORDER: ICD-10-CM

## 2023-03-20 NOTE — TELEPHONE ENCOUNTER
Rx Refill Note  Requested Prescriptions     Pending Prescriptions Disp Refills   • lisdexamfetamine (Vyvanse) 70 MG capsule 30 capsule 0     Sig: Take 1 capsule by mouth Every Morning      Last office visit with prescribing clinician: 8/26/2022   Last telemedicine visit with prescribing clinician: 5/5/2023   Next office visit with prescribing clinician: 5/5/2023                         Would you like a call back once the refill request has been completed: [] Yes [] No    If the office needs to give you a call back, can they leave a voicemail: [] Yes [] No    Felipe Jameson  03/20/23, 08:47 EDT   Refill request for  controlled substance.      Date of request: 3/20/2023   Medication requested: Vyvanse   Last OV: 1/20/2023  Last UDS: 1/28/2023  Contract signed: yes    Date:3/11/2022  Next office visit: 5/5/2023    Felipe Jameson

## 2023-04-14 ENCOUNTER — TELEPHONE (OUTPATIENT)
Dept: SLEEP MEDICINE | Facility: HOSPITAL | Age: 43
End: 2023-04-14
Payer: OTHER GOVERNMENT

## 2023-04-17 DIAGNOSIS — F33.1 MAJOR DEPRESSIVE DISORDER, RECURRENT EPISODE, MODERATE: ICD-10-CM

## 2023-04-17 DIAGNOSIS — F41.1 GENERALIZED ANXIETY DISORDER: ICD-10-CM

## 2023-04-17 RX ORDER — BUSPIRONE HYDROCHLORIDE 10 MG/1
TABLET ORAL
Qty: 120 TABLET | Refills: 5 | OUTPATIENT
Start: 2023-04-17

## 2023-04-17 NOTE — TELEPHONE ENCOUNTER
CALLED PATIENT WHO SAID [facility].  PT ASKED US TO CANCEL ANY FUTURE REQUESTS THAT ARE SENT TO US

## 2023-04-17 NOTE — TELEPHONE ENCOUNTER
ATTEMPTED TO CONTACT PT(PATIENT)      NO ANSWER      LEFT VOICEMAIL WITH INSTRUCTIONS TO RETURN CALL TO OFFICE AT [phone]

## 2023-04-25 DIAGNOSIS — F50.81 BINGE EATING DISORDER: ICD-10-CM

## 2023-04-25 NOTE — TELEPHONE ENCOUNTER
CONTROLLED MEDICATION. PLEASE ADVISE.     Vyvanse 70mg     Last Filled: 3/21/23 #30-no refills   Last Visit: 1/20/23  Next Appt: 5/5/23  Last UDS: 1/28/23  Last Controlled Contract: 3/11/22

## 2023-05-05 ENCOUNTER — OFFICE VISIT (OUTPATIENT)
Dept: INTERNAL MEDICINE | Facility: CLINIC | Age: 43
End: 2023-05-05
Payer: OTHER GOVERNMENT

## 2023-05-05 VITALS
SYSTOLIC BLOOD PRESSURE: 118 MMHG | BODY MASS INDEX: 45.75 KG/M2 | WEIGHT: 268 LBS | TEMPERATURE: 98 F | HEART RATE: 100 BPM | HEIGHT: 64 IN | DIASTOLIC BLOOD PRESSURE: 69 MMHG | OXYGEN SATURATION: 96 %

## 2023-05-05 DIAGNOSIS — Z86.718 HISTORY OF THROMBOEMBOLISM OF VEIN: Primary | ICD-10-CM

## 2023-05-05 DIAGNOSIS — F41.1 GAD (GENERALIZED ANXIETY DISORDER): ICD-10-CM

## 2023-05-05 DIAGNOSIS — R60.0 LOCALIZED EDEMA: ICD-10-CM

## 2023-05-05 DIAGNOSIS — M32.9 SYSTEMIC LUPUS ERYTHEMATOSUS, UNSPECIFIED SLE TYPE, UNSPECIFIED ORGAN INVOLVEMENT STATUS: ICD-10-CM

## 2023-05-05 DIAGNOSIS — F50.81 BINGE EATING DISORDER: ICD-10-CM

## 2023-05-05 RX ORDER — LEFLUNOMIDE 20 MG/1
20 TABLET ORAL DAILY
COMMUNITY

## 2023-05-05 RX ORDER — MONTELUKAST SODIUM 10 MG/1
10 TABLET ORAL NIGHTLY
Qty: 90 TABLET | Refills: 1 | Status: CANCELLED | OUTPATIENT
Start: 2023-05-05

## 2023-05-05 RX ORDER — FLUCONAZOLE 150 MG/1
150 TABLET ORAL
Qty: 20 TABLET | Refills: 0 | Status: SHIPPED | OUTPATIENT
Start: 2023-05-05

## 2023-05-05 RX ORDER — SPIRONOLACTONE AND HYDROCHLOROTHIAZIDE 50; 50 MG/1; MG/1
2 TABLET, FILM COATED ORAL DAILY
Qty: 180 TABLET | Refills: 3 | Status: SHIPPED | OUTPATIENT
Start: 2023-05-05 | End: 2024-05-04

## 2023-05-05 NOTE — PROGRESS NOTES
"Chief Complaint  binge eating disorder    Subjective          Malinda Sue Sacuedo presents to [facility]  History of Present Illness  Binge eating disorder- patient notices that when she misses a dose, her appetite is increased. Patient is follow-up with psychological assessment. Patient thinks the medication greatly helps curb her appetite.   Patient reports she had a clot discovered in her RUE. She is now on eliquis and no longer on aspirin. Patient without bleeding issues.   SLE- patient follow-up with rheumatology. She is now on leflunomide. She is getting regular labs to monitor.     Current Outpatient Medications   Medication Instructions   • albuterol sulfate  (90 Base) MCG/ACT inhaler 2 puffs, Inhalation, Every 4 Hours PRN   • apixaban (ELIQUIS) 2.5 mg, Oral, 2 Times Daily   • B-D TB SYRINGE 1CC/27GX1/2\" 27G X 1/2\" 1 ML misc USE 1 EACH WITH METHOTREXATE   • buPROPion XL (WELLBUTRIN XL) 300 mg, Oral, Every Morning   • busPIRone (BUSPAR) 20 mg, Oral, 2 Times Daily   • cetirizine (ZYRTEC) 10 mg, Oral, Daily   • Cosentyx Sensoready, 300 MG, 150 MG/ML solution auto-injector No dose, route, or frequency recorded.   • cyclobenzaprine (FLEXERIL) 10 mg, Oral, 2 Times Daily PRN   • Elagolix Sodium (Orilissa) 150 MG tablet Oral   • EPINEPHrine (EPIPEN) 0.3 MG/0.3ML solution auto-injector injection 0.3 mL, Subcutaneous, Once As Needed   • fluconazole (DIFLUCAN) 150 mg, Oral, Every 72 Hours, Take two tablets by mouth (200 mg) on day 1, then 100 mg PO daily for six more days.   • Fluocinolone Acetonide Scalp 0.01 % oil APPLY TOPICALLY TO THE SCALP EVERY NIGHT AT BEDTIME AS NEEDED FOR PSORIASIS OR FLARES   • gabapentin (NEURONTIN) 600 MG tablet Take 800 mg by mouth 4 (Four) Times a Day.   • hydrOXYzine (ATARAX) 25 mg, Oral, Daily PRN   • ibuprofen (ADVIL,MOTRIN) 800 MG tablet TAKE 1 TABLET BY MOUTH EVERY 8 HOURS AS NEEDED FOR MODERATE PAIN   • leflunomide (ARAVA) 20 " "mg, Oral, Daily   • lisdexamfetamine (VYVANSE) 70 mg, Oral, Every Morning   • montelukast (SINGULAIR) 10 mg, Oral, Nightly   • spironolactone-hydrochlorothiazide (Aldactazide) 50-50 MG per tablet 2 tablets, Oral, Daily       The following portions of the patient's history were reviewed and updated as appropriate: allergies, current medications, past family history, past medical history, past social history, past surgical history, and problem list.    Objective   Vital Signs:   /69 (BP Location: Left arm)   Pulse 100   Temp 98 °F (36.7 °C) (Temporal)   Ht 162.6 cm (64\")   Wt 122 kg (268 lb)   SpO2 96%   BMI 46.00 kg/m²     Wt Readings from Last 3 Encounters:   05/05/23 122 kg (268 lb)   01/28/23 119 kg (261 lb 11 oz)   01/20/23 116 kg (256 lb 12.8 oz)     BP Readings from Last 3 Encounters:   05/05/23 118/69   01/28/23 112/79   01/20/23 130/89     Physical Exam   Appearance: No acute distress, well-nourished  Head: normocephalic, atraumatic  Eyes: extraocular movements intact, no scleral icterus, no conjunctival injection  Ears, Nose, and Throat: external ears normal, nares patent, moist mucous membranes  Cardiovascular: regular rate and rhythm. no murmurs, rubs, or gallops. no edema  Respiratory: breathing comfortably, symmetric chest rise, clear to auscultation bilaterally. No wheezes, rales, or rhonchi.  Neuro: alert and oriented to time, place, and person. Normal gait  Psych: normal mood and affect     Result Review :   The following data was reviewed by: Ant Carlos Jr, MD on 05/05/2023:  Common labs        8/15/2022    13:24 1/14/2023    14:17 1/28/2023    04:15   Common Labs   Glucose 72   90   150     BUN 13   14      Creatinine 0.84   0.90      Sodium 137   134   139.6     Potassium 3.7   3.8      Chloride 97   98      Calcium 9.9   9.3      Albumin 4.50   4.3      Total Bilirubin 0.3   0.2      Alkaline Phosphatase 117   125      AST (SGOT) 29   16      ALT (SGPT) 33   22      WBC " 7.62   10.57      Hemoglobin 15.4   13.9      Hematocrit 45.5   40.8      Platelets 314   357      Total Cholesterol 194       Triglycerides 115       HDL Cholesterol 69       LDL Cholesterol  105             Lab Results   Component Value Date    SARSANTIGEN Not Detected 11/08/2022    COVID19 Not Detected 04/14/2022    RAPFLUA Negative 04/14/2022    RAPFLUB Negative 04/14/2022    FLUAAG Not Detected 11/08/2022    FLUBAG Not Detected 11/08/2022    RAPSCRN Negative 04/14/2022    INR 1.1 02/05/2019    BILIRUBINUR Negative 01/28/2023          Assessment and Plan    Diagnoses and all orders for this visit:    1. History of thromboembolism of vein (Primary)  Comments:  Dx by imaging at  facility. cont eliquis at current dose.     2. Binge eating disorder  Comments:  cont vyvanse to help with appetite suppression. UDS and isabela reviewed. encouraged by weight loss.   Orders:  -     lisdexamfetamine (Vyvanse) 70 MG capsule; Take 1 capsule by mouth Every Morning  Dispense: 30 capsule; Refill: 0    3. Localized edema  Comments:  improved. cont diuretic. hope for help with HAs and BP control as well  Orders:  -     spironolactone-hydrochlorothiazide (Aldactazide) 50-50 MG per tablet; Take 2 tablets by mouth Daily.  Dispense: 180 tablet; Refill: 3    4. YENNY (generalized anxiety disorder)  Comments:  cont f/u with counseling.     5. Systemic lupus erythematosus, unspecified SLE type, unspecified organ involvement status  Comments:  rheum notes reviewed. outside labs reviewed. closely montior renal function on leflunomide.     Other orders  -     fluconazole (Diflucan) 150 MG tablet; Take 1 tablet by mouth Every 72 (Seventy-Two) Hours. Take two tablets by mouth (200 mg) on day 1, then 100 mg PO daily for six more days.  Dispense: 20 tablet; Refill: 0        Medications Discontinued During This Encounter   Medication Reason   • aspirin (aspirin) 81 MG EC tablet *Therapy completed   • Vortioxetine HBr (Trintellix) 5 MG  tablet tablet *Therapy completed   • Methotrexate Sodium (methotrexate PF) 50 MG/2ML chemo syringe *Therapy completed   • folic acid (FOLVITE) 1 MG tablet *Therapy completed   • clobetasol (TEMOVATE) 0.05 % external solution *Therapy completed   • spironolactone-hydrochlorothiazide (Aldactazide) 50-50 MG per tablet Reorder   • fluconazole (Diflucan) 100 MG tablet Reorder   • lisdexamfetamine (Vyvanse) 70 MG capsule Reorder        Follow Up   Return in about 4 months (around 9/5/2023).  Patient was given instructions and counseling regarding her condition or for health maintenance advice. Please see specific information pulled into the AVS if appropriate.       Ant Carlos Jr, MD  05/05/23  17:28 EDT

## 2023-06-05 DIAGNOSIS — F50.81 BINGE EATING DISORDER: ICD-10-CM

## 2023-06-06 NOTE — TELEPHONE ENCOUNTER
Control Refill Request:  Medication: Vyvanse  Last Office Visit: 5/5/2023  Next Office Visit: 9/8/2023  Last UDS: 1/28/2023  Last Contract: 3/11/2022     Unable to assess due to medical condition

## 2023-08-23 DIAGNOSIS — F50.81 BINGE EATING DISORDER: ICD-10-CM

## 2023-08-24 NOTE — TELEPHONE ENCOUNTER
CONTROLLED MEDICATION. PLEASE ADVISE.     Vyvanse 70mg     Last Filled: 7/3/23 #30-no refills   Last Visit: 5/5/23  Next Appt: 9/1/23  Last UDS: 1/28/23  Last Controlled Contract: 3/11/22

## 2023-09-01 ENCOUNTER — OFFICE VISIT (OUTPATIENT)
Dept: INTERNAL MEDICINE | Facility: CLINIC | Age: 43
End: 2023-09-01
Payer: OTHER GOVERNMENT

## 2023-09-01 VITALS
SYSTOLIC BLOOD PRESSURE: 129 MMHG | TEMPERATURE: 97.4 F | HEIGHT: 64 IN | HEART RATE: 87 BPM | WEIGHT: 247.8 LBS | OXYGEN SATURATION: 97 % | DIASTOLIC BLOOD PRESSURE: 84 MMHG | BODY MASS INDEX: 42.3 KG/M2 | RESPIRATION RATE: 18 BRPM

## 2023-09-01 DIAGNOSIS — F50.81 BINGE EATING DISORDER: ICD-10-CM

## 2023-09-01 DIAGNOSIS — Z80.0 FAMILY HISTORY OF COLON CANCER: Primary | ICD-10-CM

## 2023-09-01 RX ORDER — FLUCONAZOLE 150 MG/1
150 TABLET ORAL
Qty: 20 TABLET | Refills: 0 | Status: SHIPPED | OUTPATIENT
Start: 2023-09-01

## 2023-09-01 RX ORDER — HYDROCHLOROTHIAZIDE 50 MG/1
50 TABLET ORAL DAILY
Qty: 90 TABLET | Refills: 1 | Status: SHIPPED | OUTPATIENT
Start: 2023-09-01

## 2023-09-01 RX ORDER — DULAGLUTIDE 0.75 MG/.5ML
0.75 INJECTION, SOLUTION SUBCUTANEOUS WEEKLY
Qty: 2 ML | Refills: 0 | Status: SHIPPED | OUTPATIENT
Start: 2023-09-01

## 2023-09-01 NOTE — PROGRESS NOTES
"Chief Complaint  Med Refill (Discuss being put on Ozcmpic/ wants to stay on Vyvanse ), and Colonoscopy (Discuss about having a colonoscopy)    Subjective          Malinda Saucedo presents to [facility]  History of Present Illness  Obesity- patient is reducing wellbutrin. Her weight is trickling upward. She continue to be on vyvanse as well.     Current Outpatient Medications   Medication Instructions    albuterol sulfate  (90 Base) MCG/ACT inhaler 2 puffs, Inhalation, Every 4 Hours PRN    apixaban (ELIQUIS) 2.5 mg, Oral, 2 Times Daily    B-D TB SYRINGE 1CC/27GX1/2\" 27G X 1/2\" 1 ML misc USE 1 EACH WITH METHOTREXATE    buPROPion XL (WELLBUTRIN XL) 300 mg, Oral, Every Morning    busPIRone (BUSPAR) 20 mg, Oral, 2 Times Daily    cetirizine (ZYRTEC) 10 mg, Oral, Daily    Cosentyx Sensoready, 300 MG, 150 MG/ML solution auto-injector No dose, route, or frequency recorded.    cyclobenzaprine (FLEXERIL) 10 mg, Oral, 2 Times Daily PRN    EPINEPHrine (EPIPEN) 0.3 MG/0.3ML solution auto-injector injection 0.3 mL, Subcutaneous, Once As Needed    fluconazole (DIFLUCAN) 150 mg, Oral, Every 72 Hours    Fluocinolone Acetonide Scalp 0.01 % oil APPLY TOPICALLY TO THE SCALP EVERY NIGHT AT BEDTIME AS NEEDED FOR PSORIASIS OR FLARES    gabapentin (NEURONTIN) 600 MG tablet Take 800 mg by mouth 4 (Four) Times a Day.    hydroCHLOROthiazide (HYDRODIURIL) 50 mg, Oral, Daily    hydrOXYzine (ATARAX) 25 mg, Oral, Daily PRN    ibuprofen (ADVIL,MOTRIN) 800 MG tablet TAKE 1 TABLET BY MOUTH EVERY 8 HOURS AS NEEDED FOR MODERATE PAIN    leflunomide (ARAVA) 20 mg, Oral, Daily    lisdexamfetamine (VYVANSE) 70 mg, Oral, Every Morning    montelukast (SINGULAIR) 10 mg, Oral, Nightly    Orilissa 150 mg, Oral, Daily    Trulicity 0.75 mg, Subcutaneous, Weekly       The following portions of the patient's history were reviewed and updated as appropriate: allergies, current medications, past family " "history, past medical history, past social history, past surgical history, and problem list.    Objective   Vital Signs:   /84 (BP Location: Left arm, Patient Position: Sitting, Cuff Size: Adult)   Pulse 87   Temp 97.4 øF (36.3 øC) (Temporal)   Resp 18   Ht 162.6 cm (64.02\")   Wt 112 kg (247 lb 12.8 oz)   SpO2 97%   BMI 42.51 kg/mý     BP Readings from Last 3 Encounters:   09/01/23 129/84   05/05/23 118/69   01/28/23 112/79     Wt Readings from Last 3 Encounters:   09/01/23 112 kg (247 lb 12.8 oz)   05/05/23 122 kg (268 lb)   01/28/23 119 kg (261 lb 11 oz)     Class 3 Severe Obesity (BMI >=40). Obesity-related health conditions include the following: hypertension. Obesity is improving with treatment. BMI is is above average; BMI management plan is completed. We discussed pharmacologic options including Vyvanse and Trulicity .    Physical Exam     Appearance: No acute distress, well-nourished  Head: normocephalic, atraumatic  Eyes: extraocular movements intact, no scleral icterus, no conjunctival injection  Ears, Nose, and Throat: external ears normal, nares patent, moist mucous membranes  Cardiovascular: regular rate and rhythm. no murmurs, rubs, or gallops. no edema  Respiratory: breathing comfortably, symmetric chest rise, clear to auscultation bilaterally. No wheezes, rales, or rhonchi.  Neuro: alert and oriented to time, place, and person. Normal gait  Psych: normal mood and affect     Result Review :   The following data was reviewed by: Ant Carlos Jr, MD on 09/01/2023:  Common labs          1/14/2023    14:17 1/28/2023    04:15   Common Labs   Glucose 90  150    BUN 14     Creatinine 0.90     Sodium 134  139.6    Potassium 3.8     Chloride 98     Calcium 9.3     Albumin 4.3     Total Bilirubin 0.2     Alkaline Phosphatase 125     AST (SGOT) 16     ALT (SGPT) 22     WBC 10.57     Hemoglobin 13.9     Hematocrit 40.8     Platelets 357              Lab Results   Component Value Date    " SARSANTIGEN Not Detected 11/08/2022    COVID19 Not Detected 04/14/2022    RAPFLUA Negative 04/14/2022    RAPFLUB Negative 04/14/2022    FLUAAG Not Detected 11/08/2022    FLUBAG Not Detected 11/08/2022    RAPSCRN Negative 04/14/2022    INR 1.1 02/05/2019    BILIRUBINUR Negative 01/28/2023        Assessment and Plan    Diagnoses and all orders for this visit:    1. Family history of colon cancer (Primary)  -     Ambulatory Referral For Screening Colonoscopy    2. Binge eating disorder  Comments:  cont vyvanse. UDS and isabela reviewed. encouraged by weight loss. will add trulicity to help augment weight loss efforts.  Orders:  -     lisdexamfetamine (Vyvanse) 70 MG capsule; Take 1 capsule by mouth Every Morning  Dispense: 30 capsule; Refill: 0  -     Dulaglutide (Trulicity) 0.75 MG/0.5ML solution pen-injector; Inject 0.75 mg under the skin into the appropriate area as directed 1 (One) Time Per Week.  Dispense: 2 mL; Refill: 0    Other orders  -     fluconazole (Diflucan) 150 MG tablet; Take 1 tablet by mouth Every 72 (Seventy-Two) Hours.  Dispense: 20 tablet; Refill: 0  -     hydroCHLOROthiazide (HYDRODIURIL) 50 MG tablet; Take 1 tablet by mouth Daily.  Dispense: 90 tablet; Refill: 1          Medications Discontinued During This Encounter   Medication Reason    spironolactone-hydrochlorothiazide (Aldactazide) 50-50 MG per tablet Alternate therapy    fluconazole (Diflucan) 150 MG tablet Reorder    lisdexamfetamine (Vyvanse) 70 MG capsule Reorder          Follow Up   Return in about 3 months (around 12/1/2023) for Recheck.  Patient was given instructions and counseling regarding her condition or for health maintenance advice. Please see specific information pulled into the AVS if appropriate.       Ant Carlos Jr, MD  09/01/23  13:10 EDT

## 2023-09-08 ENCOUNTER — CLINICAL SUPPORT (OUTPATIENT)
Dept: INTERNAL MEDICINE | Facility: CLINIC | Age: 43
End: 2023-09-08
Payer: OTHER GOVERNMENT

## 2023-09-18 ENCOUNTER — CLINICAL SUPPORT (OUTPATIENT)
Dept: INTERNAL MEDICINE | Facility: CLINIC | Age: 43
End: 2023-09-18
Payer: OTHER GOVERNMENT

## 2023-09-18 VITALS
DIASTOLIC BLOOD PRESSURE: 85 MMHG | OXYGEN SATURATION: 96 % | HEART RATE: 100 BPM | TEMPERATURE: 97.3 F | WEIGHT: 248.2 LBS | SYSTOLIC BLOOD PRESSURE: 131 MMHG | HEIGHT: 64 IN | BODY MASS INDEX: 42.37 KG/M2

## 2023-09-18 DIAGNOSIS — J02.9 SORE THROAT: Primary | ICD-10-CM

## 2023-09-18 LAB
EXPIRATION DATE: NORMAL
INTERNAL CONTROL: NORMAL
Lab: NORMAL
S PYO AG THROAT QL: NEGATIVE

## 2023-09-18 PROCEDURE — 87880 STREP A ASSAY W/OPTIC: CPT | Performed by: INTERNAL MEDICINE

## 2023-09-18 PROCEDURE — 87081 CULTURE SCREEN ONLY: CPT | Performed by: STUDENT IN AN ORGANIZED HEALTH CARE EDUCATION/TRAINING PROGRAM

## 2023-09-18 NOTE — PROGRESS NOTES
"Chief Complaint  Sore Throat, Headache, and Generalized Body Aches    Subjective        Malinda Saucedo presents to [facility]  History of Present Illness    Objective   Vital Signs:  There were no vitals taken for this visit.  Estimated body mass index is 44.61 kg/m² as calculated from the following:    Height as of 9/1/23: 162.6 cm (64.02\").    Weight as of 9/6/23: 118 kg (260 lb).               Physical Exam   Result Review :                   Assessment and Plan   There are no diagnoses linked to this encounter.         Follow Up   No follow-ups on file.  Patient was given instructions and counseling regarding her condition or for health maintenance advice. Please see specific information pulled into the AVS if appropriate.         "

## 2023-09-20 LAB — BACTERIA SPEC AEROBE CULT: NORMAL

## 2023-09-27 ENCOUNTER — TELEPHONE (OUTPATIENT)
Dept: INTERNAL MEDICINE | Facility: CLINIC | Age: 43
End: 2023-09-27
Payer: OTHER GOVERNMENT

## 2023-09-27 DIAGNOSIS — F50.81 BINGE EATING DISORDER: ICD-10-CM

## 2023-09-27 RX ORDER — DULAGLUTIDE 0.75 MG/.5ML
0.75 INJECTION, SOLUTION SUBCUTANEOUS WEEKLY
Qty: 2 ML | Refills: 0 | Status: SHIPPED | OUTPATIENT
Start: 2023-09-27

## 2023-09-27 NOTE — TELEPHONE ENCOUNTER
Rx Refill Note  Requested Prescriptions     Pending Prescriptions Disp Refills    Dulaglutide (Trulicity) 0.75 MG/0.5ML solution pen-injector 2 mL 0     Sig: Inject 0.75 mg under the skin into the appropriate area as directed 1 (One) Time Per Week.      Last office visit with prescribing clinician: 9/1/2023   Last telemedicine visit with prescribing clinician: Visit date not found   Next office visit with prescribing clinician: 12/4/2023                         Would you like a call back once the refill request has been completed: [] Yes [] No    If the office needs to give you a call back, can they leave a voicemail: [] Yes [] No    Felipe Jameson  09/27/23, 11:11 EDT

## 2023-09-28 ENCOUNTER — TELEPHONE (OUTPATIENT)
Dept: INTERNAL MEDICINE | Facility: CLINIC | Age: 43
End: 2023-09-28
Payer: OTHER GOVERNMENT

## 2023-09-28 NOTE — TELEPHONE ENCOUNTER
Caller: Malinda Saucedo    Relationship to patient: Self    Best call back number: [phone]     Patient is needing: PATIENT STATED SHE NEEDS A PRIOR AUTHORIZATION FOR TRULICITY MEDICATION. PLEASE ADVISE.

## 2023-10-04 NOTE — TELEPHONE ENCOUNTER
PATIENT STATES SHE CALLED [facility] - [place], [place] - [address] [phone] [facility] [phone]  AND THEY HAVE NOT RECEIVED THE PRIOR AUTHORIZATION AS OF TODAY 10.4.23.    PATIENT IS ASKING FOR THE PRIOR AUTHORIZATION FOR THE     Dulaglutide (Trulicity) 0.75 MG/0.5ML solution pen-injector   TO BE SENT.

## 2023-10-23 ENCOUNTER — TELEPHONE (OUTPATIENT)
Dept: INTERNAL MEDICINE | Facility: CLINIC | Age: 43
End: 2023-10-23
Payer: OTHER GOVERNMENT

## 2023-10-23 NOTE — TELEPHONE ENCOUNTER
Caller: Malinda Saucedo    Relationship: Self    Best call back number: [phone]       What was the call regarding:      THE PATIENT SAID A  PA IS NEEDED FOR TRULICITY. SHE SAID IT HAS BEEN SENT OVER MULTIPLE TIMES AND IS NOT GOING THROUGH. SHE IS WANTING PCP TO CALL [facility]. SHE SAID IT HAS TO GO THROUGH [facility] BEFORE IT CAN BE SENT TO [facility]

## 2023-10-27 ENCOUNTER — TELEPHONE (OUTPATIENT)
Dept: INTERNAL MEDICINE | Facility: CLINIC | Age: 43
End: 2023-10-27
Payer: OTHER GOVERNMENT

## 2023-10-27 NOTE — TELEPHONE ENCOUNTER
Caller: Malinda Saucedo    Relationship: Self    Best call back number: [phone]     Requested Prescriptions:   Requested Prescriptions      No prescriptions requested or ordered in this encounter    WEIGHT LOSS MEDICATION     Pharmacy where request should be sent: [facility] - [place], [place] - [address] - [phone] [place] [phone] [facility]     Last office visit with prescribing clinician: 9/1/2023   Last telemedicine visit with prescribing clinician: Visit date not found   Next office visit with prescribing clinician: 12/4/2023     Additional details provided by patient: PATIENT STATES HER INSURANCE HAS DENIED THE TRULICITY DUE TO SHE IS NOT DIABETIC AND IS ASKING FOR THE SEMIGLUTIDE TO BE CALLED IN TO THE [facility] OR [facility].     Does the patient have less than a 3 day supply:  [] Yes  [] No    Would you like a call back once the refill request has been completed: [] Yes [] No    If the office needs to give you a call back, can they leave a voicemail: [] Yes [] No    Kayy Gunter, PCT   10/27/23 11:41 EDT

## 2023-10-31 ENCOUNTER — TELEPHONE (OUTPATIENT)
Dept: INTERNAL MEDICINE | Facility: CLINIC | Age: 43
End: 2023-10-31

## 2023-10-31 NOTE — TELEPHONE ENCOUNTER
Caller: Malinda Saucedo    Relationship: Self    Best call back number: [phone]    What is the best time to reach you: ANYTIME    Who are you requesting to speak with (clinical staff, provider,  specific staff member): NURSE       What was the call regarding: PATIENT STATES THAT INSURANCE WOULD NOT COVER THE  TRULICITY  AND TOLD HER TO TRY THE WEGOVY AND SEE IF THAT WOULD WORK.  PLEASE ADVISE PATIENT IF THIS WILL BE CALLED IN.

## 2023-11-03 RX ORDER — SEMAGLUTIDE 0.25 MG/.5ML
0.25 INJECTION, SOLUTION SUBCUTANEOUS WEEKLY
Qty: 2 ML | Refills: 3 | Status: SHIPPED | OUTPATIENT
Start: 2023-11-03

## 2023-11-03 NOTE — TELEPHONE ENCOUNTER
Called patient  She stated that Dr. Carlos sent in Ozempic   I went to the [employer] website and it is the same questions.   It asks if the patient is a type 2 diabetic.   If no then its not covered.     I told the patient this- she is aware.    She is wanting to know about Saxenda or Wegovy?     She is trying to get weight loss medication.       Since these two medication isnt getting covered due to not being diabetic.

## 2023-11-13 ENCOUNTER — TELEPHONE (OUTPATIENT)
Dept: INTERNAL MEDICINE | Facility: CLINIC | Age: 43
End: 2023-11-13

## 2023-11-13 NOTE — TELEPHONE ENCOUNTER
Caller: Malinda Saucedo    Relationship: Self    Best call back number: [phone]     Which medication are you concerned about: SEMAGLUTIDE-WEIGHT MANAGEMENT (WEGOVY) .25    Who prescribed you this medication: DARINEL PRINCE    When did you start taking this medication:     What are your concerns: PHARMACY DOES NOT HAVE THIS DOSE. THEY HAVE 1.0 AND 2.4. IS EITHER OF THESE DOSES OK?    How long have you had these concerns:         PLEASE SEND TO EITHER [facility] - [address] [phone] [facility] [phone] [facility] OR [facility] - [place], [place] - [address] [place] [address] [phone] [facility] [phone] FX

## 2023-11-14 NOTE — TELEPHONE ENCOUNTER
Patient has been advised of the Wegovy sent in.   Patient stated that they are out of the .25, she stated that the pharmacy said to ask for the .05MG or 1MG be sent in.

## 2023-11-14 NOTE — TELEPHONE ENCOUNTER
That would be a decent jump and would be concerned may cause her more gastroenterology side effects. I can certainly send it in if she is willing to try, but aware that may have more side effects than a gradual titration.

## 2023-11-15 NOTE — TELEPHONE ENCOUNTER
JULIUS PROVIDED THE RELAY MESSAGE FROM THE OFFICE   PATIENT VOICED UNDERSTANDING AND IS WILLING TO TRY IT. PLEASE SEND TO [facility] - [place], [place] - [address] [facility] [phone] [facility] [phone] Soniya Barrientos MA JB    11/15/23 10:48 AM  Note      Attempted to reach, left vm     OKAY FOR HUB TO RELAY:      That would be a decent jump and would be concerned may cause her more gastroenterology side effects. I can certainly send it in if she is willing to try, but aware that may have more side effects than a gradual titration.

## 2023-11-15 NOTE — TELEPHONE ENCOUNTER
Attempted to reach, left vm    OKAY FOR HUB TO RELAY:     That would be a decent jump and would be concerned may cause her more gastroenterology side effects. I can certainly send it in if she is willing to try, but aware that may have more side effects than a gradual titration.

## 2023-11-17 DIAGNOSIS — F50.81 BINGE EATING DISORDER: ICD-10-CM

## 2023-11-17 RX ORDER — LISDEXAMFETAMINE DIMESYLATE CAPSULES 70 MG/1
70 CAPSULE ORAL EVERY MORNING
Qty: 30 CAPSULE | Refills: 0 | Status: SHIPPED | OUTPATIENT
Start: 2023-11-17

## 2023-11-17 NOTE — TELEPHONE ENCOUNTER
Refill request for  controlled substance.      Date of request: 11/17/2023    Medication requested: Vyvanse   Last OV: 9/1/23  Last UDS: 1/28/23  Contract signed: yes    Date:3/11/22  Next office visit: 12/4/23    Chelly Peace

## 2023-11-21 RX ORDER — SEMAGLUTIDE 1 MG/.5ML
1 INJECTION, SOLUTION SUBCUTANEOUS WEEKLY
Qty: 2 ML | Refills: 1 | Status: SHIPPED | OUTPATIENT
Start: 2023-11-21

## 2023-12-04 ENCOUNTER — OFFICE VISIT (OUTPATIENT)
Dept: INTERNAL MEDICINE | Facility: CLINIC | Age: 43
End: 2023-12-04
Payer: OTHER GOVERNMENT

## 2023-12-04 VITALS
DIASTOLIC BLOOD PRESSURE: 83 MMHG | OXYGEN SATURATION: 96 % | BODY MASS INDEX: 42.17 KG/M2 | TEMPERATURE: 97.3 F | HEART RATE: 108 BPM | SYSTOLIC BLOOD PRESSURE: 138 MMHG | HEIGHT: 64 IN | WEIGHT: 247 LBS

## 2023-12-04 DIAGNOSIS — F50.81 BINGE EATING DISORDER: Primary | ICD-10-CM

## 2023-12-04 DIAGNOSIS — R60.0 LOCALIZED EDEMA: ICD-10-CM

## 2023-12-04 DIAGNOSIS — Z23 NEED FOR COVID-19 VACCINE: ICD-10-CM

## 2023-12-04 LAB
AMPHET+METHAMPHET UR QL: POSITIVE
AMPHETAMINE INTERNAL CONTROL: ABNORMAL
AMPHETAMINES UR QL: NEGATIVE
BARBITURATE INTERNAL CONTROL: ABNORMAL
BARBITURATES UR QL SCN: NEGATIVE
BENZODIAZ UR QL SCN: NEGATIVE
BENZODIAZEPINE INTERNAL CONTROL: ABNORMAL
BUPRENORPHINE INTERNAL CONTROL: ABNORMAL
BUPRENORPHINE SERPL-MCNC: NEGATIVE NG/ML
CANNABINOIDS SERPL QL: NEGATIVE
COCAINE INTERNAL CONTROL: ABNORMAL
COCAINE UR QL: NEGATIVE
EXPIRATION DATE: ABNORMAL
Lab: ABNORMAL
MDMA (ECSTASY) INTERNAL CONTROL: ABNORMAL
MDMA UR QL SCN: NEGATIVE
METHADONE INTERNAL CONTROL: ABNORMAL
METHADONE UR QL SCN: NEGATIVE
METHAMPHETAMINE INTERNAL CONTROL: ABNORMAL
OPIATES INTERNAL CONTROL: ABNORMAL
OPIATES UR QL: NEGATIVE
OXYCODONE INTERNAL CONTROL: ABNORMAL
OXYCODONE UR QL SCN: NEGATIVE
PCP UR QL SCN: NEGATIVE
PHENCYCLIDINE INTERNAL CONTROL: ABNORMAL
THC INTERNAL CONTROL: ABNORMAL

## 2023-12-04 PROCEDURE — 99213 OFFICE O/P EST LOW 20 MIN: CPT | Performed by: INTERNAL MEDICINE

## 2023-12-04 PROCEDURE — 80305 DRUG TEST PRSMV DIR OPT OBS: CPT | Performed by: INTERNAL MEDICINE

## 2023-12-04 RX ORDER — HYDROCHLOROTHIAZIDE 50 MG/1
50 TABLET ORAL DAILY
Qty: 90 TABLET | Refills: 1 | Status: SHIPPED | OUTPATIENT
Start: 2023-12-04

## 2023-12-04 RX ORDER — FLUOXETINE HYDROCHLORIDE 20 MG/1
20 CAPSULE ORAL DAILY
COMMUNITY

## 2023-12-04 RX ORDER — HYDROXYCHLOROQUINE SULFATE 200 MG/1
400 TABLET, FILM COATED ORAL DAILY
COMMUNITY

## 2023-12-04 RX ORDER — ZIPRASIDONE HYDROCHLORIDE 80 MG/1
80 CAPSULE ORAL 2 TIMES DAILY WITH MEALS
COMMUNITY

## 2023-12-04 NOTE — PROGRESS NOTES
"Chief Complaint  Follow-up (3 month follow up), Depression, Insomnia, and Med Refill (Medication pending )    Subjective          Malinda Saucedo presents to [facility]  History of Present Illness  Patient is losing weight. Patient reports the vyvanse is helping curb appetite. Patient reports she has stopped drinking sodas. Patient has been unable to get wegovy at this time.   Patient reports mental health is doing well. She is no on prozac and geodon. Patient follow-up with counseling. Patient reports sleeping well.     Current Outpatient Medications   Medication Instructions    albuterol sulfate  (90 Base) MCG/ACT inhaler 2 puffs, Inhalation, Every 4 Hours PRN    apixaban (ELIQUIS) 2.5 mg, Oral, 2 Times Daily    B-D TB SYRINGE 1CC/27GX1/2\" 27G X 1/2\" 1 ML misc USE 1 EACH WITH METHOTREXATE    buPROPion XL (WELLBUTRIN XL) 300 mg, Oral, Every Morning    busPIRone (BUSPAR) 20 mg, Oral, 2 Times Daily    cetirizine (ZYRTEC) 10 mg, Oral, Daily    Cosentyx Sensoready, 300 MG, 150 MG/ML solution auto-injector No dose, route, or frequency recorded.    cyclobenzaprine (FLEXERIL) 10 mg, Oral, 2 Times Daily PRN    EPINEPHrine (EPIPEN) 0.3 MG/0.3ML solution auto-injector injection 0.3 mL, Subcutaneous, Once As Needed    fluconazole (DIFLUCAN) 150 mg, Oral, Every 72 Hours    Fluocinolone Acetonide Scalp 0.01 % oil APPLY TOPICALLY TO THE SCALP EVERY NIGHT AT BEDTIME AS NEEDED FOR PSORIASIS OR FLARES    FLUoxetine (PROZAC) 20 mg, Oral, Daily    gabapentin (NEURONTIN) 600 MG tablet Take 800 mg by mouth 4 (Four) Times a Day.    hydroCHLOROthiazide (HYDRODIURIL) 50 mg, Oral, Daily    hydroxychloroquine (PLAQUENIL) 400 mg, Oral, Daily    hydrOXYzine (ATARAX) 25 mg, Oral, Daily PRN    ibuprofen (ADVIL,MOTRIN) 800 MG tablet TAKE 1 TABLET BY MOUTH EVERY 8 HOURS AS NEEDED FOR MODERATE PAIN    leflunomide (ARAVA) 10 mg, Oral, Daily    lisdexamfetamine (VYVANSE) 70 mg, Oral, " "Every Morning    montelukast (SINGULAIR) 10 mg, Oral, Nightly    Orilissa 150 mg, Oral, Daily    Wegovy 1 mg, Subcutaneous, Weekly    ziprasidone (GEODON) 80 mg, Oral, 2 Times Daily With Meals       The following portions of the patient's history were reviewed and updated as appropriate: allergies, current medications, past family history, past medical history, past social history, past surgical history, and problem list.      Objective   Vital Signs:   /83 (BP Location: Right arm, Patient Position: Sitting)   Pulse 108   Temp 97.3 °F (36.3 °C) (Temporal)   Ht 162.6 cm (64\")   Wt 112 kg (247 lb)   SpO2 96%   BMI 42.40 kg/m²     BP Readings from Last 3 Encounters:   12/04/23 138/83   09/18/23 131/85   09/06/23 133/80     Wt Readings from Last 3 Encounters:   12/04/23 112 kg (247 lb)   09/18/23 113 kg (248 lb 3.2 oz)   09/06/23 118 kg (260 lb)         Physical Exam   Appearance: No acute distress, well-nourished  Head: normocephalic, atraumatic  Eyes: extraocular movements intact, no scleral icterus, no conjunctival injection  Ears, Nose, and Throat: external ears normal, nares patent, moist mucous membranes  Cardiovascular: regular rate and rhythm. no murmurs, rubs, or gallops. no edema  Respiratory: breathing comfortably, symmetric chest rise, clear to auscultation bilaterally. No wheezes, rales, or rhonchi.  Neuro: alert and oriented to time, place, and person. Normal gait  Psych: normal mood and affect       Result Review :   The following data was reviewed by: Ant Carlos Jr, MD on 12/04/2023:  Common labs          1/14/2023    14:17 1/28/2023    04:15   Common Labs   Glucose 90  150    BUN 14     Creatinine 0.90     Sodium 134  139.6    Potassium 3.8     Chloride 98     Calcium 9.3     Albumin 4.3     Total Bilirubin 0.2     Alkaline Phosphatase 125     AST (SGOT) 16     ALT (SGPT) 22     WBC 10.57     Hemoglobin 13.9     Hematocrit 40.8     Platelets 357           Lab Results   Component " Value Date    SARSANTIGEN Not Detected 11/08/2022    COVID19 Not Detected 04/14/2022    RAPFLUA Negative 04/14/2022    RAPFLUB Negative 04/14/2022    FLUAAG Not Detected 11/08/2022    FLUBAG Not Detected 11/08/2022    RAPSCRN Negative 09/18/2023    INR 1.1 02/05/2019    BILIRUBINUR Negative 01/28/2023       Last Urine Toxicity  More data exists         Latest Ref Rng & Units 12/4/2023 1/28/2023   LAST URINE TOXICITY RESULTS   Amphetamine, Urine Qual Negative Positive  -   Barbiturates Screen, Urine Negative Negative  Negative    Benzodiazepine Screen, Urine Negative Negative  Negative    Buprenorphine, Screen, Urine Negative Negative  -   Cocaine Screen, Urine Negative Negative  Negative    Methadone Screen , Urine Negative Negative  Negative    Methamphetamine, Ur Negative Negative  -          Assessment and Plan    Diagnoses and all orders for this visit:    1. Binge eating disorder (Primary)  Comments:  vyvanse seems ot be helping and will cont. LULY and isabela reviewed.    2. Need for COVID-19 vaccine  -     COVID-19 F23 (Pfizer) 12yrs+ (COMIRNATY)  -     POC Urine Drug Screen [facility]    3. Localized edema  Comments:  cont HCTZ as needed.  Orders:  -     hydroCHLOROthiazide (HYDRODIURIL) 50 MG tablet; Take 1 tablet by mouth Daily.  Dispense: 90 tablet; Refill: 1          Medications Discontinued During This Encounter   Medication Reason    acetaminophen (TYLENOL) 500 MG tablet *Therapy completed    hydroCHLOROthiazide (HYDRODIURIL) 50 MG tablet Reorder          Follow Up   Return in about 3 months (around 3/4/2024) for Recheck.  Patient was given instructions and counseling regarding her condition or for health maintenance advice. Please see specific information pulled into the AVS if appropriate.       Ant Carlos Jr, MD  12/04/23  11:28 EST

## 2023-12-08 ENCOUNTER — TELEPHONE (OUTPATIENT)
Dept: INTERNAL MEDICINE | Facility: CLINIC | Age: 43
End: 2023-12-08
Payer: OTHER GOVERNMENT

## 2023-12-08 NOTE — TELEPHONE ENCOUNTER
Caller: Malinda Saucedo    Relationship: Self    Best call back number: [phone]       Additional information or concerns: PATIENT REPORTS THAT HER     Semaglutide-Weight Management (Wegovy) 1 MG/0.5ML solution auto-injector       HAS BEEN WAITING FOR A PRIOR AUTHORIZATION - THAT NEEDS TO BE SENT TO [facility]  PHONE - [phone]  FAX  - [phone]    PATIENT HAS BEEN WAITING FOR MONTHS TO START MEDICATION     PLEASE ADVISE

## 2023-12-29 DIAGNOSIS — F50.81 BINGE EATING DISORDER: ICD-10-CM

## 2023-12-29 RX ORDER — LISDEXAMFETAMINE DIMESYLATE CAPSULES 70 MG/1
70 CAPSULE ORAL EVERY MORNING
Qty: 30 CAPSULE | Refills: 0 | Status: SHIPPED | OUTPATIENT
Start: 2023-12-29

## 2023-12-29 NOTE — TELEPHONE ENCOUNTER
Refill request for controlled substance.      Date of request: 12/29/2023   Medication requested: Vyvanse   Last OV: 12/4/23  Last UDS: 12/4/23  Contract signed: yes    Date:12/5/23  Next office visit: 3/4/24    Chelly Peace

## 2024-01-05 NOTE — TELEPHONE ENCOUNTER
Caller: Malinda Saucedo Sue    Relationship: Self    Best call back number: [phone]     Requested Prescriptions:   Requested Prescriptions     Pending Prescriptions Disp Refills    Semaglutide-Weight Management (Wegovy) 1 MG/0.5ML solution auto-injector 2 mL 1     Sig: Inject 0.5 mL under the skin into the appropriate area as directed 1 (One) Time Per Week.        Pharmacy where request should be sent: [facility] [address] [phone] [facility] [phone]      Last office visit with prescribing clinician: 12/4/2023   Last telemedicine visit with prescribing clinician: Visit date not found   Next office visit with prescribing clinician: 3/4/2024     Additional details provided by patient: PATIENT WILL TAKE NEXT INJECTION ON 1.9.24 AND NEW ORDER IS NEEDED    Does the patient have less than a 3 day supply:  [] Yes  [x] No    Would you like a call back once the refill request has been completed: [] Yes [] No    If the office needs to give you a call back, can they leave a voicemail: [] Yes [] No    Kayy Gunter, PCT   01/05/24 14:53 EST

## 2024-01-08 RX ORDER — SEMAGLUTIDE 1 MG/.5ML
1 INJECTION, SOLUTION SUBCUTANEOUS WEEKLY
Qty: 2 ML | Refills: 1 | Status: SHIPPED | OUTPATIENT
Start: 2024-01-08 | End: 2024-01-11 | Stop reason: SDUPTHER

## 2024-01-10 ENCOUNTER — PATIENT MESSAGE (OUTPATIENT)
Dept: INTERNAL MEDICINE | Facility: CLINIC | Age: 44
End: 2024-01-10
Payer: OTHER GOVERNMENT

## 2024-01-11 RX ORDER — SEMAGLUTIDE 1 MG/.5ML
1 INJECTION, SOLUTION SUBCUTANEOUS WEEKLY
Qty: 2 ML | Refills: 1 | Status: SHIPPED | OUTPATIENT
Start: 2024-01-11

## 2024-01-11 NOTE — TELEPHONE ENCOUNTER
From: Malinda Saucedo  To: Ant Carlos  Sent: 1/10/2024 5:35 PM EST  Subject: wegovy    I've been taking the 1.0mg dose of Wegovy, but [facility] is saying that the 0.5mg dose was ordered. Could you please resend the correct dosage so that I'll have it before it's due again next week? Thank you in advance.

## 2024-01-29 DIAGNOSIS — F50.81 BINGE EATING DISORDER: ICD-10-CM

## 2024-01-29 NOTE — TELEPHONE ENCOUNTER
Refill request for controlled substance.      Date of request: 1/29/2024  Medication requested: Vyvanse   Last OV: 12/4/23  Last UDS: 12/4/23  Contract signed: yes    Date:12/5/23  Next office visit: 3/4/24    Chelly Peace

## 2024-01-30 RX ORDER — LISDEXAMFETAMINE DIMESYLATE CAPSULES 70 MG/1
70 CAPSULE ORAL EVERY MORNING
Qty: 30 CAPSULE | Refills: 0 | Status: SHIPPED | OUTPATIENT
Start: 2024-01-30

## 2024-02-01 ENCOUNTER — PATIENT MESSAGE (OUTPATIENT)
Dept: INTERNAL MEDICINE | Facility: CLINIC | Age: 44
End: 2024-02-01
Payer: OTHER GOVERNMENT

## 2024-02-02 RX ORDER — SEMAGLUTIDE 1 MG/.5ML
1 INJECTION, SOLUTION SUBCUTANEOUS WEEKLY
Qty: 2 ML | Refills: 1 | Status: SHIPPED | OUTPATIENT
Start: 2024-02-02 | End: 2024-02-05 | Stop reason: SDUPTHER

## 2024-02-02 NOTE — TELEPHONE ENCOUNTER
From: Malinda Saucedo  To: Ant Carlos  Sent: 2/1/2024 11:20 PM EST  Subject: Wegovy    Could you please put in the rx for wegovy 1ml? I'll be finishing up the 0.5ml next week and would like to have it in time. Thank you.

## 2024-02-05 ENCOUNTER — TELEPHONE (OUTPATIENT)
Dept: INTERNAL MEDICINE | Facility: CLINIC | Age: 44
End: 2024-02-05
Payer: OTHER GOVERNMENT

## 2024-02-05 RX ORDER — SEMAGLUTIDE 1 MG/.5ML
1 INJECTION, SOLUTION SUBCUTANEOUS WEEKLY
Qty: 2 ML | Refills: 1 | Status: SHIPPED | OUTPATIENT
Start: 2024-02-05

## 2024-02-05 NOTE — PROGRESS NOTES
Chief Complaint  binge eating disorder, Weight Loss (Wants to talk about patient is trying to loose weight ), and medication conserns  (Increase vyvanse. Patient thinks her body is already adjusted too it )    Subjective          Malinda Saucedo presents to [facility]  History of Present Illness  Patient reports having return of cycles. Patient has restarted HAs. Patient is now on orlessa.   Obesity- patient does not think vyvanse is working as well at this time. She thinks she has gotten used to the medication. Patient would like to stay on med because she worries she would do worse without it. Patient has been trying to drink more water to reduce intake of other foods. Patient has noticed some swelling in her feet, she is on compression socks.   Hidradenitis- patient is considering accutane with dermatology. Patient reports having dry skin because of sjogren's. Patient has not been on aldactone previously.     Current Outpatient Medications   Medication Instructions   • aspirin 81 mg, Oral, Daily   • [START ON 7/17/2022] buPROPion XL (WELLBUTRIN XL) 300 mg, Oral, Every Morning   • busPIRone (BUSPAR) 10 mg, Oral, 2 Times Daily   • cetirizine (ZYRTEC) 10 mg, Oral, Daily   • clobetasol (TEMOVATE) 0.05 % external solution clobetasol 0.05 % scalp solution   • [START ON 7/17/2022] DULoxetine (CYMBALTA) 30 mg, Oral, Daily   • DULoxetine (CYMBALTA) 60 mg, Oral, Daily, In addition to the Duloxetine 30mg   • Elagolix Sodium (Orilissa) 150 MG tablet Oral   • EPINEPHrine (EPIPEN) 0.3 mg, Subcutaneous, Once   • gabapentin (NEURONTIN) 600 MG tablet TAKE 1 TABLET BY MOUTH EVERY 8 HOURS AS DIRECTED   • Humira Pen 40 MG/0.4ML Pen-injector Kit No dose, route, or frequency recorded.   • ibuprofen (ADVIL,MOTRIN) 800 MG tablet No dose, route, or frequency recorded.   • lisdexamfetamine (VYVANSE) 70 mg, Oral, Every Morning   • montelukast (SINGULAIR) 10 mg, Oral, Nightly   •  "spironolactone-hydrochlorothiazide (Aldactazide) 25-25 MG tablet 1 tablet, Oral, Daily       The following portions of the patient's history were reviewed and updated as appropriate: allergies, current medications, past family history, past medical history, past social history, past surgical history, and problem list.    Objective   Vital Signs:   /91   Pulse 84   Temp 97.9 °F (36.6 °C) (Temporal)   Ht 162.6 cm (64\")   Wt 123 kg (271 lb 12.8 oz)   SpO2 98%   BMI 46.65 kg/m²     Wt Readings from Last 3 Encounters:   06/21/22 123 kg (271 lb 12.8 oz)   05/24/22 122 kg (268 lb 6.4 oz)   05/13/22 122 kg (270 lb)     BP Readings from Last 3 Encounters:   06/21/22 136/91   05/24/22 131/82   05/13/22 114/82     Physical Exam   Appearance: No acute distress, well-nourished  Head: normocephalic, atraumatic  Eyes: extraocular movements intact, no scleral icterus, no conjunctival injection  Ears, Nose, and Throat: external ears normal, nares patent, moist mucous membranes  Cardiovascular: regular rate and rhythm. no murmurs, rubs, or gallops. no edema  Respiratory: breathing comfortably, symmetric chest rise, clear to auscultation bilaterally. No wheezes, rales, or rhonchi.  Neuro: alert and oriented to time, place, and person. Normal gait  Psych: normal mood and affect     Result Review :{Labs  Result Review  Imaging  Med Tab  Media  Procedures :23}   The following data was reviewed by: Ant Carlos Jr, MD on 06/21/2022:  Common labs    Common Labsle 12/28/21 12/28/21 12/28/21 12/28/21 2/8/22 2/8/22    1147 1147 1147 1147 1645 1645   Glucose    95  75   BUN    10  8   Creatinine    1.12 (A)  1.04 (A)   eGFR African Am    65  71   Sodium    141  139   Potassium    4.4  4.1   Chloride    111 (A)  108 (A)   Calcium    9.5  8.9   Albumin    4.50  4.00   Total Bilirubin    0.3  0.2   Alkaline Phosphatase    98  90   AST (SGOT)    15  13   ALT (SGPT)    15  14   WBC 7.36    8.13    Hemoglobin 15.0    14.7 "    Hematocrit 45.2    44.1    Platelets 265    299    Total Cholesterol   150      Triglycerides   69      HDL Cholesterol   64 (A)      LDL Cholesterol    72      Hemoglobin A1C  4.94       (A) Abnormal value              Lab Results   Component Value Date    SARSANTIGEN Not Detected 04/14/2022    COVID19 Not Detected 04/14/2022    RAPFLUA Negative 04/14/2022    RAPFLUB Negative 04/14/2022    FLUAAG Not Detected 12/22/2021    FLUBAG Not Detected 12/22/2021    RAPSCRN Negative 04/14/2022    INR 1.1 02/05/2019          Assessment and Plan    Diagnoses and all orders for this visit:    1. Localized edema (Primary)  Comments:  will start diuretic. hope for help with HAs and BP control as well  Orders:  -     spironolactone-hydrochlorothiazide (Aldactazide) 25-25 MG tablet; Take 1 tablet by mouth Daily.  Dispense: 90 tablet; Refill: 1    2. Hidradenitis suppurativa  Comments:  hope for improvement with use of aldactone. cont f/u with derm in 1 month.     3. Chronic nonintractable headache, unspecified headache type  Comments:  thought related to changes in OCPs. cont monitoring. may consider something like topamax if persists.     4. Weight gain  Comments:  cont vyvanse. monitor for changes with addition of diuretics.     Other orders  -     EPINEPHrine (EPIPEN) 0.3 MG/0.3ML solution auto-injector injection; Inject 0.3 mL under the skin into the appropriate area as directed 1 (One) Time for 1 dose.  Dispense: 1 each; Refill: 3        Medications Discontinued During This Encounter   Medication Reason   • EPINEPHrine (EPIPEN) 0.3 MG/0.3ML solution auto-injector injection Reorder   • ISOtretinoin (ACCUTANE) 30 MG capsule *Therapy completed   • itraconazole (SPORANOX) 100 MG capsule *Therapy completed     Follow Up   Return in about 4 weeks (around 7/19/2022) for Recheck.  Patient was given instructions and counseling regarding her condition or for health maintenance advice. Please see specific information pulled into the  AVS if appropriate.       Ant Carlos Jr, MD  06/21/22  12:07 EDT             Detail Level: Detailed Quality 47: Advance Care Plan: Advance Care Planning discussed and documented in the medical record; patient did not wish or was not able to name a surrogate decision maker or provide an advance care plan. Quality 358: Patient-Centered Surgical Risk Assessment And Communication: Documentation of patient-specific risk assessment with a risk calculator based on multi-institutional clinical data, the specific risk calculator used, and communication of risk assessment from risk calculator with the patient or family. Quality 226: Preventive Care And Screening: Tobacco Use: Screening And Cessation Intervention: Patient screened for tobacco use and is an ex/non-smoker

## 2024-02-05 NOTE — TELEPHONE ENCOUNTER
----- Message from Malinda Saucedo sent at 2/5/2024 12:16 PM EST -----  Regarding: Wegovy  Contact: [phone]  An order for the 0.5ml was placed, instead of the 1.0ml. Could you please send in the change to the correct dosage?  
[Obese, well nourished, in no acute distress] : obese, well nourished, in no acute distress

## 2024-02-12 ENCOUNTER — PATIENT MESSAGE (OUTPATIENT)
Dept: INTERNAL MEDICINE | Facility: CLINIC | Age: 44
End: 2024-02-12
Payer: OTHER GOVERNMENT

## 2024-02-26 DIAGNOSIS — F50.81 BINGE EATING DISORDER: ICD-10-CM

## 2024-02-29 RX ORDER — LISDEXAMFETAMINE DIMESYLATE CAPSULES 70 MG/1
70 CAPSULE ORAL EVERY MORNING
Qty: 30 CAPSULE | Refills: 0 | Status: SHIPPED | OUTPATIENT
Start: 2024-02-29

## 2024-02-29 NOTE — TELEPHONE ENCOUNTER
Refill request for controlled substance.      Date of request: 2/29/2024   Medication requested: Vyvanse   Last OV: 12/4/23  Last UDS: 12/4/23  Contract signed: yes    Date:12/5/23  Next office visit: 3/4/24    Chelly QUINTANA in folder for review

## 2024-03-04 ENCOUNTER — OFFICE VISIT (OUTPATIENT)
Dept: INTERNAL MEDICINE | Facility: CLINIC | Age: 44
End: 2024-03-04
Payer: OTHER GOVERNMENT

## 2024-03-04 VITALS
BODY MASS INDEX: 39.95 KG/M2 | TEMPERATURE: 97.4 F | HEART RATE: 96 BPM | HEIGHT: 64 IN | OXYGEN SATURATION: 98 % | WEIGHT: 234 LBS | SYSTOLIC BLOOD PRESSURE: 120 MMHG | DIASTOLIC BLOOD PRESSURE: 82 MMHG

## 2024-03-04 DIAGNOSIS — G47.00 INSOMNIA, UNSPECIFIED TYPE: Primary | ICD-10-CM

## 2024-03-04 DIAGNOSIS — J30.2 SEASONAL ALLERGIES: ICD-10-CM

## 2024-03-04 PROCEDURE — 99214 OFFICE O/P EST MOD 30 MIN: CPT | Performed by: INTERNAL MEDICINE

## 2024-03-04 RX ORDER — DOXEPIN HYDROCHLORIDE 10 MG/1
10 CAPSULE ORAL NIGHTLY
Qty: 90 CAPSULE | Refills: 1 | Status: SHIPPED | OUTPATIENT
Start: 2024-03-04

## 2024-03-04 RX ORDER — MONTELUKAST SODIUM 10 MG/1
10 TABLET ORAL NIGHTLY
Qty: 90 TABLET | Refills: 1 | Status: SHIPPED | OUTPATIENT
Start: 2024-03-04

## 2024-03-04 RX ORDER — SEMAGLUTIDE 1.7 MG/.75ML
1.7 INJECTION, SOLUTION SUBCUTANEOUS WEEKLY
Qty: 9 ML | Refills: 3 | Status: SHIPPED | OUTPATIENT
Start: 2024-03-04

## 2024-03-04 RX ORDER — GABAPENTIN 800 MG/1
800 TABLET ORAL 3 TIMES DAILY
COMMUNITY

## 2024-03-04 RX ORDER — CETIRIZINE HYDROCHLORIDE 10 MG/1
10 TABLET ORAL DAILY
Qty: 90 TABLET | Refills: 3 | Status: SHIPPED | OUTPATIENT
Start: 2024-03-04

## 2024-03-04 NOTE — PROGRESS NOTES
"Chief Complaint  Annual Exam, Med Refill (Need new dosage on wegovy ), and GI Problem (Patient stated that her stomach isn't moving /She stated that its not constipated /Her stomach isnt even bubble, its not doing anything )    Subjective          Malinda Saucedo presents to [facility]  History of Present Illness  Patient is losing weight. Patient reports the vyvanse is helping curb appetite. Patient reports she has stopped drinking sodas. Patient has been getting wegovy. Patient would like to increase dosage of wegovy. Patient reports her Bms have been slowing down. She has been using enemas to help. Patient is taking stool softener and laxative every day.  Patient reports mental health is doing well. She is no longer on prozac and geodon. Patient follow-up with counseling. Patient reports sleeping well. Patient reports vyvanse is helping with concentration. Patient will take a benadryl to help her sleep.     Current Outpatient Medications   Medication Instructions    albuterol sulfate  (90 Base) MCG/ACT inhaler 2 puffs, Inhalation, Every 4 Hours PRN    apixaban (ELIQUIS) 2.5 mg, Oral, 2 Times Daily    B-D TB SYRINGE 1CC/27GX1/2\" 27G X 1/2\" 1 ML misc USE 1 EACH WITH METHOTREXATE    buPROPion XL (WELLBUTRIN XL) 300 mg, Oral, Every Morning    busPIRone (BUSPAR) 20 mg, Oral, 2 Times Daily    cetirizine (ZYRTEC) 10 mg, Oral, Daily    Cosentyx Sensoready, 300 MG, 150 MG/ML solution auto-injector No dose, route, or frequency recorded.    cyclobenzaprine (FLEXERIL) 10 mg, Oral, 2 Times Daily PRN    doxepin (SINEQUAN) 10 mg, Oral, Nightly    EPINEPHrine (EPIPEN) 0.3 MG/0.3ML solution auto-injector injection 0.3 mL, Subcutaneous, Once As Needed    fluconazole (DIFLUCAN) 150 mg, Oral, Every 72 Hours    Fluocinolone Acetonide Scalp 0.01 % oil APPLY TOPICALLY TO THE SCALP EVERY NIGHT AT BEDTIME AS NEEDED FOR PSORIASIS OR FLARES    gabapentin (NEURONTIN) 800 mg, " "Oral, 3 Times Daily    hydroxychloroquine (PLAQUENIL) 400 mg, Oral, Daily    hydrOXYzine (ATARAX) 25 mg, Oral, Daily PRN    ibuprofen (ADVIL,MOTRIN) 800 MG tablet TAKE 1 TABLET BY MOUTH EVERY 8 HOURS AS NEEDED FOR MODERATE PAIN    leflunomide (ARAVA) 10 mg, Oral, Daily    lisdexamfetamine (VYVANSE) 70 mg, Oral, Every Morning    montelukast (SINGULAIR) 10 mg, Oral, Nightly    Orilissa 150 mg, Oral, Daily    Wegovy 1.7 mg, Subcutaneous, Weekly    ziprasidone (GEODON) 80 mg, Oral, 2 Times Daily With Meals       The following portions of the patient's history were reviewed and updated as appropriate: allergies, current medications, past family history, past medical history, past social history, past surgical history, and problem list.      Objective   Vital Signs:   /82 (BP Location: Left arm)   Pulse 96   Temp 97.4 °F (36.3 °C) (Temporal)   Ht 162.6 cm (64\")   Wt 106 kg (234 lb)   SpO2 98%   BMI 40.17 kg/m²     BP Readings from Last 3 Encounters:   03/04/24 120/82   12/04/23 138/83   09/18/23 131/85     Wt Readings from Last 3 Encounters:   03/04/24 106 kg (234 lb)   12/04/23 112 kg (247 lb)   09/18/23 113 kg (248 lb 3.2 oz)         Physical Exam   Appearance: No acute distress, well-nourished  Head: normocephalic, atraumatic  Eyes: extraocular movements intact, no scleral icterus, no conjunctival injection  Ears, Nose, and Throat: external ears normal, nares patent, moist mucous membranes  Cardiovascular: regular rate and rhythm. no murmurs, rubs, or gallops. no edema  Respiratory: breathing comfortably, symmetric chest rise, clear to auscultation bilaterally. No wheezes, rales, or rhonchi.  Neuro: alert and oriented to time, place, and person. Normal gait  Psych: normal mood and affect       Result Review :   The following data was reviewed by: Ant Carlos Jr, MD on 12/04/2023:          Lab Results   Component Value Date    SARSANTIGEN Not Detected 11/08/2022    COVID19 Not Detected 04/14/2022 "    RAPFLUA Negative 04/14/2022    RAPFLUB Negative 04/14/2022    FLUAAG Not Detected 11/08/2022    FLUBAG Not Detected 11/08/2022    RAPSCRN Negative 09/18/2023    INR 1.1 02/05/2019    BILIRUBINUR Negative 01/28/2023       Last Urine Toxicity  More data exists         Latest Ref Rng & Units 12/4/2023 1/28/2023   LAST URINE TOXICITY RESULTS   Amphetamine, Urine Qual Negative Positive  -   Barbiturates Screen, Urine Negative Negative  Negative    Benzodiazepine Screen, Urine Negative Negative  Negative    Buprenorphine, Screen, Urine Negative Negative  -   Cocaine Screen, Urine Negative Negative  Negative    Methadone Screen , Urine Negative Negative  Negative    Methamphetamine, Ur Negative Negative  -          Assessment and Plan    Diagnoses and all orders for this visit:    1. Insomnia, unspecified type (Primary)  Comments:  will start doxepin and monitor for efficacy. avoid antihistamines.  Orders:  -     doxepin (SINEquan) 10 MG capsule; Take 1 capsule by mouth Every Night.  Dispense: 90 capsule; Refill: 1    2. Seasonal allergies  -     cetirizine (zyrTEC) 10 MG tablet; Take 1 tablet by mouth Daily.  Dispense: 90 tablet; Refill: 3  -     montelukast (SINGULAIR) 10 MG tablet; Take 1 tablet by mouth Every Night.  Dispense: 90 tablet; Refill: 1    3. Need for hepatitis C screening test    4. Body mass index (BMI) 40.0-44.9, adult  Comments:  inc wegovy to 1.7mg weekly  Orders:  -     Semaglutide-Weight Management (Wegovy) 1.7 MG/0.75ML solution auto-injector; Inject 0.75 mL under the skin into the appropriate area as directed 1 (One) Time Per Week.  Dispense: 9 mL; Refill: 3            Medications Discontinued During This Encounter   Medication Reason    Semaglutide-Weight Management (Wegovy) 1 MG/0.5ML solution auto-injector Alternate therapy    gabapentin (NEURONTIN) 600 MG tablet *Therapy completed    FLUoxetine (PROzac) 20 MG capsule *Therapy completed    hydroCHLOROthiazide (HYDRODIURIL) 50 MG tablet  *Therapy completed    cetirizine (zyrTEC) 10 MG tablet Reorder    montelukast (SINGULAIR) 10 MG tablet Reorder            Follow Up   Return in about 3 months (around 6/4/2024).  Patient was given instructions and counseling regarding her condition or for health maintenance advice. Please see specific information pulled into the AVS if appropriate.       Ant Carlos Jr, MD  03/04/24  09:33 EST

## 2024-04-13 DIAGNOSIS — F50.81 BINGE EATING DISORDER: ICD-10-CM

## 2024-04-16 RX ORDER — LISDEXAMFETAMINE DIMESYLATE CAPSULES 70 MG/1
70 CAPSULE ORAL EVERY MORNING
Qty: 30 CAPSULE | Refills: 0 | Status: SHIPPED | OUTPATIENT
Start: 2024-04-16

## 2024-04-16 NOTE — TELEPHONE ENCOUNTER
Refill request for controlled substance.      Date of request: 4/16/2024    Medication requested: Vyvanse   Last OV: 3/4/24  Last UDS: 12/4/23  Contract signed: yes    Date:12/5/23  Next office visit: 7/16/24    Chelly Peace

## 2024-04-22 ENCOUNTER — PATIENT MESSAGE (OUTPATIENT)
Dept: INTERNAL MEDICINE | Facility: CLINIC | Age: 44
End: 2024-04-22
Payer: OTHER GOVERNMENT

## 2024-04-22 RX ORDER — SEMAGLUTIDE 2.4 MG/.75ML
2.4 INJECTION, SOLUTION SUBCUTANEOUS WEEKLY
Qty: 9 ML | Refills: 3 | Status: SHIPPED | OUTPATIENT
Start: 2024-04-22

## 2024-04-22 RX ORDER — SEMAGLUTIDE 2.4 MG/.75ML
2.4 INJECTION, SOLUTION SUBCUTANEOUS WEEKLY
Qty: 9 ML | Refills: 3 | Status: SHIPPED | OUTPATIENT
Start: 2024-04-22 | End: 2024-04-22

## 2024-04-22 NOTE — TELEPHONE ENCOUNTER
From: Malinda Saucedo  To: Ant Carlos  Sent: 4/22/2024 1:40 AM EDT  Subject: Wegovy     Could you please put in my next dose of wegovy? I think it's 2.4mg. I'm finishing the 1.7mg now, will be done in 3 weeks, but wanted to get this going so I don't miss a dose. Thank you in advance!

## 2024-04-23 RX ORDER — HYDROCHLOROTHIAZIDE 50 MG/1
50 TABLET ORAL DAILY
Qty: 90 TABLET | Refills: 1 | Status: SHIPPED | OUTPATIENT
Start: 2024-04-23

## 2024-05-10 DIAGNOSIS — F50.81 BINGE EATING DISORDER: ICD-10-CM

## 2024-05-13 NOTE — TELEPHONE ENCOUNTER
Refill request for controlled substance.      Date of request: 5/13/2024    Medication requested: Vyvanse   Last OV: 3/4/24  Last UDS: 12/4/23  Contract signed: yes    Date:12/5/23  Next office visit: 7/16/24    Chelly Peace

## 2024-05-14 RX ORDER — LISDEXAMFETAMINE DIMESYLATE 70 MG/1
70 CAPSULE ORAL EVERY MORNING
Qty: 30 CAPSULE | Refills: 0 | Status: SHIPPED | OUTPATIENT
Start: 2024-05-14

## 2024-06-09 DIAGNOSIS — F50.81 BINGE EATING DISORDER: ICD-10-CM

## 2024-06-10 NOTE — TELEPHONE ENCOUNTER
Refill request for controlled substance.       Date of request: 6/10/24   Medication requested: Vyvanse   Last OV: 3/4/24  Last UDS: 12/4/23  Contract signed: yes    Date:12/5/23  Next office visit: 7/16/24     Chelly Peace

## 2024-06-11 RX ORDER — LISDEXAMFETAMINE DIMESYLATE 70 MG/1
70 CAPSULE ORAL EVERY MORNING
Qty: 30 CAPSULE | Refills: 0 | Status: SHIPPED | OUTPATIENT
Start: 2024-06-11

## 2024-07-08 ENCOUNTER — TELEPHONE (OUTPATIENT)
Dept: INTERNAL MEDICINE | Facility: CLINIC | Age: 44
End: 2024-07-08
Payer: OTHER GOVERNMENT

## 2024-07-08 DIAGNOSIS — F90.9 ATTENTION DEFICIT HYPERACTIVITY DISORDER (ADHD), UNSPECIFIED ADHD TYPE: Primary | ICD-10-CM

## 2024-07-08 DIAGNOSIS — F90.9 ATTENTION DEFICIT HYPERACTIVITY DISORDER (ADHD), UNSPECIFIED ADHD TYPE: ICD-10-CM

## 2024-07-08 RX ORDER — LISDEXAMFETAMINE DIMESYLATE 60 MG/1
60 CAPSULE ORAL EVERY MORNING
Qty: 30 CAPSULE | Refills: 0 | Status: SHIPPED | OUTPATIENT
Start: 2024-07-08 | End: 2024-07-15

## 2024-07-08 NOTE — TELEPHONE ENCOUNTER
Caller: Malinda Saucedo     Relationship: [unfilled]     Best call back number: [phone]    What is your medical concern? NOT ABLE TO SLEEP. SHE WANTS TO KNOW CAN  DECREASE THE VYANSE.     How long has this issue been going on? FEW MONTHS    Is your provider already aware of this issue? LETTING HIM KNOW NOW

## 2024-07-09 ENCOUNTER — TELEPHONE (OUTPATIENT)
Dept: INTERNAL MEDICINE | Facility: CLINIC | Age: 44
End: 2024-07-09
Payer: OTHER GOVERNMENT

## 2024-07-09 NOTE — TELEPHONE ENCOUNTER
Caller: JACQUELYN    Relationship to patient: TOREY    Best call back number: [phone]     Patient is needing: JACQUELYN IS REQUESTING ORDER CLARIFICATION ON VYVANSE.       UNABLE TO WARM TRANSFER

## 2024-07-09 NOTE — TELEPHONE ENCOUNTER
Spoke with Rusty, needed clarification on the dosage of Vyvanse.     Informed [facility] that patient requested to have Vyvanse dosage decreased from 70mg.    Verbal given to fill the 60mg due to the request for switch.

## 2024-07-15 RX ORDER — LISDEXAMFETAMINE DIMESYLATE 50 MG/1
50 CAPSULE ORAL EVERY MORNING
Qty: 30 CAPSULE | Refills: 0 | Status: SHIPPED | OUTPATIENT
Start: 2024-07-15

## 2024-08-05 ENCOUNTER — PATIENT MESSAGE (OUTPATIENT)
Dept: INTERNAL MEDICINE | Facility: CLINIC | Age: 44
End: 2024-08-05
Payer: OTHER GOVERNMENT

## 2024-08-05 DIAGNOSIS — F90.9 ATTENTION DEFICIT HYPERACTIVITY DISORDER (ADHD), UNSPECIFIED ADHD TYPE: ICD-10-CM

## 2024-08-06 RX ORDER — LISDEXAMFETAMINE DIMESYLATE 50 MG/1
50 CAPSULE ORAL EVERY MORNING
Qty: 30 CAPSULE | Refills: 0 | Status: SHIPPED | OUTPATIENT
Start: 2024-08-06

## 2024-08-06 NOTE — TELEPHONE ENCOUNTER
Refill request for controlled substance.      Date of request: 8/6/2024    Medication requested: Vyvanse   Last OV: 3/4/24  Last UDS: 12/4/23  Contract signed: yes    Date:12/5/23  Next office visit: not scheduled- message sent to patient    Chelly Peace

## 2024-08-06 NOTE — TELEPHONE ENCOUNTER
From: Malinda Saucedo  To: Ant Carlos  Sent: 8/5/2024 7:11 PM EDT  Subject: Wegovy    I've hit a plateau in my weight loss, but would like to lose at least another 20 or 30 pounds. Is it possible to restart wegovy or to change to another weight loss injection? Would that help?

## 2024-08-12 ENCOUNTER — PRIOR AUTHORIZATION (OUTPATIENT)
Dept: INTERNAL MEDICINE | Facility: CLINIC | Age: 44
End: 2024-08-12
Payer: OTHER GOVERNMENT

## 2024-08-12 NOTE — TELEPHONE ENCOUNTER
PA REQUIRED:    Tirzepatide-Weight Management (ZEPBOUND) 2.5 MG/0.5ML solution auto-injector (08/08/2024)     INDEXED VIA ONBASE

## 2024-08-22 ENCOUNTER — PATIENT MESSAGE (OUTPATIENT)
Dept: INTERNAL MEDICINE | Facility: CLINIC | Age: 44
End: 2024-08-22
Payer: OTHER GOVERNMENT

## 2024-08-22 ENCOUNTER — PRIOR AUTHORIZATION (OUTPATIENT)
Dept: INTERNAL MEDICINE | Facility: CLINIC | Age: 44
End: 2024-08-22
Payer: OTHER GOVERNMENT

## 2024-08-22 RX ORDER — SEMAGLUTIDE 2.4 MG/.75ML
2.4 INJECTION, SOLUTION SUBCUTANEOUS WEEKLY
Qty: 9 ML | Refills: 3 | Status: SHIPPED | OUTPATIENT
Start: 2024-08-22

## 2024-08-22 NOTE — TELEPHONE ENCOUNTER
From: Malinda Saucedo  To: Ant Carlos  Sent: 8/22/2024 12:15 PM EDT  Subject: Wegovy    The prescription for zepbound was denied, so could you please put in a refill for the wegovy 2.4mg to [facility]?

## 2024-08-22 NOTE — TELEPHONE ENCOUNTER
PA REQUIRED FOR FOLLOWING MEDICATION:    Semaglutide-Weight Management (Wegovy) 2.4 MG/0.75ML solution auto-injector (08/22/2024)     INDEXED VIA ONBASE

## 2024-09-12 ENCOUNTER — OFFICE VISIT (OUTPATIENT)
Dept: INTERNAL MEDICINE | Facility: CLINIC | Age: 44
End: 2024-09-12
Payer: OTHER GOVERNMENT

## 2024-09-12 VITALS
HEART RATE: 86 BPM | SYSTOLIC BLOOD PRESSURE: 117 MMHG | TEMPERATURE: 97.5 F | WEIGHT: 196.6 LBS | BODY MASS INDEX: 33.57 KG/M2 | HEIGHT: 64 IN | OXYGEN SATURATION: 98 % | DIASTOLIC BLOOD PRESSURE: 83 MMHG

## 2024-09-12 DIAGNOSIS — Z13.220 SCREENING FOR LIPID DISORDERS: ICD-10-CM

## 2024-09-12 DIAGNOSIS — F90.9 ATTENTION DEFICIT HYPERACTIVITY DISORDER (ADHD), UNSPECIFIED ADHD TYPE: ICD-10-CM

## 2024-09-12 DIAGNOSIS — Z00.00 ANNUAL PHYSICAL EXAM: Primary | ICD-10-CM

## 2024-09-12 DIAGNOSIS — Z79.899 ENCOUNTER FOR LONG-TERM (CURRENT) USE OF OTHER MEDICATIONS: ICD-10-CM

## 2024-09-12 DIAGNOSIS — Z11.59 NEED FOR HEPATITIS C SCREENING TEST: ICD-10-CM

## 2024-09-12 DIAGNOSIS — Z23 NEED FOR INFLUENZA VACCINATION: ICD-10-CM

## 2024-09-12 DIAGNOSIS — Z23 NEED FOR COVID-19 VACCINE: ICD-10-CM

## 2024-09-12 DIAGNOSIS — J30.2 SEASONAL ALLERGIES: ICD-10-CM

## 2024-09-12 LAB
AMPHET+METHAMPHET UR QL: POSITIVE
AMPHETAMINE INTERNAL CONTROL: ABNORMAL
AMPHETAMINES UR QL: NEGATIVE
BARBITURATE INTERNAL CONTROL: ABNORMAL
BARBITURATES UR QL SCN: NEGATIVE
BENZODIAZ UR QL SCN: NEGATIVE
BENZODIAZEPINE INTERNAL CONTROL: ABNORMAL
BUPRENORPHINE INTERNAL CONTROL: ABNORMAL
BUPRENORPHINE SERPL-MCNC: NEGATIVE NG/ML
CANNABINOIDS SERPL QL: NEGATIVE
COCAINE INTERNAL CONTROL: ABNORMAL
COCAINE UR QL: NEGATIVE
EXPIRATION DATE: ABNORMAL
Lab: ABNORMAL
MDMA (ECSTASY) INTERNAL CONTROL: ABNORMAL
MDMA UR QL SCN: NEGATIVE
METHADONE INTERNAL CONTROL: ABNORMAL
METHADONE UR QL SCN: NEGATIVE
METHAMPHETAMINE INTERNAL CONTROL: ABNORMAL
MORPHINE INTERNAL CONTROL: ABNORMAL
MORPHINE/OPIATES SCREEN, URINE: NEGATIVE
OXYCODONE INTERNAL CONTROL: ABNORMAL
OXYCODONE UR QL SCN: NEGATIVE
PCP UR QL SCN: NEGATIVE
PHENCYCLIDINE INTERNAL CONTROL: ABNORMAL
THC INTERNAL CONTROL: ABNORMAL

## 2024-09-12 PROCEDURE — 90656 IIV3 VACC NO PRSV 0.5 ML IM: CPT | Performed by: INTERNAL MEDICINE

## 2024-09-12 PROCEDURE — 91320 SARSCV2 VAC 30MCG TRS-SUC IM: CPT | Performed by: INTERNAL MEDICINE

## 2024-09-12 PROCEDURE — 90471 IMMUNIZATION ADMIN: CPT | Performed by: INTERNAL MEDICINE

## 2024-09-12 PROCEDURE — 90480 ADMN SARSCOV2 VAC 1/ONLY CMP: CPT | Performed by: INTERNAL MEDICINE

## 2024-09-12 PROCEDURE — 99396 PREV VISIT EST AGE 40-64: CPT | Performed by: INTERNAL MEDICINE

## 2024-09-12 RX ORDER — TRAMADOL HYDROCHLORIDE 50 MG/1
TABLET ORAL
COMMUNITY
Start: 2024-07-19

## 2024-09-12 RX ORDER — LISDEXAMFETAMINE DIMESYLATE 50 MG/1
50 CAPSULE ORAL EVERY MORNING
Qty: 30 CAPSULE | Refills: 0 | Status: SHIPPED | OUTPATIENT
Start: 2024-09-12

## 2024-09-12 RX ORDER — ESZOPICLONE 2 MG/1
TABLET, FILM COATED ORAL
COMMUNITY

## 2024-09-12 RX ORDER — CETIRIZINE HYDROCHLORIDE 10 MG/1
10 TABLET ORAL DAILY
Qty: 90 TABLET | Refills: 3 | Status: SHIPPED | OUTPATIENT
Start: 2024-09-12

## 2024-10-10 DIAGNOSIS — F90.9 ATTENTION DEFICIT HYPERACTIVITY DISORDER (ADHD), UNSPECIFIED ADHD TYPE: ICD-10-CM

## 2024-10-11 NOTE — TELEPHONE ENCOUNTER
Refill request for controlled substance.      Date of request: 10/11/2024    Medication requested: Vyvanse   Last OV: 9/12/24  Last UDS: 9/12/24  Contract signed: yes    Date:12/5/23  Next office visit: 3/4/25    Chelly Peace

## 2024-10-14 RX ORDER — LISDEXAMFETAMINE DIMESYLATE 50 MG/1
50 CAPSULE ORAL EVERY MORNING
Qty: 30 CAPSULE | Refills: 0 | Status: SHIPPED | OUTPATIENT
Start: 2024-10-14

## 2024-10-21 ENCOUNTER — PATIENT MESSAGE (OUTPATIENT)
Dept: INTERNAL MEDICINE | Facility: CLINIC | Age: 44
End: 2024-10-21
Payer: OTHER GOVERNMENT

## 2024-10-21 DIAGNOSIS — F90.9 ATTENTION DEFICIT HYPERACTIVITY DISORDER (ADHD), UNSPECIFIED ADHD TYPE: ICD-10-CM

## 2024-10-22 RX ORDER — LISDEXAMFETAMINE DIMESYLATE 50 MG/1
50 CAPSULE ORAL EVERY MORNING
Qty: 30 CAPSULE | Refills: 0 | Status: SHIPPED | OUTPATIENT
Start: 2024-10-22

## 2024-11-18 DIAGNOSIS — F90.9 ATTENTION DEFICIT HYPERACTIVITY DISORDER (ADHD), UNSPECIFIED ADHD TYPE: ICD-10-CM

## 2024-11-19 NOTE — TELEPHONE ENCOUNTER
Rx Refill Note  Requested Prescriptions     Pending Prescriptions Disp Refills    lisdexamfetamine (Vyvanse) 50 MG capsule 30 capsule 0     Sig: Take 1 capsule by mouth Every Morning      Last office visit with prescribing clinician: 9/12/2024   Last telemedicine visit with prescribing clinician: Visit date not found   Next office visit with prescribing clinician: 3/4/2025                         Would you like a call back once the refill request has been completed: [] Yes [] No    If the office needs to give you a call back, can they leave a voicemail: [] Yes [] No    Felipe Jameson  11/19/24, 08:07 EST    Refill request for  controlled substance.      Date of request: 11/19/2024  (Please change date if request date is different than today's)  Medication requested: Vyvanse   Last OV: 9/12/2024  Last UDS: 9/12/2024  Contract signed: yes    Date:12/05/2023  Next office visit: 3/04/2025    Felipe Jameson

## 2024-11-21 RX ORDER — LISDEXAMFETAMINE DIMESYLATE 50 MG/1
50 CAPSULE ORAL EVERY MORNING
Qty: 30 CAPSULE | Refills: 0 | Status: SHIPPED | OUTPATIENT
Start: 2024-11-21

## 2024-11-29 ENCOUNTER — PATIENT MESSAGE (OUTPATIENT)
Dept: INTERNAL MEDICINE | Facility: CLINIC | Age: 44
End: 2024-11-29
Payer: OTHER GOVERNMENT

## 2024-11-29 DIAGNOSIS — F90.9 ATTENTION DEFICIT HYPERACTIVITY DISORDER (ADHD), UNSPECIFIED ADHD TYPE: ICD-10-CM

## 2024-12-02 RX ORDER — LISDEXAMFETAMINE DIMESYLATE 50 MG/1
50 CAPSULE ORAL EVERY MORNING
Qty: 30 CAPSULE | Refills: 0 | Status: SHIPPED | OUTPATIENT
Start: 2024-12-02

## 2024-12-02 NOTE — TELEPHONE ENCOUNTER
Request for Controlled Substance      Date of Request: 12/2/2024  Medication: Vyvanse  Last OV: 9/12/24  Next OV: 3/4/25  Last UDS: 9/12/24  Last Narcotic Consent: 12/5/23    Pt needs this to go to [facility] [place] instead of [facility]

## 2024-12-28 DIAGNOSIS — F90.9 ATTENTION DEFICIT HYPERACTIVITY DISORDER (ADHD), UNSPECIFIED ADHD TYPE: ICD-10-CM

## 2024-12-30 RX ORDER — LISDEXAMFETAMINE DIMESYLATE 50 MG/1
50 CAPSULE ORAL EVERY MORNING
Qty: 30 CAPSULE | Refills: 0 | Status: SHIPPED | OUTPATIENT
Start: 2024-12-30

## 2024-12-30 NOTE — TELEPHONE ENCOUNTER
Request for Controlled Substance      Date of Request: 12/30/2024  Medication: Vyvanse  Last OV: 9/12/24  Next OV: 3/4/25  Last UDS: 9/12/24  Last Narcotic Consent: 12/5/23

## 2025-01-05 ENCOUNTER — PATIENT MESSAGE (OUTPATIENT)
Dept: INTERNAL MEDICINE | Facility: CLINIC | Age: 45
End: 2025-01-05
Payer: OTHER GOVERNMENT

## 2025-02-02 DIAGNOSIS — F90.9 ATTENTION DEFICIT HYPERACTIVITY DISORDER (ADHD), UNSPECIFIED ADHD TYPE: ICD-10-CM

## 2025-02-03 RX ORDER — LISDEXAMFETAMINE DIMESYLATE 50 MG/1
50 CAPSULE ORAL EVERY MORNING
Qty: 30 CAPSULE | Refills: 0 | Status: SHIPPED | OUTPATIENT
Start: 2025-02-03

## 2025-02-03 NOTE — TELEPHONE ENCOUNTER
Rx Refill Note  Requested Prescriptions     Pending Prescriptions Disp Refills    lisdexamfetamine (Vyvanse) 50 MG capsule 30 capsule 0     Sig: Take 1 capsule by mouth Every Morning      Last office visit with prescribing clinician: 9/12/2024   Last telemedicine visit with prescribing clinician: Visit date not found   Next office visit with prescribing clinician: 3/4/2025                         Would you like a call back once the refill request has been completed: [] Yes [] No    If the office needs to give you a call back, can they leave a voicemail: [] Yes [] No    Felipe Jameson  02/03/25, 07:51 EST    Refill request for  controlled substance.      Date of request: 2/3/2025  (Please change date if request date is different than today's)  Medication requested: Vyvanse   Last OV: 9/12/2024  Last UDS: 9/12/2024  Contract signed: yes    Date:12/05/2023  Next office visit: 3/4/2025    Felipe Jameson
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
## 2025-03-04 ENCOUNTER — OFFICE VISIT (OUTPATIENT)
Dept: INTERNAL MEDICINE | Facility: CLINIC | Age: 45
End: 2025-03-04
Payer: OTHER GOVERNMENT

## 2025-03-04 VITALS
SYSTOLIC BLOOD PRESSURE: 105 MMHG | WEIGHT: 175 LBS | HEART RATE: 99 BPM | BODY MASS INDEX: 30.04 KG/M2 | TEMPERATURE: 96.6 F | DIASTOLIC BLOOD PRESSURE: 73 MMHG | OXYGEN SATURATION: 97 %

## 2025-03-04 DIAGNOSIS — Z12.4 CERVICAL CANCER SCREENING: ICD-10-CM

## 2025-03-04 DIAGNOSIS — Z79.899 ENCOUNTER FOR LONG-TERM (CURRENT) USE OF MEDICATIONS: ICD-10-CM

## 2025-03-04 DIAGNOSIS — F90.9 ATTENTION DEFICIT HYPERACTIVITY DISORDER (ADHD), UNSPECIFIED ADHD TYPE: Primary | ICD-10-CM

## 2025-03-04 DIAGNOSIS — M32.9 SYSTEMIC LUPUS ERYTHEMATOSUS, UNSPECIFIED SLE TYPE, UNSPECIFIED ORGAN INVOLVEMENT STATUS: ICD-10-CM

## 2025-03-04 LAB
AMPHET+METHAMPHET UR QL: POSITIVE
AMPHETAMINE INTERNAL CONTROL: ABNORMAL
AMPHETAMINES UR QL: NEGATIVE
BARBITURATE INTERNAL CONTROL: ABNORMAL
BARBITURATES UR QL SCN: NEGATIVE
BENZODIAZ UR QL SCN: NEGATIVE
BENZODIAZEPINE INTERNAL CONTROL: ABNORMAL
BUPRENORPHINE INTERNAL CONTROL: ABNORMAL
BUPRENORPHINE SERPL-MCNC: NEGATIVE NG/ML
CANNABINOIDS SERPL QL: NEGATIVE
COCAINE INTERNAL CONTROL: ABNORMAL
COCAINE UR QL: NEGATIVE
EXPIRATION DATE: 0
Lab: 0
MDMA (ECSTASY) INTERNAL CONTROL: ABNORMAL
MDMA UR QL SCN: NEGATIVE
METHADONE INTERNAL CONTROL: ABNORMAL
METHADONE UR QL SCN: NEGATIVE
METHAMPHETAMINE INTERNAL CONTROL: ABNORMAL
MORPHINE INTERNAL CONTROL: ABNORMAL
MORPHINE/OPIATES SCREEN, URINE: NEGATIVE
OXYCODONE INTERNAL CONTROL: ABNORMAL
OXYCODONE UR QL SCN: NEGATIVE
PCP UR QL SCN: NEGATIVE
PHENCYCLIDINE INTERNAL CONTROL: ABNORMAL
THC INTERNAL CONTROL: ABNORMAL

## 2025-03-04 PROCEDURE — 80305 DRUG TEST PRSMV DIR OPT OBS: CPT | Performed by: INTERNAL MEDICINE

## 2025-03-04 PROCEDURE — 99214 OFFICE O/P EST MOD 30 MIN: CPT | Performed by: INTERNAL MEDICINE

## 2025-03-04 RX ORDER — LISDEXAMFETAMINE DIMESYLATE 50 MG/1
50 CAPSULE ORAL EVERY MORNING
Qty: 90 CAPSULE | Refills: 0 | Status: SHIPPED | OUTPATIENT
Start: 2025-03-04

## 2025-03-04 RX ORDER — SEMAGLUTIDE 2.4 MG/.75ML
2.4 INJECTION, SOLUTION SUBCUTANEOUS WEEKLY
Qty: 9 ML | Refills: 3 | Status: SHIPPED | OUTPATIENT
Start: 2025-03-04

## 2025-03-04 RX ORDER — GABAPENTIN 600 MG/1
600 TABLET ORAL 2 TIMES DAILY
COMMUNITY
Start: 2025-01-31

## 2025-05-19 ENCOUNTER — PATIENT MESSAGE (OUTPATIENT)
Dept: INTERNAL MEDICINE | Facility: CLINIC | Age: 45
End: 2025-05-19
Payer: OTHER GOVERNMENT

## 2025-05-20 RX ORDER — FLUCONAZOLE 150 MG/1
150 TABLET ORAL
Qty: 3 TABLET | Refills: 0 | Status: SHIPPED | OUTPATIENT
Start: 2025-05-20

## 2025-06-01 DIAGNOSIS — F90.9 ATTENTION DEFICIT HYPERACTIVITY DISORDER (ADHD), UNSPECIFIED ADHD TYPE: ICD-10-CM

## 2025-06-02 NOTE — TELEPHONE ENCOUNTER
Request for Controlled Substance      Date of Request: 6/2/2025  Medication: Vyvanse  Last OV: 3/4/25  Next OV:    Last UDS: 3/4/25  Last Narcotic Consent: 12/5/23

## 2025-06-03 RX ORDER — LISDEXAMFETAMINE DIMESYLATE 50 MG/1
50 CAPSULE ORAL EVERY MORNING
Qty: 90 CAPSULE | Refills: 0 | Status: SHIPPED | OUTPATIENT
Start: 2025-06-03

## 2025-06-04 ENCOUNTER — TELEPHONE (OUTPATIENT)
Dept: INTERNAL MEDICINE | Facility: CLINIC | Age: 45
End: 2025-06-04
Payer: OTHER GOVERNMENT

## 2025-06-04 NOTE — TELEPHONE ENCOUNTER
[facility] called, they state that patient is currently taking tramadol (She states patient last had this filled on 04/30/2025 by a different provider) [facility] was [email or web address] and the pharmacists states that these 2 medications have a drug interaction where the stimulant can mask overdose symptoms of opioids. The pharmacy wants me to call them back with whatever Dr. Carlos's recommendations are.

## 2025-06-06 NOTE — TELEPHONE ENCOUNTER
Phone number for us to reach [facility] is [phone].   Reference number for this issue is [id number]   In no apparent distress. normal (ped)...

## 2025-06-18 ENCOUNTER — PATIENT MESSAGE (OUTPATIENT)
Dept: INTERNAL MEDICINE | Facility: CLINIC | Age: 45
End: 2025-06-18
Payer: OTHER GOVERNMENT

## 2025-06-19 RX ORDER — SEMAGLUTIDE 1.7 MG/.75ML
1.7 INJECTION, SOLUTION SUBCUTANEOUS WEEKLY
Qty: 3 ML | Refills: 0 | Status: SHIPPED | OUTPATIENT
Start: 2025-06-19

## 2025-07-21 RX ORDER — SEMAGLUTIDE 1.7 MG/.75ML
1.7 INJECTION, SOLUTION SUBCUTANEOUS WEEKLY
Qty: 3 ML | Refills: 0 | Status: SHIPPED | OUTPATIENT
Start: 2025-07-21

## 2025-08-11 ENCOUNTER — OFFICE VISIT (OUTPATIENT)
Dept: INTERNAL MEDICINE | Facility: CLINIC | Age: 45
End: 2025-08-11
Payer: OTHER GOVERNMENT

## 2025-08-11 VITALS
DIASTOLIC BLOOD PRESSURE: 77 MMHG | BODY MASS INDEX: 28.61 KG/M2 | HEART RATE: 100 BPM | HEIGHT: 64 IN | WEIGHT: 167.6 LBS | SYSTOLIC BLOOD PRESSURE: 120 MMHG | OXYGEN SATURATION: 98 % | TEMPERATURE: 97.2 F

## 2025-08-11 DIAGNOSIS — Z12.4 CERVICAL CANCER SCREENING: ICD-10-CM

## 2025-08-11 DIAGNOSIS — Z79.899 ENCOUNTER FOR LONG-TERM (CURRENT) USE OF MEDICATIONS: ICD-10-CM

## 2025-08-11 DIAGNOSIS — Z12.31 BREAST CANCER SCREENING BY MAMMOGRAM: ICD-10-CM

## 2025-08-11 DIAGNOSIS — M32.9 SYSTEMIC LUPUS ERYTHEMATOSUS, UNSPECIFIED SLE TYPE, UNSPECIFIED ORGAN INVOLVEMENT STATUS: ICD-10-CM

## 2025-08-11 DIAGNOSIS — F90.9 ATTENTION DEFICIT HYPERACTIVITY DISORDER (ADHD), UNSPECIFIED ADHD TYPE: Primary | ICD-10-CM

## 2025-08-11 DIAGNOSIS — L73.2 HIDRADENITIS SUPPURATIVA: ICD-10-CM

## 2025-08-11 PROCEDURE — 99214 OFFICE O/P EST MOD 30 MIN: CPT | Performed by: INTERNAL MEDICINE

## 2025-08-11 RX ORDER — ZIPRASIDONE HYDROCHLORIDE 40 MG/1
40 CAPSULE ORAL 2 TIMES DAILY WITH MEALS
COMMUNITY

## 2025-08-11 RX ORDER — ESZOPICLONE 3 MG/1
3 TABLET, FILM COATED ORAL NIGHTLY
COMMUNITY

## 2025-08-11 RX ORDER — SPIRONOLACTONE 100 MG/1
100 TABLET, FILM COATED ORAL DAILY
Qty: 90 TABLET | Refills: 3 | Status: SHIPPED | OUTPATIENT
Start: 2025-08-11

## 2025-08-11 RX ORDER — SEMAGLUTIDE 1.7 MG/.75ML
1.7 INJECTION, SOLUTION SUBCUTANEOUS WEEKLY
Qty: 3 ML | Refills: 0 | Status: SHIPPED | OUTPATIENT
Start: 2025-08-11